# Patient Record
Sex: FEMALE | Race: WHITE | Employment: UNEMPLOYED | URBAN - NONMETROPOLITAN AREA
[De-identification: names, ages, dates, MRNs, and addresses within clinical notes are randomized per-mention and may not be internally consistent; named-entity substitution may affect disease eponyms.]

---

## 2017-01-14 RX ORDER — HYDROCODONE BITARTRATE AND ACETAMINOPHEN 10; 325 MG/1; MG/1
1 TABLET ORAL EVERY 6 HOURS PRN
Qty: 110 TABLET | Refills: 0 | Status: SHIPPED | OUTPATIENT
Start: 2017-01-14 | End: 2017-02-16 | Stop reason: SDUPTHER

## 2017-01-14 RX ORDER — ALPRAZOLAM 1 MG/1
1 TABLET ORAL 3 TIMES DAILY PRN
Qty: 90 TABLET | Refills: 0 | Status: ON HOLD | OUTPATIENT
Start: 2017-01-14 | End: 2017-02-08 | Stop reason: HOSPADM

## 2017-01-14 RX ORDER — ZOLPIDEM TARTRATE 12.5 MG/1
12.5 TABLET, FILM COATED, EXTENDED RELEASE ORAL NIGHTLY PRN
Qty: 30 TABLET | Refills: 0 | Status: SHIPPED | OUTPATIENT
Start: 2017-01-14 | End: 2017-02-16 | Stop reason: SDUPTHER

## 2017-01-30 ENCOUNTER — APPOINTMENT (OUTPATIENT)
Dept: GENERAL RADIOLOGY | Age: 47
DRG: 917 | End: 2017-01-30
Payer: COMMERCIAL

## 2017-01-30 ENCOUNTER — HOSPITAL ENCOUNTER (INPATIENT)
Age: 47
LOS: 9 days | Discharge: HOME | DRG: 917 | End: 2017-02-08
Attending: EMERGENCY MEDICINE | Admitting: HOSPITALIST
Payer: COMMERCIAL

## 2017-01-30 DIAGNOSIS — T24.202A: ICD-10-CM

## 2017-01-30 DIAGNOSIS — N30.00 ACUTE CYSTITIS WITHOUT HEMATURIA: ICD-10-CM

## 2017-01-30 DIAGNOSIS — T50.902A POLYSUBSTANCE OVERDOSE, INTENTIONAL SELF-HARM, INITIAL ENCOUNTER (HCC): ICD-10-CM

## 2017-01-30 DIAGNOSIS — J96.01 ACUTE HYPOXEMIC RESPIRATORY FAILURE (HCC): Primary | ICD-10-CM

## 2017-01-30 PROBLEM — N30.01 ACUTE CYSTITIS WITH HEMATURIA: Status: ACTIVE | Noted: 2017-01-30

## 2017-01-30 PROBLEM — J96.02 ACUTE RESPIRATORY FAILURE WITH HYPOXIA AND HYPERCAPNIA (HCC): Status: ACTIVE | Noted: 2017-01-30

## 2017-01-30 PROBLEM — F33.1 MODERATE EPISODE OF RECURRENT MAJOR DEPRESSIVE DISORDER (HCC): Status: ACTIVE | Noted: 2017-01-30

## 2017-01-30 LAB
ACETAMINOPHEN LEVEL: <15 UG/ML
ALBUMIN SERPL-MCNC: 4.4 G/DL (ref 3.5–5.2)
ALP BLD-CCNC: 122 U/L (ref 35–104)
ALT SERPL-CCNC: 25 U/L (ref 5–33)
AMPHETAMINE SCREEN, URINE: NEGATIVE
ANION GAP SERPL CALCULATED.3IONS-SCNC: 20 MMOL/L (ref 7–19)
AST SERPL-CCNC: 27 U/L (ref 5–32)
BACTERIA: ABNORMAL /HPF
BANDED NEUTROPHILS RELATIVE PERCENT: 3 % (ref 0–5)
BARBITURATE SCREEN URINE: NEGATIVE
BASE EXCESS ARTERIAL: -1.2 MMOL/L (ref -2–2)
BASE EXCESS ARTERIAL: -5 MMOL/L (ref -2–2)
BASOPHILS ABSOLUTE: 0.4 K/UL (ref 0–0.2)
BASOPHILS RELATIVE PERCENT: 2 % (ref 0–1)
BENZODIAZEPINE SCREEN, URINE: POSITIVE
BILIRUB SERPL-MCNC: <0.2 MG/DL (ref 0.2–1.2)
BILIRUBIN URINE: NEGATIVE
BLOOD, URINE: ABNORMAL
BUN BLDV-MCNC: 13 MG/DL (ref 6–20)
CALCIUM SERPL-MCNC: 8.8 MG/DL (ref 8.6–10)
CANNABINOID SCREEN URINE: NEGATIVE
CARBOXYHEMOGLOBIN ARTERIAL: 1.3 % (ref 0–5)
CARBOXYHEMOGLOBIN ARTERIAL: 1.7 % (ref 0–5)
CHLORIDE BLD-SCNC: 103 MMOL/L (ref 98–111)
CLARITY: ABNORMAL
CO2: 22 MMOL/L (ref 22–29)
COCAINE METABOLITE SCREEN URINE: NEGATIVE
COLOR: YELLOW
CREAT SERPL-MCNC: 0.8 MG/DL (ref 0.5–0.9)
EOSINOPHILS ABSOLUTE: 0 K/UL (ref 0–0.6)
EOSINOPHILS RELATIVE PERCENT: 0 % (ref 0–5)
EPITHELIAL CELLS, UA: 2 /HPF (ref 0–5)
ETHANOL: <10 MG/DL (ref 0–0.08)
GFR NON-AFRICAN AMERICAN: >60
GLOBULIN: 3.5 G/DL
GLUCOSE BLD-MCNC: 132 MG/DL (ref 74–109)
GLUCOSE URINE: NEGATIVE MG/DL
HCG(URINE) PREGNANCY TEST: NEGATIVE
HCO3 ARTERIAL: 22.4 MMOL/L (ref 22–26)
HCO3 ARTERIAL: 23.6 MMOL/L (ref 22–26)
HCT VFR BLD CALC: 44.7 % (ref 37–47)
HEMOGLOBIN, ART, EXTENDED: 12.4 G/DL (ref 12–16)
HEMOGLOBIN, ART, EXTENDED: 13.6 G/DL (ref 12–16)
HEMOGLOBIN: 13.9 G/DL (ref 12–16)
HYALINE CASTS: 12 /HPF (ref 0–8)
KETONES, URINE: NEGATIVE MG/DL
LACTIC ACID: 4.5 MG/DL (ref 0.5–1.9)
LACTIC ACID: 4.7 MG/DL (ref 0.5–1.9)
LACTIC ACID: 5 MG/DL (ref 0.5–1.9)
LACTIC ACID: 5.2 MG/DL (ref 0.5–1.9)
LEUKOCYTE ESTERASE, URINE: NEGATIVE
LYMPHOCYTES ABSOLUTE: 1.3 K/UL (ref 1.1–4.5)
LYMPHOCYTES RELATIVE PERCENT: 6 % (ref 20–40)
Lab: ABNORMAL
MAGNESIUM: 2.2 MG/DL (ref 1.6–2.6)
MCH RBC QN AUTO: 28.6 PG (ref 27–31)
MCHC RBC AUTO-ENTMCNC: 31.1 G/DL (ref 33–37)
MCV RBC AUTO: 92 FL (ref 81–99)
METHEMOGLOBIN ARTERIAL: 0.8 %
METHEMOGLOBIN ARTERIAL: 1.3 %
MONOCYTES ABSOLUTE: 0.6 K/UL (ref 0–0.9)
MONOCYTES RELATIVE PERCENT: 3 % (ref 0–10)
NEUTROPHILS ABSOLUTE: 18.9 K/UL (ref 1.5–7.5)
NEUTROPHILS RELATIVE PERCENT: 86 % (ref 50–65)
NITRITE, URINE: POSITIVE
O2 CONTENT ARTERIAL: 16.4 ML/DL
O2 CONTENT ARTERIAL: 19.7 ML/DL
O2 SAT, ARTERIAL: 93.7 %
O2 SAT, ARTERIAL: 97.6 %
O2 THERAPY: ABNORMAL
O2 THERAPY: ABNORMAL
OPIATE SCREEN URINE: NEGATIVE
PCO2 ARTERIAL: 39 MMHG (ref 35–45)
PCO2 ARTERIAL: 50 MMHG (ref 35–45)
PDW BLD-RTO: 14.2 % (ref 11.5–14.5)
PH ARTERIAL: 7.26 (ref 7.35–7.45)
PH ARTERIAL: 7.39 (ref 7.35–7.45)
PH UA: 5.5
PLATELET # BLD: 387 K/UL (ref 130–400)
PLATELET SLIDE REVIEW: ADEQUATE
PMV BLD AUTO: 9 FL (ref 7.4–10.4)
PO2 ARTERIAL: 407 MMHG (ref 80–100)
PO2 ARTERIAL: 70 MMHG (ref 80–100)
POLYCHROMASIA: ABNORMAL
POTASSIUM SERPL-SCNC: 4.6 MMOL/L (ref 3.5–5)
POTASSIUM, WHOLE BLOOD: 3.7
POTASSIUM, WHOLE BLOOD: 4.5
PROTEIN UA: 30 MG/DL
RBC # BLD: 4.86 M/UL (ref 4.2–5.4)
RBC UA: 2 /HPF (ref 0–4)
SALICYLATE, SERUM: <3 MG/DL (ref 3–10)
SODIUM BLD-SCNC: 145 MMOL/L (ref 136–145)
SPECIFIC GRAVITY UA: 1.02
STOMATOCYTES: ABNORMAL
TOTAL CK: 601 U/L (ref 26–192)
TOTAL PROTEIN: 7.9 G/DL (ref 6.6–8.7)
UROBILINOGEN, URINE: 0.2 E.U./DL
WBC # BLD: 21.2 K/UL (ref 4.8–10.8)
WBC UA: 10 /HPF (ref 0–5)

## 2017-01-30 PROCEDURE — G0480 DRUG TEST DEF 1-7 CLASSES: HCPCS

## 2017-01-30 PROCEDURE — 36600 WITHDRAWAL OF ARTERIAL BLOOD: CPT

## 2017-01-30 PROCEDURE — 99291 CRITICAL CARE FIRST HOUR: CPT | Performed by: HOSPITALIST

## 2017-01-30 PROCEDURE — 96365 THER/PROPH/DIAG IV INF INIT: CPT

## 2017-01-30 PROCEDURE — 43753 TX GASTRO INTUB W/ASP: CPT | Performed by: EMERGENCY MEDICINE

## 2017-01-30 PROCEDURE — 82550 ASSAY OF CK (CPK): CPT

## 2017-01-30 PROCEDURE — 31500 INSERT EMERGENCY AIRWAY: CPT | Performed by: EMERGENCY MEDICINE

## 2017-01-30 PROCEDURE — 96375 TX/PRO/DX INJ NEW DRUG ADDON: CPT

## 2017-01-30 PROCEDURE — 93005 ELECTROCARDIOGRAM TRACING: CPT

## 2017-01-30 PROCEDURE — 71010 XR CHEST PORTABLE: CPT

## 2017-01-30 PROCEDURE — 81001 URINALYSIS AUTO W/SCOPE: CPT

## 2017-01-30 PROCEDURE — 2000000000 HC ICU R&B

## 2017-01-30 PROCEDURE — 6360000002 HC RX W HCPCS: Performed by: EMERGENCY MEDICINE

## 2017-01-30 PROCEDURE — 6360000002 HC RX W HCPCS: Performed by: HOSPITALIST

## 2017-01-30 PROCEDURE — 84132 ASSAY OF SERUM POTASSIUM: CPT

## 2017-01-30 PROCEDURE — 99291 CRITICAL CARE FIRST HOUR: CPT

## 2017-01-30 PROCEDURE — 6370000000 HC RX 637 (ALT 250 FOR IP): Performed by: HOSPITALIST

## 2017-01-30 PROCEDURE — 82803 BLOOD GASES ANY COMBINATION: CPT

## 2017-01-30 PROCEDURE — 83605 ASSAY OF LACTIC ACID: CPT

## 2017-01-30 PROCEDURE — 87185 SC STD ENZYME DETCJ PER NZM: CPT

## 2017-01-30 PROCEDURE — 36415 COLL VENOUS BLD VENIPUNCTURE: CPT

## 2017-01-30 PROCEDURE — 94002 VENT MGMT INPAT INIT DAY: CPT

## 2017-01-30 PROCEDURE — C9113 INJ PANTOPRAZOLE SODIUM, VIA: HCPCS | Performed by: HOSPITALIST

## 2017-01-30 PROCEDURE — 83735 ASSAY OF MAGNESIUM: CPT

## 2017-01-30 PROCEDURE — 94640 AIRWAY INHALATION TREATMENT: CPT

## 2017-01-30 PROCEDURE — 87070 CULTURE OTHR SPECIMN AEROBIC: CPT

## 2017-01-30 PROCEDURE — 2580000003 HC RX 258: Performed by: HOSPITALIST

## 2017-01-30 PROCEDURE — 2580000003 HC RX 258: Performed by: EMERGENCY MEDICINE

## 2017-01-30 PROCEDURE — 80307 DRUG TEST PRSMV CHEM ANLYZR: CPT

## 2017-01-30 PROCEDURE — 2700000000 HC OXYGEN THERAPY PER DAY

## 2017-01-30 PROCEDURE — 0BH17EZ INSERTION OF ENDOTRACHEAL AIRWAY INTO TRACHEA, VIA NATURAL OR ARTIFICIAL OPENING: ICD-10-PCS | Performed by: EMERGENCY MEDICINE

## 2017-01-30 PROCEDURE — 80053 COMPREHEN METABOLIC PANEL: CPT

## 2017-01-30 PROCEDURE — 99291 CRITICAL CARE FIRST HOUR: CPT | Performed by: EMERGENCY MEDICINE

## 2017-01-30 PROCEDURE — 85025 COMPLETE CBC W/AUTO DIFF WBC: CPT

## 2017-01-30 PROCEDURE — 87205 SMEAR GRAM STAIN: CPT

## 2017-01-30 PROCEDURE — 87086 URINE CULTURE/COLONY COUNT: CPT

## 2017-01-30 PROCEDURE — 5A1955Z RESPIRATORY VENTILATION, GREATER THAN 96 CONSECUTIVE HOURS: ICD-10-PCS | Performed by: INTERNAL MEDICINE

## 2017-01-30 PROCEDURE — 2500000003 HC RX 250 WO HCPCS: Performed by: EMERGENCY MEDICINE

## 2017-01-30 PROCEDURE — 2580000003 HC RX 258: Performed by: INTERNAL MEDICINE

## 2017-01-30 PROCEDURE — 6360000002 HC RX W HCPCS

## 2017-01-30 PROCEDURE — 31500 INSERT EMERGENCY AIRWAY: CPT

## 2017-01-30 PROCEDURE — 81025 URINE PREGNANCY TEST: CPT

## 2017-01-30 RX ORDER — SODIUM CHLORIDE 9 MG/ML
INJECTION, SOLUTION INTRAVENOUS CONTINUOUS
Status: DISCONTINUED | OUTPATIENT
Start: 2017-01-30 | End: 2017-02-03

## 2017-01-30 RX ORDER — 0.9 % SODIUM CHLORIDE 0.9 %
500 INTRAVENOUS SOLUTION INTRAVENOUS ONCE
Status: COMPLETED | OUTPATIENT
Start: 2017-01-30 | End: 2017-01-30

## 2017-01-30 RX ORDER — PROPOFOL 10 MG/ML
10 INJECTION, EMULSION INTRAVENOUS CONTINUOUS
Status: DISCONTINUED | OUTPATIENT
Start: 2017-01-30 | End: 2017-02-05

## 2017-01-30 RX ORDER — SODIUM CHLORIDE 0.9 % (FLUSH) 0.9 %
10 SYRINGE (ML) INJECTION PRN
Status: DISCONTINUED | OUTPATIENT
Start: 2017-01-30 | End: 2017-01-31

## 2017-01-30 RX ORDER — PROPOFOL 10 MG/ML
INJECTION, EMULSION INTRAVENOUS
Status: COMPLETED
Start: 2017-01-30 | End: 2017-01-30

## 2017-01-30 RX ORDER — PROPOFOL 10 MG/ML
10 INJECTION, EMULSION INTRAVENOUS
Status: DISCONTINUED | OUTPATIENT
Start: 2017-01-30 | End: 2017-01-30

## 2017-01-30 RX ORDER — PANTOPRAZOLE SODIUM 40 MG/10ML
40 INJECTION, POWDER, LYOPHILIZED, FOR SOLUTION INTRAVENOUS DAILY
Status: DISCONTINUED | OUTPATIENT
Start: 2017-01-30 | End: 2017-02-08 | Stop reason: ALTCHOICE

## 2017-01-30 RX ORDER — CHLORHEXIDINE GLUCONATE 0.12 MG/ML
15 RINSE ORAL 2 TIMES DAILY
Status: DISCONTINUED | OUTPATIENT
Start: 2017-01-30 | End: 2017-02-05

## 2017-01-30 RX ORDER — IPRATROPIUM BROMIDE AND ALBUTEROL SULFATE 2.5; .5 MG/3ML; MG/3ML
1 SOLUTION RESPIRATORY (INHALATION) EVERY 4 HOURS
Status: DISCONTINUED | OUTPATIENT
Start: 2017-01-30 | End: 2017-02-06

## 2017-01-30 RX ORDER — LORAZEPAM 2 MG/ML
2 INJECTION INTRAMUSCULAR
Status: DISCONTINUED | OUTPATIENT
Start: 2017-01-30 | End: 2017-02-06

## 2017-01-30 RX ORDER — LORAZEPAM 1 MG/1
2 TABLET ORAL
Status: DISCONTINUED | OUTPATIENT
Start: 2017-01-30 | End: 2017-02-06

## 2017-01-30 RX ORDER — ACETAMINOPHEN 325 MG/1
650 TABLET ORAL EVERY 4 HOURS PRN
Status: DISCONTINUED | OUTPATIENT
Start: 2017-01-30 | End: 2017-02-08 | Stop reason: HOSPADM

## 2017-01-30 RX ORDER — LORAZEPAM 1 MG/1
4 TABLET ORAL
Status: DISCONTINUED | OUTPATIENT
Start: 2017-01-30 | End: 2017-02-06

## 2017-01-30 RX ORDER — SODIUM CHLORIDE 0.9 % (FLUSH) 0.9 %
10 SYRINGE (ML) INJECTION EVERY 12 HOURS SCHEDULED
Status: DISCONTINUED | OUTPATIENT
Start: 2017-01-30 | End: 2017-01-31

## 2017-01-30 RX ORDER — LORAZEPAM 1 MG/1
1 TABLET ORAL
Status: DISCONTINUED | OUTPATIENT
Start: 2017-01-30 | End: 2017-02-06

## 2017-01-30 RX ORDER — LORAZEPAM 1 MG/1
3 TABLET ORAL
Status: DISCONTINUED | OUTPATIENT
Start: 2017-01-30 | End: 2017-02-06

## 2017-01-30 RX ORDER — ONDANSETRON 2 MG/ML
4 INJECTION INTRAMUSCULAR; INTRAVENOUS EVERY 6 HOURS PRN
Status: DISCONTINUED | OUTPATIENT
Start: 2017-01-30 | End: 2017-02-08 | Stop reason: HOSPADM

## 2017-01-30 RX ORDER — LORAZEPAM 2 MG/ML
3 INJECTION INTRAMUSCULAR
Status: DISCONTINUED | OUTPATIENT
Start: 2017-01-30 | End: 2017-02-06

## 2017-01-30 RX ORDER — LORAZEPAM 2 MG/ML
1 INJECTION INTRAMUSCULAR
Status: DISCONTINUED | OUTPATIENT
Start: 2017-01-30 | End: 2017-02-06

## 2017-01-30 RX ORDER — LORAZEPAM 2 MG/ML
4 INJECTION INTRAMUSCULAR
Status: DISCONTINUED | OUTPATIENT
Start: 2017-01-30 | End: 2017-02-06

## 2017-01-30 RX ADMIN — SODIUM CHLORIDE 500 ML: 9 INJECTION, SOLUTION INTRAVENOUS at 20:02

## 2017-01-30 RX ADMIN — CEFTRIAXONE 1 G: 1 INJECTION, POWDER, FOR SOLUTION INTRAMUSCULAR; INTRAVENOUS at 10:28

## 2017-01-30 RX ADMIN — PROPOFOL 30 MCG/KG/MIN: 10 INJECTION, EMULSION INTRAVENOUS at 18:00

## 2017-01-30 RX ADMIN — SILVER SULFADIAZINE: 10 CREAM TOPICAL at 12:07

## 2017-01-30 RX ADMIN — SODIUM CHLORIDE: 9 INJECTION, SOLUTION INTRAVENOUS at 20:02

## 2017-01-30 RX ADMIN — ENOXAPARIN SODIUM 40 MG: 40 INJECTION SUBCUTANEOUS at 16:02

## 2017-01-30 RX ADMIN — IPRATROPIUM BROMIDE AND ALBUTEROL SULFATE 1 AMPULE: .5; 3 SOLUTION RESPIRATORY (INHALATION) at 23:12

## 2017-01-30 RX ADMIN — MEROPENEM 1 G: 1 INJECTION, POWDER, FOR SOLUTION INTRAVENOUS at 18:14

## 2017-01-30 RX ADMIN — IPRATROPIUM BROMIDE AND ALBUTEROL SULFATE 1 AMPULE: .5; 3 SOLUTION RESPIRATORY (INHALATION) at 16:09

## 2017-01-30 RX ADMIN — PROPOFOL 1000 MG: 10 INJECTION, EMULSION INTRAVENOUS at 10:12

## 2017-01-30 RX ADMIN — PROPOFOL 30 MCG/KG/MIN: 10 INJECTION, EMULSION INTRAVENOUS at 22:13

## 2017-01-30 RX ADMIN — PANTOPRAZOLE SODIUM 40 MG: 40 INJECTION, POWDER, FOR SOLUTION INTRAVENOUS at 16:02

## 2017-01-30 RX ADMIN — IPRATROPIUM BROMIDE AND ALBUTEROL SULFATE 1 AMPULE: .5; 3 SOLUTION RESPIRATORY (INHALATION) at 20:08

## 2017-01-30 RX ADMIN — CHLORHEXIDINE GLUCONATE 15 ML: 1.2 RINSE ORAL at 21:55

## 2017-01-30 ASSESSMENT — PULMONARY FUNCTION TESTS
PIF_VALUE: 16.5
PIF_VALUE: 31.5
PIF_VALUE: 23.1
PIF_VALUE: 18.9
PIF_VALUE: 18.9
PIF_VALUE: 12.3
PIF_VALUE: 57.7
PIF_VALUE: 18.6
PIF_VALUE: 20.6
PIF_VALUE: 21.8
PIF_VALUE: 19.8
PIF_VALUE: 20

## 2017-01-31 ENCOUNTER — APPOINTMENT (OUTPATIENT)
Dept: GENERAL RADIOLOGY | Age: 47
DRG: 917 | End: 2017-01-31
Payer: COMMERCIAL

## 2017-01-31 LAB
ANION GAP SERPL CALCULATED.3IONS-SCNC: 19 MMOL/L (ref 7–19)
BANDED NEUTROPHILS RELATIVE PERCENT: 2 % (ref 0–5)
BASE EXCESS ARTERIAL: -0.2 MMOL/L (ref -2–2)
BASOPHILS ABSOLUTE: 0 K/UL (ref 0–0.2)
BASOPHILS RELATIVE PERCENT: 0 % (ref 0–1)
BUN BLDV-MCNC: 12 MG/DL (ref 6–20)
CALCIUM SERPL-MCNC: 8 MG/DL (ref 8.6–10)
CARBOXYHEMOGLOBIN ARTERIAL: 1.4 % (ref 0–5)
CHLORIDE BLD-SCNC: 100 MMOL/L (ref 98–111)
CO2: 23 MMOL/L (ref 22–29)
CREAT SERPL-MCNC: 0.9 MG/DL (ref 0.5–0.9)
EKG P AXIS: 67 DEGREES
EKG P-R INTERVAL: 112 MS
EKG Q-T INTERVAL: 328 MS
EKG QRS DURATION: 94 MS
EKG QTC CALCULATION (BAZETT): 429 MS
EKG T AXIS: 60 DEGREES
EOSINOPHILS ABSOLUTE: 0 K/UL (ref 0–0.6)
EOSINOPHILS RELATIVE PERCENT: 0 % (ref 0–5)
GFR NON-AFRICAN AMERICAN: >60
GLUCOSE BLD-MCNC: 120 MG/DL (ref 74–109)
HCO3 ARTERIAL: 23.9 MMOL/L (ref 22–26)
HCT VFR BLD CALC: 38.7 % (ref 37–47)
HEMOGLOBIN, ART, EXTENDED: 11.2 G/DL (ref 12–16)
HEMOGLOBIN: 12 G/DL (ref 12–16)
LACTIC ACID: 4.3 MG/DL (ref 0.5–1.9)
LACTIC ACID: 4.6 MG/DL (ref 0.5–1.9)
LACTIC ACID: 5 MG/DL (ref 0.5–1.9)
LYMPHOCYTES ABSOLUTE: 1.4 K/UL (ref 1.1–4.5)
LYMPHOCYTES RELATIVE PERCENT: 10 % (ref 20–40)
MAGNESIUM: 2 MG/DL (ref 1.6–2.6)
MCH RBC QN AUTO: 29.5 PG (ref 27–31)
MCHC RBC AUTO-ENTMCNC: 31 G/DL (ref 33–37)
MCV RBC AUTO: 95.1 FL (ref 81–99)
METHEMOGLOBIN ARTERIAL: 1.3 %
MONOCYTES ABSOLUTE: 0.3 K/UL (ref 0–0.9)
MONOCYTES RELATIVE PERCENT: 2 % (ref 0–10)
NEUTROPHILS ABSOLUTE: 12.2 K/UL (ref 1.5–7.5)
NEUTROPHILS RELATIVE PERCENT: 86 % (ref 50–65)
O2 CONTENT ARTERIAL: 15 ML/DL
O2 SAT, ARTERIAL: 94.5 %
O2 THERAPY: ABNORMAL
PCO2 ARTERIAL: 36 MMHG (ref 35–45)
PDW BLD-RTO: 14.6 % (ref 11.5–14.5)
PH ARTERIAL: 7.43 (ref 7.35–7.45)
PHOSPHORUS: 2.5 MG/DL (ref 2.5–4.5)
PLATELET # BLD: 305 K/UL (ref 130–400)
PLATELET SLIDE REVIEW: ADEQUATE
PMV BLD AUTO: 9 FL (ref 7.4–10.4)
PO2 ARTERIAL: 79 MMHG (ref 80–100)
POTASSIUM SERPL-SCNC: 3.6 MMOL/L (ref 3.5–5)
POTASSIUM, WHOLE BLOOD: 3.4
RBC # BLD: 4.07 M/UL (ref 4.2–5.4)
RBC # BLD: NORMAL 10*6/UL
SODIUM BLD-SCNC: 142 MMOL/L (ref 136–145)
TOTAL CK: 2701 U/L (ref 26–192)
WBC # BLD: 13.9 K/UL (ref 4.8–10.8)

## 2017-01-31 PROCEDURE — 99291 CRITICAL CARE FIRST HOUR: CPT | Performed by: HOSPITALIST

## 2017-01-31 PROCEDURE — 94640 AIRWAY INHALATION TREATMENT: CPT

## 2017-01-31 PROCEDURE — 2700000000 HC OXYGEN THERAPY PER DAY

## 2017-01-31 PROCEDURE — 82803 BLOOD GASES ANY COMBINATION: CPT

## 2017-01-31 PROCEDURE — 85025 COMPLETE CBC W/AUTO DIFF WBC: CPT

## 2017-01-31 PROCEDURE — 6360000002 HC RX W HCPCS: Performed by: HOSPITALIST

## 2017-01-31 PROCEDURE — C1751 CATH, INF, PER/CENT/MIDLINE: HCPCS

## 2017-01-31 PROCEDURE — 2580000003 HC RX 258: Performed by: HOSPITALIST

## 2017-01-31 PROCEDURE — 2000000000 HC ICU R&B

## 2017-01-31 PROCEDURE — 82550 ASSAY OF CK (CPK): CPT

## 2017-01-31 PROCEDURE — 87040 BLOOD CULTURE FOR BACTERIA: CPT

## 2017-01-31 PROCEDURE — 36600 WITHDRAWAL OF ARTERIAL BLOOD: CPT

## 2017-01-31 PROCEDURE — 2500000003 HC RX 250 WO HCPCS: Performed by: HOSPITALIST

## 2017-01-31 PROCEDURE — 83605 ASSAY OF LACTIC ACID: CPT

## 2017-01-31 PROCEDURE — 83735 ASSAY OF MAGNESIUM: CPT

## 2017-01-31 PROCEDURE — 71010 XR CHEST PORTABLE: CPT

## 2017-01-31 PROCEDURE — 4A02X4A MEASUREMENT OF CARDIAC ELECTRICAL ACTIVITY, GUIDANCE, EXTERNAL APPROACH: ICD-10-PCS | Performed by: HOSPITALIST

## 2017-01-31 PROCEDURE — 84132 ASSAY OF SERUM POTASSIUM: CPT

## 2017-01-31 PROCEDURE — 36415 COLL VENOUS BLD VENIPUNCTURE: CPT

## 2017-01-31 PROCEDURE — 84100 ASSAY OF PHOSPHORUS: CPT

## 2017-01-31 PROCEDURE — 87070 CULTURE OTHR SPECIMN AEROBIC: CPT

## 2017-01-31 PROCEDURE — 02HV33Z INSERTION OF INFUSION DEVICE INTO SUPERIOR VENA CAVA, PERCUTANEOUS APPROACH: ICD-10-PCS | Performed by: HOSPITALIST

## 2017-01-31 PROCEDURE — 94003 VENT MGMT INPAT SUBQ DAY: CPT

## 2017-01-31 PROCEDURE — 76937 US GUIDE VASCULAR ACCESS: CPT

## 2017-01-31 PROCEDURE — 6370000000 HC RX 637 (ALT 250 FOR IP): Performed by: HOSPITALIST

## 2017-01-31 PROCEDURE — 36569 INSJ PICC 5 YR+ W/O IMAGING: CPT

## 2017-01-31 PROCEDURE — 80048 BASIC METABOLIC PNL TOTAL CA: CPT

## 2017-01-31 PROCEDURE — 2580000003 HC RX 258: Performed by: INTERNAL MEDICINE

## 2017-01-31 PROCEDURE — C9113 INJ PANTOPRAZOLE SODIUM, VIA: HCPCS | Performed by: HOSPITALIST

## 2017-01-31 RX ORDER — SODIUM CHLORIDE 0.9 % (FLUSH) 0.9 %
10 SYRINGE (ML) INJECTION EVERY 12 HOURS SCHEDULED
Status: DISCONTINUED | OUTPATIENT
Start: 2017-01-31 | End: 2017-02-08 | Stop reason: HOSPADM

## 2017-01-31 RX ORDER — 0.9 % SODIUM CHLORIDE 0.9 %
500 INTRAVENOUS SOLUTION INTRAVENOUS ONCE
Status: COMPLETED | OUTPATIENT
Start: 2017-01-31 | End: 2017-01-31

## 2017-01-31 RX ORDER — LORAZEPAM 2 MG/ML
1 INJECTION INTRAMUSCULAR EVERY 4 HOURS PRN
Status: DISCONTINUED | OUTPATIENT
Start: 2017-01-31 | End: 2017-02-07 | Stop reason: ALTCHOICE

## 2017-01-31 RX ORDER — SODIUM CHLORIDE 0.9 % (FLUSH) 0.9 %
10 SYRINGE (ML) INJECTION PRN
Status: DISCONTINUED | OUTPATIENT
Start: 2017-01-31 | End: 2017-02-08 | Stop reason: HOSPADM

## 2017-01-31 RX ORDER — LIDOCAINE HYDROCHLORIDE 10 MG/ML
5 INJECTION, SOLUTION EPIDURAL; INFILTRATION; INTRACAUDAL; PERINEURAL ONCE
Status: DISCONTINUED | OUTPATIENT
Start: 2017-01-31 | End: 2017-02-07 | Stop reason: ALTCHOICE

## 2017-01-31 RX ADMIN — Medication 10 ML: at 08:37

## 2017-01-31 RX ADMIN — MEROPENEM 1 G: 1 INJECTION, POWDER, FOR SOLUTION INTRAVENOUS at 01:39

## 2017-01-31 RX ADMIN — DEXMEDETOMIDINE HYDROCHLORIDE 1 MCG/KG/HR: 100 INJECTION, SOLUTION, CONCENTRATE INTRAVENOUS at 16:14

## 2017-01-31 RX ADMIN — SODIUM CHLORIDE: 9 INJECTION, SOLUTION INTRAVENOUS at 23:12

## 2017-01-31 RX ADMIN — LORAZEPAM 2 MG: 2 INJECTION INTRAMUSCULAR; INTRAVENOUS at 16:13

## 2017-01-31 RX ADMIN — MEROPENEM 1 G: 1 INJECTION, POWDER, FOR SOLUTION INTRAVENOUS at 08:51

## 2017-01-31 RX ADMIN — MEROPENEM 1 G: 1 INJECTION, POWDER, FOR SOLUTION INTRAVENOUS at 16:13

## 2017-01-31 RX ADMIN — IPRATROPIUM BROMIDE AND ALBUTEROL SULFATE 1 AMPULE: .5; 3 SOLUTION RESPIRATORY (INHALATION) at 23:18

## 2017-01-31 RX ADMIN — PANTOPRAZOLE SODIUM 40 MG: 40 INJECTION, POWDER, FOR SOLUTION INTRAVENOUS at 08:28

## 2017-01-31 RX ADMIN — LORAZEPAM 2 MG: 2 INJECTION INTRAMUSCULAR; INTRAVENOUS at 11:03

## 2017-01-31 RX ADMIN — IPRATROPIUM BROMIDE AND ALBUTEROL SULFATE 1 AMPULE: .5; 3 SOLUTION RESPIRATORY (INHALATION) at 03:15

## 2017-01-31 RX ADMIN — DEXMEDETOMIDINE HYDROCHLORIDE 1 MCG/KG/HR: 100 INJECTION, SOLUTION, CONCENTRATE INTRAVENOUS at 21:49

## 2017-01-31 RX ADMIN — Medication 10 ML: at 21:50

## 2017-01-31 RX ADMIN — DEXMEDETOMIDINE HYDROCHLORIDE 1 MCG/KG/HR: 100 INJECTION, SOLUTION, CONCENTRATE INTRAVENOUS at 19:36

## 2017-01-31 RX ADMIN — SODIUM CHLORIDE: 9 INJECTION, SOLUTION INTRAVENOUS at 08:51

## 2017-01-31 RX ADMIN — DEXMEDETOMIDINE HYDROCHLORIDE 1 MCG/KG/HR: 100 INJECTION, SOLUTION, CONCENTRATE INTRAVENOUS at 23:12

## 2017-01-31 RX ADMIN — IPRATROPIUM BROMIDE AND ALBUTEROL SULFATE 1 AMPULE: .5; 3 SOLUTION RESPIRATORY (INHALATION) at 18:45

## 2017-01-31 RX ADMIN — SODIUM CHLORIDE 500 ML: 9 INJECTION, SOLUTION INTRAVENOUS at 04:39

## 2017-01-31 RX ADMIN — CHLORHEXIDINE GLUCONATE 15 ML: 1.2 RINSE ORAL at 21:49

## 2017-01-31 RX ADMIN — Medication 10 ML: at 08:28

## 2017-01-31 RX ADMIN — IPRATROPIUM BROMIDE AND ALBUTEROL SULFATE 1 AMPULE: .5; 3 SOLUTION RESPIRATORY (INHALATION) at 10:28

## 2017-01-31 RX ADMIN — IPRATROPIUM BROMIDE AND ALBUTEROL SULFATE 1 AMPULE: .5; 3 SOLUTION RESPIRATORY (INHALATION) at 06:17

## 2017-01-31 RX ADMIN — CHLORHEXIDINE GLUCONATE 15 ML: 1.2 RINSE ORAL at 08:37

## 2017-01-31 RX ADMIN — DEXMEDETOMIDINE HYDROCHLORIDE 1.2 MCG/KG/HR: 100 INJECTION, SOLUTION, CONCENTRATE INTRAVENOUS at 13:19

## 2017-01-31 RX ADMIN — ENOXAPARIN SODIUM 40 MG: 40 INJECTION SUBCUTANEOUS at 15:33

## 2017-01-31 RX ADMIN — SILVER SULFADIAZINE: 10 CREAM TOPICAL at 19:15

## 2017-01-31 RX ADMIN — DEXMEDETOMIDINE HYDROCHLORIDE 1.2 MCG/KG/HR: 100 INJECTION, SOLUTION, CONCENTRATE INTRAVENOUS at 11:19

## 2017-01-31 RX ADMIN — IPRATROPIUM BROMIDE AND ALBUTEROL SULFATE 1 AMPULE: .5; 3 SOLUTION RESPIRATORY (INHALATION) at 14:43

## 2017-01-31 RX ADMIN — PROPOFOL 30 MCG/KG/MIN: 10 INJECTION, EMULSION INTRAVENOUS at 01:58

## 2017-01-31 RX ADMIN — LORAZEPAM 2 MG: 2 INJECTION INTRAMUSCULAR; INTRAVENOUS at 08:28

## 2017-01-31 ASSESSMENT — PULMONARY FUNCTION TESTS
PIF_VALUE: 21.4
PIF_VALUE: 17.9
PIF_VALUE: 20.3
PIF_VALUE: 19.9
PIF_VALUE: 21.5
PIF_VALUE: 13.4
PIF_VALUE: 16.4
PIF_VALUE: 11.5
PIF_VALUE: 21.8
PIF_VALUE: 22.2
PIF_VALUE: 20
PIF_VALUE: 20.8
PIF_VALUE: 9.8
PIF_VALUE: 19.1
PIF_VALUE: 19.4
PIF_VALUE: 18
PIF_VALUE: 18.8
PIF_VALUE: 21.7
PIF_VALUE: 21
PIF_VALUE: 17.5
PIF_VALUE: 19.5
PIF_VALUE: 18.6
PIF_VALUE: 22.5

## 2017-02-01 ENCOUNTER — APPOINTMENT (OUTPATIENT)
Dept: GENERAL RADIOLOGY | Age: 47
DRG: 917 | End: 2017-02-01
Payer: COMMERCIAL

## 2017-02-01 ENCOUNTER — APPOINTMENT (OUTPATIENT)
Dept: CT IMAGING | Age: 47
DRG: 917 | End: 2017-02-01
Payer: COMMERCIAL

## 2017-02-01 PROBLEM — N39.0 E. COLI UTI: Status: ACTIVE | Noted: 2017-02-01

## 2017-02-01 PROBLEM — I10 HYPERTENSION: Status: ACTIVE | Noted: 2017-02-01

## 2017-02-01 PROBLEM — B96.20 E. COLI UTI: Status: ACTIVE | Noted: 2017-02-01

## 2017-02-01 PROBLEM — F41.9 ANXIETY: Status: ACTIVE | Noted: 2017-02-01

## 2017-02-01 PROBLEM — E66.9 DIABETES MELLITUS TYPE 2 IN OBESE (HCC): Status: ACTIVE | Noted: 2017-02-01

## 2017-02-01 PROBLEM — E11.69 DIABETES MELLITUS TYPE 2 IN OBESE (HCC): Status: ACTIVE | Noted: 2017-02-01

## 2017-02-01 LAB
ALBUMIN SERPL-MCNC: 2.9 G/DL (ref 3.5–5.2)
ALP BLD-CCNC: 85 U/L (ref 35–104)
ALT SERPL-CCNC: 24 U/L (ref 5–33)
ANION GAP SERPL CALCULATED.3IONS-SCNC: 16 MMOL/L (ref 7–19)
AST SERPL-CCNC: 36 U/L (ref 5–32)
BASE EXCESS ARTERIAL: 0.8 MMOL/L (ref -2–2)
BASOPHILS ABSOLUTE: 0 K/UL (ref 0–0.2)
BASOPHILS RELATIVE PERCENT: 0.3 % (ref 0–1)
BILIRUB SERPL-MCNC: 0.3 MG/DL (ref 0.2–1.2)
BUN BLDV-MCNC: 11 MG/DL (ref 6–20)
CALCIUM SERPL-MCNC: 7.8 MG/DL (ref 8.6–10)
CARBOXYHEMOGLOBIN ARTERIAL: 1.7 % (ref 0–5)
CHLORIDE BLD-SCNC: 106 MMOL/L (ref 98–111)
CO2: 22 MMOL/L (ref 22–29)
CREAT SERPL-MCNC: 0.8 MG/DL (ref 0.5–0.9)
CULTURE, RESPIRATORY: NORMAL
EOSINOPHILS ABSOLUTE: 0.1 K/UL (ref 0–0.6)
EOSINOPHILS RELATIVE PERCENT: 0.7 % (ref 0–5)
GFR NON-AFRICAN AMERICAN: >60
GLOBULIN: 2.7 G/DL
GLUCOSE BLD-MCNC: 113 MG/DL (ref 70–99)
GLUCOSE BLD-MCNC: 134 MG/DL (ref 74–109)
GLUCOSE BLD-MCNC: 89 MG/DL (ref 70–99)
GLUCOSE BLD-MCNC: 94 MG/DL (ref 70–99)
GRAM STAIN RESULT: NORMAL
HCO3 ARTERIAL: 25.3 MMOL/L (ref 22–26)
HCT VFR BLD CALC: 34.9 % (ref 37–47)
HEMOGLOBIN, ART, EXTENDED: 10.7 G/DL (ref 12–16)
HEMOGLOBIN: 10.7 G/DL (ref 12–16)
LACTIC ACID: 2.7 MG/DL (ref 0.5–1.9)
LYMPHOCYTES ABSOLUTE: 1.8 K/UL (ref 1.1–4.5)
LYMPHOCYTES RELATIVE PERCENT: 20.7 % (ref 20–40)
MAGNESIUM: 2.1 MG/DL (ref 1.6–2.6)
MCH RBC QN AUTO: 28.2 PG (ref 27–31)
MCHC RBC AUTO-ENTMCNC: 30.7 G/DL (ref 33–37)
MCV RBC AUTO: 91.8 FL (ref 81–99)
METHEMOGLOBIN ARTERIAL: 0.9 %
MONOCYTES ABSOLUTE: 0.5 K/UL (ref 0–0.9)
MONOCYTES RELATIVE PERCENT: 6 % (ref 0–10)
MRSA CULTURE ONLY: NORMAL
NEUTROPHILS ABSOLUTE: 6.3 K/UL (ref 1.5–7.5)
NEUTROPHILS RELATIVE PERCENT: 71.8 % (ref 50–65)
O2 CONTENT ARTERIAL: 14.3 ML/DL
O2 SAT, ARTERIAL: 94.7 %
O2 THERAPY: ABNORMAL
PCO2 ARTERIAL: 39 MMHG (ref 35–45)
PDW BLD-RTO: 14.1 % (ref 11.5–14.5)
PERFORMED ON: ABNORMAL
PERFORMED ON: NORMAL
PERFORMED ON: NORMAL
PH ARTERIAL: 7.42 (ref 7.35–7.45)
PHOSPHORUS: 2.2 MG/DL (ref 2.5–4.5)
PLATELET # BLD: 266 K/UL (ref 130–400)
PMV BLD AUTO: 8.7 FL (ref 7.4–10.4)
PO2 ARTERIAL: 76 MMHG (ref 80–100)
POTASSIUM SERPL-SCNC: 3.2 MMOL/L (ref 3.5–5)
POTASSIUM, WHOLE BLOOD: 3.2
RBC # BLD: 3.8 M/UL (ref 4.2–5.4)
SODIUM BLD-SCNC: 144 MMOL/L (ref 136–145)
TOTAL CK: 1319 U/L (ref 26–192)
TOTAL PROTEIN: 5.6 G/DL (ref 6.6–8.7)
WBC # BLD: 8.7 K/UL (ref 4.8–10.8)

## 2017-02-01 PROCEDURE — 82803 BLOOD GASES ANY COMBINATION: CPT

## 2017-02-01 PROCEDURE — 36600 WITHDRAWAL OF ARTERIAL BLOOD: CPT

## 2017-02-01 PROCEDURE — 6370000000 HC RX 637 (ALT 250 FOR IP): Performed by: HOSPITALIST

## 2017-02-01 PROCEDURE — 99291 CRITICAL CARE FIRST HOUR: CPT | Performed by: HOSPITALIST

## 2017-02-01 PROCEDURE — 70450 CT HEAD/BRAIN W/O DYE: CPT

## 2017-02-01 PROCEDURE — 85025 COMPLETE CBC W/AUTO DIFF WBC: CPT

## 2017-02-01 PROCEDURE — 84100 ASSAY OF PHOSPHORUS: CPT

## 2017-02-01 PROCEDURE — 83735 ASSAY OF MAGNESIUM: CPT

## 2017-02-01 PROCEDURE — 71010 XR CHEST PORTABLE: CPT

## 2017-02-01 PROCEDURE — 2580000003 HC RX 258: Performed by: INTERNAL MEDICINE

## 2017-02-01 PROCEDURE — 2000000000 HC ICU R&B

## 2017-02-01 PROCEDURE — 95819 EEG AWAKE AND ASLEEP: CPT

## 2017-02-01 PROCEDURE — 83605 ASSAY OF LACTIC ACID: CPT

## 2017-02-01 PROCEDURE — C9113 INJ PANTOPRAZOLE SODIUM, VIA: HCPCS | Performed by: HOSPITALIST

## 2017-02-01 PROCEDURE — 84132 ASSAY OF SERUM POTASSIUM: CPT

## 2017-02-01 PROCEDURE — 80053 COMPREHEN METABOLIC PANEL: CPT

## 2017-02-01 PROCEDURE — 94003 VENT MGMT INPAT SUBQ DAY: CPT

## 2017-02-01 PROCEDURE — 6360000002 HC RX W HCPCS: Performed by: HOSPITALIST

## 2017-02-01 PROCEDURE — 2580000003 HC RX 258: Performed by: HOSPITALIST

## 2017-02-01 PROCEDURE — 2500000003 HC RX 250 WO HCPCS: Performed by: HOSPITALIST

## 2017-02-01 PROCEDURE — 95819 EEG AWAKE AND ASLEEP: CPT | Performed by: PSYCHIATRY & NEUROLOGY

## 2017-02-01 PROCEDURE — 94640 AIRWAY INHALATION TREATMENT: CPT

## 2017-02-01 PROCEDURE — 2700000000 HC OXYGEN THERAPY PER DAY

## 2017-02-01 PROCEDURE — 82948 REAGENT STRIP/BLOOD GLUCOSE: CPT

## 2017-02-01 PROCEDURE — 82550 ASSAY OF CK (CPK): CPT

## 2017-02-01 RX ORDER — DEXTROSE MONOHYDRATE 50 MG/ML
100 INJECTION, SOLUTION INTRAVENOUS PRN
Status: DISCONTINUED | OUTPATIENT
Start: 2017-02-01 | End: 2017-02-02

## 2017-02-01 RX ORDER — BUSPIRONE HYDROCHLORIDE 5 MG/1
10 TABLET ORAL 3 TIMES DAILY
Status: DISCONTINUED | OUTPATIENT
Start: 2017-02-01 | End: 2017-02-08 | Stop reason: HOSPADM

## 2017-02-01 RX ORDER — NICOTINE POLACRILEX 4 MG
15 LOZENGE BUCCAL PRN
Status: DISCONTINUED | OUTPATIENT
Start: 2017-02-01 | End: 2017-02-08 | Stop reason: HOSPADM

## 2017-02-01 RX ORDER — M-VIT,TX,IRON,MINS/CALC/FOLIC 27MG-0.4MG
1 TABLET ORAL DAILY
Status: DISCONTINUED | OUTPATIENT
Start: 2017-02-01 | End: 2017-02-08 | Stop reason: HOSPADM

## 2017-02-01 RX ORDER — ARIPIPRAZOLE 5 MG/1
5 TABLET ORAL DAILY
Status: DISCONTINUED | OUTPATIENT
Start: 2017-02-01 | End: 2017-02-08 | Stop reason: HOSPADM

## 2017-02-01 RX ORDER — DEXTROSE MONOHYDRATE 25 G/50ML
12.5 INJECTION, SOLUTION INTRAVENOUS PRN
Status: DISCONTINUED | OUTPATIENT
Start: 2017-02-01 | End: 2017-02-08 | Stop reason: HOSPADM

## 2017-02-01 RX ORDER — FLUOXETINE HYDROCHLORIDE 20 MG/1
40 CAPSULE ORAL DAILY
Status: DISCONTINUED | OUTPATIENT
Start: 2017-02-01 | End: 2017-02-08 | Stop reason: HOSPADM

## 2017-02-01 RX ORDER — POTASSIUM CHLORIDE 7.45 MG/ML
10 INJECTION INTRAVENOUS
Status: COMPLETED | OUTPATIENT
Start: 2017-02-01 | End: 2017-02-01

## 2017-02-01 RX ADMIN — POTASSIUM CHLORIDE 10 MEQ: 10 INJECTION, SOLUTION INTRAVENOUS at 10:09

## 2017-02-01 RX ADMIN — LORAZEPAM 2 MG: 2 INJECTION INTRAMUSCULAR; INTRAVENOUS at 01:09

## 2017-02-01 RX ADMIN — SILVER SULFADIAZINE: 10 CREAM TOPICAL at 16:21

## 2017-02-01 RX ADMIN — BUSPIRONE HYDROCHLORIDE 10 MG: 10 TABLET ORAL at 14:06

## 2017-02-01 RX ADMIN — MEROPENEM 1 G: 1 INJECTION, POWDER, FOR SOLUTION INTRAVENOUS at 18:00

## 2017-02-01 RX ADMIN — POTASSIUM CHLORIDE 10 MEQ: 10 INJECTION, SOLUTION INTRAVENOUS at 08:38

## 2017-02-01 RX ADMIN — IPRATROPIUM BROMIDE AND ALBUTEROL SULFATE 1 AMPULE: .5; 3 SOLUTION RESPIRATORY (INHALATION) at 03:31

## 2017-02-01 RX ADMIN — PROPOFOL 40 MCG/KG/MIN: 10 INJECTION, EMULSION INTRAVENOUS at 04:58

## 2017-02-01 RX ADMIN — IPRATROPIUM BROMIDE AND ALBUTEROL SULFATE 1 AMPULE: .5; 3 SOLUTION RESPIRATORY (INHALATION) at 14:43

## 2017-02-01 RX ADMIN — PROPOFOL 50 MCG/KG/MIN: 10 INJECTION, EMULSION INTRAVENOUS at 03:00

## 2017-02-01 RX ADMIN — MEROPENEM 1 G: 1 INJECTION, POWDER, FOR SOLUTION INTRAVENOUS at 02:34

## 2017-02-01 RX ADMIN — ENOXAPARIN SODIUM 40 MG: 40 INJECTION SUBCUTANEOUS at 16:21

## 2017-02-01 RX ADMIN — ARIPIPRAZOLE 5 MG: 5 TABLET ORAL at 11:08

## 2017-02-01 RX ADMIN — BUSPIRONE HYDROCHLORIDE 10 MG: 10 TABLET ORAL at 08:38

## 2017-02-01 RX ADMIN — PROPOFOL 50 MCG/KG/MIN: 10 INJECTION, EMULSION INTRAVENOUS at 11:32

## 2017-02-01 RX ADMIN — Medication 10 ML: at 08:39

## 2017-02-01 RX ADMIN — IPRATROPIUM BROMIDE AND ALBUTEROL SULFATE 1 AMPULE: .5; 3 SOLUTION RESPIRATORY (INHALATION) at 10:58

## 2017-02-01 RX ADMIN — LORAZEPAM 2 MG: 2 INJECTION INTRAMUSCULAR; INTRAVENOUS at 20:02

## 2017-02-01 RX ADMIN — POTASSIUM CHLORIDE 10 MEQ: 10 INJECTION, SOLUTION INTRAVENOUS at 11:08

## 2017-02-01 RX ADMIN — MEROPENEM 1 G: 1 INJECTION, POWDER, FOR SOLUTION INTRAVENOUS at 10:06

## 2017-02-01 RX ADMIN — PROPOFOL 40 MCG/KG/MIN: 10 INJECTION, EMULSION INTRAVENOUS at 19:07

## 2017-02-01 RX ADMIN — SODIUM CHLORIDE: 9 INJECTION, SOLUTION INTRAVENOUS at 22:34

## 2017-02-01 RX ADMIN — PANTOPRAZOLE SODIUM 40 MG: 40 INJECTION, POWDER, FOR SOLUTION INTRAVENOUS at 08:37

## 2017-02-01 RX ADMIN — Medication 1 TABLET: at 08:37

## 2017-02-01 RX ADMIN — IPRATROPIUM BROMIDE AND ALBUTEROL SULFATE 1 AMPULE: .5; 3 SOLUTION RESPIRATORY (INHALATION) at 07:13

## 2017-02-01 RX ADMIN — IPRATROPIUM BROMIDE AND ALBUTEROL SULFATE 1 AMPULE: .5; 3 SOLUTION RESPIRATORY (INHALATION) at 22:51

## 2017-02-01 RX ADMIN — SODIUM CHLORIDE 100 ML/HR: 9 INJECTION, SOLUTION INTRAVENOUS at 11:27

## 2017-02-01 RX ADMIN — POTASSIUM CHLORIDE 10 MEQ: 10 INJECTION, SOLUTION INTRAVENOUS at 12:36

## 2017-02-01 RX ADMIN — CHLORHEXIDINE GLUCONATE 15 ML: 1.2 RINSE ORAL at 08:37

## 2017-02-01 RX ADMIN — BUSPIRONE HYDROCHLORIDE 10 MG: 10 TABLET ORAL at 20:02

## 2017-02-01 RX ADMIN — FLUOXETINE HYDROCHLORIDE 40 MG: 20 CAPSULE ORAL at 08:37

## 2017-02-01 RX ADMIN — PROPOFOL 40 MCG/KG/MIN: 10 INJECTION, EMULSION INTRAVENOUS at 16:15

## 2017-02-01 RX ADMIN — IPRATROPIUM BROMIDE AND ALBUTEROL SULFATE 1 AMPULE: .5; 3 SOLUTION RESPIRATORY (INHALATION) at 18:21

## 2017-02-01 RX ADMIN — Medication 10 ML: at 20:02

## 2017-02-01 RX ADMIN — PROPOFOL 40 MCG/KG/MIN: 10 INJECTION, EMULSION INTRAVENOUS at 21:59

## 2017-02-01 RX ADMIN — PROPOFOL 40 MCG/KG/MIN: 10 INJECTION, EMULSION INTRAVENOUS at 08:18

## 2017-02-01 RX ADMIN — PROPOFOL 35 MCG/KG/MIN: 10 INJECTION, EMULSION INTRAVENOUS at 14:05

## 2017-02-01 RX ADMIN — CHLORHEXIDINE GLUCONATE 15 ML: 1.2 RINSE ORAL at 20:09

## 2017-02-01 ASSESSMENT — PULMONARY FUNCTION TESTS
PIF_VALUE: 13.9
PIF_VALUE: 17.6
PIF_VALUE: 20.5
PIF_VALUE: 19.9
PIF_VALUE: 22.1
PIF_VALUE: 18.1
PIF_VALUE: 17.7
PIF_VALUE: .5
PIF_VALUE: 16
PIF_VALUE: 20.5
PIF_VALUE: 19.6
PIF_VALUE: 18.5
PIF_VALUE: 18.3
PIF_VALUE: 19.9
PIF_VALUE: 21.7
PIF_VALUE: 22.7
PIF_VALUE: 16.8

## 2017-02-02 ENCOUNTER — APPOINTMENT (OUTPATIENT)
Dept: GENERAL RADIOLOGY | Age: 47
DRG: 917 | End: 2017-02-02
Payer: COMMERCIAL

## 2017-02-02 LAB
ALBUMIN SERPL-MCNC: 2.9 G/DL (ref 3.5–5.2)
ALP BLD-CCNC: 80 U/L (ref 35–104)
ALT SERPL-CCNC: 30 U/L (ref 5–33)
ANION GAP SERPL CALCULATED.3IONS-SCNC: 12 MMOL/L (ref 7–19)
AST SERPL-CCNC: 42 U/L (ref 5–32)
BASE EXCESS ARTERIAL: 2 MMOL/L (ref -2–2)
BASOPHILS ABSOLUTE: 0 K/UL (ref 0–0.2)
BASOPHILS RELATIVE PERCENT: 0.5 % (ref 0–1)
BILIRUB SERPL-MCNC: <0.2 MG/DL (ref 0.2–1.2)
BUN BLDV-MCNC: 9 MG/DL (ref 6–20)
CALCIUM SERPL-MCNC: 7.9 MG/DL (ref 8.6–10)
CARBOXYHEMOGLOBIN ARTERIAL: 2.1 % (ref 0–5)
CHLORIDE BLD-SCNC: 106 MMOL/L (ref 98–111)
CO2: 24 MMOL/L (ref 22–29)
CREAT SERPL-MCNC: 0.7 MG/DL (ref 0.5–0.9)
EOSINOPHILS ABSOLUTE: 0.3 K/UL (ref 0–0.6)
EOSINOPHILS RELATIVE PERCENT: 3.3 % (ref 0–5)
GFR NON-AFRICAN AMERICAN: >60
GLOBULIN: 2.4 G/DL
GLUCOSE BLD-MCNC: 102 MG/DL (ref 70–99)
GLUCOSE BLD-MCNC: 108 MG/DL (ref 70–99)
GLUCOSE BLD-MCNC: 121 MG/DL (ref 74–109)
GLUCOSE BLD-MCNC: 95 MG/DL (ref 70–99)
GLUCOSE BLD-MCNC: 99 MG/DL (ref 70–99)
HCO3 ARTERIAL: 26.5 MMOL/L (ref 22–26)
HCT VFR BLD CALC: 33.4 % (ref 37–47)
HEMOGLOBIN, ART, EXTENDED: 10.7 G/DL (ref 12–16)
HEMOGLOBIN: 10.2 G/DL (ref 12–16)
LYMPHOCYTES ABSOLUTE: 1.8 K/UL (ref 1.1–4.5)
LYMPHOCYTES RELATIVE PERCENT: 20.7 % (ref 20–40)
MAGNESIUM: 2.1 MG/DL (ref 1.6–2.6)
MCH RBC QN AUTO: 28 PG (ref 27–31)
MCHC RBC AUTO-ENTMCNC: 30.5 G/DL (ref 33–37)
MCV RBC AUTO: 91.8 FL (ref 81–99)
METHEMOGLOBIN ARTERIAL: 0.9 %
MONOCYTES ABSOLUTE: 0.4 K/UL (ref 0–0.9)
MONOCYTES RELATIVE PERCENT: 4.1 % (ref 0–10)
NEUTROPHILS ABSOLUTE: 6.2 K/UL (ref 1.5–7.5)
NEUTROPHILS RELATIVE PERCENT: 70.9 % (ref 50–65)
O2 CONTENT ARTERIAL: 13.9 ML/DL
O2 SAT, ARTERIAL: 92.3 %
O2 THERAPY: ABNORMAL
ORGANISM: ABNORMAL
PCO2 ARTERIAL: 40 MMHG (ref 35–45)
PDW BLD-RTO: 14.3 % (ref 11.5–14.5)
PERFORMED ON: ABNORMAL
PERFORMED ON: ABNORMAL
PERFORMED ON: NORMAL
PERFORMED ON: NORMAL
PH ARTERIAL: 7.43 (ref 7.35–7.45)
PHOSPHORUS: 3.1 MG/DL (ref 2.5–4.5)
PLATELET # BLD: 239 K/UL (ref 130–400)
PMV BLD AUTO: 8.5 FL (ref 7.4–10.4)
PO2 ARTERIAL: 62 MMHG (ref 80–100)
POTASSIUM SERPL-SCNC: 3.7 MMOL/L (ref 3.5–5)
POTASSIUM, WHOLE BLOOD: 3.7
RBC # BLD: 3.64 M/UL (ref 4.2–5.4)
SODIUM BLD-SCNC: 142 MMOL/L (ref 136–145)
TOTAL PROTEIN: 5.3 G/DL (ref 6.6–8.7)
URINE CULTURE, ROUTINE: ABNORMAL
URINE CULTURE, ROUTINE: ABNORMAL
WBC # BLD: 8.7 K/UL (ref 4.8–10.8)

## 2017-02-02 PROCEDURE — 2580000003 HC RX 258: Performed by: HOSPITALIST

## 2017-02-02 PROCEDURE — 82803 BLOOD GASES ANY COMBINATION: CPT

## 2017-02-02 PROCEDURE — 94003 VENT MGMT INPAT SUBQ DAY: CPT

## 2017-02-02 PROCEDURE — 94640 AIRWAY INHALATION TREATMENT: CPT

## 2017-02-02 PROCEDURE — 2500000003 HC RX 250 WO HCPCS: Performed by: HOSPITALIST

## 2017-02-02 PROCEDURE — 84132 ASSAY OF SERUM POTASSIUM: CPT

## 2017-02-02 PROCEDURE — 2000000000 HC ICU R&B

## 2017-02-02 PROCEDURE — 2700000000 HC OXYGEN THERAPY PER DAY

## 2017-02-02 PROCEDURE — 71010 XR CHEST PORTABLE: CPT

## 2017-02-02 PROCEDURE — 82948 REAGENT STRIP/BLOOD GLUCOSE: CPT

## 2017-02-02 PROCEDURE — 36600 WITHDRAWAL OF ARTERIAL BLOOD: CPT

## 2017-02-02 PROCEDURE — 99291 CRITICAL CARE FIRST HOUR: CPT | Performed by: HOSPITALIST

## 2017-02-02 PROCEDURE — 6370000000 HC RX 637 (ALT 250 FOR IP): Performed by: HOSPITALIST

## 2017-02-02 PROCEDURE — 83735 ASSAY OF MAGNESIUM: CPT

## 2017-02-02 PROCEDURE — 6360000002 HC RX W HCPCS: Performed by: HOSPITALIST

## 2017-02-02 PROCEDURE — 85025 COMPLETE CBC W/AUTO DIFF WBC: CPT

## 2017-02-02 PROCEDURE — C9113 INJ PANTOPRAZOLE SODIUM, VIA: HCPCS | Performed by: HOSPITALIST

## 2017-02-02 PROCEDURE — 84100 ASSAY OF PHOSPHORUS: CPT

## 2017-02-02 PROCEDURE — 2580000003 HC RX 258: Performed by: INTERNAL MEDICINE

## 2017-02-02 PROCEDURE — 80053 COMPREHEN METABOLIC PANEL: CPT

## 2017-02-02 PROCEDURE — 99222 1ST HOSP IP/OBS MODERATE 55: CPT | Performed by: PSYCHIATRY & NEUROLOGY

## 2017-02-02 RX ADMIN — IPRATROPIUM BROMIDE AND ALBUTEROL SULFATE 1 AMPULE: .5; 3 SOLUTION RESPIRATORY (INHALATION) at 18:39

## 2017-02-02 RX ADMIN — IPRATROPIUM BROMIDE AND ALBUTEROL SULFATE 1 AMPULE: .5; 3 SOLUTION RESPIRATORY (INHALATION) at 15:01

## 2017-02-02 RX ADMIN — ENOXAPARIN SODIUM 40 MG: 40 INJECTION SUBCUTANEOUS at 17:03

## 2017-02-02 RX ADMIN — FLUOXETINE HYDROCHLORIDE 40 MG: 20 CAPSULE ORAL at 08:44

## 2017-02-02 RX ADMIN — CHLORHEXIDINE GLUCONATE 15 ML: 1.2 RINSE ORAL at 08:45

## 2017-02-02 RX ADMIN — Medication 10 ML: at 08:44

## 2017-02-02 RX ADMIN — PROPOFOL 40 MCG/KG/MIN: 10 INJECTION, EMULSION INTRAVENOUS at 02:44

## 2017-02-02 RX ADMIN — IPRATROPIUM BROMIDE AND ALBUTEROL SULFATE 1 AMPULE: .5; 3 SOLUTION RESPIRATORY (INHALATION) at 06:47

## 2017-02-02 RX ADMIN — METOPROLOL TARTRATE 5 MG: 5 INJECTION INTRAVENOUS at 19:48

## 2017-02-02 RX ADMIN — CHLORHEXIDINE GLUCONATE 15 ML: 1.2 RINSE ORAL at 20:13

## 2017-02-02 RX ADMIN — SODIUM CHLORIDE: 9 INJECTION, SOLUTION INTRAVENOUS at 10:35

## 2017-02-02 RX ADMIN — BUSPIRONE HYDROCHLORIDE 10 MG: 10 TABLET ORAL at 08:44

## 2017-02-02 RX ADMIN — PROPOFOL 40 MCG/KG/MIN: 10 INJECTION, EMULSION INTRAVENOUS at 08:43

## 2017-02-02 RX ADMIN — PROPOFOL 40 MCG/KG/MIN: 10 INJECTION, EMULSION INTRAVENOUS at 05:34

## 2017-02-02 RX ADMIN — PROPOFOL 30 MCG/KG/MIN: 10 INJECTION, EMULSION INTRAVENOUS at 22:19

## 2017-02-02 RX ADMIN — IPRATROPIUM BROMIDE AND ALBUTEROL SULFATE 1 AMPULE: .5; 3 SOLUTION RESPIRATORY (INHALATION) at 02:46

## 2017-02-02 RX ADMIN — PROPOFOL 40 MCG/KG/MIN: 10 INJECTION, EMULSION INTRAVENOUS at 00:48

## 2017-02-02 RX ADMIN — IPRATROPIUM BROMIDE AND ALBUTEROL SULFATE 1 AMPULE: .5; 3 SOLUTION RESPIRATORY (INHALATION) at 10:37

## 2017-02-02 RX ADMIN — PANTOPRAZOLE SODIUM 40 MG: 40 INJECTION, POWDER, FOR SOLUTION INTRAVENOUS at 08:44

## 2017-02-02 RX ADMIN — MEROPENEM 1 G: 1 INJECTION, POWDER, FOR SOLUTION INTRAVENOUS at 09:08

## 2017-02-02 RX ADMIN — MEROPENEM 1 G: 1 INJECTION, POWDER, FOR SOLUTION INTRAVENOUS at 01:01

## 2017-02-02 RX ADMIN — SILVER SULFADIAZINE: 10 CREAM TOPICAL at 08:45

## 2017-02-02 RX ADMIN — CEFTRIAXONE SODIUM 1 G: 1 INJECTION, POWDER, FOR SOLUTION INTRAMUSCULAR; INTRAVENOUS at 11:45

## 2017-02-02 RX ADMIN — ARIPIPRAZOLE 5 MG: 5 TABLET ORAL at 08:44

## 2017-02-02 RX ADMIN — Medication 10 ML: at 20:13

## 2017-02-02 RX ADMIN — BUSPIRONE HYDROCHLORIDE 10 MG: 10 TABLET ORAL at 20:15

## 2017-02-02 RX ADMIN — METOPROLOL TARTRATE 5 MG: 5 INJECTION INTRAVENOUS at 11:03

## 2017-02-02 RX ADMIN — SODIUM CHLORIDE: 9 INJECTION, SOLUTION INTRAVENOUS at 21:23

## 2017-02-02 RX ADMIN — PROPOFOL 30 MCG/KG/MIN: 10 INJECTION, EMULSION INTRAVENOUS at 19:47

## 2017-02-02 RX ADMIN — BUSPIRONE HYDROCHLORIDE 10 MG: 10 TABLET ORAL at 13:41

## 2017-02-02 RX ADMIN — Medication 1 TABLET: at 08:44

## 2017-02-02 RX ADMIN — IPRATROPIUM BROMIDE AND ALBUTEROL SULFATE 1 AMPULE: .5; 3 SOLUTION RESPIRATORY (INHALATION) at 22:45

## 2017-02-02 ASSESSMENT — PULMONARY FUNCTION TESTS
PIF_VALUE: 19.4
PIF_VALUE: 20.8
PIF_VALUE: 20.8
PIF_VALUE: 21.5
PIF_VALUE: 22.9
PIF_VALUE: 28.6

## 2017-02-03 ENCOUNTER — APPOINTMENT (OUTPATIENT)
Dept: GENERAL RADIOLOGY | Age: 47
DRG: 917 | End: 2017-02-03
Payer: COMMERCIAL

## 2017-02-03 LAB
ALBUMIN SERPL-MCNC: 3.1 G/DL (ref 3.5–5.2)
ALP BLD-CCNC: 88 U/L (ref 35–104)
ALT SERPL-CCNC: 36 U/L (ref 5–33)
ANION GAP SERPL CALCULATED.3IONS-SCNC: 14 MMOL/L (ref 7–19)
AST SERPL-CCNC: 44 U/L (ref 5–32)
BASE EXCESS ARTERIAL: 3.2 MMOL/L (ref -2–2)
BASE EXCESS ARTERIAL: 4.2 MMOL/L (ref -2–2)
BASOPHILS ABSOLUTE: 0 K/UL (ref 0–0.2)
BASOPHILS RELATIVE PERCENT: 0.4 % (ref 0–1)
BILIRUB SERPL-MCNC: <0.2 MG/DL (ref 0.2–1.2)
BUN BLDV-MCNC: 14 MG/DL (ref 6–20)
CALCIUM SERPL-MCNC: 8.2 MG/DL (ref 8.6–10)
CARBOXYHEMOGLOBIN ARTERIAL: 2 % (ref 0–5)
CARBOXYHEMOGLOBIN ARTERIAL: 2.2 % (ref 0–5)
CHLORIDE BLD-SCNC: 106 MMOL/L (ref 98–111)
CO2: 26 MMOL/L (ref 22–29)
CREAT SERPL-MCNC: 0.6 MG/DL (ref 0.5–0.9)
EOSINOPHILS ABSOLUTE: 0.3 K/UL (ref 0–0.6)
EOSINOPHILS RELATIVE PERCENT: 2.4 % (ref 0–5)
GFR NON-AFRICAN AMERICAN: >60
GLOBULIN: 2.9 G/DL
GLUCOSE BLD-MCNC: 101 MG/DL (ref 70–99)
GLUCOSE BLD-MCNC: 130 MG/DL (ref 70–99)
GLUCOSE BLD-MCNC: 131 MG/DL (ref 74–109)
GLUCOSE BLD-MCNC: 89 MG/DL (ref 70–99)
HCO3 ARTERIAL: 28.4 MMOL/L (ref 22–26)
HCO3 ARTERIAL: 28.5 MMOL/L (ref 22–26)
HCT VFR BLD CALC: 34.5 % (ref 37–47)
HEMOGLOBIN, ART, EXTENDED: 10.6 G/DL (ref 12–16)
HEMOGLOBIN, ART, EXTENDED: 11.4 G/DL (ref 12–16)
HEMOGLOBIN: 10.5 G/DL (ref 12–16)
LYMPHOCYTES ABSOLUTE: 1.5 K/UL (ref 1.1–4.5)
LYMPHOCYTES RELATIVE PERCENT: 13.8 % (ref 20–40)
MAGNESIUM: 2.4 MG/DL (ref 1.6–2.6)
MCH RBC QN AUTO: 27.9 PG (ref 27–31)
MCHC RBC AUTO-ENTMCNC: 30.4 G/DL (ref 33–37)
MCV RBC AUTO: 91.8 FL (ref 81–99)
METHEMOGLOBIN ARTERIAL: 0.9 %
METHEMOGLOBIN ARTERIAL: 1 %
MONOCYTES ABSOLUTE: 0.8 K/UL (ref 0–0.9)
MONOCYTES RELATIVE PERCENT: 7.2 % (ref 0–10)
NEUTROPHILS ABSOLUTE: 8 K/UL (ref 1.5–7.5)
NEUTROPHILS RELATIVE PERCENT: 75.4 % (ref 50–65)
O2 CONTENT ARTERIAL: 13.7 ML/DL
O2 CONTENT ARTERIAL: 14.9 ML/DL
O2 SAT, ARTERIAL: 91.7 %
O2 SAT, ARTERIAL: 93 %
O2 THERAPY: ABNORMAL
O2 THERAPY: ABNORMAL
PCO2 ARTERIAL: 40 MMHG (ref 35–45)
PCO2 ARTERIAL: 46 MMHG (ref 35–45)
PDW BLD-RTO: 14.4 % (ref 11.5–14.5)
PERFORMED ON: ABNORMAL
PERFORMED ON: ABNORMAL
PERFORMED ON: NORMAL
PH ARTERIAL: 7.4 (ref 7.35–7.45)
PH ARTERIAL: 7.46 (ref 7.35–7.45)
PHOSPHORUS: 3.5 MG/DL (ref 2.5–4.5)
PLATELET # BLD: 238 K/UL (ref 130–400)
PMV BLD AUTO: 8.6 FL (ref 7.4–10.4)
PO2 ARTERIAL: 65 MMHG (ref 80–100)
PO2 ARTERIAL: 67 MMHG (ref 80–100)
POTASSIUM SERPL-SCNC: 3.9 MMOL/L (ref 3.5–5)
POTASSIUM, WHOLE BLOOD: 3.7
POTASSIUM, WHOLE BLOOD: 3.8
RBC # BLD: 3.76 M/UL (ref 4.2–5.4)
SODIUM BLD-SCNC: 146 MMOL/L (ref 136–145)
TOTAL PROTEIN: 6 G/DL (ref 6.6–8.7)
WBC # BLD: 10.6 K/UL (ref 4.8–10.8)

## 2017-02-03 PROCEDURE — 6370000000 HC RX 637 (ALT 250 FOR IP): Performed by: HOSPITALIST

## 2017-02-03 PROCEDURE — 94003 VENT MGMT INPAT SUBQ DAY: CPT

## 2017-02-03 PROCEDURE — 36600 WITHDRAWAL OF ARTERIAL BLOOD: CPT

## 2017-02-03 PROCEDURE — 99233 SBSQ HOSP IP/OBS HIGH 50: CPT | Performed by: HOSPITALIST

## 2017-02-03 PROCEDURE — 80053 COMPREHEN METABOLIC PANEL: CPT

## 2017-02-03 PROCEDURE — 83735 ASSAY OF MAGNESIUM: CPT

## 2017-02-03 PROCEDURE — 2580000003 HC RX 258: Performed by: HOSPITALIST

## 2017-02-03 PROCEDURE — 2700000000 HC OXYGEN THERAPY PER DAY

## 2017-02-03 PROCEDURE — 82803 BLOOD GASES ANY COMBINATION: CPT

## 2017-02-03 PROCEDURE — 84100 ASSAY OF PHOSPHORUS: CPT

## 2017-02-03 PROCEDURE — 6360000002 HC RX W HCPCS: Performed by: HOSPITALIST

## 2017-02-03 PROCEDURE — 71010 XR CHEST PORTABLE: CPT

## 2017-02-03 PROCEDURE — 99232 SBSQ HOSP IP/OBS MODERATE 35: CPT | Performed by: PSYCHIATRY & NEUROLOGY

## 2017-02-03 PROCEDURE — 85025 COMPLETE CBC W/AUTO DIFF WBC: CPT

## 2017-02-03 PROCEDURE — 82948 REAGENT STRIP/BLOOD GLUCOSE: CPT

## 2017-02-03 PROCEDURE — 2000000000 HC ICU R&B

## 2017-02-03 PROCEDURE — C9113 INJ PANTOPRAZOLE SODIUM, VIA: HCPCS | Performed by: HOSPITALIST

## 2017-02-03 PROCEDURE — 94640 AIRWAY INHALATION TREATMENT: CPT

## 2017-02-03 PROCEDURE — 84132 ASSAY OF SERUM POTASSIUM: CPT

## 2017-02-03 PROCEDURE — 2500000003 HC RX 250 WO HCPCS: Performed by: HOSPITALIST

## 2017-02-03 RX ADMIN — IPRATROPIUM BROMIDE AND ALBUTEROL SULFATE 1 AMPULE: .5; 3 SOLUTION RESPIRATORY (INHALATION) at 23:21

## 2017-02-03 RX ADMIN — PROPOFOL 30 MCG/KG/MIN: 10 INJECTION, EMULSION INTRAVENOUS at 01:37

## 2017-02-03 RX ADMIN — CEFTRIAXONE SODIUM 1 G: 1 INJECTION, POWDER, FOR SOLUTION INTRAMUSCULAR; INTRAVENOUS at 13:23

## 2017-02-03 RX ADMIN — CHLORHEXIDINE GLUCONATE 15 ML: 1.2 RINSE ORAL at 09:53

## 2017-02-03 RX ADMIN — Medication 1 TABLET: at 08:35

## 2017-02-03 RX ADMIN — SILVER SULFADIAZINE: 10 CREAM TOPICAL at 09:53

## 2017-02-03 RX ADMIN — PROPOFOL 40 MCG/KG/MIN: 10 INJECTION, EMULSION INTRAVENOUS at 19:25

## 2017-02-03 RX ADMIN — LORAZEPAM 2 MG: 2 INJECTION INTRAMUSCULAR; INTRAVENOUS at 09:03

## 2017-02-03 RX ADMIN — ARIPIPRAZOLE 5 MG: 5 TABLET ORAL at 08:35

## 2017-02-03 RX ADMIN — LORAZEPAM 2 MG: 2 INJECTION INTRAMUSCULAR; INTRAVENOUS at 11:32

## 2017-02-03 RX ADMIN — CHLORHEXIDINE GLUCONATE 15 ML: 1.2 RINSE ORAL at 22:09

## 2017-02-03 RX ADMIN — LORAZEPAM 2 MG: 2 INJECTION INTRAMUSCULAR; INTRAVENOUS at 03:48

## 2017-02-03 RX ADMIN — ENOXAPARIN SODIUM 40 MG: 40 INJECTION SUBCUTANEOUS at 15:10

## 2017-02-03 RX ADMIN — Medication 10 ML: at 08:35

## 2017-02-03 RX ADMIN — PROPOFOL 20 MCG/KG/MIN: 10 INJECTION, EMULSION INTRAVENOUS at 05:55

## 2017-02-03 RX ADMIN — BUSPIRONE HYDROCHLORIDE 10 MG: 10 TABLET ORAL at 08:35

## 2017-02-03 RX ADMIN — IPRATROPIUM BROMIDE AND ALBUTEROL SULFATE 1 AMPULE: .5; 3 SOLUTION RESPIRATORY (INHALATION) at 20:13

## 2017-02-03 RX ADMIN — IPRATROPIUM BROMIDE AND ALBUTEROL SULFATE 1 AMPULE: .5; 3 SOLUTION RESPIRATORY (INHALATION) at 10:24

## 2017-02-03 RX ADMIN — PROPOFOL 30 MCG/KG/MIN: 10 INJECTION, EMULSION INTRAVENOUS at 15:09

## 2017-02-03 RX ADMIN — BUSPIRONE HYDROCHLORIDE 10 MG: 10 TABLET ORAL at 21:12

## 2017-02-03 RX ADMIN — IPRATROPIUM BROMIDE AND ALBUTEROL SULFATE 1 AMPULE: .5; 3 SOLUTION RESPIRATORY (INHALATION) at 14:36

## 2017-02-03 RX ADMIN — PROPOFOL 30 MCG/KG/MIN: 10 INJECTION, EMULSION INTRAVENOUS at 03:26

## 2017-02-03 RX ADMIN — FLUOXETINE HYDROCHLORIDE 40 MG: 20 CAPSULE ORAL at 08:35

## 2017-02-03 RX ADMIN — PROPOFOL 40 MCG/KG/MIN: 10 INJECTION, EMULSION INTRAVENOUS at 22:09

## 2017-02-03 RX ADMIN — METOPROLOL TARTRATE 5 MG: 5 INJECTION INTRAVENOUS at 03:46

## 2017-02-03 RX ADMIN — BUSPIRONE HYDROCHLORIDE 10 MG: 10 TABLET ORAL at 13:23

## 2017-02-03 RX ADMIN — IPRATROPIUM BROMIDE AND ALBUTEROL SULFATE 1 AMPULE: .5; 3 SOLUTION RESPIRATORY (INHALATION) at 06:56

## 2017-02-03 RX ADMIN — PROPOFOL 30 MCG/KG/MIN: 10 INJECTION, EMULSION INTRAVENOUS at 13:00

## 2017-02-03 RX ADMIN — PANTOPRAZOLE SODIUM 40 MG: 40 INJECTION, POWDER, FOR SOLUTION INTRAVENOUS at 08:35

## 2017-02-03 RX ADMIN — IPRATROPIUM BROMIDE AND ALBUTEROL SULFATE: .5; 3 SOLUTION RESPIRATORY (INHALATION) at 02:53

## 2017-02-03 ASSESSMENT — PULMONARY FUNCTION TESTS
PIF_VALUE: 25.1
PIF_VALUE: 21.1
PIF_VALUE: 19
PIF_VALUE: 22.8
PIF_VALUE: 11.4
PIF_VALUE: 20.7
PIF_VALUE: 22.4
PIF_VALUE: 30.5
PIF_VALUE: 18
PIF_VALUE: 11.5
PIF_VALUE: 11.3
PIF_VALUE: 11.4
PIF_VALUE: 28
PIF_VALUE: 31.9
PIF_VALUE: 25.6
PIF_VALUE: 23.9
PIF_VALUE: 25.6
PIF_VALUE: 26.6
PIF_VALUE: 21
PIF_VALUE: 20.2
PIF_VALUE: 25.3
PIF_VALUE: 24.5
PIF_VALUE: 21.3
PIF_VALUE: 32.1

## 2017-02-04 ENCOUNTER — APPOINTMENT (OUTPATIENT)
Dept: GENERAL RADIOLOGY | Age: 47
DRG: 917 | End: 2017-02-04
Payer: COMMERCIAL

## 2017-02-04 LAB
ALBUMIN SERPL-MCNC: 3.2 G/DL (ref 3.5–5.2)
ALP BLD-CCNC: 81 U/L (ref 35–104)
ALT SERPL-CCNC: 37 U/L (ref 5–33)
ANION GAP SERPL CALCULATED.3IONS-SCNC: 12 MMOL/L (ref 7–19)
AST SERPL-CCNC: 44 U/L (ref 5–32)
BASE EXCESS ARTERIAL: 3.9 MMOL/L (ref -2–2)
BASOPHILS ABSOLUTE: 0.1 K/UL (ref 0–0.2)
BASOPHILS RELATIVE PERCENT: 0.4 % (ref 0–1)
BILIRUB SERPL-MCNC: 0.3 MG/DL (ref 0.2–1.2)
BUN BLDV-MCNC: 18 MG/DL (ref 6–20)
CALCIUM SERPL-MCNC: 8.4 MG/DL (ref 8.6–10)
CARBOXYHEMOGLOBIN ARTERIAL: 2.1 % (ref 0–5)
CHLORIDE BLD-SCNC: 105 MMOL/L (ref 98–111)
CO2: 26 MMOL/L (ref 22–29)
CREAT SERPL-MCNC: 0.6 MG/DL (ref 0.5–0.9)
EOSINOPHILS ABSOLUTE: 0.4 K/UL (ref 0–0.6)
EOSINOPHILS RELATIVE PERCENT: 3.8 % (ref 0–5)
GFR NON-AFRICAN AMERICAN: >60
GLOBULIN: 2.7 G/DL
GLUCOSE BLD-MCNC: 104 MG/DL (ref 70–99)
GLUCOSE BLD-MCNC: 108 MG/DL (ref 70–99)
GLUCOSE BLD-MCNC: 113 MG/DL (ref 70–99)
GLUCOSE BLD-MCNC: 113 MG/DL (ref 74–109)
HCO3 ARTERIAL: 28.5 MMOL/L (ref 22–26)
HCT VFR BLD CALC: 34.3 % (ref 37–47)
HEMOGLOBIN, ART, EXTENDED: 10.8 G/DL (ref 12–16)
HEMOGLOBIN: 10.4 G/DL (ref 12–16)
LYMPHOCYTES ABSOLUTE: 2.2 K/UL (ref 1.1–4.5)
LYMPHOCYTES RELATIVE PERCENT: 20 % (ref 20–40)
MAGNESIUM: 2.4 MG/DL (ref 1.6–2.6)
MCH RBC QN AUTO: 28.1 PG (ref 27–31)
MCHC RBC AUTO-ENTMCNC: 30.3 G/DL (ref 33–37)
MCV RBC AUTO: 92.7 FL (ref 81–99)
METHEMOGLOBIN ARTERIAL: 1.2 %
MONOCYTES ABSOLUTE: 0.6 K/UL (ref 0–0.9)
MONOCYTES RELATIVE PERCENT: 5.4 % (ref 0–10)
NEUTROPHILS ABSOLUTE: 7.7 K/UL (ref 1.5–7.5)
NEUTROPHILS RELATIVE PERCENT: 69.2 % (ref 50–65)
O2 CONTENT ARTERIAL: 13.9 ML/DL
O2 SAT, ARTERIAL: 91.3 %
O2 THERAPY: ABNORMAL
PCO2 ARTERIAL: 42 MMHG (ref 35–45)
PDW BLD-RTO: 14.6 % (ref 11.5–14.5)
PERFORMED ON: ABNORMAL
PH ARTERIAL: 7.44 (ref 7.35–7.45)
PHOSPHORUS: 4.2 MG/DL (ref 2.5–4.5)
PLATELET # BLD: 238 K/UL (ref 130–400)
PMV BLD AUTO: 8.8 FL (ref 7.4–10.4)
PO2 ARTERIAL: 67 MMHG (ref 80–100)
POTASSIUM SERPL-SCNC: 3.7 MMOL/L (ref 3.5–5)
POTASSIUM, WHOLE BLOOD: 3.7
RBC # BLD: 3.7 M/UL (ref 4.2–5.4)
SODIUM BLD-SCNC: 143 MMOL/L (ref 136–145)
TOTAL PROTEIN: 5.9 G/DL (ref 6.6–8.7)
WBC # BLD: 11.2 K/UL (ref 4.8–10.8)

## 2017-02-04 PROCEDURE — 84100 ASSAY OF PHOSPHORUS: CPT

## 2017-02-04 PROCEDURE — C9113 INJ PANTOPRAZOLE SODIUM, VIA: HCPCS | Performed by: HOSPITALIST

## 2017-02-04 PROCEDURE — 84132 ASSAY OF SERUM POTASSIUM: CPT

## 2017-02-04 PROCEDURE — 85025 COMPLETE CBC W/AUTO DIFF WBC: CPT

## 2017-02-04 PROCEDURE — 2700000000 HC OXYGEN THERAPY PER DAY

## 2017-02-04 PROCEDURE — 2000000000 HC ICU R&B

## 2017-02-04 PROCEDURE — 94003 VENT MGMT INPAT SUBQ DAY: CPT

## 2017-02-04 PROCEDURE — 99233 SBSQ HOSP IP/OBS HIGH 50: CPT | Performed by: HOSPITALIST

## 2017-02-04 PROCEDURE — 80053 COMPREHEN METABOLIC PANEL: CPT

## 2017-02-04 PROCEDURE — 83735 ASSAY OF MAGNESIUM: CPT

## 2017-02-04 PROCEDURE — 6360000002 HC RX W HCPCS: Performed by: HOSPITALIST

## 2017-02-04 PROCEDURE — 2580000003 HC RX 258: Performed by: HOSPITALIST

## 2017-02-04 PROCEDURE — 71010 XR CHEST PORTABLE: CPT

## 2017-02-04 PROCEDURE — 6370000000 HC RX 637 (ALT 250 FOR IP): Performed by: HOSPITALIST

## 2017-02-04 PROCEDURE — 94640 AIRWAY INHALATION TREATMENT: CPT

## 2017-02-04 PROCEDURE — 82948 REAGENT STRIP/BLOOD GLUCOSE: CPT

## 2017-02-04 PROCEDURE — 36600 WITHDRAWAL OF ARTERIAL BLOOD: CPT

## 2017-02-04 PROCEDURE — 82803 BLOOD GASES ANY COMBINATION: CPT

## 2017-02-04 RX ORDER — OLANZAPINE 10 MG/1
5 INJECTION, POWDER, LYOPHILIZED, FOR SOLUTION INTRAMUSCULAR ONCE
Status: COMPLETED | OUTPATIENT
Start: 2017-02-05 | End: 2017-02-05

## 2017-02-04 RX ADMIN — SILVER SULFADIAZINE: 10 CREAM TOPICAL at 09:36

## 2017-02-04 RX ADMIN — BUSPIRONE HYDROCHLORIDE 10 MG: 10 TABLET ORAL at 08:55

## 2017-02-04 RX ADMIN — CEFTRIAXONE SODIUM 1 G: 1 INJECTION, POWDER, FOR SOLUTION INTRAMUSCULAR; INTRAVENOUS at 12:30

## 2017-02-04 RX ADMIN — ARIPIPRAZOLE 5 MG: 5 TABLET ORAL at 08:56

## 2017-02-04 RX ADMIN — PROPOFOL 44.6 MCG/KG/MIN: 10 INJECTION, EMULSION INTRAVENOUS at 23:14

## 2017-02-04 RX ADMIN — BUSPIRONE HYDROCHLORIDE 10 MG: 10 TABLET ORAL at 20:27

## 2017-02-04 RX ADMIN — IPRATROPIUM BROMIDE AND ALBUTEROL SULFATE 1 AMPULE: .5; 3 SOLUTION RESPIRATORY (INHALATION) at 19:22

## 2017-02-04 RX ADMIN — PROPOFOL 20 MCG/KG/MIN: 10 INJECTION, EMULSION INTRAVENOUS at 08:55

## 2017-02-04 RX ADMIN — LORAZEPAM 2 MG: 2 INJECTION INTRAMUSCULAR; INTRAVENOUS at 12:26

## 2017-02-04 RX ADMIN — PROPOFOL 40 MCG/KG/MIN: 10 INJECTION, EMULSION INTRAVENOUS at 03:14

## 2017-02-04 RX ADMIN — IPRATROPIUM BROMIDE AND ALBUTEROL SULFATE 1 AMPULE: .5; 3 SOLUTION RESPIRATORY (INHALATION) at 02:50

## 2017-02-04 RX ADMIN — Medication 1 TABLET: at 08:56

## 2017-02-04 RX ADMIN — PROPOFOL 45 MCG/KG/MIN: 10 INJECTION, EMULSION INTRAVENOUS at 20:27

## 2017-02-04 RX ADMIN — LORAZEPAM 2 MG: 2 INJECTION INTRAMUSCULAR; INTRAVENOUS at 16:43

## 2017-02-04 RX ADMIN — BUSPIRONE HYDROCHLORIDE 10 MG: 10 TABLET ORAL at 14:33

## 2017-02-04 RX ADMIN — PROPOFOL 45 MCG/KG/MIN: 10 INJECTION, EMULSION INTRAVENOUS at 01:25

## 2017-02-04 RX ADMIN — FLUOXETINE HYDROCHLORIDE 40 MG: 20 CAPSULE ORAL at 08:55

## 2017-02-04 RX ADMIN — LORAZEPAM 2 MG: 2 INJECTION INTRAMUSCULAR; INTRAVENOUS at 10:38

## 2017-02-04 RX ADMIN — PROPOFOL 20 MCG/KG/MIN: 10 INJECTION, EMULSION INTRAVENOUS at 05:37

## 2017-02-04 RX ADMIN — IPRATROPIUM BROMIDE AND ALBUTEROL SULFATE 1 AMPULE: .5; 3 SOLUTION RESPIRATORY (INHALATION) at 10:21

## 2017-02-04 RX ADMIN — PROPOFOL 20 MCG/KG/MIN: 10 INJECTION, EMULSION INTRAVENOUS at 16:43

## 2017-02-04 RX ADMIN — PANTOPRAZOLE SODIUM 40 MG: 40 INJECTION, POWDER, FOR SOLUTION INTRAVENOUS at 08:55

## 2017-02-04 RX ADMIN — CHLORHEXIDINE GLUCONATE 15 ML: 1.2 RINSE ORAL at 09:36

## 2017-02-04 RX ADMIN — Medication 10 ML: at 09:00

## 2017-02-04 RX ADMIN — IPRATROPIUM BROMIDE AND ALBUTEROL SULFATE 1 AMPULE: .5; 3 SOLUTION RESPIRATORY (INHALATION) at 22:48

## 2017-02-04 RX ADMIN — IPRATROPIUM BROMIDE AND ALBUTEROL SULFATE 1 AMPULE: .5; 3 SOLUTION RESPIRATORY (INHALATION) at 14:12

## 2017-02-04 RX ADMIN — ENOXAPARIN SODIUM 40 MG: 40 INJECTION SUBCUTANEOUS at 16:44

## 2017-02-04 RX ADMIN — IPRATROPIUM BROMIDE AND ALBUTEROL SULFATE 1 AMPULE: .5; 3 SOLUTION RESPIRATORY (INHALATION) at 06:07

## 2017-02-04 ASSESSMENT — PULMONARY FUNCTION TESTS
PIF_VALUE: 15.4
PIF_VALUE: 14.7
PIF_VALUE: 20.7
PIF_VALUE: 17
PIF_VALUE: 19.9
PIF_VALUE: 22.4
PIF_VALUE: 14
PIF_VALUE: 19.3
PIF_VALUE: 19.8
PIF_VALUE: 23.8
PIF_VALUE: 22.8
PIF_VALUE: 22.5
PIF_VALUE: 20.4
PIF_VALUE: 27
PIF_VALUE: 18.5
PIF_VALUE: 20.8
PIF_VALUE: 18
PIF_VALUE: 19.2
PIF_VALUE: 20
PIF_VALUE: 18.3
PIF_VALUE: 21.5
PIF_VALUE: 17.8
PIF_VALUE: 16.8
PIF_VALUE: 18.3

## 2017-02-05 ENCOUNTER — APPOINTMENT (OUTPATIENT)
Dept: GENERAL RADIOLOGY | Age: 47
DRG: 917 | End: 2017-02-05
Payer: COMMERCIAL

## 2017-02-05 LAB
ALBUMIN SERPL-MCNC: 3.1 G/DL (ref 3.5–5.2)
ALP BLD-CCNC: 80 U/L (ref 35–104)
ALT SERPL-CCNC: 40 U/L (ref 5–33)
ANION GAP SERPL CALCULATED.3IONS-SCNC: 16 MMOL/L (ref 7–19)
AST SERPL-CCNC: 38 U/L (ref 5–32)
BASE EXCESS ARTERIAL: 3.4 MMOL/L (ref -2–2)
BASOPHILS ABSOLUTE: 0 K/UL (ref 0–0.2)
BASOPHILS RELATIVE PERCENT: 0.4 % (ref 0–1)
BILIRUB SERPL-MCNC: 0.3 MG/DL (ref 0.2–1.2)
BLOOD CULTURE, ROUTINE: NORMAL
BUN BLDV-MCNC: 18 MG/DL (ref 6–20)
CALCIUM SERPL-MCNC: 8.6 MG/DL (ref 8.6–10)
CARBOXYHEMOGLOBIN ARTERIAL: 1.9 % (ref 0–5)
CHLORIDE BLD-SCNC: 104 MMOL/L (ref 98–111)
CO2: 25 MMOL/L (ref 22–29)
CREAT SERPL-MCNC: 0.6 MG/DL (ref 0.5–0.9)
CULTURE, BLOOD 2: NORMAL
EOSINOPHILS ABSOLUTE: 0.4 K/UL (ref 0–0.6)
EOSINOPHILS RELATIVE PERCENT: 4 % (ref 0–5)
GFR NON-AFRICAN AMERICAN: >60
GLOBULIN: 2.9 G/DL
GLUCOSE BLD-MCNC: 133 MG/DL (ref 74–109)
GLUCOSE BLD-MCNC: 85 MG/DL (ref 70–99)
GLUCOSE BLD-MCNC: 93 MG/DL (ref 70–99)
GLUCOSE BLD-MCNC: 94 MG/DL (ref 70–99)
HCO3 ARTERIAL: 28.5 MMOL/L (ref 22–26)
HCT VFR BLD CALC: 33.6 % (ref 37–47)
HEMOGLOBIN, ART, EXTENDED: 10.3 G/DL (ref 12–16)
HEMOGLOBIN: 10.2 G/DL (ref 12–16)
LYMPHOCYTES ABSOLUTE: 1.6 K/UL (ref 1.1–4.5)
LYMPHOCYTES RELATIVE PERCENT: 16.6 % (ref 20–40)
MAGNESIUM: 2.3 MG/DL (ref 1.6–2.6)
MCH RBC QN AUTO: 28.1 PG (ref 27–31)
MCHC RBC AUTO-ENTMCNC: 30.4 G/DL (ref 33–37)
MCV RBC AUTO: 92.6 FL (ref 81–99)
METHEMOGLOBIN ARTERIAL: 1 %
MONOCYTES ABSOLUTE: 0.5 K/UL (ref 0–0.9)
MONOCYTES RELATIVE PERCENT: 5.1 % (ref 0–10)
NEUTROPHILS ABSOLUTE: 7.2 K/UL (ref 1.5–7.5)
NEUTROPHILS RELATIVE PERCENT: 72.7 % (ref 50–65)
O2 CONTENT ARTERIAL: 13.5 ML/DL
O2 SAT, ARTERIAL: 92.7 %
O2 THERAPY: ABNORMAL
PCO2 ARTERIAL: 45 MMHG (ref 35–45)
PDW BLD-RTO: 14.4 % (ref 11.5–14.5)
PERFORMED ON: NORMAL
PH ARTERIAL: 7.41 (ref 7.35–7.45)
PHOSPHORUS: 4.2 MG/DL (ref 2.5–4.5)
PLATELET # BLD: 244 K/UL (ref 130–400)
PMV BLD AUTO: 9.1 FL (ref 7.4–10.4)
PO2 ARTERIAL: 69 MMHG (ref 80–100)
POTASSIUM SERPL-SCNC: 3.4 MMOL/L (ref 3.5–5)
POTASSIUM, WHOLE BLOOD: 3.5
RBC # BLD: 3.63 M/UL (ref 4.2–5.4)
SODIUM BLD-SCNC: 145 MMOL/L (ref 136–145)
TOTAL PROTEIN: 6 G/DL (ref 6.6–8.7)
WBC # BLD: 9.9 K/UL (ref 4.8–10.8)

## 2017-02-05 PROCEDURE — 71010 XR CHEST PORTABLE: CPT

## 2017-02-05 PROCEDURE — 82948 REAGENT STRIP/BLOOD GLUCOSE: CPT

## 2017-02-05 PROCEDURE — 94003 VENT MGMT INPAT SUBQ DAY: CPT

## 2017-02-05 PROCEDURE — 85025 COMPLETE CBC W/AUTO DIFF WBC: CPT

## 2017-02-05 PROCEDURE — 84132 ASSAY OF SERUM POTASSIUM: CPT

## 2017-02-05 PROCEDURE — 6370000000 HC RX 637 (ALT 250 FOR IP): Performed by: HOSPITALIST

## 2017-02-05 PROCEDURE — 36600 WITHDRAWAL OF ARTERIAL BLOOD: CPT

## 2017-02-05 PROCEDURE — 83735 ASSAY OF MAGNESIUM: CPT

## 2017-02-05 PROCEDURE — 2000000000 HC ICU R&B

## 2017-02-05 PROCEDURE — 99233 SBSQ HOSP IP/OBS HIGH 50: CPT | Performed by: HOSPITALIST

## 2017-02-05 PROCEDURE — 2580000003 HC RX 258: Performed by: HOSPITALIST

## 2017-02-05 PROCEDURE — 2700000000 HC OXYGEN THERAPY PER DAY

## 2017-02-05 PROCEDURE — 6360000002 HC RX W HCPCS: Performed by: HOSPITALIST

## 2017-02-05 PROCEDURE — 94640 AIRWAY INHALATION TREATMENT: CPT

## 2017-02-05 PROCEDURE — 82803 BLOOD GASES ANY COMBINATION: CPT

## 2017-02-05 PROCEDURE — 2500000003 HC RX 250 WO HCPCS: Performed by: HOSPITALIST

## 2017-02-05 PROCEDURE — 80053 COMPREHEN METABOLIC PANEL: CPT

## 2017-02-05 PROCEDURE — 84100 ASSAY OF PHOSPHORUS: CPT

## 2017-02-05 PROCEDURE — C9113 INJ PANTOPRAZOLE SODIUM, VIA: HCPCS | Performed by: HOSPITALIST

## 2017-02-05 RX ORDER — ZIPRASIDONE MESYLATE 20 MG/ML
10 INJECTION, POWDER, LYOPHILIZED, FOR SOLUTION INTRAMUSCULAR ONCE
Status: COMPLETED | OUTPATIENT
Start: 2017-02-05 | End: 2017-02-05

## 2017-02-05 RX ORDER — LISINOPRIL 20 MG/1
20 TABLET ORAL DAILY
Status: DISCONTINUED | OUTPATIENT
Start: 2017-02-05 | End: 2017-02-08 | Stop reason: HOSPADM

## 2017-02-05 RX ORDER — OLANZAPINE 5 MG/1
5 TABLET ORAL EVERY 12 HOURS PRN
Status: DISCONTINUED | OUTPATIENT
Start: 2017-02-05 | End: 2017-02-06

## 2017-02-05 RX ADMIN — PROPOFOL 40 MCG/KG/MIN: 10 INJECTION, EMULSION INTRAVENOUS at 06:47

## 2017-02-05 RX ADMIN — LISINOPRIL 20 MG: 20 TABLET ORAL at 13:30

## 2017-02-05 RX ADMIN — ENOXAPARIN SODIUM 40 MG: 40 INJECTION SUBCUTANEOUS at 16:00

## 2017-02-05 RX ADMIN — IPRATROPIUM BROMIDE AND ALBUTEROL SULFATE 1 AMPULE: .5; 3 SOLUTION RESPIRATORY (INHALATION) at 23:20

## 2017-02-05 RX ADMIN — LORAZEPAM 4 MG: 2 INJECTION INTRAMUSCULAR; INTRAVENOUS at 14:15

## 2017-02-05 RX ADMIN — Medication 10 ML: at 09:59

## 2017-02-05 RX ADMIN — LORAZEPAM 4 MG: 2 INJECTION INTRAMUSCULAR; INTRAVENOUS at 11:02

## 2017-02-05 RX ADMIN — IPRATROPIUM BROMIDE AND ALBUTEROL SULFATE 1 AMPULE: .5; 3 SOLUTION RESPIRATORY (INHALATION) at 06:22

## 2017-02-05 RX ADMIN — BUSPIRONE HYDROCHLORIDE 10 MG: 10 TABLET ORAL at 09:58

## 2017-02-05 RX ADMIN — PROPOFOL 30 MCG/KG/MIN: 10 INJECTION, EMULSION INTRAVENOUS at 07:12

## 2017-02-05 RX ADMIN — IPRATROPIUM BROMIDE AND ALBUTEROL SULFATE 1 AMPULE: .5; 3 SOLUTION RESPIRATORY (INHALATION) at 10:39

## 2017-02-05 RX ADMIN — IPRATROPIUM BROMIDE AND ALBUTEROL SULFATE 1 AMPULE: .5; 3 SOLUTION RESPIRATORY (INHALATION) at 19:48

## 2017-02-05 RX ADMIN — LORAZEPAM 4 MG: 2 INJECTION INTRAMUSCULAR; INTRAVENOUS at 09:57

## 2017-02-05 RX ADMIN — Medication 10 ML: at 21:47

## 2017-02-05 RX ADMIN — OLANZAPINE: 10 INJECTION, POWDER, FOR SOLUTION INTRAMUSCULAR at 07:07

## 2017-02-05 RX ADMIN — LORAZEPAM 4 MG: 2 INJECTION INTRAMUSCULAR; INTRAVENOUS at 19:40

## 2017-02-05 RX ADMIN — IPRATROPIUM BROMIDE AND ALBUTEROL SULFATE 1 AMPULE: .5; 3 SOLUTION RESPIRATORY (INHALATION) at 14:33

## 2017-02-05 RX ADMIN — METOPROLOL TARTRATE 5 MG: 5 INJECTION INTRAVENOUS at 19:40

## 2017-02-05 RX ADMIN — BUSPIRONE HYDROCHLORIDE 10 MG: 10 TABLET ORAL at 13:52

## 2017-02-05 RX ADMIN — LORAZEPAM 4 MG: 2 INJECTION INTRAMUSCULAR; INTRAVENOUS at 12:55

## 2017-02-05 RX ADMIN — BUSPIRONE HYDROCHLORIDE 10 MG: 10 TABLET ORAL at 21:47

## 2017-02-05 RX ADMIN — LORAZEPAM 4 MG: 2 INJECTION INTRAMUSCULAR; INTRAVENOUS at 15:13

## 2017-02-05 RX ADMIN — ARIPIPRAZOLE 5 MG: 5 TABLET ORAL at 09:57

## 2017-02-05 RX ADMIN — ZIPRASIDONE MESYLATE 10 MG: 20 INJECTION, POWDER, LYOPHILIZED, FOR SOLUTION INTRAMUSCULAR at 15:14

## 2017-02-05 RX ADMIN — LORAZEPAM 1 MG: 2 INJECTION INTRAMUSCULAR; INTRAVENOUS at 08:00

## 2017-02-05 RX ADMIN — IPRATROPIUM BROMIDE AND ALBUTEROL SULFATE 1 AMPULE: .5; 3 SOLUTION RESPIRATORY (INHALATION) at 02:54

## 2017-02-05 RX ADMIN — Medication 1 TABLET: at 09:58

## 2017-02-05 RX ADMIN — FLUOXETINE HYDROCHLORIDE 40 MG: 20 CAPSULE ORAL at 09:58

## 2017-02-05 RX ADMIN — CEFTRIAXONE SODIUM 1 G: 1 INJECTION, POWDER, FOR SOLUTION INTRAMUSCULAR; INTRAVENOUS at 12:00

## 2017-02-05 RX ADMIN — SILVER SULFADIAZINE: 10 CREAM TOPICAL at 12:00

## 2017-02-05 RX ADMIN — PANTOPRAZOLE SODIUM 40 MG: 40 INJECTION, POWDER, FOR SOLUTION INTRAVENOUS at 09:57

## 2017-02-05 ASSESSMENT — PULMONARY FUNCTION TESTS
PIF_VALUE: 23.3
PIF_VALUE: 11.1
PIF_VALUE: 20.3
PIF_VALUE: 19.4
PIF_VALUE: 49.7
PIF_VALUE: 19.5
PIF_VALUE: 9
PIF_VALUE: 20.3

## 2017-02-06 LAB
ALBUMIN SERPL-MCNC: 3.2 G/DL (ref 3.5–5.2)
ALP BLD-CCNC: 83 U/L (ref 35–104)
ALT SERPL-CCNC: 44 U/L (ref 5–33)
ANION GAP SERPL CALCULATED.3IONS-SCNC: 13 MMOL/L (ref 7–19)
AST SERPL-CCNC: 43 U/L (ref 5–32)
BASOPHILS ABSOLUTE: 0 K/UL (ref 0–0.2)
BASOPHILS RELATIVE PERCENT: 0.4 % (ref 0–1)
BILIRUB SERPL-MCNC: <0.2 MG/DL (ref 0.2–1.2)
BUN BLDV-MCNC: 18 MG/DL (ref 6–20)
C DIFFICILE TOXIN, EIA: NORMAL
CALCIUM SERPL-MCNC: 8.3 MG/DL (ref 8.6–10)
CHLORIDE BLD-SCNC: 106 MMOL/L (ref 98–111)
CO2: 28 MMOL/L (ref 22–29)
CREAT SERPL-MCNC: 0.6 MG/DL (ref 0.5–0.9)
EOSINOPHILS ABSOLUTE: 0.4 K/UL (ref 0–0.6)
EOSINOPHILS RELATIVE PERCENT: 4.4 % (ref 0–5)
GFR NON-AFRICAN AMERICAN: >60
GLOBULIN: 2.9 G/DL
GLUCOSE BLD-MCNC: 113 MG/DL (ref 70–99)
GLUCOSE BLD-MCNC: 132 MG/DL (ref 74–109)
GLUCOSE BLD-MCNC: 90 MG/DL (ref 70–99)
GLUCOSE BLD-MCNC: 93 MG/DL (ref 70–99)
HCT VFR BLD CALC: 34.2 % (ref 37–47)
HEMOGLOBIN: 10.4 G/DL (ref 12–16)
LYMPHOCYTES ABSOLUTE: 1.7 K/UL (ref 1.1–4.5)
LYMPHOCYTES RELATIVE PERCENT: 17.1 % (ref 20–40)
MAGNESIUM: 2.3 MG/DL (ref 1.6–2.6)
MCH RBC QN AUTO: 28.2 PG (ref 27–31)
MCHC RBC AUTO-ENTMCNC: 30.4 G/DL (ref 33–37)
MCV RBC AUTO: 92.7 FL (ref 81–99)
MONOCYTES ABSOLUTE: 0.6 K/UL (ref 0–0.9)
MONOCYTES RELATIVE PERCENT: 5.6 % (ref 0–10)
NEUTROPHILS ABSOLUTE: 7.1 K/UL (ref 1.5–7.5)
NEUTROPHILS RELATIVE PERCENT: 71.3 % (ref 50–65)
PDW BLD-RTO: 14.2 % (ref 11.5–14.5)
PERFORMED ON: ABNORMAL
PERFORMED ON: NORMAL
PERFORMED ON: NORMAL
PHOSPHORUS: 4.1 MG/DL (ref 2.5–4.5)
PLATELET # BLD: 265 K/UL (ref 130–400)
PMV BLD AUTO: 9 FL (ref 7.4–10.4)
POTASSIUM SERPL-SCNC: 3.5 MMOL/L (ref 3.5–5)
RBC # BLD: 3.69 M/UL (ref 4.2–5.4)
SODIUM BLD-SCNC: 147 MMOL/L (ref 136–145)
TOTAL PROTEIN: 6.1 G/DL (ref 6.6–8.7)
WBC # BLD: 9.9 K/UL (ref 4.8–10.8)

## 2017-02-06 PROCEDURE — 94640 AIRWAY INHALATION TREATMENT: CPT

## 2017-02-06 PROCEDURE — 90791 PSYCH DIAGNOSTIC EVALUATION: CPT | Performed by: PSYCHIATRY & NEUROLOGY

## 2017-02-06 PROCEDURE — 92610 EVALUATE SWALLOWING FUNCTION: CPT

## 2017-02-06 PROCEDURE — 99233 SBSQ HOSP IP/OBS HIGH 50: CPT | Performed by: HOSPITALIST

## 2017-02-06 PROCEDURE — G8997 SWALLOW GOAL STATUS: HCPCS

## 2017-02-06 PROCEDURE — 2700000000 HC OXYGEN THERAPY PER DAY

## 2017-02-06 PROCEDURE — 6370000000 HC RX 637 (ALT 250 FOR IP): Performed by: HOSPITALIST

## 2017-02-06 PROCEDURE — 99231 SBSQ HOSP IP/OBS SF/LOW 25: CPT | Performed by: PSYCHIATRY & NEUROLOGY

## 2017-02-06 PROCEDURE — 82948 REAGENT STRIP/BLOOD GLUCOSE: CPT

## 2017-02-06 PROCEDURE — 85025 COMPLETE CBC W/AUTO DIFF WBC: CPT

## 2017-02-06 PROCEDURE — G8996 SWALLOW CURRENT STATUS: HCPCS

## 2017-02-06 PROCEDURE — 83735 ASSAY OF MAGNESIUM: CPT

## 2017-02-06 PROCEDURE — 80053 COMPREHEN METABOLIC PANEL: CPT

## 2017-02-06 PROCEDURE — 6370000000 HC RX 637 (ALT 250 FOR IP): Performed by: NURSE PRACTITIONER

## 2017-02-06 PROCEDURE — 6360000002 HC RX W HCPCS: Performed by: HOSPITALIST

## 2017-02-06 PROCEDURE — 2580000003 HC RX 258: Performed by: HOSPITALIST

## 2017-02-06 PROCEDURE — C9113 INJ PANTOPRAZOLE SODIUM, VIA: HCPCS | Performed by: HOSPITALIST

## 2017-02-06 PROCEDURE — 84100 ASSAY OF PHOSPHORUS: CPT

## 2017-02-06 PROCEDURE — 1210000000 HC MED SURG R&B

## 2017-02-06 PROCEDURE — 87324 CLOSTRIDIUM AG IA: CPT

## 2017-02-06 RX ORDER — IPRATROPIUM BROMIDE AND ALBUTEROL SULFATE 2.5; .5 MG/3ML; MG/3ML
1 SOLUTION RESPIRATORY (INHALATION) 3 TIMES DAILY
Status: DISCONTINUED | OUTPATIENT
Start: 2017-02-06 | End: 2017-02-08 | Stop reason: HOSPADM

## 2017-02-06 RX ORDER — DEXTROSE MONOHYDRATE 50 MG/ML
INJECTION, SOLUTION INTRAVENOUS CONTINUOUS
Status: DISCONTINUED | OUTPATIENT
Start: 2017-02-06 | End: 2017-02-07

## 2017-02-06 RX ORDER — ZIPRASIDONE MESYLATE 20 MG/ML
20 INJECTION, POWDER, LYOPHILIZED, FOR SOLUTION INTRAMUSCULAR EVERY 12 HOURS PRN
Status: DISCONTINUED | OUTPATIENT
Start: 2017-02-06 | End: 2017-02-07 | Stop reason: ALTCHOICE

## 2017-02-06 RX ADMIN — PANTOPRAZOLE SODIUM 40 MG: 40 INJECTION, POWDER, FOR SOLUTION INTRAVENOUS at 09:15

## 2017-02-06 RX ADMIN — BUSPIRONE HYDROCHLORIDE 10 MG: 10 TABLET ORAL at 21:14

## 2017-02-06 RX ADMIN — ENOXAPARIN SODIUM 40 MG: 40 INJECTION SUBCUTANEOUS at 16:30

## 2017-02-06 RX ADMIN — IPRATROPIUM BROMIDE AND ALBUTEROL SULFATE 1 AMPULE: .5; 3 SOLUTION RESPIRATORY (INHALATION) at 14:25

## 2017-02-06 RX ADMIN — FLUOXETINE HYDROCHLORIDE 40 MG: 20 CAPSULE ORAL at 09:15

## 2017-02-06 RX ADMIN — CEFTRIAXONE SODIUM 1 G: 1 INJECTION, POWDER, FOR SOLUTION INTRAMUSCULAR; INTRAVENOUS at 12:30

## 2017-02-06 RX ADMIN — SILVER SULFADIAZINE: 10 CREAM TOPICAL at 09:22

## 2017-02-06 RX ADMIN — ARIPIPRAZOLE 5 MG: 5 TABLET ORAL at 09:14

## 2017-02-06 RX ADMIN — DEXTROSE MONOHYDRATE: 50 INJECTION, SOLUTION INTRAVENOUS at 09:21

## 2017-02-06 RX ADMIN — IPRATROPIUM BROMIDE AND ALBUTEROL SULFATE 1 AMPULE: .5; 3 SOLUTION RESPIRATORY (INHALATION) at 03:04

## 2017-02-06 RX ADMIN — Medication 10 ML: at 09:19

## 2017-02-06 RX ADMIN — IPRATROPIUM BROMIDE AND ALBUTEROL SULFATE 1 AMPULE: .5; 3 SOLUTION RESPIRATORY (INHALATION) at 18:07

## 2017-02-06 RX ADMIN — Medication 10 ML: at 21:15

## 2017-02-06 RX ADMIN — IPRATROPIUM BROMIDE AND ALBUTEROL SULFATE 1 AMPULE: .5; 3 SOLUTION RESPIRATORY (INHALATION) at 06:31

## 2017-02-06 RX ADMIN — LISINOPRIL 20 MG: 20 TABLET ORAL at 09:21

## 2017-02-06 RX ADMIN — LORAZEPAM 4 MG: 2 INJECTION INTRAMUSCULAR; INTRAVENOUS at 02:13

## 2017-02-06 RX ADMIN — BUSPIRONE HYDROCHLORIDE 10 MG: 10 TABLET ORAL at 09:15

## 2017-02-06 RX ADMIN — Medication 1 TABLET: at 09:14

## 2017-02-06 ASSESSMENT — PAIN SCALES - GENERAL: PAINLEVEL_OUTOF10: 0

## 2017-02-07 PROBLEM — N30.00 ACUTE CYSTITIS WITHOUT HEMATURIA: Status: ACTIVE | Noted: 2017-01-30

## 2017-02-07 LAB
ALBUMIN SERPL-MCNC: 3.3 G/DL (ref 3.5–5.2)
ALP BLD-CCNC: 78 U/L (ref 35–104)
ALT SERPL-CCNC: 40 U/L (ref 5–33)
ANION GAP SERPL CALCULATED.3IONS-SCNC: 18 MMOL/L (ref 7–19)
AST SERPL-CCNC: 33 U/L (ref 5–32)
BASOPHILS ABSOLUTE: 0.1 K/UL (ref 0–0.2)
BASOPHILS RELATIVE PERCENT: 0.5 % (ref 0–1)
BILIRUB SERPL-MCNC: 0.4 MG/DL (ref 0.2–1.2)
BUN BLDV-MCNC: 20 MG/DL (ref 6–20)
CALCIUM SERPL-MCNC: 8.6 MG/DL (ref 8.6–10)
CHLORIDE BLD-SCNC: 102 MMOL/L (ref 98–111)
CO2: 24 MMOL/L (ref 22–29)
CREAT SERPL-MCNC: 0.7 MG/DL (ref 0.5–0.9)
EOSINOPHILS ABSOLUTE: 0.3 K/UL (ref 0–0.6)
EOSINOPHILS RELATIVE PERCENT: 3.1 % (ref 0–5)
GFR NON-AFRICAN AMERICAN: >60
GLOBULIN: 3.1 G/DL
GLUCOSE BLD-MCNC: 102 MG/DL (ref 70–99)
GLUCOSE BLD-MCNC: 103 MG/DL (ref 70–99)
GLUCOSE BLD-MCNC: 132 MG/DL (ref 70–99)
GLUCOSE BLD-MCNC: 73 MG/DL (ref 74–109)
GLUCOSE BLD-MCNC: 90 MG/DL (ref 70–99)
HCT VFR BLD CALC: 34.9 % (ref 37–47)
HEMOGLOBIN: 10.4 G/DL (ref 12–16)
LYMPHOCYTES ABSOLUTE: 2.4 K/UL (ref 1.1–4.5)
LYMPHOCYTES RELATIVE PERCENT: 21.4 % (ref 20–40)
MCH RBC QN AUTO: 28 PG (ref 27–31)
MCHC RBC AUTO-ENTMCNC: 29.8 G/DL (ref 33–37)
MCV RBC AUTO: 94.1 FL (ref 81–99)
MONOCYTES ABSOLUTE: 0.6 K/UL (ref 0–0.9)
MONOCYTES RELATIVE PERCENT: 5.4 % (ref 0–10)
NEUTROPHILS ABSOLUTE: 7.6 K/UL (ref 1.5–7.5)
NEUTROPHILS RELATIVE PERCENT: 68.4 % (ref 50–65)
PDW BLD-RTO: 14.4 % (ref 11.5–14.5)
PERFORMED ON: ABNORMAL
PERFORMED ON: NORMAL
PLATELET # BLD: 308 K/UL (ref 130–400)
PMV BLD AUTO: 9.4 FL (ref 7.4–10.4)
POTASSIUM SERPL-SCNC: 3.4 MMOL/L (ref 3.5–5)
RBC # BLD: 3.71 M/UL (ref 4.2–5.4)
SODIUM BLD-SCNC: 144 MMOL/L (ref 136–145)
TOTAL PROTEIN: 6.4 G/DL (ref 6.6–8.7)
WBC # BLD: 11 K/UL (ref 4.8–10.8)

## 2017-02-07 PROCEDURE — 2580000003 HC RX 258: Performed by: HOSPITALIST

## 2017-02-07 PROCEDURE — 85025 COMPLETE CBC W/AUTO DIFF WBC: CPT

## 2017-02-07 PROCEDURE — 94640 AIRWAY INHALATION TREATMENT: CPT

## 2017-02-07 PROCEDURE — 99231 SBSQ HOSP IP/OBS SF/LOW 25: CPT | Performed by: PSYCHIATRY & NEUROLOGY

## 2017-02-07 PROCEDURE — C9113 INJ PANTOPRAZOLE SODIUM, VIA: HCPCS | Performed by: HOSPITALIST

## 2017-02-07 PROCEDURE — 82948 REAGENT STRIP/BLOOD GLUCOSE: CPT

## 2017-02-07 PROCEDURE — 6360000002 HC RX W HCPCS: Performed by: HOSPITALIST

## 2017-02-07 PROCEDURE — 93005 ELECTROCARDIOGRAM TRACING: CPT

## 2017-02-07 PROCEDURE — 80053 COMPREHEN METABOLIC PANEL: CPT

## 2017-02-07 PROCEDURE — 2700000000 HC OXYGEN THERAPY PER DAY

## 2017-02-07 PROCEDURE — 1210000000 HC MED SURG R&B

## 2017-02-07 PROCEDURE — 99232 SBSQ HOSP IP/OBS MODERATE 35: CPT | Performed by: HOSPITALIST

## 2017-02-07 PROCEDURE — 6370000000 HC RX 637 (ALT 250 FOR IP): Performed by: HOSPITALIST

## 2017-02-07 PROCEDURE — 6370000000 HC RX 637 (ALT 250 FOR IP): Performed by: NURSE PRACTITIONER

## 2017-02-07 PROCEDURE — 92526 ORAL FUNCTION THERAPY: CPT

## 2017-02-07 RX ORDER — NITROFURANTOIN 25; 75 MG/1; MG/1
100 CAPSULE ORAL EVERY 12 HOURS SCHEDULED
Status: DISCONTINUED | OUTPATIENT
Start: 2017-02-07 | End: 2017-02-08

## 2017-02-07 RX ORDER — PHENAZOPYRIDINE HYDROCHLORIDE 200 MG/1
200 TABLET, FILM COATED ORAL 3 TIMES DAILY PRN
Status: DISCONTINUED | OUTPATIENT
Start: 2017-02-07 | End: 2017-02-08 | Stop reason: HOSPADM

## 2017-02-07 RX ORDER — POTASSIUM CHLORIDE 20MEQ/15ML
20 LIQUID (ML) ORAL DAILY
Status: DISCONTINUED | OUTPATIENT
Start: 2017-02-07 | End: 2017-02-07

## 2017-02-07 RX ADMIN — BUSPIRONE HYDROCHLORIDE 10 MG: 10 TABLET ORAL at 14:39

## 2017-02-07 RX ADMIN — Medication 10 ML: at 11:08

## 2017-02-07 RX ADMIN — IPRATROPIUM BROMIDE AND ALBUTEROL SULFATE 1 AMPULE: .5; 3 SOLUTION RESPIRATORY (INHALATION) at 18:59

## 2017-02-07 RX ADMIN — FLUOXETINE HYDROCHLORIDE 40 MG: 20 CAPSULE ORAL at 11:06

## 2017-02-07 RX ADMIN — NITROFURANTOIN (MONOHYDRATE/MACROCRYSTALS) 100 MG: 75; 25 CAPSULE ORAL at 22:13

## 2017-02-07 RX ADMIN — BUSPIRONE HYDROCHLORIDE 10 MG: 10 TABLET ORAL at 11:05

## 2017-02-07 RX ADMIN — ENOXAPARIN SODIUM 40 MG: 40 INJECTION SUBCUTANEOUS at 18:38

## 2017-02-07 RX ADMIN — IPRATROPIUM BROMIDE AND ALBUTEROL SULFATE 1 AMPULE: .5; 3 SOLUTION RESPIRATORY (INHALATION) at 14:07

## 2017-02-07 RX ADMIN — IPRATROPIUM BROMIDE AND ALBUTEROL SULFATE 1 AMPULE: .5; 3 SOLUTION RESPIRATORY (INHALATION) at 06:28

## 2017-02-07 RX ADMIN — LISINOPRIL 20 MG: 20 TABLET ORAL at 11:06

## 2017-02-07 RX ADMIN — PANTOPRAZOLE SODIUM 40 MG: 40 INJECTION, POWDER, FOR SOLUTION INTRAVENOUS at 11:07

## 2017-02-07 RX ADMIN — BUSPIRONE HYDROCHLORIDE 10 MG: 10 TABLET ORAL at 22:13

## 2017-02-07 RX ADMIN — Medication 10 ML: at 22:15

## 2017-02-07 RX ADMIN — POTASSIUM CHLORIDE 20 MEQ: 20 SOLUTION ORAL at 11:05

## 2017-02-07 RX ADMIN — Medication 1 TABLET: at 11:06

## 2017-02-07 RX ADMIN — ARIPIPRAZOLE 5 MG: 5 TABLET ORAL at 11:06

## 2017-02-08 VITALS
OXYGEN SATURATION: 94 % | HEART RATE: 107 BPM | SYSTOLIC BLOOD PRESSURE: 147 MMHG | HEIGHT: 70 IN | TEMPERATURE: 98.2 F | RESPIRATION RATE: 16 BRPM | WEIGHT: 291 LBS | DIASTOLIC BLOOD PRESSURE: 94 MMHG | BODY MASS INDEX: 41.66 KG/M2

## 2017-02-08 LAB
ALBUMIN SERPL-MCNC: 3.3 G/DL (ref 3.5–5.2)
ALP BLD-CCNC: 82 U/L (ref 35–104)
ALT SERPL-CCNC: 45 U/L (ref 5–33)
ANION GAP SERPL CALCULATED.3IONS-SCNC: 14 MMOL/L (ref 7–19)
AST SERPL-CCNC: 34 U/L (ref 5–32)
BASOPHILS ABSOLUTE: 0.1 K/UL (ref 0–0.2)
BASOPHILS RELATIVE PERCENT: 0.4 % (ref 0–1)
BILIRUB SERPL-MCNC: 0.4 MG/DL (ref 0.2–1.2)
BUN BLDV-MCNC: 16 MG/DL (ref 6–20)
CALCIUM SERPL-MCNC: 8.2 MG/DL (ref 8.6–10)
CHLORIDE BLD-SCNC: 102 MMOL/L (ref 98–111)
CO2: 25 MMOL/L (ref 22–29)
CREAT SERPL-MCNC: 0.7 MG/DL (ref 0.5–0.9)
EOSINOPHILS ABSOLUTE: 0.3 K/UL (ref 0–0.6)
EOSINOPHILS RELATIVE PERCENT: 2.8 % (ref 0–5)
GFR NON-AFRICAN AMERICAN: >60
GLOBULIN: 2.7 G/DL
GLUCOSE BLD-MCNC: 108 MG/DL (ref 74–109)
GLUCOSE BLD-MCNC: 115 MG/DL (ref 70–99)
GLUCOSE BLD-MCNC: 120 MG/DL (ref 70–99)
HCT VFR BLD CALC: 33.5 % (ref 37–47)
HEMOGLOBIN: 10.2 G/DL (ref 12–16)
LYMPHOCYTES ABSOLUTE: 2 K/UL (ref 1.1–4.5)
LYMPHOCYTES RELATIVE PERCENT: 17.1 % (ref 20–40)
MCH RBC QN AUTO: 28 PG (ref 27–31)
MCHC RBC AUTO-ENTMCNC: 30.4 G/DL (ref 33–37)
MCV RBC AUTO: 92 FL (ref 81–99)
MONOCYTES ABSOLUTE: 0.6 K/UL (ref 0–0.9)
MONOCYTES RELATIVE PERCENT: 4.8 % (ref 0–10)
NEUTROPHILS ABSOLUTE: 8.8 K/UL (ref 1.5–7.5)
NEUTROPHILS RELATIVE PERCENT: 74.1 % (ref 50–65)
PDW BLD-RTO: 14.3 % (ref 11.5–14.5)
PERFORMED ON: ABNORMAL
PERFORMED ON: ABNORMAL
PLATELET # BLD: 312 K/UL (ref 130–400)
PMV BLD AUTO: 9.1 FL (ref 7.4–10.4)
POTASSIUM SERPL-SCNC: 3.3 MMOL/L (ref 3.5–5)
RBC # BLD: 3.64 M/UL (ref 4.2–5.4)
SODIUM BLD-SCNC: 141 MMOL/L (ref 136–145)
TOTAL PROTEIN: 6 G/DL (ref 6.6–8.7)
WBC # BLD: 11.9 K/UL (ref 4.8–10.8)

## 2017-02-08 PROCEDURE — 80053 COMPREHEN METABOLIC PANEL: CPT

## 2017-02-08 PROCEDURE — 6370000000 HC RX 637 (ALT 250 FOR IP): Performed by: HOSPITALIST

## 2017-02-08 PROCEDURE — 6370000000 HC RX 637 (ALT 250 FOR IP): Performed by: NURSE PRACTITIONER

## 2017-02-08 PROCEDURE — 99238 HOSP IP/OBS DSCHRG MGMT 30/<: CPT | Performed by: NURSE PRACTITIONER

## 2017-02-08 PROCEDURE — 85025 COMPLETE CBC W/AUTO DIFF WBC: CPT

## 2017-02-08 PROCEDURE — 94640 AIRWAY INHALATION TREATMENT: CPT

## 2017-02-08 PROCEDURE — 93005 ELECTROCARDIOGRAM TRACING: CPT

## 2017-02-08 PROCEDURE — 99999 PR OFFICE/OUTPT VISIT,PROCEDURE ONLY: CPT | Performed by: PSYCHIATRY & NEUROLOGY

## 2017-02-08 PROCEDURE — 82948 REAGENT STRIP/BLOOD GLUCOSE: CPT

## 2017-02-08 RX ORDER — CIPROFLOXACIN 500 MG/1
500 TABLET, FILM COATED ORAL EVERY 12 HOURS SCHEDULED
Qty: 20 TABLET | Refills: 0 | Status: SHIPPED | OUTPATIENT
Start: 2017-02-08 | End: 2017-02-16 | Stop reason: ALTCHOICE

## 2017-02-08 RX ORDER — POTASSIUM CHLORIDE 20 MEQ/1
40 TABLET, EXTENDED RELEASE ORAL 2 TIMES DAILY
Status: DISCONTINUED | OUTPATIENT
Start: 2017-02-08 | End: 2017-02-08

## 2017-02-08 RX ORDER — CIPROFLOXACIN 500 MG/1
500 TABLET, FILM COATED ORAL EVERY 12 HOURS SCHEDULED
Status: DISCONTINUED | OUTPATIENT
Start: 2017-02-08 | End: 2017-02-08 | Stop reason: HOSPADM

## 2017-02-08 RX ORDER — PANTOPRAZOLE SODIUM 40 MG/1
40 TABLET, DELAYED RELEASE ORAL
Status: DISCONTINUED | OUTPATIENT
Start: 2017-02-08 | End: 2017-02-08 | Stop reason: HOSPADM

## 2017-02-08 RX ADMIN — BUSPIRONE HYDROCHLORIDE 10 MG: 10 TABLET ORAL at 09:43

## 2017-02-08 RX ADMIN — IPRATROPIUM BROMIDE AND ALBUTEROL SULFATE 1 AMPULE: .5; 3 SOLUTION RESPIRATORY (INHALATION) at 06:10

## 2017-02-08 RX ADMIN — FLUOXETINE HYDROCHLORIDE 40 MG: 20 CAPSULE ORAL at 09:43

## 2017-02-08 RX ADMIN — Medication 1 TABLET: at 09:43

## 2017-02-08 RX ADMIN — CIPROFLOXACIN HYDROCHLORIDE 500 MG: 500 TABLET, FILM COATED ORAL at 09:43

## 2017-02-08 RX ADMIN — POTASSIUM CHLORIDE 40 MEQ: 20 TABLET, EXTENDED RELEASE ORAL at 09:43

## 2017-02-08 RX ADMIN — ARIPIPRAZOLE 5 MG: 5 TABLET ORAL at 09:44

## 2017-02-08 RX ADMIN — PANTOPRAZOLE SODIUM 40 MG: 40 TABLET, DELAYED RELEASE ORAL at 06:33

## 2017-02-08 RX ADMIN — LISINOPRIL 20 MG: 20 TABLET ORAL at 09:43

## 2017-02-09 LAB
EKG P AXIS: 56 DEGREES
EKG P AXIS: 60 DEGREES
EKG P-R INTERVAL: 126 MS
EKG P-R INTERVAL: 128 MS
EKG Q-T INTERVAL: 368 MS
EKG Q-T INTERVAL: 368 MS
EKG QRS DURATION: 90 MS
EKG QRS DURATION: 94 MS
EKG QTC CALCULATION (BAZETT): 431 MS
EKG QTC CALCULATION (BAZETT): 436 MS
EKG T AXIS: 18 DEGREES
EKG T AXIS: 36 DEGREES

## 2017-02-10 ENCOUNTER — TELEPHONE (OUTPATIENT)
Dept: PRIMARY CARE CLINIC | Age: 47
End: 2017-02-10

## 2017-02-16 ENCOUNTER — OFFICE VISIT (OUTPATIENT)
Dept: PRIMARY CARE CLINIC | Age: 47
End: 2017-02-16
Payer: COMMERCIAL

## 2017-02-16 VITALS
DIASTOLIC BLOOD PRESSURE: 78 MMHG | RESPIRATION RATE: 20 BRPM | HEIGHT: 68 IN | OXYGEN SATURATION: 100 % | WEIGHT: 280 LBS | BODY MASS INDEX: 42.44 KG/M2 | SYSTOLIC BLOOD PRESSURE: 132 MMHG | HEART RATE: 98 BPM

## 2017-02-16 DIAGNOSIS — I10 ESSENTIAL HYPERTENSION: ICD-10-CM

## 2017-02-16 DIAGNOSIS — T50.901D: ICD-10-CM

## 2017-02-16 DIAGNOSIS — R73.02 IMPAIRED GLUCOSE TOLERANCE: Primary | ICD-10-CM

## 2017-02-16 DIAGNOSIS — E11.9 TYPE 2 DIABETES MELLITUS WITHOUT COMPLICATION, WITHOUT LONG-TERM CURRENT USE OF INSULIN (HCC): ICD-10-CM

## 2017-02-16 PROCEDURE — 99214 OFFICE O/P EST MOD 30 MIN: CPT | Performed by: INTERNAL MEDICINE

## 2017-02-16 RX ORDER — HYDROCODONE BITARTRATE AND ACETAMINOPHEN 10; 325 MG/1; MG/1
1 TABLET ORAL EVERY 8 HOURS PRN
Qty: 90 TABLET | Refills: 0 | Status: SHIPPED | OUTPATIENT
Start: 2017-02-16 | End: 2017-03-14 | Stop reason: SDUPTHER

## 2017-02-16 RX ORDER — ZOLPIDEM TARTRATE 12.5 MG/1
12.5 TABLET, FILM COATED, EXTENDED RELEASE ORAL NIGHTLY PRN
Qty: 30 TABLET | Refills: 0 | Status: SHIPPED | OUTPATIENT
Start: 2017-02-16 | End: 2017-04-13 | Stop reason: SDUPTHER

## 2017-02-16 RX ORDER — ALPRAZOLAM 0.25 MG/1
0.25 TABLET ORAL 3 TIMES DAILY PRN
Qty: 90 TABLET | Refills: 0 | Status: SHIPPED | OUTPATIENT
Start: 2017-02-16 | End: 2017-03-14 | Stop reason: SDUPTHER

## 2017-02-28 ENCOUNTER — TELEPHONE (OUTPATIENT)
Dept: PRIMARY CARE CLINIC | Age: 47
End: 2017-02-28

## 2017-03-14 RX ORDER — ZOLPIDEM TARTRATE 12.5 MG/1
12.5 TABLET, FILM COATED, EXTENDED RELEASE ORAL NIGHTLY PRN
Qty: 30 TABLET | Refills: 0 | Status: SHIPPED | OUTPATIENT
Start: 2017-03-18 | End: 2017-04-01

## 2017-03-14 RX ORDER — ALPRAZOLAM 0.25 MG/1
0.25 TABLET ORAL 3 TIMES DAILY PRN
Qty: 90 TABLET | Refills: 0 | Status: SHIPPED | OUTPATIENT
Start: 2017-03-18 | End: 2017-04-13 | Stop reason: SDUPTHER

## 2017-03-14 RX ORDER — HYDROCODONE BITARTRATE AND ACETAMINOPHEN 10; 325 MG/1; MG/1
1 TABLET ORAL EVERY 8 HOURS PRN
Qty: 90 TABLET | Refills: 0 | Status: SHIPPED | OUTPATIENT
Start: 2017-03-18 | End: 2017-04-13 | Stop reason: SDUPTHER

## 2017-04-13 RX ORDER — ZOLPIDEM TARTRATE 12.5 MG/1
12.5 TABLET, FILM COATED, EXTENDED RELEASE ORAL NIGHTLY PRN
Qty: 30 TABLET | Refills: 0 | Status: SHIPPED | OUTPATIENT
Start: 2017-04-17 | End: 2017-05-12 | Stop reason: SDUPTHER

## 2017-04-13 RX ORDER — HYDROCODONE BITARTRATE AND ACETAMINOPHEN 10; 325 MG/1; MG/1
1 TABLET ORAL EVERY 8 HOURS PRN
Qty: 90 TABLET | Refills: 0 | Status: SHIPPED | OUTPATIENT
Start: 2017-04-17 | End: 2017-05-12 | Stop reason: SDUPTHER

## 2017-04-13 RX ORDER — ALPRAZOLAM 0.25 MG/1
0.25 TABLET ORAL 3 TIMES DAILY PRN
Qty: 90 TABLET | Refills: 0 | Status: SHIPPED | OUTPATIENT
Start: 2017-04-17 | End: 2017-05-12 | Stop reason: SDUPTHER

## 2017-05-12 RX ORDER — ALPRAZOLAM 0.25 MG/1
0.25 TABLET ORAL 3 TIMES DAILY PRN
Qty: 90 TABLET | Refills: 0 | OUTPATIENT
Start: 2017-05-12 | End: 2017-06-13 | Stop reason: SDUPTHER

## 2017-05-12 RX ORDER — ZOLPIDEM TARTRATE 12.5 MG/1
12.5 TABLET, FILM COATED, EXTENDED RELEASE ORAL NIGHTLY PRN
Qty: 30 TABLET | Refills: 0 | OUTPATIENT
Start: 2017-05-12 | End: 2017-06-13 | Stop reason: SDUPTHER

## 2017-05-15 RX ORDER — HYDROCODONE BITARTRATE AND ACETAMINOPHEN 10; 325 MG/1; MG/1
1 TABLET ORAL EVERY 8 HOURS PRN
Qty: 90 TABLET | Refills: 0 | Status: SHIPPED | OUTPATIENT
Start: 2017-05-15 | End: 2017-06-13 | Stop reason: SDUPTHER

## 2017-05-27 DIAGNOSIS — Z76.0 MEDICATION REFILL: ICD-10-CM

## 2017-05-28 RX ORDER — HYDROCHLOROTHIAZIDE 25 MG/1
TABLET ORAL
Qty: 30 TABLET | Refills: 0 | Status: SHIPPED | OUTPATIENT
Start: 2017-05-28 | End: 2017-08-28 | Stop reason: SDUPTHER

## 2017-06-14 RX ORDER — ZOLPIDEM TARTRATE 12.5 MG/1
12.5 TABLET, FILM COATED, EXTENDED RELEASE ORAL NIGHTLY PRN
Qty: 30 TABLET | Refills: 0 | Status: SHIPPED | OUTPATIENT
Start: 2017-06-14 | End: 2017-07-17 | Stop reason: SDUPTHER

## 2017-06-14 RX ORDER — ALPRAZOLAM 0.25 MG/1
0.25 TABLET ORAL 3 TIMES DAILY PRN
Qty: 90 TABLET | Refills: 0 | Status: SHIPPED | OUTPATIENT
Start: 2017-06-14 | End: 2017-07-14

## 2017-06-14 RX ORDER — HYDROCODONE BITARTRATE AND ACETAMINOPHEN 10; 325 MG/1; MG/1
1 TABLET ORAL EVERY 8 HOURS PRN
Qty: 90 TABLET | Refills: 0 | Status: SHIPPED | OUTPATIENT
Start: 2017-06-14 | End: 2017-07-17 | Stop reason: SDUPTHER

## 2017-06-28 DIAGNOSIS — Z76.0 MEDICATION REFILL: ICD-10-CM

## 2017-06-28 RX ORDER — HYDROCHLOROTHIAZIDE 25 MG/1
TABLET ORAL
Qty: 30 TABLET | Refills: 0 | OUTPATIENT
Start: 2017-06-28

## 2017-07-17 ENCOUNTER — OFFICE VISIT (OUTPATIENT)
Dept: PRIMARY CARE CLINIC | Age: 47
End: 2017-07-17

## 2017-07-17 VITALS
WEIGHT: 293 LBS | TEMPERATURE: 98 F | OXYGEN SATURATION: 96 % | SYSTOLIC BLOOD PRESSURE: 122 MMHG | HEIGHT: 68 IN | BODY MASS INDEX: 44.41 KG/M2 | DIASTOLIC BLOOD PRESSURE: 80 MMHG | HEART RATE: 113 BPM | RESPIRATION RATE: 22 BRPM

## 2017-07-17 DIAGNOSIS — I10 ESSENTIAL HYPERTENSION: Primary | ICD-10-CM

## 2017-07-17 DIAGNOSIS — M51.36 DDD (DEGENERATIVE DISC DISEASE), LUMBAR: ICD-10-CM

## 2017-07-17 DIAGNOSIS — E11.9 TYPE 2 DIABETES MELLITUS WITHOUT COMPLICATION, WITHOUT LONG-TERM CURRENT USE OF INSULIN (HCC): ICD-10-CM

## 2017-07-17 PROCEDURE — 99214 OFFICE O/P EST MOD 30 MIN: CPT | Performed by: INTERNAL MEDICINE

## 2017-07-17 RX ORDER — ALPRAZOLAM 0.5 MG/1
0.5 TABLET ORAL 3 TIMES DAILY PRN
Qty: 90 TABLET | Refills: 2 | Status: SHIPPED | OUTPATIENT
Start: 2017-07-17 | End: 2017-10-10 | Stop reason: SDUPTHER

## 2017-07-17 RX ORDER — ZOLPIDEM TARTRATE 12.5 MG/1
12.5 TABLET, FILM COATED, EXTENDED RELEASE ORAL NIGHTLY PRN
Qty: 30 TABLET | Refills: 0 | Status: SHIPPED | OUTPATIENT
Start: 2017-07-17 | End: 2017-08-14 | Stop reason: SDUPTHER

## 2017-07-17 RX ORDER — HYDROCODONE BITARTRATE AND ACETAMINOPHEN 10; 325 MG/1; MG/1
1 TABLET ORAL EVERY 8 HOURS PRN
Qty: 90 TABLET | Refills: 0 | Status: SHIPPED | OUTPATIENT
Start: 2017-07-17 | End: 2017-08-14 | Stop reason: SDUPTHER

## 2017-07-17 ASSESSMENT — ENCOUNTER SYMPTOMS
BACK PAIN: 1
COUGH: 0
CONSTIPATION: 0
VOMITING: 0
NAUSEA: 0
SHORTNESS OF BREATH: 0
DIARRHEA: 1

## 2017-08-14 RX ORDER — HYDROCODONE BITARTRATE AND ACETAMINOPHEN 10; 325 MG/1; MG/1
1 TABLET ORAL EVERY 8 HOURS PRN
Qty: 90 TABLET | Refills: 0 | Status: SHIPPED | OUTPATIENT
Start: 2017-08-16 | End: 2017-09-11 | Stop reason: SDUPTHER

## 2017-08-14 RX ORDER — ZOLPIDEM TARTRATE 12.5 MG/1
12.5 TABLET, FILM COATED, EXTENDED RELEASE ORAL NIGHTLY PRN
Qty: 30 TABLET | Refills: 3 | Status: SHIPPED | OUTPATIENT
Start: 2017-08-16 | End: 2017-09-11 | Stop reason: SDUPTHER

## 2017-08-28 DIAGNOSIS — Z76.0 MEDICATION REFILL: ICD-10-CM

## 2017-08-28 RX ORDER — HYDROCHLOROTHIAZIDE 25 MG/1
TABLET ORAL
Qty: 30 TABLET | Refills: 11 | Status: SHIPPED | OUTPATIENT
Start: 2017-08-28 | End: 2018-09-21 | Stop reason: SDUPTHER

## 2017-09-13 RX ORDER — HYDROCODONE BITARTRATE AND ACETAMINOPHEN 10; 325 MG/1; MG/1
1 TABLET ORAL EVERY 8 HOURS PRN
Qty: 90 TABLET | Refills: 0 | Status: SHIPPED | OUTPATIENT
Start: 2017-10-11 | End: 2017-11-10

## 2017-09-13 RX ORDER — ZOLPIDEM TARTRATE 12.5 MG/1
12.5 TABLET, FILM COATED, EXTENDED RELEASE ORAL NIGHTLY PRN
Qty: 30 TABLET | Refills: 3 | Status: SHIPPED | OUTPATIENT
Start: 2017-09-13 | End: 2018-01-02 | Stop reason: SDUPTHER

## 2017-09-13 RX ORDER — HYDROCODONE BITARTRATE AND ACETAMINOPHEN 10; 325 MG/1; MG/1
1 TABLET ORAL EVERY 8 HOURS PRN
Qty: 90 TABLET | Refills: 0 | Status: SHIPPED | OUTPATIENT
Start: 2017-09-13 | End: 2017-11-07 | Stop reason: SDUPTHER

## 2017-10-12 RX ORDER — ALPRAZOLAM 0.5 MG/1
0.5 TABLET ORAL 3 TIMES DAILY PRN
Qty: 90 TABLET | Refills: 2 | OUTPATIENT
Start: 2017-10-13 | End: 2018-01-02 | Stop reason: SDUPTHER

## 2017-10-12 RX ORDER — ALPRAZOLAM 0.5 MG/1
0.5 TABLET ORAL 3 TIMES DAILY PRN
Qty: 90 TABLET | Refills: 2 | Status: CANCELLED | OUTPATIENT
Start: 2017-10-13 | End: 2017-11-12

## 2017-10-12 NOTE — TELEPHONE ENCOUNTER
JUANITA was reviewed today per office protocol. Report shows No discrepancies. Fill pattern is consistent from single provider(s) at single pharmacy(s). Prescription escribed. Patient is aware to check within the pharmacy        Please call in.

## 2017-11-07 RX ORDER — HYDROCODONE BITARTRATE AND ACETAMINOPHEN 10; 325 MG/1; MG/1
1 TABLET ORAL EVERY 8 HOURS PRN
Qty: 90 TABLET | Refills: 0 | Status: SHIPPED | OUTPATIENT
Start: 2017-11-07 | End: 2017-12-07 | Stop reason: SDUPTHER

## 2017-11-07 NOTE — TELEPHONE ENCOUNTER
Pt requesting a refill on [place] . Dose verified. Last fill date 10/11/2017. Last OV 7/17/2017.  Next OV 11/10/2017

## 2017-11-07 NOTE — TELEPHONE ENCOUNTER
JUANITA was reviewed today per office protocol. Report shows No discrepancies. Fill pattern is consistent from single provider(s) at single pharmacy(s).     Presciption Escribed

## 2017-12-07 ENCOUNTER — OFFICE VISIT (OUTPATIENT)
Dept: PRIMARY CARE CLINIC | Age: 47
End: 2017-12-07

## 2017-12-07 VITALS
BODY MASS INDEX: 44.41 KG/M2 | SYSTOLIC BLOOD PRESSURE: 119 MMHG | WEIGHT: 293 LBS | OXYGEN SATURATION: 96 % | HEIGHT: 68 IN | DIASTOLIC BLOOD PRESSURE: 76 MMHG | HEART RATE: 91 BPM | TEMPERATURE: 97.6 F | RESPIRATION RATE: 19 BRPM

## 2017-12-07 DIAGNOSIS — F32.A ANXIETY AND DEPRESSION: Primary | ICD-10-CM

## 2017-12-07 DIAGNOSIS — R73.02 GLUCOSE INTOLERANCE (IMPAIRED GLUCOSE TOLERANCE): ICD-10-CM

## 2017-12-07 DIAGNOSIS — F41.9 ANXIETY AND DEPRESSION: Primary | ICD-10-CM

## 2017-12-07 DIAGNOSIS — R23.2 HOT FLASHES: ICD-10-CM

## 2017-12-07 DIAGNOSIS — E66.01 MORBID OBESITY WITH BMI OF 40.0-44.9, ADULT (HCC): ICD-10-CM

## 2017-12-07 DIAGNOSIS — F41.9 ANXIETY AND DEPRESSION: ICD-10-CM

## 2017-12-07 DIAGNOSIS — E11.9 TYPE 2 DIABETES MELLITUS WITHOUT COMPLICATION, WITHOUT LONG-TERM CURRENT USE OF INSULIN (HCC): ICD-10-CM

## 2017-12-07 DIAGNOSIS — R53.83 OTHER FATIGUE: ICD-10-CM

## 2017-12-07 DIAGNOSIS — G89.29 OTHER CHRONIC PAIN: ICD-10-CM

## 2017-12-07 DIAGNOSIS — F32.A ANXIETY AND DEPRESSION: ICD-10-CM

## 2017-12-07 DIAGNOSIS — I10 ESSENTIAL HYPERTENSION: ICD-10-CM

## 2017-12-07 DIAGNOSIS — I10 WELL-CONTROLLED HYPERTENSION: ICD-10-CM

## 2017-12-07 DIAGNOSIS — M51.36 DDD (DEGENERATIVE DISC DISEASE), LUMBAR: ICD-10-CM

## 2017-12-07 LAB
ALBUMIN SERPL-MCNC: 4.3 G/DL (ref 3.5–5.2)
ALP BLD-CCNC: 96 U/L (ref 35–104)
ALT SERPL-CCNC: 33 U/L (ref 5–33)
ANION GAP SERPL CALCULATED.3IONS-SCNC: 16 MMOL/L (ref 7–19)
AST SERPL-CCNC: 25 U/L (ref 5–32)
BACTERIA: ABNORMAL /HPF
BILIRUB SERPL-MCNC: 0.4 MG/DL (ref 0.2–1.2)
BILIRUBIN URINE: NEGATIVE
BLOOD, URINE: NEGATIVE
BUN BLDV-MCNC: 13 MG/DL (ref 6–20)
CALCIUM SERPL-MCNC: 9.1 MG/DL (ref 8.6–10)
CHLORIDE BLD-SCNC: 99 MMOL/L (ref 98–111)
CHOLESTEROL, TOTAL: 239 MG/DL (ref 160–199)
CLARITY: ABNORMAL
CO2: 25 MMOL/L (ref 22–29)
COLOR: ABNORMAL
CREAT SERPL-MCNC: 0.8 MG/DL (ref 0.5–0.9)
CREATININE URINE: 332.8 MG/DL (ref 4.2–622)
EPITHELIAL CELLS, UA: ABNORMAL /HPF
FOLLICLE STIMULATING HORMONE: 5.5 MIU/ML
GFR NON-AFRICAN AMERICAN: >60
GLUCOSE BLD-MCNC: 72 MG/DL (ref 74–109)
GLUCOSE URINE: NEGATIVE MG/DL
HBA1C MFR BLD: 5.3 %
HCT VFR BLD CALC: 40.4 % (ref 37–47)
HDLC SERPL-MCNC: 61 MG/DL (ref 65–121)
HEMOGLOBIN: 12.9 G/DL (ref 12–16)
KETONES, URINE: NEGATIVE MG/DL
LDL CHOLESTEROL CALCULATED: 121 MG/DL
LEUKOCYTE ESTERASE, URINE: ABNORMAL
LUTEINIZING HORMONE: 10.1 MIU/ML
MCH RBC QN AUTO: 28.7 PG (ref 27–31)
MCHC RBC AUTO-ENTMCNC: 31.9 G/DL (ref 33–37)
MCV RBC AUTO: 90 FL (ref 81–99)
MICROALBUMIN UR-MCNC: <1.2 MG/DL (ref 0–19)
MICROALBUMIN/CREAT UR-RTO: NORMAL MG/G
MUCUS: ABNORMAL /LPF
NITRITE, URINE: POSITIVE
PDW BLD-RTO: 14 % (ref 11.5–14.5)
PH UA: 6
PLATELET # BLD: 353 K/UL (ref 130–400)
PMV BLD AUTO: 8.8 FL (ref 9.4–12.3)
POTASSIUM SERPL-SCNC: 4.1 MMOL/L (ref 3.5–5)
PROTEIN PROTEIN: 41 MG/DL (ref 15–45)
PROTEIN UA: ABNORMAL MG/DL
RBC # BLD: 4.49 M/UL (ref 4.2–5.4)
SODIUM BLD-SCNC: 140 MMOL/L (ref 136–145)
SPECIFIC GRAVITY UA: 1.03
TOTAL PROTEIN: 7.3 G/DL (ref 6.6–8.7)
TRIGL SERPL-MCNC: 287 MG/DL (ref 0–149)
TSH SERPL DL<=0.05 MIU/L-ACNC: 1.97 UIU/ML (ref 0.27–4.2)
UROBILINOGEN, URINE: 0.2 E.U./DL
WBC # BLD: 10.1 K/UL (ref 4.8–10.8)
WBC UA: ABNORMAL /HPF (ref 0–5)

## 2017-12-07 PROCEDURE — 99214 OFFICE O/P EST MOD 30 MIN: CPT | Performed by: NURSE PRACTITIONER

## 2017-12-07 RX ORDER — HYDROCODONE BITARTRATE AND ACETAMINOPHEN 10; 325 MG/1; MG/1
1 TABLET ORAL EVERY 8 HOURS PRN
Qty: 90 TABLET | Refills: 0 | Status: SHIPPED | OUTPATIENT
Start: 2017-12-07 | End: 2018-01-02 | Stop reason: SDUPTHER

## 2017-12-07 RX ORDER — FLUOXETINE HYDROCHLORIDE 40 MG/1
40 CAPSULE ORAL DAILY
Qty: 30 CAPSULE | Refills: 11 | Status: SHIPPED | OUTPATIENT
Start: 2017-12-07 | End: 2018-12-09 | Stop reason: SDUPTHER

## 2017-12-07 RX ORDER — HYDROCODONE BITARTRATE AND ACETAMINOPHEN 10; 325 MG/1; MG/1
1 TABLET ORAL EVERY 8 HOURS PRN
Qty: 90 TABLET | Refills: 0 | Status: SHIPPED | OUTPATIENT
Start: 2017-12-07 | End: 2017-12-07 | Stop reason: SDUPTHER

## 2017-12-07 RX ORDER — FLUOXETINE 10 MG/1
10 CAPSULE ORAL DAILY
Qty: 30 CAPSULE | Refills: 3 | Status: SHIPPED | OUTPATIENT
Start: 2017-12-07 | End: 2018-05-24 | Stop reason: SDUPTHER

## 2017-12-07 RX ORDER — M-VIT,TX,IRON,MINS/CALC/FOLIC 27MG-0.4MG
1 TABLET ORAL DAILY
Qty: 30 TABLET | Refills: 5 | Status: SHIPPED | OUTPATIENT
Start: 2017-12-07

## 2017-12-07 NOTE — PROGRESS NOTES
worsening she denies any hair loss, does have a history of constipation. She is also struggle with weight gain. Patient would like to have her hormone levels evaluated as she is beginning to have symptoms consistent with perimenopause. Including less frequent periods and shorter indurations however she does have some dysmenorrhea as well. He is complaining of hot flashes. Past Medical History:   Diagnosis Date    Anxiety     Chronic pain     Depression     Diabetes mellitus type 2 in obese ([address]     Hypertension       Past Surgical History:   Procedure Laterality Date     SECTION      TUBAL LIGATION         Family History   Problem Relation Age of Onset    Family history unknown: Yes       Social History   Substance Use Topics    Smoking status: Never Smoker    Smokeless tobacco: Never Used    Alcohol use Yes      Comment: rare      Current Outpatient Prescriptions   Medication Sig Dispense Refill    Multiple Vitamins-Minerals (THERAPEUTIC MULTIVITAMIN-MINERALS) tablet Take 1 tablet by mouth daily 30 tablet 5    FLUoxetine (PROZAC) 10 MG capsule Take 1 capsule by mouth daily 30 capsule 3    FLUoxetine (PROZAC) 40 MG capsule Take 1 capsule by mouth daily 30 capsule 11    HYDROcodone-acetaminophen (NORCO)  MG per tablet Take 1 tablet by mouth every 8 hours as needed for Pain .  90 tablet 0    zolpidem (AMBIEN CR) 12.5 MG extended release tablet Take 1 tablet by mouth nightly as needed for Sleep 30 tablet 3    hydrochlorothiazide (HYDRODIURIL) 25 MG tablet TAKE 1 TABLET BY MOUTH DAILY 30 tablet 11    ARIPiprazole (ABILIFY) 5 MG tablet Take 1 tablet by mouth daily 30 tablet 9    lisinopril (PRINIVIL;ZESTRIL) 20 MG tablet TAKE 1 TABLET BY MOUTH EVERY DAY 30 tablet 11    busPIRone (BUSPAR) 10 MG tablet Take 1 tablet by mouth 3 times daily 90 tablet 3    Insulin Pen Needle (B-D ULTRAFINE III SHORT PEN) 31G X 8 MM MISC 1 each by Does not apply route daily 100 each 3     No current facility-administered medications for this visit. No Known Allergies    Health Maintenance   Topic Date Due    Diabetic foot exam  10/01/1979    Diabetic retinal exam  10/01/1979    DTaP/Tdap/Td vaccine (1 - Tdap) 10/01/1988    Pneumococcal med risk (1 of 1 - PPSV23) 10/01/1988    Cervical cancer screen  10/01/1990    Flu vaccine (1) 09/01/2017    HIV screen  12/21/2017 (Originally 10/1/1984)    Diabetic hemoglobin A1C test  12/07/2018    Diabetic microalbuminuria test  12/07/2018    Lipid screen  12/07/2018       Subjective:      Review of Systems   Constitutional: Positive for fatigue and unexpected weight change. Negative for activity change, appetite change and chills. Eyes: Negative for visual disturbance. Respiratory: Negative for apnea, shortness of breath and wheezing. Cardiovascular: Negative for chest pain, palpitations and leg swelling. Gastrointestinal: Positive for constipation. Endocrine: Positive for heat intolerance and polyuria. Negative for cold intolerance, polydipsia and polyphagia. Genitourinary: Positive for dysuria and menstrual problem. History of recurrent UTIs   Musculoskeletal: Positive for arthralgias and back pain. Skin: Negative. Allergic/Immunologic: Negative. Neurological: Negative. Psychiatric/Behavioral: Positive for decreased concentration and dysphoric mood. Negative for agitation, behavioral problems, confusion, hallucinations, self-injury, sleep disturbance and suicidal ideas. The patient is nervous/anxious. The patient is not hyperactive. Patient is feeling more depressed than usual.       Objective:     Physical Exam   Constitutional: She is oriented to person, place, and time. She appears well-developed and well-nourished. HENT:   Head: Normocephalic and atraumatic. Eyes: Pupils are equal, round, and reactive to light. Neck: Normal range of motion. Neck supple. No thyromegaly present.    Cardiovascular: Normal rate, continue current medications, diet and exercise. Patient agreed with treatment plan. Follow up as directed. There are no Patient Instructions on file for this visit.       Electronically signed by NATALI Teresa on 12/8/2017 at 8:27 AM

## 2017-12-08 ASSESSMENT — ENCOUNTER SYMPTOMS
BACK PAIN: 1
APNEA: 0
CONSTIPATION: 1
SHORTNESS OF BREATH: 0
WHEEZING: 0
ALLERGIC/IMMUNOLOGIC NEGATIVE: 1

## 2017-12-09 LAB
ORGANISM: ABNORMAL
URINE CULTURE, ROUTINE: ABNORMAL
URINE CULTURE, ROUTINE: ABNORMAL

## 2017-12-10 LAB
DHEA: 1.26 NG/ML (ref 0.63–4.7)
SEX HORMONE BINDING GLOBULIN: 43 NMOL/L (ref 30–135)
TESTOSTERONE FREE: 3.4 PG/ML (ref 1.1–5.8)
TESTOSTERONE, FEMALES/CHILDREN: 24 NG/DL (ref 9–55)

## 2017-12-12 ENCOUNTER — TELEPHONE (OUTPATIENT)
Dept: PRIMARY CARE CLINIC | Age: 47
End: 2017-12-12

## 2017-12-12 RX ORDER — LEVOFLOXACIN 500 MG/1
500 TABLET, FILM COATED ORAL DAILY
Qty: 3 TABLET | Refills: 0 | Status: SHIPPED | OUTPATIENT
Start: 2017-12-12 | End: 2017-12-22

## 2017-12-12 RX ORDER — PHENAZOPYRIDINE HYDROCHLORIDE 100 MG/1
100 TABLET, FILM COATED ORAL 3 TIMES DAILY PRN
Qty: 9 TABLET | Refills: 0 | Status: SHIPPED | OUTPATIENT
Start: 2017-12-12 | End: 2018-07-05 | Stop reason: ALTCHOICE

## 2018-01-02 RX ORDER — HYDROCODONE BITARTRATE AND ACETAMINOPHEN 10; 325 MG/1; MG/1
1 TABLET ORAL EVERY 8 HOURS PRN
Qty: 90 TABLET | Refills: 0 | Status: SHIPPED | OUTPATIENT
Start: 2018-01-02 | End: 2018-01-30 | Stop reason: SDUPTHER

## 2018-01-02 RX ORDER — ZOLPIDEM TARTRATE 12.5 MG/1
12.5 TABLET, FILM COATED, EXTENDED RELEASE ORAL NIGHTLY PRN
Qty: 30 TABLET | Refills: 3 | Status: SHIPPED | OUTPATIENT
Start: 2018-01-02 | End: 2018-03-29 | Stop reason: SDUPTHER

## 2018-01-02 RX ORDER — LISINOPRIL 20 MG/1
TABLET ORAL
Qty: 30 TABLET | Refills: 11 | Status: SHIPPED | OUTPATIENT
Start: 2018-01-02 | End: 2019-01-13 | Stop reason: SDUPTHER

## 2018-01-02 RX ORDER — ALPRAZOLAM 0.5 MG/1
0.5 TABLET ORAL 3 TIMES DAILY PRN
Qty: 90 TABLET | Refills: 2 | Status: SHIPPED | OUTPATIENT
Start: 2018-01-02 | End: 2018-03-29 | Stop reason: SDUPTHER

## 2018-01-02 NOTE — TELEPHONE ENCOUNTER
Patient called today with request for refill on Xanax, Ambien and Norco . Last office visit 12/07/17 with next scheduled appointment 03/07/18  Dose verified. Last UDS Schedule for next appointment.

## 2018-01-30 NOTE — TELEPHONE ENCOUNTER
Patient will be in today or tomorrow for a UDS. This must be done prior to refill of script. Patient called today with request for refill on norco. Last office visit 12/07/17 with next scheduled appointment 03/07/18  Dose verified. Last UDS  Patient is scheduled to come by office to give UDS today or tomorrow. Script will pend until this is done.

## 2018-01-31 ENCOUNTER — OFFICE VISIT (OUTPATIENT)
Dept: PRIMARY CARE CLINIC | Age: 48
End: 2018-01-31

## 2018-01-31 DIAGNOSIS — G89.29 OTHER CHRONIC PAIN: Primary | ICD-10-CM

## 2018-01-31 LAB
AMPHETAMINE SCREEN, URINE: NORMAL
BARBITURATE SCREEN, URINE: NORMAL
BENZODIAZEPINE SCREEN, URINE: NORMAL
COCAINE METABOLITE SCREEN URINE: NORMAL
MDMA URINE: NORMAL
METHADONE SCREEN, URINE: NORMAL
METHAMPHETAMINE, URINE: NORMAL
OPIATE SCREEN URINE: NORMAL
OXYCODONE SCREEN URINE: NORMAL
PHENCYCLIDINE SCREEN URINE: NORMAL
PROPOXYPHENE SCREEN, URINE: NORMAL
THC: NORMAL
TRICYCLIC ANTIDEPRESSANTS, UR: NORMAL

## 2018-01-31 PROCEDURE — 80305 DRUG TEST PRSMV DIR OPT OBS: CPT | Performed by: INTERNAL MEDICINE

## 2018-01-31 RX ORDER — HYDROCODONE BITARTRATE AND ACETAMINOPHEN 10; 325 MG/1; MG/1
1 TABLET ORAL EVERY 8 HOURS PRN
Qty: 90 TABLET | Refills: 0 | Status: SHIPPED | OUTPATIENT
Start: 2018-01-31 | End: 2018-02-26 | Stop reason: SDUPTHER

## 2018-02-26 RX ORDER — HYDROCODONE BITARTRATE AND ACETAMINOPHEN 10; 325 MG/1; MG/1
1 TABLET ORAL EVERY 8 HOURS PRN
Qty: 90 TABLET | Refills: 0 | Status: SHIPPED | OUTPATIENT
Start: 2018-02-26 | End: 2018-03-29 | Stop reason: SDUPTHER

## 2018-03-29 ENCOUNTER — OFFICE VISIT (OUTPATIENT)
Dept: PRIMARY CARE CLINIC | Age: 48
End: 2018-03-29

## 2018-03-29 VITALS
HEART RATE: 128 BPM | SYSTOLIC BLOOD PRESSURE: 136 MMHG | DIASTOLIC BLOOD PRESSURE: 86 MMHG | BODY MASS INDEX: 49.26 KG/M2 | OXYGEN SATURATION: 95 % | WEIGHT: 293 LBS | TEMPERATURE: 97.5 F

## 2018-03-29 DIAGNOSIS — I10 ESSENTIAL HYPERTENSION: Primary | ICD-10-CM

## 2018-03-29 DIAGNOSIS — F41.9 ANXIETY AND DEPRESSION: ICD-10-CM

## 2018-03-29 DIAGNOSIS — E11.9 TYPE 2 DIABETES MELLITUS WITHOUT COMPLICATION, WITHOUT LONG-TERM CURRENT USE OF INSULIN (HCC): ICD-10-CM

## 2018-03-29 DIAGNOSIS — M51.36 DDD (DEGENERATIVE DISC DISEASE), LUMBAR: ICD-10-CM

## 2018-03-29 DIAGNOSIS — F32.A ANXIETY AND DEPRESSION: ICD-10-CM

## 2018-03-29 PROCEDURE — 99212 OFFICE O/P EST SF 10 MIN: CPT | Performed by: INTERNAL MEDICINE

## 2018-03-29 RX ORDER — BUSPIRONE HYDROCHLORIDE 10 MG/1
10 TABLET ORAL 3 TIMES DAILY
Qty: 90 TABLET | Refills: 3 | Status: SHIPPED | OUTPATIENT
Start: 2018-03-29 | End: 2018-09-04 | Stop reason: SDUPTHER

## 2018-03-29 RX ORDER — HYDROCODONE BITARTRATE AND ACETAMINOPHEN 10; 325 MG/1; MG/1
1 TABLET ORAL EVERY 8 HOURS PRN
Qty: 90 TABLET | Refills: 0 | Status: SHIPPED | OUTPATIENT
Start: 2018-03-29 | End: 2018-04-26 | Stop reason: SDUPTHER

## 2018-03-29 RX ORDER — GABAPENTIN 100 MG/1
100 CAPSULE ORAL 3 TIMES DAILY
Qty: 90 CAPSULE | Refills: 3 | Status: SHIPPED | OUTPATIENT
Start: 2018-03-29 | End: 2018-10-31 | Stop reason: ALTCHOICE

## 2018-03-29 RX ORDER — ALPRAZOLAM 0.5 MG/1
0.5 TABLET ORAL 3 TIMES DAILY PRN
Qty: 90 TABLET | Refills: 2 | Status: SHIPPED | OUTPATIENT
Start: 2018-03-29 | End: 2018-06-26 | Stop reason: SDUPTHER

## 2018-03-29 RX ORDER — ZOLPIDEM TARTRATE 12.5 MG/1
12.5 TABLET, FILM COATED, EXTENDED RELEASE ORAL NIGHTLY PRN
Qty: 30 TABLET | Refills: 3 | Status: SHIPPED | OUTPATIENT
Start: 2018-03-29 | End: 2018-04-26 | Stop reason: SDUPTHER

## 2018-03-29 ASSESSMENT — ENCOUNTER SYMPTOMS
DIARRHEA: 0
CONSTIPATION: 0
SHORTNESS OF BREATH: 0
COUGH: 0
VOMITING: 0
BACK PAIN: 1
NAUSEA: 0

## 2018-03-29 ASSESSMENT — PATIENT HEALTH QUESTIONNAIRE - PHQ9
1. LITTLE INTEREST OR PLEASURE IN DOING THINGS: 0
SUM OF ALL RESPONSES TO PHQ QUESTIONS 1-9: 0
2. FEELING DOWN, DEPRESSED OR HOPELESS: 0
SUM OF ALL RESPONSES TO PHQ9 QUESTIONS 1 & 2: 0

## 2018-04-26 RX ORDER — HYDROCODONE BITARTRATE AND ACETAMINOPHEN 10; 325 MG/1; MG/1
1 TABLET ORAL EVERY 8 HOURS PRN
Qty: 90 TABLET | Refills: 0 | Status: SHIPPED | OUTPATIENT
Start: 2018-04-28 | End: 2018-05-24 | Stop reason: SDUPTHER

## 2018-04-26 RX ORDER — ZOLPIDEM TARTRATE 12.5 MG/1
12.5 TABLET, FILM COATED, EXTENDED RELEASE ORAL NIGHTLY PRN
Qty: 30 TABLET | Refills: 3 | Status: SHIPPED | OUTPATIENT
Start: 2018-04-28 | End: 2018-05-28

## 2018-05-25 RX ORDER — HYDROCODONE BITARTRATE AND ACETAMINOPHEN 10; 325 MG/1; MG/1
1 TABLET ORAL EVERY 8 HOURS PRN
Qty: 90 TABLET | Refills: 0 | Status: SHIPPED | OUTPATIENT
Start: 2018-05-28 | End: 2018-06-26 | Stop reason: SDUPTHER

## 2018-06-26 DIAGNOSIS — M51.36 DDD (DEGENERATIVE DISC DISEASE), LUMBAR: Primary | ICD-10-CM

## 2018-06-26 DIAGNOSIS — F41.9 ANXIETY AND DEPRESSION: ICD-10-CM

## 2018-06-26 DIAGNOSIS — F32.A ANXIETY AND DEPRESSION: ICD-10-CM

## 2018-06-27 RX ORDER — ALPRAZOLAM 0.5 MG/1
0.5 TABLET ORAL 3 TIMES DAILY PRN
Qty: 90 TABLET | Refills: 2 | Status: SHIPPED | OUTPATIENT
Start: 2018-06-27 | End: 2018-08-23 | Stop reason: SDUPTHER

## 2018-06-27 RX ORDER — HYDROCODONE BITARTRATE AND ACETAMINOPHEN 10; 325 MG/1; MG/1
1 TABLET ORAL EVERY 8 HOURS PRN
Qty: 90 TABLET | Refills: 0 | Status: SHIPPED | OUTPATIENT
Start: 2018-06-27 | End: 2018-07-24 | Stop reason: SDUPTHER

## 2018-07-05 ENCOUNTER — OFFICE VISIT (OUTPATIENT)
Dept: PRIMARY CARE CLINIC | Age: 48
End: 2018-07-05

## 2018-07-05 VITALS
HEIGHT: 68 IN | SYSTOLIC BLOOD PRESSURE: 130 MMHG | BODY MASS INDEX: 44.41 KG/M2 | HEART RATE: 120 BPM | TEMPERATURE: 96.8 F | WEIGHT: 293 LBS | DIASTOLIC BLOOD PRESSURE: 70 MMHG | OXYGEN SATURATION: 98 %

## 2018-07-05 DIAGNOSIS — Z13.220 SCREENING CHOLESTEROL LEVEL: ICD-10-CM

## 2018-07-05 DIAGNOSIS — F41.9 ANXIETY DISORDER, UNSPECIFIED TYPE: Primary | ICD-10-CM

## 2018-07-05 DIAGNOSIS — E83.42 HYPOMAGNESEMIA: ICD-10-CM

## 2018-07-05 DIAGNOSIS — E66.01 MORBID OBESITY WITH BMI OF 40.0-44.9, ADULT (HCC): ICD-10-CM

## 2018-07-05 DIAGNOSIS — G89.4 CHRONIC PAIN DISORDER: ICD-10-CM

## 2018-07-05 DIAGNOSIS — E78.1 HYPERTRIGLYCERIDEMIA: ICD-10-CM

## 2018-07-05 DIAGNOSIS — R73.03 BORDERLINE DIABETES: ICD-10-CM

## 2018-07-05 DIAGNOSIS — I10 WELL-CONTROLLED HYPERTENSION: ICD-10-CM

## 2018-07-05 LAB
ALBUMIN SERPL-MCNC: 4 G/DL (ref 3.5–5.2)
ALP BLD-CCNC: 86 U/L (ref 35–104)
ALT SERPL-CCNC: 26 U/L (ref 5–33)
AMPHETAMINE SCREEN, URINE: ABNORMAL
ANION GAP SERPL CALCULATED.3IONS-SCNC: 21 MMOL/L (ref 7–19)
AST SERPL-CCNC: 25 U/L (ref 5–32)
BARBITURATE SCREEN, URINE: ABNORMAL
BENZODIAZEPINE SCREEN, URINE: ABNORMAL
BILIRUB SERPL-MCNC: 0.3 MG/DL (ref 0.2–1.2)
BUN BLDV-MCNC: 13 MG/DL (ref 6–20)
CALCIUM SERPL-MCNC: 9.3 MG/DL (ref 8.6–10)
CHLORIDE BLD-SCNC: 98 MMOL/L (ref 98–111)
CHOLESTEROL, TOTAL: 207 MG/DL (ref 160–199)
CO2: 22 MMOL/L (ref 22–29)
COCAINE METABOLITE SCREEN URINE: ABNORMAL
CREAT SERPL-MCNC: 0.9 MG/DL (ref 0.5–0.9)
GFR NON-AFRICAN AMERICAN: >60
GLUCOSE BLD-MCNC: 102 MG/DL (ref 74–109)
HCT VFR BLD CALC: 40 % (ref 37–47)
HDLC SERPL-MCNC: 51 MG/DL (ref 65–121)
HEMOGLOBIN: 12.6 G/DL (ref 12–16)
LDL CHOLESTEROL CALCULATED: ABNORMAL MG/DL
LDL CHOLESTEROL DIRECT: 118 MG/DL
MAGNESIUM: 1.7 MG/DL (ref 1.6–2.6)
MCH RBC QN AUTO: 29.3 PG (ref 27–31)
MCHC RBC AUTO-ENTMCNC: 31.5 G/DL (ref 33–37)
MCV RBC AUTO: 93 FL (ref 81–99)
MDMA URINE: ABNORMAL
METHADONE SCREEN, URINE: ABNORMAL
METHAMPHETAMINE, URINE: ABNORMAL
OPIATE SCREEN URINE: ABNORMAL
OXYCODONE SCREEN URINE: ABNORMAL
PDW BLD-RTO: 14.6 % (ref 11.5–14.5)
PHENCYCLIDINE SCREEN URINE: ABNORMAL
PLATELET # BLD: 289 K/UL (ref 130–400)
PMV BLD AUTO: 8.8 FL (ref 9.4–12.3)
POTASSIUM SERPL-SCNC: 4.1 MMOL/L (ref 3.5–5)
PROPOXYPHENE SCREEN, URINE: ABNORMAL
RBC # BLD: 4.3 M/UL (ref 4.2–5.4)
SODIUM BLD-SCNC: 141 MMOL/L (ref 136–145)
THC: ABNORMAL
TOTAL PROTEIN: 7.3 G/DL (ref 6.6–8.7)
TRICYCLIC ANTIDEPRESSANTS, UR: ABNORMAL
TRIGL SERPL-MCNC: 437 MG/DL (ref 0–149)
WBC # BLD: 9.7 K/UL (ref 4.8–10.8)

## 2018-07-05 PROCEDURE — 80305 DRUG TEST PRSMV DIR OPT OBS: CPT | Performed by: NURSE PRACTITIONER

## 2018-07-05 PROCEDURE — 99214 OFFICE O/P EST MOD 30 MIN: CPT | Performed by: NURSE PRACTITIONER

## 2018-07-05 RX ORDER — ATORVASTATIN CALCIUM 20 MG/1
20 TABLET, FILM COATED ORAL DAILY
Qty: 30 TABLET | Refills: 3 | Status: SHIPPED | OUTPATIENT
Start: 2018-07-05 | End: 2018-11-03 | Stop reason: SDUPTHER

## 2018-07-05 RX ORDER — ASPIRIN 325 MG
325 TABLET ORAL NIGHTLY
COMMUNITY

## 2018-07-05 RX ORDER — GABAPENTIN 300 MG/1
300 CAPSULE ORAL 3 TIMES DAILY
Qty: 90 CAPSULE | Refills: 0 | Status: SHIPPED | OUTPATIENT
Start: 2018-07-05 | End: 2018-08-02 | Stop reason: SDUPTHER

## 2018-07-05 ASSESSMENT — ENCOUNTER SYMPTOMS
DIARRHEA: 0
VOMITING: 0
ABDOMINAL DISTENTION: 0
NAUSEA: 0
SHORTNESS OF BREATH: 0
ABDOMINAL PAIN: 0
CONSTIPATION: 0
CHEST TIGHTNESS: 0
APNEA: 0
COUGH: 0
COLOR CHANGE: 0
WHEEZING: 0

## 2018-07-05 NOTE — PROGRESS NOTES
[facility]  [address]  [address] [place] [address]  Dept: [phone]  Dept Fax: [phone]  Loc: [phone]    Shaye Mendoza is a 49 y.o. female who presents today for her medical conditions/complaints as noted below. Shaye Mendoza is c/o of   Chief Complaint   Patient presents with    Anxiety     Doing well    Depression    Other     Chronic pain       HPI  This is a patient that was followed by Dr. Evelin Galvaiz prior to his departure from our practice. Patient presents to the office today to follow-up on anxiety with depression, chronic pain disorder. Patient also has hyperlipidemia, diabetes and hypertension. Anxiety and depression  Patient reports she's had a several year of anxiety with depression. She tells me that her symptoms have been overall stable she does take Xanax 0.5 mg 3 times a day as needed for severe anxiety, I did question the patient on whether she takes it consistently 3 times a day and she reports that most days she does. She is on a very generous dose of Prozac 50 mg daily. She also takes Abilify 5 mg daily. BuSpar 3 times a day for breakthrough anxiety. In addition to these medication she is also on gabapentin 100 mg 3 times a day. She tells me Dr. Evelin Galaviz recently started her on this for a combination of pain and anxiety. She currently denies suicidal or homicidal ideation but tells me she thinks she could benefit from increasing the Xanax. I did explain to her that she is on several controlled substances and felt that a referral to psychiatry might be beneficial and the patient declines at present she will continue taking her current medications as overall she is stable. She also has chronic pain disorder and as noted above she takes Neurontin 100 mg 3 times a day and Norco  milligrams every 8 hours. Patient did have an MRI at [facility] in 2016.  I did discuss the option of pain management with the [facility] lumbar spine wo contrast1/29/2016:    Romaine Lynn U. 18.- 1/29/2016 8-12 AM CST    HISTORY- Degenerative disc disease, low back pain, bilateral leg pain. FINDINGS- This exam of the lumbar spine consists of sagittal and axial  T1 and T2-weighted images with a sagittal STIR sequence. The conus  appears to end normally at the L1 level. The L1-2 level is unremarkable. At L2-3, there appears to be mild disc dehydration and disc space  narrowing. At L3-4, there appears to be disc dehydration and disc space narrowing  with mild facet hypertrophy. There appears to be mild narrowing of the  canal in the AP diameter at the L3-4 level with the AP diameter of the  canal measuring approximately 8 mm in the midline consistent with mild  spinal stenosis. There appears to be mild bilateral foraminal narrowing. At L4-5, there is mild disc dehydration, disc space narrowing, and  concentric disc bulge. The AP diameter of the canal at this level is  approximately 8 mm in the midline. There appears to be mild bilateral  foraminal narrowing. The L5-S1 level is remarkable for mild bilateral facet hypertrophy. No  significant impingement upon the thecal sac or nerve roots is noted. IMPRESSION-   Mild multilevel changes as described above. Findings are similar to an  old exam from 10/28/2014.           Reading Radiologist- Jesica jiménez and she adamantly declines, citing that she does not want injections and that her current pain medication regimen has been stable under the plan initiated by Dr. Lisa Tony. Did explain to her that since he moved to [facility] revealed that she had the option to continue with him or she would have to consider tapering down on her dosage. She currently states that they are he decreased her from 4 times a day to 3 times a day she doesn't think she can go any lower.  I did explain that Dr. Joseluis Castaneda is currently our medical director and I would need to discuss the case more in detail with him as I have to rely on him or Dr. Irma Barker to prescribe schedule II controlled substances. Patient has tried physical therapy but might possibly benefit from a surgical intervention as well, but she does not want a referral at this time. Patient's diabetes is well controlled, not currently on any medication and her fasting blood sugar today was 102 on her lab work, her CMP was otherwise stable. Patient's cholesterol level, was significantly elevated with her triglycerides 437. Her last A1c was 5.3. I do recommend statin therapy and a healthy heart diet and we will recheck her blood sugar on follow-up may need to consider metformin. Patient's blood pressure is well controlled she denies chest pain, shortness of breath ,or fatigue she is adherent to a low sodium diet and is trying to exercise. She does have some ongoing pain which does seem to be a barrier for her exercise.   Hemoglobin A1C (%)   Date Value   2017 5.3   2016 5.2   2016 5.1             ( goal A1C is < 7)   Microalbumin, Random Urine (mg/dL)   Date Value   2017 <1.20     LDL Calculated (mg/dL)   Date Value   2018 see below   2017 121   2016 65       (goal LDL is <100)   AST (U/L)   Date Value   2018 25     ALT (U/L)   Date Value   2018 26     BUN (mg/dL)   Date Value   2018 13     BP Readings from Last 3 Encounters:   18 130/70   18 136/86   17 119/76          (goal 120/80)    Past Medical History:   Diagnosis Date    Anxiety     Chronic pain     Depression     Diabetes mellitus type 2 in obese ([address]     Hypertension     Premenopausal patient 2018      Past Surgical History:   Procedure Laterality Date     SECTION      TUBAL LIGATION         Family History   Problem Relation Age of Onset    Family history unknown: Yes       Social History   Substance Use Topics    Smoking Prediabetic)  12/07/2018    Diabetic microalbuminuria test  12/07/2018    Lipid screen  07/05/2019    Potassium monitoring  07/05/2019    Creatinine monitoring  07/05/2019       Subjective:      Review of Systems   Constitutional: Negative for activity change, appetite change, fatigue, fever and unexpected weight change. Respiratory: Negative for apnea, cough, chest tightness, shortness of breath and wheezing. Cardiovascular: Negative for chest pain, palpitations and leg swelling. Gastrointestinal: Negative for abdominal distention, abdominal pain, constipation, diarrhea, nausea and vomiting. Endocrine: Negative for cold intolerance, heat intolerance, polydipsia, polyphagia and polyuria. Genitourinary: Negative for difficulty urinating, dysuria, enuresis, hematuria, menstrual problem, pelvic pain, urgency, vaginal bleeding, vaginal discharge and vaginal pain. Musculoskeletal: Positive for arthralgias and back pain. Negative for gait problem, joint swelling, neck pain and neck stiffness. Patient complains of arthritis in multiple joints and chronic lumbosacral pain. Skin: Negative for color change, pallor, rash and wound. Allergic/Immunologic: Negative for environmental allergies. Neurological: Negative for dizziness, tremors, seizures, syncope, facial asymmetry, speech difficulty, weakness, light-headedness, numbness and headaches. Psychiatric/Behavioral: Negative for agitation, behavioral problems, confusion, decreased concentration, dysphoric mood and hallucinations. The patient is not nervous/anxious and is not hyperactive. Objective:     Physical Exam   Constitutional: She is oriented to person, place, and time. She appears well-developed and well-nourished. HENT:   Head: Normocephalic and atraumatic. Eyes: Pupils are equal, round, and reactive to light. Neck: Normal range of motion. Neck supple.    Cardiovascular: Normal rate, regular rhythm, normal heart sounds and

## 2018-07-06 ENCOUNTER — TELEPHONE (OUTPATIENT)
Dept: PRIMARY CARE CLINIC | Age: 48
End: 2018-07-06

## 2018-07-06 ASSESSMENT — ENCOUNTER SYMPTOMS: BACK PAIN: 1

## 2018-07-06 NOTE — TELEPHONE ENCOUNTER
Patient called and wanted to know what the new medication was for at the pharmacy. Reviewed,discussed lab-triglycerides elevated.  Patient will start lipitor as directed

## 2018-07-12 ENCOUNTER — TELEPHONE (OUTPATIENT)
Dept: PRIMARY CARE CLINIC | Age: 48
End: 2018-07-12

## 2018-07-12 NOTE — TELEPHONE ENCOUNTER
----- Message from NATALI Yoon sent at 7/5/2018  1:16 PM CDT -----  Please notify patient of abnormal results. I recommend Lipitor 20 mg daily, she can also try over-the-counter fish oil. Triglycerides are dangerously high!

## 2018-07-12 NOTE — TELEPHONE ENCOUNTER
Patient notified of labs, need for Lipitor and Fish Oil 1200 MG daily. Please notify patient of abnormal results. I recommend Lipitor 20 mg daily, she can also try over-the-counter fish oil. Triglycerides are dangerously high! Associated Results     LDL Cholesterol, Direct   Order: [id number]   Status:  Final result   Visible to patient:  No (Not Released)   Notes recorded by NATALI Presley on 7/5/2018 at 1:16 PM CDT  Please notify patient of abnormal results. I recommend Lipitor 20 mg daily, she can also try over-the-counter fish oil. Triglycerides are dangerously high! Ref Range & Units 7d ago   LDL Direct <100 mg/dL 118    Comment: <100 MG/DL=OPITIMAL     100-129 MG/DL=DESIRABLE     130-159 MG/DL BORDERLINE=INCREASED RISK OF ATHEROSCLEROTIC   CARDIOVASCULAR DISEASE     > OR = 160 MG/DL=ASSOCIATED WITH AN INCREASE RISK OF   ATHEROSCLEROTIC CARDIOVASCULAR DISEASE    Resulting Agency  [address] [facility]      Specimen Collected: 07/05/18 10:37 Last Resulted: 07/05/18 12:24                       Lipid Panel   Order: [id number]   Status:  Final result   Visible to patient:  No (Not Released) Dx:  Screening cholesterol level   Notes recorded by NATALI Presley on 7/5/2018 at 1:16 PM CDT  Please notify patient of abnormal results. I recommend Lipitor 20 mg daily, she can also try over-the-counter fish oil. Triglycerides are dangerously high!      Ref Range & Units 7d ago 7mo ago 1yr ago    Cholesterol, Total 160 - 199 mg/dL 207   239CM   179CM    Comment: <160 MG/DL=OPTIMAL     160-199 MG/DL= DESIRABLE     200-239 MG/DL=BORDERLINE-INCREASED RISK OF ATHEROSCLEROTIC   CARDIOVASCULAR DISEASE     > OR = 240 MG/DL-ASSOCIATED WITH AN INCREASED RISK OF   ATHROSCLEROTIC CARDIOVASCULAR DISEASE     Triglycerides 0 - 149 mg/dL 437   287   335R      HDL 65 - 121 mg/dL 51   61CM   47CM     Comment: VALUES>60 MG/DL ARE ASSOCIATED WITH A DECREASED RISK OF   ATHEROSCLEROTIC CARDIOVASCULAR DISEASE     LDL Glucose 74 - 109 mg/dL 102  72   108     BUN 6 - 20 mg/dL 13  13  16     CREATININE 0.5 - 0.9 mg/dL 0.9  0.8  0.7     GFR Non- >60 >60  >60CM  >60CM    Comment: This calculation may be inaccurate for patients under the age of 25 years. For ages 25 and older, a GFR >60 mL/min/1.73m2 (not corrected for weight) is   valid for stable renal function. Calcium 8.6 - 10.0 mg/dL 9.3  9.1  8.2      Total Protein 6.6 - 8.7 g/dL 7.3  7.3  6.0      Alb 3.5 - 5.2 g/dL 4.0  4.3  3.3      Total Bilirubin 0.2 - 1.2 mg/dL 0.3  0.4  0.4     Alkaline Phosphatase 35 - 104 U/L 86  96  82     ALT 5 - 33 U/L 26  33  45      AST 5 - 32 U/L 25  25  34      Globulin    2.7    Resulting Agency  [address] [place] [address] Lab      Specimen Collected: 07/05/18 10:37 Last Resulted: 07/05/18 12:10              CM=Additional comments              CBC   Order: [id number]   Status:  Final result   Visible to patient:  No (Not Released) Dx:  Morbid obesity with BMI of 40.0-44.9,... Notes recorded by NATALI Mendenhall on 7/5/2018 at 1:16 PM CDT  Please notify patient of abnormal results. I recommend Lipitor 20 mg daily, she can also try over-the-counter fish oil. Triglycerides are dangerously high!      Ref Range & Units 7d ago 7mo ago 1yr ago    WBC 4.8 - 10.8 K/uL 9.7  10.1  11.9      RBC 4.20 - 5.40 M/uL 4.30  4.49  3.64      Hemoglobin 12.0 - 16.0 g/dL 12.6  12.9  10.2      Hematocrit 37.0 - 47.0 % 40.0  40.4  33.5      MCV 81.0 - 99.0 fL 93.0  90.0  92.0     MCH 27.0 - 31.0 pg 29.3  28.7  28.0     MCHC 33.0 - 37.0 g/dL 31.5   31.9   30.4      RDW 11.5 - 14.5 % 14.6   14.0  14.3     Platelets 710 - 500 K/uL 289  353  312     MPV 9.4 - 12.3 fL 8.8   8.8   9.1R     Neutrophils %    74.1      Basophils #    0.10     Lymphocytes %    17.1      Monocytes %    4.8     Eosinophils %    2.8     Basophils %    0.4     Neutrophils #    8.8      Lymphocytes #    2.0     Monocytes #

## 2018-07-24 DIAGNOSIS — M51.36 DDD (DEGENERATIVE DISC DISEASE), LUMBAR: ICD-10-CM

## 2018-07-24 RX ORDER — HYDROCODONE BITARTRATE AND ACETAMINOPHEN 10; 325 MG/1; MG/1
1 TABLET ORAL EVERY 8 HOURS PRN
Qty: 90 TABLET | Refills: 0 | Status: SHIPPED | OUTPATIENT
Start: 2018-07-26 | End: 2018-08-23 | Stop reason: SDUPTHER

## 2018-08-02 DIAGNOSIS — G89.4 CHRONIC PAIN DISORDER: ICD-10-CM

## 2018-08-02 RX ORDER — GABAPENTIN 300 MG/1
300 CAPSULE ORAL 3 TIMES DAILY
Qty: 90 CAPSULE | Refills: 0 | OUTPATIENT
Start: 2018-08-05 | End: 2018-09-01 | Stop reason: SDUPTHER

## 2018-08-05 DIAGNOSIS — G89.4 CHRONIC PAIN DISORDER: ICD-10-CM

## 2018-08-06 RX ORDER — GABAPENTIN 300 MG/1
CAPSULE ORAL
Qty: 90 CAPSULE | Refills: 0 | OUTPATIENT
Start: 2018-08-06

## 2018-08-23 DIAGNOSIS — F41.9 ANXIETY AND DEPRESSION: ICD-10-CM

## 2018-08-23 DIAGNOSIS — M51.36 DDD (DEGENERATIVE DISC DISEASE), LUMBAR: ICD-10-CM

## 2018-08-23 DIAGNOSIS — F32.A ANXIETY AND DEPRESSION: ICD-10-CM

## 2018-08-24 RX ORDER — ALPRAZOLAM 0.5 MG/1
0.5 TABLET ORAL 3 TIMES DAILY PRN
Qty: 90 TABLET | Refills: 2 | Status: SHIPPED | OUTPATIENT
Start: 2018-08-24 | End: 2018-12-18 | Stop reason: SDUPTHER

## 2018-08-24 RX ORDER — HYDROCODONE BITARTRATE AND ACETAMINOPHEN 10; 325 MG/1; MG/1
1 TABLET ORAL EVERY 8 HOURS PRN
Qty: 90 TABLET | Refills: 0 | Status: SHIPPED | OUTPATIENT
Start: 2018-08-24 | End: 2018-09-19 | Stop reason: SDUPTHER

## 2018-09-04 RX ORDER — BUSPIRONE HYDROCHLORIDE 10 MG/1
TABLET ORAL
Qty: 90 TABLET | Refills: 5 | Status: SHIPPED | OUTPATIENT
Start: 2018-09-04 | End: 2019-09-30 | Stop reason: SDUPTHER

## 2018-09-19 DIAGNOSIS — M51.36 DDD (DEGENERATIVE DISC DISEASE), LUMBAR: ICD-10-CM

## 2018-09-19 NOTE — TELEPHONE ENCOUNTER
Oseas Saha called 09/19/18 with request for refill on Norco. Last office visit 07/05/18 with next scheduled appointment 10/10/18  Dose verified.    Last UDS  07/05/18  Abnormal  Last fill per 08/24/18  Eden James Report 06/26/18 7/5/2018 12:44 PM - Vaishali Bennett MA     Component Results     Component Collected Lab   Amphetamine Screen, Urine 07/05/2018 10:56 AM Unknown   NEG    Barbiturate Screen, Urine 07/05/2018 10:56 AM Unknown   NEG    Benzodiazepine Screen, Urine 07/05/2018 10:56 AM Unknown   POS    Cocaine Metabolite Screen, Urine 07/05/2018 10:56 AM Unknown   NEG    THC 07/05/2018 10:56 AM Unknown   NEG    MDMA, Urine 07/05/2018 10:56 AM Unknown   NEG    Methadone Screen, Urine 07/05/2018 10:56 AM Unknown   NEG    Opiate Scrn, Ur 07/05/2018 10:56 AM Unknown   POS    Oxycodone Screen, Ur 07/05/2018 10:56 AM Unknown   NEG    PCP Screen, Urine 07/05/2018 10:56 AM Unknown   NEG    Propoxyphene Screen, Urine 07/05/2018 10:56 AM Unknown   NEG    Tricyclic Antidepressants, Urine 07/05/2018 10:56 AM Unknown   POS    Methamphetamine, Urine 07/05/2018 10:56 AM Unknown   NEG    Lab and Collection     POCT Rapid Drug Screen on 7/5/2018   Result History     POCT Rapid Drug Screen on 7/5/2018          Result Information     Flag: Abnormal

## 2018-09-21 DIAGNOSIS — Z76.0 MEDICATION REFILL: ICD-10-CM

## 2018-09-21 DIAGNOSIS — G47.00 INSOMNIA, UNSPECIFIED TYPE: Primary | ICD-10-CM

## 2018-09-21 RX ORDER — HYDROCODONE BITARTRATE AND ACETAMINOPHEN 10; 325 MG/1; MG/1
1 TABLET ORAL EVERY 8 HOURS PRN
Qty: 90 TABLET | Refills: 0 | Status: SHIPPED | OUTPATIENT
Start: 2018-09-24 | End: 2018-10-17 | Stop reason: SDUPTHER

## 2018-09-21 RX ORDER — ZOLPIDEM TARTRATE 12.5 MG/1
TABLET, FILM COATED, EXTENDED RELEASE ORAL
Qty: 30 TABLET | Refills: 0 | Status: SHIPPED | OUTPATIENT
Start: 2018-09-21 | End: 2018-10-17 | Stop reason: SDUPTHER

## 2018-09-21 RX ORDER — HYDROCHLOROTHIAZIDE 25 MG/1
TABLET ORAL
Qty: 30 TABLET | Refills: 0 | Status: SHIPPED | OUTPATIENT
Start: 2018-09-21 | End: 2018-10-27 | Stop reason: SDUPTHER

## 2018-10-17 DIAGNOSIS — G47.00 INSOMNIA, UNSPECIFIED TYPE: Primary | ICD-10-CM

## 2018-10-17 DIAGNOSIS — G47.00 INSOMNIA, UNSPECIFIED TYPE: ICD-10-CM

## 2018-10-17 DIAGNOSIS — M51.36 DDD (DEGENERATIVE DISC DISEASE), LUMBAR: ICD-10-CM

## 2018-10-18 RX ORDER — ZOLPIDEM TARTRATE 12.5 MG/1
TABLET, FILM COATED, EXTENDED RELEASE ORAL
Qty: 30 TABLET | Refills: 0 | OUTPATIENT
Start: 2018-10-21 | End: 2018-10-31 | Stop reason: SDUPTHER

## 2018-10-18 RX ORDER — ZOLPIDEM TARTRATE 12.5 MG/1
TABLET, FILM COATED, EXTENDED RELEASE ORAL
Qty: 30 TABLET | Refills: 0 | Status: SHIPPED | OUTPATIENT
Start: 2018-10-20 | End: 2018-11-16 | Stop reason: SDUPTHER

## 2018-10-18 RX ORDER — HYDROCODONE BITARTRATE AND ACETAMINOPHEN 10; 325 MG/1; MG/1
1 TABLET ORAL EVERY 8 HOURS PRN
Qty: 90 TABLET | Refills: 0 | Status: SHIPPED | OUTPATIENT
Start: 2018-10-20 | End: 2018-11-16 | Stop reason: SDUPTHER

## 2018-10-18 NOTE — TELEPHONE ENCOUNTER
JUANITA was reviewed today per office protocol. Report shows No discrepancies. Fill pattern is consistent from single provider(s) at single pharmacy(s). Prescription escribed.  Patient is aware to check within the pharmacy

## 2018-10-27 DIAGNOSIS — Z76.0 MEDICATION REFILL: ICD-10-CM

## 2018-10-28 RX ORDER — HYDROCHLOROTHIAZIDE 25 MG/1
TABLET ORAL
Qty: 30 TABLET | Refills: 0 | Status: SHIPPED | OUTPATIENT
Start: 2018-10-28 | End: 2018-12-09 | Stop reason: SDUPTHER

## 2018-10-31 ENCOUNTER — OFFICE VISIT (OUTPATIENT)
Dept: PRIMARY CARE CLINIC | Age: 48
End: 2018-10-31

## 2018-10-31 VITALS
HEIGHT: 68 IN | HEART RATE: 90 BPM | SYSTOLIC BLOOD PRESSURE: 120 MMHG | TEMPERATURE: 97.1 F | BODY MASS INDEX: 44.41 KG/M2 | WEIGHT: 293 LBS | DIASTOLIC BLOOD PRESSURE: 64 MMHG | OXYGEN SATURATION: 98 %

## 2018-10-31 DIAGNOSIS — E66.01 MORBID OBESITY WITH BMI OF 50.0-59.9, ADULT (HCC): ICD-10-CM

## 2018-10-31 DIAGNOSIS — R63.5 WEIGHT GAIN FINDING: ICD-10-CM

## 2018-10-31 DIAGNOSIS — R53.83 OTHER FATIGUE: ICD-10-CM

## 2018-10-31 DIAGNOSIS — E11.69 DM COMA, TYPE 2, NOT AT GOAL (HCC): Primary | ICD-10-CM

## 2018-10-31 DIAGNOSIS — E55.9 VITAMIN D DEFICIENCY: ICD-10-CM

## 2018-10-31 PROCEDURE — 99214 OFFICE O/P EST MOD 30 MIN: CPT | Performed by: NURSE PRACTITIONER

## 2018-10-31 RX ORDER — LANCETS 28 GAUGE
1 EACH MISCELLANEOUS 2 TIMES DAILY
Qty: 100 EACH | Refills: 3 | Status: SHIPPED | OUTPATIENT
Start: 2018-10-31 | End: 2019-03-15 | Stop reason: SDUPTHER

## 2018-10-31 RX ORDER — BLOOD-GLUCOSE METER
1 KIT MISCELLANEOUS DAILY
Qty: 1 KIT | Refills: 0 | Status: SHIPPED | OUTPATIENT
Start: 2018-10-31

## 2018-10-31 RX ORDER — ALPRAZOLAM 0.5 MG/1
0.5 TABLET ORAL 3 TIMES DAILY PRN
COMMUNITY
End: 2019-03-20 | Stop reason: SDUPTHER

## 2018-10-31 ASSESSMENT — ENCOUNTER SYMPTOMS
CONSTIPATION: 0
DIARRHEA: 0
WHEEZING: 0
BACK PAIN: 1
ABDOMINAL PAIN: 0
VOMITING: 0
COUGH: 0
NAUSEA: 0
BLOOD IN STOOL: 0

## 2018-10-31 NOTE — PROGRESS NOTES
(1.727 m)     Estimated body mass index is 51.85 kg/m² as calculated from the following:    Height as of this encounter: 5' 8\" (1.727 m). Weight as of this encounter: 341 lb (154.7 kg). Physical Exam   Constitutional: She is oriented to person, place, and time. She appears well-developed and well-nourished. BMI 51.85   HENT:   Head: Normocephalic and atraumatic. Eyes: Pupils are equal, round, and reactive to light. Neck: Normal range of motion. Neck supple. Cardiovascular: Normal rate, regular rhythm, normal heart sounds and intact distal pulses. Pulmonary/Chest: Effort normal and breath sounds normal.   Abdominal: Soft. Bowel sounds are normal.   Musculoskeletal: Normal range of motion. Neurological: She is alert and oriented to person, place, and time. Skin: Skin is warm and dry. Psychiatric: She has a normal mood and affect. Her behavior is normal.   Nursing note and vitals reviewed. ASSESSMENT/PLAN:    Irvin Mijares was seen today for anxiety, depression, hypertension, obesity and diabetes. Diagnoses and all orders for this visit:    DM coma, type 2, not at goal [facility])  -     Exenatide ER 2 MG/0.85ML AUIJ; Inject 2 mg into the skin once a week  -     metFORMIN (GLUCOPHAGE) 500 MG tablet; Take 2 tablets by mouth 2 times daily (with meals)  -     glucose monitoring kit (FREESTYLE) monitoring kit; 1 kit by Does not apply route daily Please fill script with meter covered under patients insurance. Dx: E11.9  -     blood glucose test strips (ACCU-CHEK BLAIR) strip; 1 each by In Vitro route 2 times daily As needed. Please fill script with meter and strips covered under patients insurance. Dx: E11.9  -     A[place]o Ultra Thin Lancets MISC; 1 strip by Does not apply route 2 times daily Please fill script with meter cover, strips and lancets under patients insurance. Dx: E11.9    Morbid obesity with BMI of 50.0-59.9, adult (HCC)    Weight gain finding  -     TSH without Reflex;  Future  -     T4;

## 2018-11-12 ENCOUNTER — TELEPHONE (OUTPATIENT)
Dept: PRIMARY CARE CLINIC | Age: 48
End: 2018-11-12

## 2018-11-12 DIAGNOSIS — Z79.4 TYPE 2 DIABETES MELLITUS WITHOUT COMPLICATION, WITH LONG-TERM CURRENT USE OF INSULIN (HCC): Primary | ICD-10-CM

## 2018-11-12 DIAGNOSIS — E11.9 TYPE 2 DIABETES MELLITUS WITHOUT COMPLICATION, WITH LONG-TERM CURRENT USE OF INSULIN (HCC): Primary | ICD-10-CM

## 2018-11-12 NOTE — TELEPHONE ENCOUNTER
Patient called and wants to know why gabapentin was taken off her med list. Additionally would like Jollyon switched to [facility] so the insurance will pay for it.

## 2018-11-12 NOTE — TELEPHONE ENCOUNTER
Patient's insurance will not pay for Bydureon. Per patient it will pay for Trulicity. Per NATALI Trujillo script for Trulicity 1.5 mg once weekly, e-script to [facility]. Patient notified.

## 2018-11-16 DIAGNOSIS — G47.00 INSOMNIA, UNSPECIFIED TYPE: ICD-10-CM

## 2018-11-16 DIAGNOSIS — M51.36 DDD (DEGENERATIVE DISC DISEASE), LUMBAR: ICD-10-CM

## 2018-11-18 RX ORDER — HYDROCODONE BITARTRATE AND ACETAMINOPHEN 10; 325 MG/1; MG/1
1 TABLET ORAL EVERY 8 HOURS PRN
Qty: 90 TABLET | Refills: 0 | Status: SHIPPED | OUTPATIENT
Start: 2018-11-19 | End: 2018-12-18 | Stop reason: SDUPTHER

## 2018-11-18 RX ORDER — ZOLPIDEM TARTRATE 12.5 MG/1
TABLET, FILM COATED, EXTENDED RELEASE ORAL
Qty: 30 TABLET | Refills: 0 | Status: SHIPPED | OUTPATIENT
Start: 2018-11-19 | End: 2018-12-18 | Stop reason: SDUPTHER

## 2018-12-09 DIAGNOSIS — Z76.0 MEDICATION REFILL: ICD-10-CM

## 2018-12-09 RX ORDER — ARIPIPRAZOLE 5 MG/1
5 TABLET ORAL DAILY
Qty: 30 TABLET | Refills: 0 | Status: SHIPPED | OUTPATIENT
Start: 2018-12-09 | End: 2019-03-28 | Stop reason: SDUPTHER

## 2018-12-10 RX ORDER — HYDROCHLOROTHIAZIDE 25 MG/1
TABLET ORAL
Qty: 30 TABLET | Refills: 0 | Status: SHIPPED | OUTPATIENT
Start: 2018-12-10 | End: 2019-01-13 | Stop reason: SDUPTHER

## 2018-12-10 RX ORDER — FLUOXETINE HYDROCHLORIDE 40 MG/1
40 CAPSULE ORAL DAILY
Qty: 30 CAPSULE | Refills: 0 | Status: SHIPPED | OUTPATIENT
Start: 2018-12-10 | End: 2019-01-13 | Stop reason: SDUPTHER

## 2018-12-18 DIAGNOSIS — M51.36 DDD (DEGENERATIVE DISC DISEASE), LUMBAR: ICD-10-CM

## 2018-12-18 DIAGNOSIS — F41.9 ANXIETY AND DEPRESSION: ICD-10-CM

## 2018-12-18 DIAGNOSIS — F32.A ANXIETY AND DEPRESSION: ICD-10-CM

## 2018-12-18 DIAGNOSIS — Z76.0 MEDICATION REFILL: ICD-10-CM

## 2018-12-18 DIAGNOSIS — G47.00 INSOMNIA, UNSPECIFIED TYPE: ICD-10-CM

## 2018-12-18 RX ORDER — ZOLPIDEM TARTRATE 12.5 MG/1
TABLET, FILM COATED, EXTENDED RELEASE ORAL
Qty: 30 TABLET | Refills: 2 | Status: SHIPPED | OUTPATIENT
Start: 2018-12-18 | End: 2019-01-15

## 2018-12-18 RX ORDER — ALPRAZOLAM 0.5 MG/1
0.5 TABLET ORAL 3 TIMES DAILY PRN
Qty: 90 TABLET | Refills: 2 | Status: SHIPPED | OUTPATIENT
Start: 2018-12-18 | End: 2019-01-17

## 2018-12-18 RX ORDER — HYDROCODONE BITARTRATE AND ACETAMINOPHEN 10; 325 MG/1; MG/1
1 TABLET ORAL EVERY 8 HOURS PRN
Qty: 90 TABLET | Refills: 0 | Status: SHIPPED | OUTPATIENT
Start: 2018-12-18 | End: 2019-01-17 | Stop reason: SDUPTHER

## 2018-12-21 DIAGNOSIS — R63.5 WEIGHT GAIN FINDING: ICD-10-CM

## 2018-12-21 DIAGNOSIS — R53.83 OTHER FATIGUE: ICD-10-CM

## 2018-12-21 DIAGNOSIS — E55.9 VITAMIN D DEFICIENCY: ICD-10-CM

## 2018-12-21 LAB
ALBUMIN SERPL-MCNC: 4.4 G/DL (ref 3.5–5.2)
ALP BLD-CCNC: 104 U/L (ref 35–104)
ALT SERPL-CCNC: 36 U/L (ref 5–33)
ANION GAP SERPL CALCULATED.3IONS-SCNC: 18 MMOL/L (ref 7–19)
AST SERPL-CCNC: 33 U/L (ref 5–32)
BILIRUB SERPL-MCNC: 0.3 MG/DL (ref 0.2–1.2)
BUN BLDV-MCNC: 14 MG/DL (ref 6–20)
CALCIUM SERPL-MCNC: 9.3 MG/DL (ref 8.6–10)
CHLORIDE BLD-SCNC: 97 MMOL/L (ref 98–111)
CHOLESTEROL, TOTAL: 145 MG/DL (ref 160–199)
CO2: 26 MMOL/L (ref 22–29)
CREAT SERPL-MCNC: 1 MG/DL (ref 0.5–0.9)
GFR NON-AFRICAN AMERICAN: 59
GLUCOSE FASTING: 100 MG/DL (ref 74–109)
HCT VFR BLD CALC: 41.5 % (ref 37–47)
HDLC SERPL-MCNC: 52 MG/DL (ref 65–121)
HEMOGLOBIN: 12.9 G/DL (ref 12–16)
LDL CHOLESTEROL CALCULATED: 52 MG/DL
MCH RBC QN AUTO: 28.7 PG (ref 27–31)
MCHC RBC AUTO-ENTMCNC: 31.1 G/DL (ref 33–37)
MCV RBC AUTO: 92.4 FL (ref 81–99)
PDW BLD-RTO: 14.1 % (ref 11.5–14.5)
PLATELET # BLD: 362 K/UL (ref 130–400)
PMV BLD AUTO: 8.4 FL (ref 9.4–12.3)
POTASSIUM SERPL-SCNC: 4.4 MMOL/L (ref 3.5–5)
RBC # BLD: 4.49 M/UL (ref 4.2–5.4)
SODIUM BLD-SCNC: 141 MMOL/L (ref 136–145)
T4 TOTAL: 10.2 UG/DL (ref 4.5–11.7)
TOTAL PROTEIN: 7.8 G/DL (ref 6.6–8.7)
TRIGL SERPL-MCNC: 203 MG/DL (ref 0–149)
TSH SERPL DL<=0.05 MIU/L-ACNC: 1.75 UIU/ML (ref 0.27–4.2)
VITAMIN B-12: 589 PG/ML (ref 211–946)
VITAMIN D 25-HYDROXY: >100 NG/ML
WBC # BLD: 11.1 K/UL (ref 4.8–10.8)

## 2019-01-07 ENCOUNTER — TELEPHONE (OUTPATIENT)
Dept: PRIMARY CARE CLINIC | Age: 49
End: 2019-01-07

## 2019-01-13 DIAGNOSIS — Z76.0 MEDICATION REFILL: ICD-10-CM

## 2019-01-14 RX ORDER — FLUOXETINE HYDROCHLORIDE 40 MG/1
40 CAPSULE ORAL DAILY
Qty: 30 CAPSULE | Refills: 0 | Status: SHIPPED | OUTPATIENT
Start: 2019-01-14 | End: 2019-03-27 | Stop reason: SDUPTHER

## 2019-01-14 RX ORDER — HYDROCHLOROTHIAZIDE 25 MG/1
TABLET ORAL
Qty: 30 TABLET | Refills: 0 | Status: SHIPPED | OUTPATIENT
Start: 2019-01-14 | End: 2019-02-01 | Stop reason: SDUPTHER

## 2019-01-17 DIAGNOSIS — M51.36 DDD (DEGENERATIVE DISC DISEASE), LUMBAR: ICD-10-CM

## 2019-01-21 RX ORDER — HYDROCODONE BITARTRATE AND ACETAMINOPHEN 10; 325 MG/1; MG/1
1 TABLET ORAL EVERY 8 HOURS PRN
Qty: 90 TABLET | Refills: 0 | Status: SHIPPED | OUTPATIENT
Start: 2019-02-17 | End: 2019-03-20 | Stop reason: SDUPTHER

## 2019-01-21 RX ORDER — HYDROCODONE BITARTRATE AND ACETAMINOPHEN 10; 325 MG/1; MG/1
1 TABLET ORAL EVERY 8 HOURS PRN
Qty: 90 TABLET | Refills: 0 | Status: SHIPPED | OUTPATIENT
Start: 2019-01-21 | End: 2019-02-20

## 2019-02-01 ENCOUNTER — OFFICE VISIT (OUTPATIENT)
Dept: PRIMARY CARE CLINIC | Age: 49
End: 2019-02-01
Payer: MEDICAID

## 2019-02-01 VITALS
HEART RATE: 112 BPM | TEMPERATURE: 97.7 F | RESPIRATION RATE: 16 BRPM | WEIGHT: 293 LBS | DIASTOLIC BLOOD PRESSURE: 86 MMHG | HEIGHT: 68 IN | OXYGEN SATURATION: 98 % | SYSTOLIC BLOOD PRESSURE: 136 MMHG | BODY MASS INDEX: 44.41 KG/M2

## 2019-02-01 DIAGNOSIS — E66.01 MORBID OBESITY WITH BMI OF 40.0-44.9, ADULT (HCC): Chronic | ICD-10-CM

## 2019-02-01 DIAGNOSIS — Z76.0 MEDICATION REFILL: ICD-10-CM

## 2019-02-01 DIAGNOSIS — E66.9 DIABETES MELLITUS TYPE 2 IN OBESE (HCC): Chronic | ICD-10-CM

## 2019-02-01 DIAGNOSIS — Z79.899 HIGH RISK MEDICATION USE: ICD-10-CM

## 2019-02-01 DIAGNOSIS — E11.69 DIABETES MELLITUS TYPE 2 IN OBESE (HCC): Chronic | ICD-10-CM

## 2019-02-01 DIAGNOSIS — I10 WELL-CONTROLLED HYPERTENSION: Chronic | ICD-10-CM

## 2019-02-01 LAB
AMPHETAMINE SCREEN, URINE: NEGATIVE
BARBITURATE SCREEN, URINE: NEGATIVE
BENZODIAZEPINE SCREEN, URINE: POSITIVE
BUPRENORPHINE URINE: NEGATIVE
COCAINE METABOLITE SCREEN URINE: NEGATIVE
GABAPENTIN SCREEN, URINE: NEGATIVE
HBA1C MFR BLD: 5.1 %
MDMA URINE: NEGATIVE
METHADONE SCREEN, URINE: NEGATIVE
METHAMPHETAMINE, URINE: NEGATIVE
OPIATE SCREEN URINE: POSITIVE
OXYCODONE SCREEN URINE: NEGATIVE
PHENCYCLIDINE SCREEN URINE: NEGATIVE
PROPOXYPHENE SCREEN, URINE: NEGATIVE
THC SCREEN, URINE: NEGATIVE
TRICYCLIC ANTIDEPRESSANTS, UR: NORMAL

## 2019-02-01 PROCEDURE — 80305 DRUG TEST PRSMV DIR OPT OBS: CPT | Performed by: NURSE PRACTITIONER

## 2019-02-01 PROCEDURE — 99214 OFFICE O/P EST MOD 30 MIN: CPT | Performed by: NURSE PRACTITIONER

## 2019-02-01 PROCEDURE — 83036 HEMOGLOBIN GLYCOSYLATED A1C: CPT | Performed by: NURSE PRACTITIONER

## 2019-02-01 RX ORDER — HYDROCHLOROTHIAZIDE 25 MG/1
25 TABLET ORAL DAILY
Qty: 30 TABLET | Refills: 0 | Status: SHIPPED | OUTPATIENT
Start: 2019-02-01 | End: 2019-04-26 | Stop reason: SDUPTHER

## 2019-02-01 ASSESSMENT — ENCOUNTER SYMPTOMS
ABDOMINAL PAIN: 0
VOMITING: 0
COUGH: 0
BACK PAIN: 1
ABDOMINAL DISTENTION: 0
SHORTNESS OF BREATH: 0
COLOR CHANGE: 0
CONSTIPATION: 0
NAUSEA: 0
DIARRHEA: 0
CHEST TIGHTNESS: 0
APNEA: 0
WHEEZING: 0

## 2019-02-13 RX ORDER — DULAGLUTIDE 1.5 MG/.5ML
INJECTION, SOLUTION SUBCUTANEOUS
Refills: 1 | COMMUNITY
Start: 2019-01-14 | End: 2019-02-13

## 2019-03-11 ENCOUNTER — TELEPHONE (OUTPATIENT)
Dept: PRIMARY CARE CLINIC | Age: 49
End: 2019-03-11

## 2019-03-15 DIAGNOSIS — G89.29 OTHER CHRONIC PAIN: ICD-10-CM

## 2019-03-15 DIAGNOSIS — E11.69 DM COMA, TYPE 2, NOT AT GOAL (HCC): ICD-10-CM

## 2019-03-15 DIAGNOSIS — Z79.899 HIGH RISK MEDICATION USE: ICD-10-CM

## 2019-03-15 DIAGNOSIS — M51.36 DDD (DEGENERATIVE DISC DISEASE), LUMBAR: ICD-10-CM

## 2019-03-15 DIAGNOSIS — F41.9 ANXIETY: Primary | ICD-10-CM

## 2019-03-15 RX ORDER — LANCETS 33 GAUGE
EACH MISCELLANEOUS
Qty: 100 EACH | Refills: 1 | Status: SHIPPED | OUTPATIENT
Start: 2019-03-15 | End: 2020-10-02 | Stop reason: SDUPTHER

## 2019-03-20 DIAGNOSIS — F41.9 ANXIETY AND DEPRESSION: ICD-10-CM

## 2019-03-20 DIAGNOSIS — M51.36 DDD (DEGENERATIVE DISC DISEASE), LUMBAR: ICD-10-CM

## 2019-03-20 DIAGNOSIS — F32.A ANXIETY AND DEPRESSION: ICD-10-CM

## 2019-03-20 RX ORDER — ALPRAZOLAM 0.5 MG/1
0.5 TABLET ORAL 3 TIMES DAILY PRN
Qty: 90 TABLET | Refills: 2 | Status: CANCELLED | OUTPATIENT
Start: 2019-03-20 | End: 2019-04-19

## 2019-03-22 ENCOUNTER — TELEPHONE (OUTPATIENT)
Dept: PRIMARY CARE CLINIC | Age: 49
End: 2019-03-22

## 2019-03-27 RX ORDER — LISINOPRIL 20 MG/1
TABLET ORAL
Qty: 30 TABLET | Refills: 0 | Status: SHIPPED | OUTPATIENT
Start: 2019-03-27 | End: 2019-04-26 | Stop reason: SDUPTHER

## 2019-03-27 RX ORDER — FLUOXETINE HYDROCHLORIDE 40 MG/1
40 CAPSULE ORAL DAILY
Qty: 30 CAPSULE | Refills: 0 | Status: SHIPPED | OUTPATIENT
Start: 2019-03-27 | End: 2019-04-26 | Stop reason: SDUPTHER

## 2019-03-28 RX ORDER — HYDROCODONE BITARTRATE AND ACETAMINOPHEN 10; 325 MG/1; MG/1
1 TABLET ORAL EVERY 8 HOURS PRN
Qty: 90 TABLET | Refills: 0 | Status: SHIPPED | OUTPATIENT
Start: 2019-04-20 | End: 2019-05-20

## 2019-03-28 RX ORDER — HYDROCODONE BITARTRATE AND ACETAMINOPHEN 10; 325 MG/1; MG/1
1 TABLET ORAL EVERY 8 HOURS PRN
Qty: 90 TABLET | Refills: 0 | Status: SHIPPED | OUTPATIENT
Start: 2019-03-28 | End: 2019-04-27

## 2019-03-28 RX ORDER — ALPRAZOLAM 0.5 MG/1
0.5 TABLET ORAL 3 TIMES DAILY PRN
Qty: 90 TABLET | Refills: 2 | Status: SHIPPED | OUTPATIENT
Start: 2019-03-28 | End: 2019-06-20 | Stop reason: SDUPTHER

## 2019-03-28 RX ORDER — ARIPIPRAZOLE 5 MG/1
5 TABLET ORAL DAILY
Qty: 30 TABLET | Refills: 0 | Status: SHIPPED | OUTPATIENT
Start: 2019-03-28 | End: 2019-05-04 | Stop reason: SDUPTHER

## 2019-04-16 DIAGNOSIS — G47.00 INSOMNIA, UNSPECIFIED TYPE: Primary | ICD-10-CM

## 2019-04-16 RX ORDER — ZOLPIDEM TARTRATE 12.5 MG/1
12.5 TABLET, FILM COATED, EXTENDED RELEASE ORAL NIGHTLY PRN
Qty: 30 TABLET | Refills: 1 | OUTPATIENT
Start: 2019-04-16 | End: 2019-06-20 | Stop reason: SDUPTHER

## 2019-04-26 DIAGNOSIS — Z76.0 MEDICATION REFILL: ICD-10-CM

## 2019-04-26 RX ORDER — HYDROCHLOROTHIAZIDE 25 MG/1
25 TABLET ORAL DAILY
Qty: 30 TABLET | Refills: 0 | Status: SHIPPED | OUTPATIENT
Start: 2019-04-26 | End: 2019-06-01 | Stop reason: SDUPTHER

## 2019-04-26 RX ORDER — LISINOPRIL 20 MG/1
TABLET ORAL
Qty: 30 TABLET | Refills: 0 | Status: SHIPPED | OUTPATIENT
Start: 2019-04-26 | End: 2019-06-01 | Stop reason: SDUPTHER

## 2019-04-26 RX ORDER — FLUOXETINE HYDROCHLORIDE 40 MG/1
40 CAPSULE ORAL DAILY
Qty: 30 CAPSULE | Refills: 0 | Status: SHIPPED | OUTPATIENT
Start: 2019-04-26 | End: 2019-06-01 | Stop reason: SDUPTHER

## 2019-05-15 ENCOUNTER — OFFICE VISIT (OUTPATIENT)
Dept: PRIMARY CARE CLINIC | Age: 49
End: 2019-05-15
Payer: MEDICAID

## 2019-05-15 VITALS
WEIGHT: 293 LBS | DIASTOLIC BLOOD PRESSURE: 62 MMHG | HEIGHT: 68 IN | HEART RATE: 112 BPM | SYSTOLIC BLOOD PRESSURE: 114 MMHG | TEMPERATURE: 96.3 F | OXYGEN SATURATION: 99 % | BODY MASS INDEX: 44.41 KG/M2

## 2019-05-15 DIAGNOSIS — F41.9 ANXIETY: ICD-10-CM

## 2019-05-15 DIAGNOSIS — I10 WELL-CONTROLLED HYPERTENSION: Primary | Chronic | ICD-10-CM

## 2019-05-15 DIAGNOSIS — E66.01 MORBID OBESITY (HCC): ICD-10-CM

## 2019-05-15 DIAGNOSIS — R10.9 FLANK PAIN: ICD-10-CM

## 2019-05-15 DIAGNOSIS — E78.5 HYPERLIPIDEMIA, UNSPECIFIED HYPERLIPIDEMIA TYPE: ICD-10-CM

## 2019-05-15 DIAGNOSIS — E11.9 WELL CONTROLLED TYPE 2 DIABETES MELLITUS (HCC): Chronic | ICD-10-CM

## 2019-05-15 LAB
BILIRUBIN, POC: NORMAL
BLOOD URINE, POC: NORMAL
CLARITY, POC: CLEAR
COLOR, POC: YELLOW
CREATININE URINE: 205.6 MG/DL (ref 4.2–622)
GLUCOSE URINE, POC: NORMAL
HBA1C MFR BLD: 5.4 %
KETONES, POC: NORMAL
LEUKOCYTE EST, POC: NORMAL
MICROALBUMIN UR-MCNC: <1.2 MG/DL (ref 0–19)
MICROALBUMIN/CREAT UR-RTO: NORMAL MG/G
NITRITE, POC: NORMAL
PH, POC: 6
PROTEIN, POC: NORMAL
SPECIFIC GRAVITY, POC: 1.03
UROBILINOGEN, POC: 0.2

## 2019-05-15 PROCEDURE — 99214 OFFICE O/P EST MOD 30 MIN: CPT | Performed by: NURSE PRACTITIONER

## 2019-05-15 PROCEDURE — 83036 HEMOGLOBIN GLYCOSYLATED A1C: CPT | Performed by: NURSE PRACTITIONER

## 2019-05-15 PROCEDURE — 81002 URINALYSIS NONAUTO W/O SCOPE: CPT | Performed by: NURSE PRACTITIONER

## 2019-05-15 NOTE — PROGRESS NOTES
Yes    Hypertension:  Home blood pressure monitoring: Yes - systolic ranges from 888 to 288 diastolic 52-24. She is adherent to a low sodium diet. Patient denies chest pain, lightheadedness, palpitations, dry cough and fatigue. Antihypertensive medication side effects: no medication side effects noted. Use of agents associated with hypertension: none. Hyperlipidemia:  No new myalgias or GI upset on atorvastatin (Lipitor). Lab Results   Component Value Date    LABA1C 5.4 05/15/2019    LABA1C 5.1 02/01/2019    LABA1C 5.3 12/07/2017     Lab Results   Component Value Date    LABMICR <1.20 12/07/2017    CREATININE 1.0 (H) 12/21/2018     Lab Results   Component Value Date    ALT 36 (H) 12/21/2018    AST 33 (H) 12/21/2018     Lab Results   Component Value Date    CHOL 145 (L) 12/21/2018    TRIG 203 (H) 12/21/2018    HDL 52 (L) 12/21/2018    LDLCALC 52 12/21/2018    LDLDIRECT 118 07/05/2018          Mood Disorder:  Patient presents for follow-up of anxiety disorder. Current complaints include: none. She denies any other symptoms. Symptoms/signs of kirsten: none. External stressors: none. Current treatment includes: Abilify and BuSpar. Medication side effects: none. Body mass index is 46.53 kg/m². Wt Readings from Last 3 Encounters:   05/15/19 (!) 306 lb (138.8 kg)   02/01/19 (!) 331 lb (150.1 kg)   10/31/18 (!) 341 lb (154.7 kg)     BP Readings from Last 3 Encounters:   05/15/19 114/62   02/01/19 136/86   10/31/18 120/64      Review of Systems    Prior to Visit Medications    Medication Sig Taking?  Authorizing Provider   metFORMIN (GLUCOPHAGE) 500 MG tablet Take 2 tablets by mouth 2 times daily (with meals) Yes NATALI Jara   ARIPiprazole (ABILIFY) 5 MG tablet TAKE ONE TABLET BY MOUTH DAILY Yes Percy Wooten MD   lisinopril (PRINIVIL;ZESTRIL) 20 MG tablet TAKE 1 TABLET BY MOUTH EVERY DAY Yes NATALI Jara   FLUoxetine (PROZAC) 40 MG capsule TAKE 1 CAPSULE BY MOUTH DAILY Yes Derrek Carolina NATALI   hydrochlorothiazide (HYDRODIURIL) 25 MG tablet TAKE 1 TABLET BY MOUTH DAILY Yes NATALI Jara   HYDROcodone-acetaminophen (NORCO)  MG per tablet Take 1 tablet by mouth every 8 hours as needed for Pain for up to 30 days. Yes Ashley Walden MD   [facility] LANCETS 37X MISC USE TO CHECK BLOOD SUGAR TWICE DAILY Yes NATALI Jara   Semaglutide (OZEMPIC) 1 MG/DOSE SOPN Inject 0.25mg into skin once weekly for the first 4 weeks and then go up to 0.50 mg weekly there after Yes NATALI Jara   atorvastatin (LIPITOR) 20 MG tablet TAKE 1 TABLET BY MOUTH DAILY Yes NATALI Jara   glucose monitoring kit (FREESTYLE) monitoring kit 1 kit by Does not apply route daily Please fill script with meter covered under patients insurance. Dx: E11.9 Yes NATALI Jara   blood glucose test strips (ACCU-CHEK BLAIR) strip 1 each by In Vitro route 2 times daily As needed. Please fill script with meter and strips covered under patients insurance. Dx: E11.9 Yes NATALI Jara   busPIRone (BUSPAR) 10 MG tablet TAKE 1 TABLET BY MOUTH THREE TIMES DAILY Yes NATALI Jara   aspirin 325 MG tablet Take 325 mg by mouth daily Yes Historical Provider, MD   Multiple Vitamins-Minerals (THERAPEUTIC MULTIVITAMIN-MINERALS) tablet Take 1 tablet by mouth daily Yes NATALI Jara        Social History     Tobacco Use    Smoking status: Never Smoker    Smokeless tobacco: Never Used   Substance Use Topics    Alcohol use: Yes     Comment: rare        Vitals:    05/15/19 1023   BP: 114/62   Pulse: 112   Temp: 96.3 °F (35.7 °C)   SpO2: 99%   Weight: (!) 306 lb (138.8 kg)   Height: 5' 8\" (1.727 m)     Estimated body mass index is 46.53 kg/m² as calculated from the following:    Height as of this encounter: 5' 8\" (1.727 m). Weight as of this encounter: 306 lb (138.8 kg). Physical Exam   Constitutional: She is oriented to person, place, and time. She appears well-developed and well-nourished.    BMI restriction. ChooseMyPlate.gov - Official Site  EXEND[employer]r.co.nz    Body Weight Planner  [facility]  [email or web address]     Exercise regularly 5 x a week at least 30min at a time. May need to start out with less minutes but try to exercise 10 minutes immediately after each meal. Certainly may exercise 30 minutes a day with low impact activities every day if possible. Research recommends exercising and an environment that is safe, and do not put herself at any risk. EMR Dragon/transcription disclaimer: Much of this encounter note is an electronic transcription/translation of spoken language to printed text. The electronic translation of spoken language may permit erroneous, or at times, nonsensical words or phrases to be inadvertently transcribed. Although I have reviewed the note for such errors, some may still exist        An electronic signature was used to authenticate this note.     --NATALI Bourgeois on 5/15/2019 at 12:54 PM

## 2019-05-31 ENCOUNTER — TELEPHONE (OUTPATIENT)
Dept: PRIMARY CARE CLINIC | Age: 49
End: 2019-05-31

## 2019-05-31 NOTE — TELEPHONE ENCOUNTER
My chart message  Please notify patient of normal results. Associated Results     Result Notes for Microalbumin / Creatinine Urine Ratio     Notes recorded by NATALI Bentley on 5/15/2019 at 8:15 PM CDT  Please notify patient of normal results. Microalbumin / Creatinine Urine Ratio   Order: [id number]   Status:  Final result   Visible to patient:  Yes (MyChart)    Ref Range & Units 2wk ago 1yr ago 2yr ago   Microalbumin, Random Urine 0.00 - 19.00 mg/dL <1.20  <1.20     Creatinine, Ur 4.2 - 622.0 mg/dL 205.6  332.8  120.9    Microalbumin Creatinine Ratio mg/g see below  see below CM    Comment: Ratio cannot be calculated since microalbumin level is below   the lower detection limit. Resulting Agency  [address] [facility]         Specimen Collected: 05/15/19 14:00 Last Resulted: 05/15/19 16:51         Lab Flowsheet       Order Details       View Encounter       Lab and Collection Details       Routing       Result History         CM=Additional comments           Result Notes for POCT glycosylated hemoglobin (Hb A1C)     Notes recorded by NATALI Bentley on 5/15/2019 at 8:15 PM CDT  Please notify patient of normal results. POCT glycosylated hemoglobin (Hb A1C)   Order: [id number]   Status:  Final result   Visible to patient:  Yes ([facility]) Dx: Well controlled type 2 diabetes melli. ..     Ref Range & Units 2wk ago 3mo ago 1yr ago   Hemoglobin A1C % 5.4  5.1  5.3 R, CM   Resulting Agency    [facility]         Specimen Collected: 05/15/19 11:19 Last Resulted: 05/15/19 11:19         Lab Flowsheet       Order Details       View Encounter       Lab and Collection Details       Routing       Result History         CM=Additional comments  R=Reference range differs from displayed range           Result Notes for POCT Urinalysis no Micro     Notes recorded by NATALI Bentley on 5/15/2019 at 8:15 PM CDT  Please notify patient of normal results.    POCT Urinalysis no Micro   Order: [id number]   Status:  Final result   Visible to patient:  Yes (MyChart) Dx:  Flank pain   Component 2wk ago   Color, UA Yellow    Clarity, UA Clear    Glucose, UA POC Neg    Bilirubin, UA Neg    Ketones, UA Neg    Spec Grav, UA 1.030    Blood, UA POC Neg    pH, UA 6.0    Protein, UA POC Neg    Urobilinogen, UA 0.2    Leukocytes, UA Neg    Nitrite, UA Neg          Specimen Collected: 05/15/19 11:17 Last Resulted: 05/15/19 11:18         Lab Flowsheet       Order Details       View Encounter       Lab and Collection Details       Routing       Result History               Status of Other Orders     Expected    Microalbumin / creatinine urine ratio 06/14/19      Completed      DIABETES FOOT EXAM  05/15/19

## 2019-05-31 NOTE — TELEPHONE ENCOUNTER
----- Message from NATALI Castro sent at 5/15/2019  8:15 PM CDT -----  Please notify patient of normal results.

## 2019-06-14 RX ORDER — FLUOXETINE 10 MG/1
10 CAPSULE ORAL DAILY
Qty: 30 CAPSULE | Refills: 11 | Status: SHIPPED | OUTPATIENT
Start: 2019-06-14 | End: 2020-01-22 | Stop reason: ALTCHOICE

## 2019-06-14 NOTE — TELEPHONE ENCOUNTER
Patient called stating that she takes 50mg of Prozac daily and that her 10mg was not sent in with the 40mg. Rx has been reordered.

## 2019-06-16 DIAGNOSIS — E11.69 DM COMA, TYPE 2, NOT AT GOAL (HCC): ICD-10-CM

## 2019-06-20 DIAGNOSIS — F41.9 ANXIETY AND DEPRESSION: ICD-10-CM

## 2019-06-20 DIAGNOSIS — F32.A ANXIETY AND DEPRESSION: ICD-10-CM

## 2019-06-20 DIAGNOSIS — M51.36 DDD (DEGENERATIVE DISC DISEASE), LUMBAR: ICD-10-CM

## 2019-06-20 DIAGNOSIS — G47.00 INSOMNIA, UNSPECIFIED TYPE: ICD-10-CM

## 2019-07-01 RX ORDER — ALPRAZOLAM 0.5 MG/1
0.5 TABLET ORAL 3 TIMES DAILY PRN
Qty: 90 TABLET | Refills: 2 | OUTPATIENT
Start: 2019-07-01 | End: 2019-07-01 | Stop reason: SDUPTHER

## 2019-07-01 RX ORDER — ALPRAZOLAM 0.5 MG/1
0.5 TABLET ORAL 3 TIMES DAILY PRN
Qty: 90 TABLET | Refills: 0 | Status: SHIPPED | OUTPATIENT
Start: 2019-07-01 | End: 2019-08-06 | Stop reason: SDUPTHER

## 2019-07-01 RX ORDER — ZOLPIDEM TARTRATE 12.5 MG/1
12.5 TABLET, FILM COATED, EXTENDED RELEASE ORAL NIGHTLY PRN
Qty: 30 TABLET | Refills: 1 | OUTPATIENT
Start: 2019-07-01 | End: 2019-07-01 | Stop reason: SDUPTHER

## 2019-07-01 RX ORDER — ZOLPIDEM TARTRATE 12.5 MG/1
12.5 TABLET, FILM COATED, EXTENDED RELEASE ORAL NIGHTLY PRN
Qty: 30 TABLET | Refills: 0 | Status: SHIPPED | OUTPATIENT
Start: 2019-07-01 | End: 2019-08-06 | Stop reason: SDUPTHER

## 2019-07-01 NOTE — TELEPHONE ENCOUNTER
Requested Prescriptions     Signed Prescriptions Disp Refills    zolpidem (AMBIEN CR) 12.5 MG extended release tablet 30 tablet 0     Sig: Take 1 tablet by mouth nightly as needed for Sleep for up to 14 days. Authorizing Provider: RYLEE NARVAEZ     Ordering User: Serenity Bourgeois ALPRAZolam (XANAX) 0.5 MG tablet 90 tablet 0     Sig: Take 1 tablet by mouth 3 times daily as needed for Sleep for up to 30 days.      Authorizing Provider: Nicolas MACIAS     Ordering User: Ceci Crespo

## 2019-08-06 DIAGNOSIS — F41.9 ANXIETY AND DEPRESSION: ICD-10-CM

## 2019-08-06 DIAGNOSIS — M51.36 DDD (DEGENERATIVE DISC DISEASE), LUMBAR: ICD-10-CM

## 2019-08-06 DIAGNOSIS — G47.00 INSOMNIA, UNSPECIFIED TYPE: ICD-10-CM

## 2019-08-06 DIAGNOSIS — F32.A ANXIETY AND DEPRESSION: ICD-10-CM

## 2019-08-06 RX ORDER — ALPRAZOLAM 0.5 MG/1
0.5 TABLET ORAL 3 TIMES DAILY PRN
Qty: 90 TABLET | Refills: 1 | OUTPATIENT
Start: 2019-08-06 | End: 2019-09-05

## 2019-08-06 RX ORDER — ZOLPIDEM TARTRATE 12.5 MG/1
12.5 TABLET, FILM COATED, EXTENDED RELEASE ORAL NIGHTLY PRN
Qty: 30 TABLET | Refills: 1 | OUTPATIENT
Start: 2019-08-06 | End: 2019-08-20

## 2019-08-17 DIAGNOSIS — E11.69 DM COMA, TYPE 2, NOT AT GOAL (HCC): ICD-10-CM

## 2019-10-09 ENCOUNTER — OFFICE VISIT (OUTPATIENT)
Dept: PRIMARY CARE CLINIC | Age: 49
End: 2019-10-09
Payer: MEDICAID

## 2019-10-09 VITALS
WEIGHT: 293 LBS | TEMPERATURE: 97.8 F | BODY MASS INDEX: 44.41 KG/M2 | DIASTOLIC BLOOD PRESSURE: 72 MMHG | HEIGHT: 68 IN | SYSTOLIC BLOOD PRESSURE: 122 MMHG | HEART RATE: 100 BPM | OXYGEN SATURATION: 98 %

## 2019-10-09 DIAGNOSIS — Z23 NEED FOR PROPHYLACTIC VACCINATION AND INOCULATION AGAINST VARICELLA: ICD-10-CM

## 2019-10-09 DIAGNOSIS — F32.A ANXIETY AND DEPRESSION: ICD-10-CM

## 2019-10-09 DIAGNOSIS — F41.9 ANXIETY AND DEPRESSION: ICD-10-CM

## 2019-10-09 DIAGNOSIS — G89.29 CHRONIC BILATERAL LOW BACK PAIN WITH SCIATICA, SCIATICA LATERALITY UNSPECIFIED: Chronic | ICD-10-CM

## 2019-10-09 DIAGNOSIS — I10 ESSENTIAL HYPERTENSION: ICD-10-CM

## 2019-10-09 DIAGNOSIS — Z12.31 ENCOUNTER FOR SCREENING MAMMOGRAM FOR BREAST CANCER: ICD-10-CM

## 2019-10-09 DIAGNOSIS — M54.40 CHRONIC BILATERAL LOW BACK PAIN WITH SCIATICA, SCIATICA LATERALITY UNSPECIFIED: Chronic | ICD-10-CM

## 2019-10-09 DIAGNOSIS — E11.00 TYPE 2 DIABETES MELLITUS WITH HYPEROSMOLARITY WITHOUT COMA, UNSPECIFIED WHETHER LONG TERM INSULIN USE (HCC): Primary | Chronic | ICD-10-CM

## 2019-10-09 DIAGNOSIS — G47.00 INSOMNIA, UNSPECIFIED TYPE: ICD-10-CM

## 2019-10-09 LAB — HBA1C MFR BLD: 5.3 %

## 2019-10-09 PROCEDURE — 83036 HEMOGLOBIN GLYCOSYLATED A1C: CPT | Performed by: NURSE PRACTITIONER

## 2019-10-09 PROCEDURE — 99214 OFFICE O/P EST MOD 30 MIN: CPT | Performed by: NURSE PRACTITIONER

## 2019-10-09 RX ORDER — GABAPENTIN 800 MG/1
800 TABLET ORAL EVERY 8 HOURS PRN
Refills: 1 | COMMUNITY
Start: 2019-09-30

## 2019-10-09 RX ORDER — ZOLPIDEM TARTRATE 12.5 MG/1
TABLET, FILM COATED, EXTENDED RELEASE ORAL
Qty: 30 TABLET | Refills: 0 | Status: SHIPPED | OUTPATIENT
Start: 2019-10-09 | End: 2019-11-09

## 2019-10-09 RX ORDER — TIZANIDINE 4 MG/1
4 TABLET ORAL 3 TIMES DAILY PRN
Refills: 1 | Status: ON HOLD | COMMUNITY
Start: 2019-09-26 | End: 2021-01-18 | Stop reason: HOSPADM

## 2019-10-09 RX ORDER — ALPRAZOLAM 0.5 MG/1
TABLET ORAL
Qty: 90 TABLET | Refills: 0 | Status: SHIPPED | OUTPATIENT
Start: 2019-10-09 | End: 2019-11-07 | Stop reason: SDUPTHER

## 2019-10-09 RX ORDER — ALPRAZOLAM 0.5 MG/1
TABLET ORAL
Refills: 1 | COMMUNITY
Start: 2019-09-05 | End: 2019-10-09 | Stop reason: SDUPTHER

## 2019-10-09 RX ORDER — ZOLPIDEM TARTRATE 12.5 MG/1
TABLET, FILM COATED, EXTENDED RELEASE ORAL
Refills: 1 | COMMUNITY
Start: 2019-09-05 | End: 2019-10-09 | Stop reason: SDUPTHER

## 2019-10-09 ASSESSMENT — ENCOUNTER SYMPTOMS
APNEA: 0
DIARRHEA: 0
VOMITING: 0
CHEST TIGHTNESS: 0
SHORTNESS OF BREATH: 0
ABDOMINAL PAIN: 0
COUGH: 0
BACK PAIN: 0
CONSTIPATION: 0
ABDOMINAL DISTENTION: 0
NAUSEA: 0
COLOR CHANGE: 0
WHEEZING: 0

## 2019-11-15 ENCOUNTER — OFFICE VISIT (OUTPATIENT)
Dept: PRIMARY CARE CLINIC | Age: 49
End: 2019-11-15
Payer: MEDICAID

## 2019-11-15 VITALS
SYSTOLIC BLOOD PRESSURE: 126 MMHG | DIASTOLIC BLOOD PRESSURE: 68 MMHG | HEART RATE: 75 BPM | HEIGHT: 68 IN | OXYGEN SATURATION: 96 % | RESPIRATION RATE: 20 BRPM | WEIGHT: 293 LBS | TEMPERATURE: 96.8 F | BODY MASS INDEX: 44.41 KG/M2

## 2019-11-15 DIAGNOSIS — G89.29 OTHER CHRONIC PAIN: ICD-10-CM

## 2019-11-15 DIAGNOSIS — N95.1 VASOMOTOR SYMPTOMS DUE TO MENOPAUSE: ICD-10-CM

## 2019-11-15 DIAGNOSIS — M51.36 DDD (DEGENERATIVE DISC DISEASE), LUMBAR: ICD-10-CM

## 2019-11-15 DIAGNOSIS — G47.00 INSOMNIA, UNSPECIFIED TYPE: ICD-10-CM

## 2019-11-15 DIAGNOSIS — F32.A DEPRESSION, UNSPECIFIED DEPRESSION TYPE: Primary | ICD-10-CM

## 2019-11-15 DIAGNOSIS — Z79.899 POLYPHARMACY: ICD-10-CM

## 2019-11-15 PROCEDURE — 99214 OFFICE O/P EST MOD 30 MIN: CPT | Performed by: NURSE PRACTITIONER

## 2019-11-15 RX ORDER — DESVENLAFAXINE 25 MG/1
25 TABLET, EXTENDED RELEASE ORAL DAILY
Qty: 60 TABLET | Refills: 1 | Status: SHIPPED | OUTPATIENT
Start: 2019-11-15 | End: 2020-01-22 | Stop reason: ALTCHOICE

## 2019-11-15 RX ORDER — TRAZODONE HYDROCHLORIDE 100 MG/1
100 TABLET ORAL NIGHTLY
Qty: 30 TABLET | Refills: 1 | Status: SHIPPED | OUTPATIENT
Start: 2019-11-15 | End: 2019-12-17 | Stop reason: ALTCHOICE

## 2019-11-19 ASSESSMENT — ENCOUNTER SYMPTOMS
CHEST TIGHTNESS: 0
COLOR CHANGE: 0
VOMITING: 0
CONSTIPATION: 0
SHORTNESS OF BREATH: 0
NAUSEA: 0
WHEEZING: 0
APNEA: 0
ABDOMINAL DISTENTION: 0
COUGH: 0
BACK PAIN: 1
ABDOMINAL PAIN: 0
DIARRHEA: 0

## 2019-12-17 ENCOUNTER — OFFICE VISIT (OUTPATIENT)
Dept: PRIMARY CARE CLINIC | Age: 49
End: 2019-12-17
Payer: MEDICAID

## 2019-12-17 VITALS
TEMPERATURE: 97.8 F | HEIGHT: 68 IN | WEIGHT: 293 LBS | HEART RATE: 85 BPM | DIASTOLIC BLOOD PRESSURE: 82 MMHG | OXYGEN SATURATION: 98 % | SYSTOLIC BLOOD PRESSURE: 122 MMHG | BODY MASS INDEX: 44.41 KG/M2

## 2019-12-17 DIAGNOSIS — G89.29 OTHER CHRONIC PAIN: ICD-10-CM

## 2019-12-17 DIAGNOSIS — F41.9 ANXIETY AND DEPRESSION: ICD-10-CM

## 2019-12-17 DIAGNOSIS — Z23 NEED FOR PROPHYLACTIC VACCINATION AND INOCULATION AGAINST VARICELLA: ICD-10-CM

## 2019-12-17 DIAGNOSIS — Z79.899 POLYPHARMACY: ICD-10-CM

## 2019-12-17 DIAGNOSIS — E11.00 TYPE 2 DIABETES MELLITUS WITH HYPEROSMOLARITY WITHOUT COMA, UNSPECIFIED WHETHER LONG TERM INSULIN USE (HCC): Primary | ICD-10-CM

## 2019-12-17 DIAGNOSIS — Z12.11 COLON CANCER SCREENING: ICD-10-CM

## 2019-12-17 DIAGNOSIS — Z12.31 ENCOUNTER FOR SCREENING MAMMOGRAM FOR BREAST CANCER: ICD-10-CM

## 2019-12-17 DIAGNOSIS — G47.00 INSOMNIA, UNSPECIFIED TYPE: ICD-10-CM

## 2019-12-17 DIAGNOSIS — F32.A ANXIETY AND DEPRESSION: ICD-10-CM

## 2019-12-17 PROCEDURE — 99214 OFFICE O/P EST MOD 30 MIN: CPT | Performed by: NURSE PRACTITIONER

## 2019-12-17 RX ORDER — ALPRAZOLAM 0.5 MG/1
TABLET ORAL
Qty: 90 TABLET | Refills: 0 | Status: SHIPPED | OUTPATIENT
Start: 2019-12-17 | End: 2020-01-09

## 2019-12-17 RX ORDER — BUSPIRONE HYDROCHLORIDE 10 MG/1
10 TABLET ORAL 3 TIMES DAILY PRN
Qty: 90 TABLET | Refills: 2 | Status: SHIPPED | OUTPATIENT
Start: 2019-12-17 | End: 2020-01-16

## 2019-12-17 RX ORDER — TRAZODONE HYDROCHLORIDE 300 MG/1
300 TABLET ORAL NIGHTLY
Qty: 60 TABLET | Refills: 1 | Status: SHIPPED | OUTPATIENT
Start: 2019-12-17 | End: 2019-12-27 | Stop reason: SDUPTHER

## 2019-12-17 RX ORDER — FLUOXETINE HYDROCHLORIDE 40 MG/1
40 CAPSULE ORAL EVERY MORNING
Refills: 5 | COMMUNITY
Start: 2019-11-27 | End: 2020-01-02 | Stop reason: SDUPTHER

## 2019-12-17 RX ORDER — HYDROCODONE BITARTRATE AND ACETAMINOPHEN 10; 325 MG/1; MG/1
1 TABLET ORAL EVERY 8 HOURS PRN
Refills: 0 | COMMUNITY
Start: 2019-11-30

## 2019-12-17 ASSESSMENT — ENCOUNTER SYMPTOMS
NAUSEA: 0
SHORTNESS OF BREATH: 0
BACK PAIN: 1
COUGH: 0
VOMITING: 0
CHEST TIGHTNESS: 0
WHEEZING: 0
ABDOMINAL DISTENTION: 0
APNEA: 0
CONSTIPATION: 0
COLOR CHANGE: 0
DIARRHEA: 0
ABDOMINAL PAIN: 0

## 2019-12-18 ENCOUNTER — TRANSCRIBE ORDERS (OUTPATIENT)
Dept: ADMINISTRATIVE | Facility: HOSPITAL | Age: 49
End: 2019-12-18

## 2019-12-18 DIAGNOSIS — Z12.31 ENCOUNTER FOR SCREENING MAMMOGRAM FOR MALIGNANT NEOPLASM OF BREAST: Primary | ICD-10-CM

## 2019-12-26 ENCOUNTER — HOSPITAL ENCOUNTER (OUTPATIENT)
Dept: MAMMOGRAPHY | Facility: HOSPITAL | Age: 49
Discharge: HOME | End: 2019-12-26
Admitting: NURSE PRACTITIONER

## 2019-12-26 DIAGNOSIS — E11.00 TYPE 2 DIABETES MELLITUS WITH HYPEROSMOLARITY WITHOUT COMA, UNSPECIFIED WHETHER LONG TERM INSULIN USE (HCC): ICD-10-CM

## 2019-12-26 DIAGNOSIS — Z12.31 ENCOUNTER FOR SCREENING MAMMOGRAM FOR MALIGNANT NEOPLASM OF BREAST: ICD-10-CM

## 2019-12-26 PROBLEM — Z23 NEED FOR PROPHYLACTIC VACCINATION AND INOCULATION AGAINST VARICELLA: Status: ACTIVE | Noted: 2019-12-26

## 2019-12-26 PROBLEM — Z79.899 POLYPHARMACY: Status: ACTIVE | Noted: 2019-12-26

## 2019-12-26 PROBLEM — Z12.11 COLON CANCER SCREENING: Status: ACTIVE | Noted: 2019-12-26

## 2019-12-26 PROBLEM — F32.A ANXIETY AND DEPRESSION: Status: ACTIVE | Noted: 2019-12-26

## 2019-12-26 PROBLEM — F41.9 ANXIETY AND DEPRESSION: Status: ACTIVE | Noted: 2019-12-26

## 2019-12-26 PROBLEM — G89.29 OTHER CHRONIC PAIN: Status: ACTIVE | Noted: 2019-12-26

## 2019-12-26 PROBLEM — G47.00 INSOMNIA: Status: ACTIVE | Noted: 2019-12-26

## 2019-12-26 LAB
ALBUMIN SERPL-MCNC: 3.8 G/DL (ref 3.5–5.2)
ALP BLD-CCNC: 86 U/L (ref 35–104)
ALT SERPL-CCNC: 21 U/L (ref 5–33)
ANION GAP SERPL CALCULATED.3IONS-SCNC: 14 MMOL/L (ref 7–19)
AST SERPL-CCNC: 15 U/L (ref 5–32)
BACTERIA: ABNORMAL /HPF
BASOPHILS ABSOLUTE: 0.1 K/UL (ref 0–0.2)
BASOPHILS RELATIVE PERCENT: 0.5 % (ref 0–1)
BILIRUB SERPL-MCNC: <0.2 MG/DL (ref 0.2–1.2)
BILIRUBIN URINE: ABNORMAL
BLOOD, URINE: NEGATIVE
BUN BLDV-MCNC: 14 MG/DL (ref 6–20)
CALCIUM SERPL-MCNC: 8.7 MG/DL (ref 8.6–10)
CHLORIDE BLD-SCNC: 99 MMOL/L (ref 98–111)
CHOLESTEROL, TOTAL: 181 MG/DL (ref 160–199)
CLARITY: ABNORMAL
CO2: 26 MMOL/L (ref 22–29)
COLOR: YELLOW
CREAT SERPL-MCNC: 0.9 MG/DL (ref 0.5–0.9)
CREATININE URINE: 423.3 MG/DL (ref 4.2–622)
EOSINOPHILS ABSOLUTE: 0.2 K/UL (ref 0–0.6)
EOSINOPHILS RELATIVE PERCENT: 1.7 % (ref 0–5)
EPITHELIAL CELLS, UA: ABNORMAL /HPF
GFR NON-AFRICAN AMERICAN: >60
GLUCOSE BLD-MCNC: 100 MG/DL (ref 74–109)
GLUCOSE URINE: NEGATIVE MG/DL
HBA1C MFR BLD: 5.2 % (ref 4–6)
HCT VFR BLD CALC: 34.7 % (ref 37–47)
HDLC SERPL-MCNC: 58 MG/DL (ref 65–121)
HEMOGLOBIN: 11.3 G/DL (ref 12–16)
IMMATURE GRANULOCYTES #: 0.1 K/UL
KETONES, URINE: NEGATIVE MG/DL
LDL CHOLESTEROL CALCULATED: 85 MG/DL
LEUKOCYTE ESTERASE, URINE: NEGATIVE
LYMPHOCYTES ABSOLUTE: 2.4 K/UL (ref 1.1–4.5)
LYMPHOCYTES RELATIVE PERCENT: 24 % (ref 20–40)
MCH RBC QN AUTO: 30.5 PG (ref 27–31)
MCHC RBC AUTO-ENTMCNC: 32.6 G/DL (ref 33–37)
MCV RBC AUTO: 93.5 FL (ref 81–99)
MICROALBUMIN UR-MCNC: 1.4 MG/DL (ref 0–19)
MICROALBUMIN/CREAT UR-RTO: 3.3 MG/G
MONOCYTES ABSOLUTE: 0.5 K/UL (ref 0–0.9)
MONOCYTES RELATIVE PERCENT: 5.2 % (ref 0–10)
MUCUS: ABNORMAL /LPF
NEUTROPHILS ABSOLUTE: 6.7 K/UL (ref 1.5–7.5)
NEUTROPHILS RELATIVE PERCENT: 67.9 % (ref 50–65)
NITRITE, URINE: POSITIVE
PDW BLD-RTO: 13.1 % (ref 11.5–14.5)
PH UA: 5.5 (ref 5–8)
PLATELET # BLD: 344 K/UL (ref 130–400)
PMV BLD AUTO: 8.9 FL (ref 9.4–12.3)
POTASSIUM SERPL-SCNC: 4.3 MMOL/L (ref 3.5–5)
PROTEIN UA: ABNORMAL MG/DL
RBC # BLD: 3.71 M/UL (ref 4.2–5.4)
RBC UA: ABNORMAL /HPF (ref 0–2)
SODIUM BLD-SCNC: 139 MMOL/L (ref 136–145)
SPECIFIC GRAVITY UA: >=1.03 (ref 1–1.03)
T4 FREE: 1.08 NG/DL (ref 0.93–1.7)
TOTAL PROTEIN: 6.5 G/DL (ref 6.6–8.7)
TRIGL SERPL-MCNC: 192 MG/DL (ref 0–149)
TSH SERPL DL<=0.05 MIU/L-ACNC: 3.07 UIU/ML (ref 0.27–4.2)
UROBILINOGEN, URINE: 0.2 E.U./DL
WBC # BLD: 9.9 K/UL (ref 4.8–10.8)
WBC UA: ABNORMAL /HPF (ref 0–5)

## 2019-12-26 PROCEDURE — 77067 SCR MAMMO BI INCL CAD: CPT

## 2019-12-26 PROCEDURE — 77063 BREAST TOMOSYNTHESIS BI: CPT

## 2019-12-27 RX ORDER — TRAZODONE HYDROCHLORIDE 150 MG/1
300 TABLET ORAL NIGHTLY
Qty: 60 TABLET | Refills: 1 | Status: SHIPPED | OUTPATIENT
Start: 2019-12-27 | End: 2020-04-08

## 2020-01-02 RX ORDER — FLUOXETINE HYDROCHLORIDE 40 MG/1
40 CAPSULE ORAL EVERY MORNING
Qty: 30 CAPSULE | Refills: 0 | Status: SHIPPED | OUTPATIENT
Start: 2020-01-02 | End: 2020-01-22 | Stop reason: ALTCHOICE

## 2020-01-03 ENCOUNTER — HOSPITAL ENCOUNTER (OUTPATIENT)
Dept: MAMMOGRAPHY | Facility: HOSPITAL | Age: 50
Discharge: HOME | End: 2020-01-03

## 2020-01-03 ENCOUNTER — HOSPITAL ENCOUNTER (OUTPATIENT)
Dept: ULTRASOUND IMAGING | Facility: HOSPITAL | Age: 50
Discharge: HOME | End: 2020-01-03
Admitting: NURSE PRACTITIONER

## 2020-01-03 DIAGNOSIS — R92.8 ABNORMAL MAMMOGRAM OF RIGHT BREAST: ICD-10-CM

## 2020-01-03 PROCEDURE — G0279 TOMOSYNTHESIS, MAMMO: HCPCS

## 2020-01-03 PROCEDURE — 77065 DX MAMMO INCL CAD UNI: CPT

## 2020-01-03 PROCEDURE — 76642 ULTRASOUND BREAST LIMITED: CPT

## 2020-01-07 ENCOUNTER — HOSPITAL ENCOUNTER (OUTPATIENT)
Dept: ULTRASOUND IMAGING | Facility: HOSPITAL | Age: 50
Discharge: HOME | End: 2020-01-07
Admitting: NURSE PRACTITIONER

## 2020-01-07 DIAGNOSIS — R92.8 ABNORMAL MAMMOGRAM OF RIGHT BREAST: ICD-10-CM

## 2020-01-07 PROCEDURE — 76642 ULTRASOUND BREAST LIMITED: CPT

## 2020-01-09 ENCOUNTER — TELEPHONE (OUTPATIENT)
Dept: PRIMARY CARE CLINIC | Age: 50
End: 2020-01-09

## 2020-01-14 RX ORDER — HYDROCHLOROTHIAZIDE 25 MG/1
25 TABLET ORAL EVERY MORNING
Qty: 30 TABLET | Refills: 5 | Status: SHIPPED | OUTPATIENT
Start: 2020-01-14 | End: 2020-07-15

## 2020-01-17 NOTE — TELEPHONE ENCOUNTER
Naye Burlington called to request a refill on her medication. Last office visit : 12/17/2019   Next office visit : 1/22/2020     Last UDS: appropriate  Amphetamine Screen, Urine   Date Value Ref Range Status   02/01/2019 NEGATIVE  Final     Barbiturates   Date Value Ref Range Status   01/17/2011 Negative () Final     Barbiturate Screen, Urine   Date Value Ref Range Status   02/01/2019 NEGATIVE  Final     Benzodiazepine Screen, Urine   Date Value Ref Range Status   02/01/2019 POSITIVE  Final     Buprenorphine Urine   Date Value Ref Range Status   02/01/2019 NEGATIVE  Final     Cocaine Metabolite Screen, Urine   Date Value Ref Range Status   02/01/2019 NEGATIVE  Final     Gabapentin Screen, Urine   Date Value Ref Range Status   02/01/2019 NEGATIVE  Final     MDMA, Urine   Date Value Ref Range Status   02/01/2019 NEGATIVE  Final     Methamphetamine, Urine   Date Value Ref Range Status   02/01/2019 NEGATIVE  Final     Opiate Scrn, Ur   Date Value Ref Range Status   02/01/2019 POSITIVE  Final     Oxycodone Screen, Ur   Date Value Ref Range Status   02/01/2019 NEGATIVE  Final     PCP Screen, Urine   Date Value Ref Range Status   02/01/2019 NEGATIVE  Final     Propoxyphene Screen, Urine   Date Value Ref Range Status   02/01/2019 NEGATIVE  Final     THC Screen, Urine   Date Value Ref Range Status   02/01/2019 NEGATIVE  Final     Tricyclic Antidepressants, Urine   Date Value Ref Range Status   07/05/2018 POS  Final       Last Michelle Jen: 11/14/19            Last Fill: 12/17/19    Requested Prescriptions     Pending Prescriptions Disp Refills    ALPRAZolam (XANAX) 0.5 MG tablet [Pharmacy Med Name: ALPRAZOLAM 0.5MG TABLETS] 90 tablet 0     Sig: TAKE 1 TABLET BY MOUTH THREE TIMES DAILY AS NEEDED         Please approve or refuse this medication.    Claudia Hu LPN

## 2020-01-21 RX ORDER — ALPRAZOLAM 0.5 MG/1
TABLET ORAL
Qty: 90 TABLET | Refills: 0 | Status: SHIPPED | OUTPATIENT
Start: 2020-01-21 | End: 2020-02-18

## 2020-01-22 ENCOUNTER — OFFICE VISIT (OUTPATIENT)
Dept: PRIMARY CARE CLINIC | Age: 50
End: 2020-01-22
Payer: MEDICAID

## 2020-01-22 VITALS
OXYGEN SATURATION: 97 % | SYSTOLIC BLOOD PRESSURE: 134 MMHG | HEART RATE: 74 BPM | DIASTOLIC BLOOD PRESSURE: 84 MMHG | BODY MASS INDEX: 44.41 KG/M2 | HEIGHT: 68 IN | WEIGHT: 293 LBS | TEMPERATURE: 98.4 F

## 2020-01-22 PROCEDURE — 99214 OFFICE O/P EST MOD 30 MIN: CPT | Performed by: NURSE PRACTITIONER

## 2020-01-22 RX ORDER — DULOXETIN HYDROCHLORIDE 30 MG/1
30 CAPSULE, DELAYED RELEASE ORAL DAILY
Qty: 90 CAPSULE | Refills: 1 | Status: SHIPPED | OUTPATIENT
Start: 2020-01-22 | End: 2020-04-14 | Stop reason: SDUPTHER

## 2020-01-22 ASSESSMENT — ENCOUNTER SYMPTOMS
CHEST TIGHTNESS: 0
ABDOMINAL DISTENTION: 0
NAUSEA: 0
SHORTNESS OF BREATH: 0
BACK PAIN: 1
CONSTIPATION: 0
COLOR CHANGE: 0
WHEEZING: 0
ABDOMINAL PAIN: 0
DIARRHEA: 0
COUGH: 0
VOMITING: 0
APNEA: 0

## 2020-01-22 NOTE — PATIENT INSTRUCTIONS
Begin cymbalta mg daily. Stop prozac when you get cymbalta. We will decrease xanax by 5 per month. Continue abilify.      I will refer you to Dr. Magui Townsend for abnormal u/s of right breast.

## 2020-01-22 NOTE — PROGRESS NOTES
[facility]  [address] [place] [address]  [id number]  Verena Walters [id number]  Dept: [phone]  Dept Fax: [phone]  Loc: [phone]        Zahraa John is a 47 y.o. female who presents today for her medical conditions/ complaints as noted below. Zahraa John is c/o Follow-up (dm, anxiety, depression, insomnia, chronic pain, polypharmacy); Medication Check (If pristiq not covered try cymbalta instead-then stop prozac; lipitor and asa at bed); and Discuss Labs        Chief Complaint   Patient presents with    Follow-up     dm, anxiety, depression, insomnia, chronic pain, polypharmacy    Medication Check     If pristiq not covered try cymbalta instead-then stop prozac; lipitor and asa at bed    Discuss Labs       HPI:     HPI    Breast lesion: Pt recently had screening mammogram at [facility]. She states that mammogram revealed a 1.1 cm and 0.7cm nodules in the upper outer aspect of the right breast.  Ultrasound noted that these lesion were at the 10/11 o'clock region of the right breast, 4 cm from the nipple, there is a 1.4 x 1 x 0.4 cm lymph node measure up to 4mm cortical thickness. An adjacent 0.8. 0.4 x 0.7cm lymph node measure up to 3 medial cortical thickness also present 3 cm from the nipple. The Impression indicated that this could be infectious/ inflammatory process or neoplastic process, tissue diagnosis was recommended. Bi-Rads Category 4 suspicious. Pt states that she didn't feel as though the interventional radiologist that was discussing the procedure wanted to perform the procedure. Thus it was documented that pt would follow up with ultrasound in 3 months. She wishes to be referred to Dr. Malissa Amaya for second opinion. DM2:   1/22/2020 304lbs today down from 312lbs at previous appointment. Ozempic 1mg for the last month. Glucophage 1000 mg bid. Pt doing well on this regimen and wishes to continue same. 12/17/19 - 134 in the morning. Weight down to 312.  B/p and pulse stable. HA1C is 5.4  Home glucose monitoring:    Medication compliance: currently on ozempic 0.5mg, Glucophage 1000 mg twice daily. Pt is compliant with ASA 325mg at bedtime, lipitor 20mg at bedtime, and lisinopril 20mg daily. Diet: not adherent to dm diet. Exercise: not adherent due to chronic pain. Depression/ anxiety/ panic attacks:   2020: Pt states that she still has not started pristiq 50mg daily. Pt states that she is still on prozac. Pt denies sI or HI. She wishes to try medication that will help with chronic pain, depression, and anxiety. Discussed with pt and she wishes to try cymbalta. 19: Pt has not started the pristiq 50mg. She states that the medication needed prior authorization. Abilify 5mg daily. Pt notes compliance with abilify 5mg daily, prozac 10mg daily, and is also on xanax 0.5mg three times daily. Pt denies SI or HI. Chronic back pain:   19Pt states that she is under the care of Dr. Solange Rob who prescribes norco 10mg tid, an d Neurontin 800 mg three times daily, zanaflex 4 mg. Patient reports that they have been compliant with taking medications as directed.      Past Medical History:   Diagnosis Date    Anxiety     Chronic pain     Depression     Diabetes mellitus type 2 in obese ([address]     Hypertension     Premenopausal patient 2018       Past Surgical History:   Procedure Laterality Date     SECTION      TUBAL LIGATION         Social History     Socioeconomic History    Marital status:      Spouse name: None    Number of children: None    Years of education: None    Highest education level: None   Occupational History    None   Social Needs    Financial resource strain: None    Food insecurity:     Worry: None     Inability: None    Transportation needs:     Medical: None     Non-medical: None   Tobacco Use    Smoking status: Never Smoker    Smokeless tobacco: Never Used   Substance and Sexual Activity    Alcohol use: Yes     Comment: rare    Drug use: No    Sexual activity: None   Lifestyle    Physical activity:     Days per week: None     Minutes per session: None    Stress: None   Relationships    Social connections:     Talks on phone: None     Gets together: None     Attends [employer] service: None     Active member of club or organization: None     Attends meetings of clubs or organizations: None     Relationship status: None    Intimate partner violence:     Fear of current or ex partner: None     Emotionally abused: None     Physically abused: None     Forced sexual activity: None   Other Topics Concern    None   Social History Narrative    None       Family History   Problem Relation Age of Onset    Other Father         bladder cancer    Diabetes Father     Breast Cancer Sister     Diabetes Maternal Grandfather     Diabetes Paternal Grandmother        Current Outpatient Medications   Medication Sig Dispense Refill    DULoxetine (CYMBALTA) 30 MG extended release capsule Take 1 capsule by mouth daily 90 capsule 1    ALPRAZolam (XANAX) 0.5 MG tablet TAKE 1 TABLET BY MOUTH THREE TIMES DAILY AS NEEDED (Patient taking differently: Take 0.5 mg by mouth 3 times daily as needed for Anxiety. ) 90 tablet 0    ARIPiprazole (ABILIFY) 5 MG tablet TAKE ONE TABLET BY MOUTH DAILY (Patient taking differently: Take 5 mg by mouth nightly ) 30 tablet 5    hydrochlorothiazide (HYDRODIURIL) 25 MG tablet Take 1 tablet by mouth every morning 30 tablet 5    traZODone (DESYREL) 150 MG tablet Take 2 tablets by mouth nightly 60 tablet 1    HYDROcodone-acetaminophen (NORCO)  MG per tablet Take 1 tablet by mouth every 8 hours as needed for Pain (Dr. Mindy Israel). 0    Semaglutide, 1 MG/DOSE, (OZEMPIC, 1 MG/DOSE,) 2 MG/1.5ML SOPN Inject 0.25mg into skin once weekly for the first 4 weeks and then go up to 0.50 mg weekly then increase to 1mg weekly.  (Patient taking differently: Inject 1 mg into the skin once a week Wednesdays) urgency, vaginal bleeding, vaginal discharge and vaginal pain. Musculoskeletal: Positive for back pain and joint swelling. Negative for gait problem, neck pain and neck stiffness. Skin: Negative for color change, pallor, rash and wound. Breast lesion right. Allergic/Immunologic: Negative for environmental allergies. Neurological: Negative for dizziness, tremors, seizures, syncope, facial asymmetry, speech difficulty, weakness, light-headedness, numbness and headaches. Psychiatric/Behavioral: Negative for agitation, behavioral problems, confusion, decreased concentration, dysphoric mood and hallucinations. The patient is not nervous/anxious and is not hyperactive. Objective:     Physical Exam  Vitals signs and nursing note reviewed. Constitutional:       Appearance: She is well-developed. HENT:      Head: Normocephalic and atraumatic. Eyes:      Pupils: Pupils are equal, round, and reactive to light. Neck:      Musculoskeletal: Normal range of motion and neck supple. Cardiovascular:      Rate and Rhythm: Normal rate and regular rhythm. Heart sounds: Normal heart sounds. No murmur. Pulmonary:      Effort: Pulmonary effort is normal.      Breath sounds: Normal breath sounds. No wheezing. Chest:       Abdominal:      General: Bowel sounds are normal.      Palpations: Abdomen is soft. Musculoskeletal:      Thoracic back: She exhibits decreased range of motion, tenderness, pain and spasm. Lumbar back: She exhibits decreased range of motion, tenderness, pain and spasm. Skin:     General: Skin is warm and dry. Neurological:      Mental Status: She is alert and oriented to person, place, and time. Psychiatric:         Behavior: Behavior normal.         Thought Content:  Thought content normal.         Judgment: Judgment normal.       /84   Pulse 74   Temp 98.4 °F (36.9 °C)   Ht 5' 8\" (1.727 m)   Wt (!) 307 lb (139.3 kg)   SpO2 97%   BMI 46.68 kg/m² capsule by mouth daily     Dispense:  90 capsule     Refill:  1       Patient Instructions   Begin cymbalta mg daily. Stop prozac when you get cymbalta. We will decrease xanax by 5 per month. Continue abilify. I will refer you to Dr. Olivia Fair for abnormal u/s of right breast.       Patient/family given educational materials - see patient instructions. Discussed use, benefit, and side effects of prescribed medications. All patient/family questions answered and voiced understanding. Instructed to continue current medications, diet and exercise. Pt/family agreed with treatment plan. Follow up as directed and sooner if needed. Patient/ family instructed that is symptoms worsen or persist they are to contact office or report to nearest ER. They voice understanding and agreement with this plan.      Electronically signed by NATALI Talbert on 2/2/2020 at 12:44 PM

## 2020-01-25 PROBLEM — Z12.11 COLON CANCER SCREENING: Status: RESOLVED | Noted: 2019-12-26 | Resolved: 2020-01-25

## 2020-02-03 ENCOUNTER — OFFICE VISIT (OUTPATIENT)
Dept: SURGERY | Age: 50
End: 2020-02-03
Payer: MEDICAID

## 2020-02-03 VITALS — SYSTOLIC BLOOD PRESSURE: 120 MMHG | HEART RATE: 80 BPM | DIASTOLIC BLOOD PRESSURE: 80 MMHG

## 2020-02-03 PROCEDURE — 99204 OFFICE O/P NEW MOD 45 MIN: CPT | Performed by: SURGERY

## 2020-02-04 PROBLEM — R92.8 ABNORMAL MAMMOGRAM: Status: ACTIVE | Noted: 2020-02-04

## 2020-02-04 PROBLEM — N63.0 LUMP OR MASS IN BREAST: Status: ACTIVE | Noted: 2020-02-04

## 2020-02-04 PROBLEM — Z80.3 FAMILY HX-BREAST MALIGNANCY: Status: ACTIVE | Noted: 2020-02-04

## 2020-02-18 RX ORDER — ALPRAZOLAM 0.5 MG/1
0.5 TABLET ORAL 3 TIMES DAILY PRN
Qty: 80 TABLET | Refills: 0 | Status: SHIPPED | OUTPATIENT
Start: 2020-02-21 | End: 2020-03-17

## 2020-02-18 NOTE — TELEPHONE ENCOUNTER
Kat Ureña called to request a refill on her medication. Last office visit : 1/22/2020   Next office visit : 4/14/2020 uds & med contract needed noted    Pended tapered dec by 5 amount per provider     Last Julia Blanchard: 2/18/20 addressed prev with provider  Last UDS: 2/1/19 aprropriate  Last Rx: 1/21    Amphetamine Screen, Urine   Date Value Ref Range Status   02/01/2019 NEGATIVE  Final     Barbiturates   Date Value Ref Range Status   01/17/2011 Negative () Final     Barbiturate Screen, Urine   Date Value Ref Range Status   02/01/2019 NEGATIVE  Final     Benzodiazepine Screen, Urine   Date Value Ref Range Status   02/01/2019 POSITIVE  Final     Buprenorphine Urine   Date Value Ref Range Status   02/01/2019 NEGATIVE  Final     Cocaine Metabolite Screen, Urine   Date Value Ref Range Status   02/01/2019 NEGATIVE  Final     Gabapentin Screen, Urine   Date Value Ref Range Status   02/01/2019 NEGATIVE  Final     MDMA, Urine   Date Value Ref Range Status   02/01/2019 NEGATIVE  Final     Methamphetamine, Urine   Date Value Ref Range Status   02/01/2019 NEGATIVE  Final     Opiate Scrn, Ur   Date Value Ref Range Status   02/01/2019 POSITIVE  Final     Oxycodone Screen, Ur   Date Value Ref Range Status   02/01/2019 NEGATIVE  Final     PCP Screen, Urine   Date Value Ref Range Status   02/01/2019 NEGATIVE  Final     Propoxyphene Screen, Urine   Date Value Ref Range Status   02/01/2019 NEGATIVE  Final     THC Screen, Urine   Date Value Ref Range Status   02/01/2019 NEGATIVE  Final     Tricyclic Antidepressants, Urine   Date Value Ref Range Status   07/05/2018 POS  Final           Requested Prescriptions     Pending Prescriptions Disp Refills    ALPRAZolam (XANAX) 0.5 MG tablet [Pharmacy Med Name: ALPRAZOLAM 0.5MG TABLETS] 90 tablet      Sig: TAKE 1 TABLET BY MOUTH THREE TIMES DAILY AS NEEDED         Please approve or refuse this medication.    Paula Carpio LPN

## 2020-02-26 ENCOUNTER — TELEPHONE (OUTPATIENT)
Dept: FAMILY MEDICINE CLINIC | Age: 50
End: 2020-02-26

## 2020-02-26 RX ORDER — BUSPIRONE HYDROCHLORIDE 10 MG/1
10 TABLET ORAL 3 TIMES DAILY
Qty: 90 TABLET | Refills: 3 | Status: SHIPPED | OUTPATIENT
Start: 2020-02-26 | End: 2021-03-18

## 2020-03-12 ENCOUNTER — OFFICE VISIT (OUTPATIENT)
Dept: SURGERY | Age: 50
End: 2020-03-12
Payer: MEDICAID

## 2020-03-12 VITALS — TEMPERATURE: 98.6 F | WEIGHT: 293 LBS | BODY MASS INDEX: 44.41 KG/M2 | HEIGHT: 68 IN

## 2020-03-12 PROCEDURE — 99213 OFFICE O/P EST LOW 20 MIN: CPT | Performed by: PHYSICIAN ASSISTANT

## 2020-03-17 NOTE — TELEPHONE ENCOUNTER
Jacob Ady called to request a refill on her medication. Last office visit : 1/22/2020   Next office visit : 4/14/2020     Last UDS:   Amphetamine Screen, Urine   Date Value Ref Range Status   02/01/2019 NEGATIVE  Final     Barbiturates   Date Value Ref Range Status   01/17/2011 Negative () Final     Barbiturate Screen, Urine   Date Value Ref Range Status   02/01/2019 NEGATIVE  Final     Benzodiazepine Screen, Urine   Date Value Ref Range Status   02/01/2019 POSITIVE  Final     Buprenorphine Urine   Date Value Ref Range Status   02/01/2019 NEGATIVE  Final     Cocaine Metabolite Screen, Urine   Date Value Ref Range Status   02/01/2019 NEGATIVE  Final     Gabapentin Screen, Urine   Date Value Ref Range Status   02/01/2019 NEGATIVE  Final     MDMA, Urine   Date Value Ref Range Status   02/01/2019 NEGATIVE  Final     Methamphetamine, Urine   Date Value Ref Range Status   02/01/2019 NEGATIVE  Final     Opiate Scrn, Ur   Date Value Ref Range Status   02/01/2019 POSITIVE  Final     Oxycodone Screen, Ur   Date Value Ref Range Status   02/01/2019 NEGATIVE  Final     PCP Screen, Urine   Date Value Ref Range Status   02/01/2019 NEGATIVE  Final     Propoxyphene Screen, Urine   Date Value Ref Range Status   02/01/2019 NEGATIVE  Final     THC Screen, Urine   Date Value Ref Range Status   02/01/2019 NEGATIVE  Final     Tricyclic Antidepressants, Urine   Date Value Ref Range Status   07/05/2018 POS  Final       Last Geroldine Greenhouse: 2-18  Medication Contract: 2-2017   Last Fill: 2-21    Requested Prescriptions     Pending Prescriptions Disp Refills    ALPRAZolam (XANAX) 0.5 MG tablet [Pharmacy Med Name: ALPRAZOLAM 0.5MG TABLETS] 80 tablet      Sig: TAKE 1 TABLET BY MOUTH THREE TIMES DAILY AS NEEDED FOR ANXIETY                   Please approve or refuse this medication.    Stefanie Bergeron LPN

## 2020-03-18 RX ORDER — ALPRAZOLAM 0.5 MG/1
TABLET ORAL
Qty: 75 TABLET | Refills: 0 | OUTPATIENT
Start: 2020-03-18 | End: 2020-03-31 | Stop reason: SDUPTHER

## 2020-03-18 NOTE — TELEPHONE ENCOUNTER
Pended dec #5 per tapering by 5 monthly per PCP    Guido Chester called to request a refill on her medication. Last office visit : 1/22/2020   Next office visit : 4/14/2020     Last [place]: 2/18/20 addressed prev w provider   Last UDS: 2/1/19 uds needed at next ov noted  Last Rx: 2/21/20    Amphetamine Screen, Urine   Date Value Ref Range Status   02/01/2019 NEGATIVE  Final     Barbiturates   Date Value Ref Range Status   01/17/2011 Negative () Final     Barbiturate Screen, Urine   Date Value Ref Range Status   02/01/2019 NEGATIVE  Final     Benzodiazepine Screen, Urine   Date Value Ref Range Status   02/01/2019 POSITIVE  Final     Buprenorphine Urine   Date Value Ref Range Status   02/01/2019 NEGATIVE  Final     Cocaine Metabolite Screen, Urine   Date Value Ref Range Status   02/01/2019 NEGATIVE  Final     Gabapentin Screen, Urine   Date Value Ref Range Status   02/01/2019 NEGATIVE  Final     MDMA, Urine   Date Value Ref Range Status   02/01/2019 NEGATIVE  Final     Methamphetamine, Urine   Date Value Ref Range Status   02/01/2019 NEGATIVE  Final     Opiate Scrn, Ur   Date Value Ref Range Status   02/01/2019 POSITIVE  Final     Oxycodone Screen, Ur   Date Value Ref Range Status   02/01/2019 NEGATIVE  Final     PCP Screen, Urine   Date Value Ref Range Status   02/01/2019 NEGATIVE  Final     Propoxyphene Screen, Urine   Date Value Ref Range Status   02/01/2019 NEGATIVE  Final     THC Screen, Urine   Date Value Ref Range Status   02/01/2019 NEGATIVE  Final     Tricyclic Antidepressants, Urine   Date Value Ref Range Status   07/05/2018 POS  Final           Requested Prescriptions     Pending Prescriptions Disp Refills    ALPRAZolam (XANAX) 0.5 MG tablet [Pharmacy Med Name: ALPRAZOLAM 0.5MG TABLETS] 75 tablet 0     Sig: TAKE 1 TABLET BY MOUTH THREE TIMES DAILY AS NEEDED FOR ANXIETY         Please approve or refuse this medication.    Soumya Pacheco LPN

## 2020-03-31 RX ORDER — ALPRAZOLAM 0.5 MG/1
TABLET ORAL
Qty: 75 TABLET | Refills: 0 | OUTPATIENT
Start: 2020-03-31 | End: 2020-05-01 | Stop reason: SDUPTHER

## 2020-03-31 NOTE — TELEPHONE ENCOUNTER
Jose Patel called to request a refill on her medication. Last office visit : 1/22/2020   Next office visit : 4/14/2020     Last UDS:   Amphetamine Screen, Urine   Date Value Ref Range Status   02/01/2019 NEGATIVE  Final     Barbiturates   Date Value Ref Range Status   01/17/2011 Negative () Final     Barbiturate Screen, Urine   Date Value Ref Range Status   02/01/2019 NEGATIVE  Final     Benzodiazepine Screen, Urine   Date Value Ref Range Status   02/01/2019 POSITIVE  Final     Buprenorphine Urine   Date Value Ref Range Status   02/01/2019 NEGATIVE  Final     Cocaine Metabolite Screen, Urine   Date Value Ref Range Status   02/01/2019 NEGATIVE  Final     Gabapentin Screen, Urine   Date Value Ref Range Status   02/01/2019 NEGATIVE  Final     MDMA, Urine   Date Value Ref Range Status   02/01/2019 NEGATIVE  Final     Methamphetamine, Urine   Date Value Ref Range Status   02/01/2019 NEGATIVE  Final     Opiate Scrn, Ur   Date Value Ref Range Status   02/01/2019 POSITIVE  Final     Oxycodone Screen, Ur   Date Value Ref Range Status   02/01/2019 NEGATIVE  Final     PCP Screen, Urine   Date Value Ref Range Status   02/01/2019 NEGATIVE  Final     Propoxyphene Screen, Urine   Date Value Ref Range Status   02/01/2019 NEGATIVE  Final     THC Screen, Urine   Date Value Ref Range Status   02/01/2019 NEGATIVE  Final     Tricyclic Antidepressants, Urine   Date Value Ref Range Status   07/05/2018 POS  Final       Last Alessio Taverasrows: 03/31/20  Medication Contract: none   Last Fill: 02/19/20                Requested Prescriptions     Signed Prescriptions Disp Refills    ALPRAZolam (XANAX) 0.5 MG tablet 75 tablet 0     Sig: TAKE 1 TABLET BY MOUTH THREE TIMES DAILY AS NEEDED FOR ANXIETY     Authorizing Provider: Franca Camargo     Ordering User: Boubacar Cao         Please approve or refuse this medication.    Sabra Rojas MA

## 2020-04-14 ENCOUNTER — VIRTUAL VISIT (OUTPATIENT)
Dept: PRIMARY CARE CLINIC | Age: 50
End: 2020-04-14
Payer: MEDICAID

## 2020-04-14 PROCEDURE — 99214 OFFICE O/P EST MOD 30 MIN: CPT | Performed by: NURSE PRACTITIONER

## 2020-04-14 RX ORDER — ATORVASTATIN CALCIUM 20 MG/1
20 TABLET, FILM COATED ORAL DAILY
Qty: 30 TABLET | Refills: 11 | Status: SHIPPED | OUTPATIENT
Start: 2020-04-14 | End: 2020-09-14

## 2020-04-14 RX ORDER — DULOXETINE 40 MG/1
40 CAPSULE, DELAYED RELEASE ORAL DAILY
Qty: 30 CAPSULE | Refills: 5 | Status: SHIPPED | OUTPATIENT
Start: 2020-04-14 | End: 2020-11-24

## 2020-04-14 ASSESSMENT — ENCOUNTER SYMPTOMS
VOICE CHANGE: 0
VOMITING: 0
BACK PAIN: 0
PHOTOPHOBIA: 0
RHINORRHEA: 0
NAUSEA: 0
SHORTNESS OF BREATH: 0
COLOR CHANGE: 0
COUGH: 0

## 2020-04-14 NOTE — PROGRESS NOTES
fill script with meter covered under patients insurance.  Dx: E11.9  NATALI Jara   aspirin 325 MG tablet Take 325 mg by mouth nightly   Historical Provider, MD   Multiple Vitamins-Minerals (THERAPEUTIC MULTIVITAMIN-MINERALS) tablet Take 1 tablet by mouth daily  Patient taking differently: Take 1 tablet by mouth nightly   NATALI Jara       Social History     Tobacco Use    Smoking status: Never Smoker    Smokeless tobacco: Never Used   Substance Use Topics    Alcohol use: Yes     Comment: rare    Drug use: No        No Known Allergies,   Past Medical History:   Diagnosis Date    Anxiety     Chronic pain     Depression     Diabetes mellitus type 2 in obese ([address]     Hypertension     Premenopausal patient 2018   ,   Past Surgical History:   Procedure Laterality Date     SECTION      TUBAL LIGATION     ,   Social History     Tobacco Use    Smoking status: Never Smoker    Smokeless tobacco: Never Used   Substance Use Topics    Alcohol use: Yes     Comment: rare    Drug use: No   ,   Family History   Problem Relation Age of Onset    Diabetes Father     Cancer Father         Bladder and Lung    Breast Cancer Sister         1/2 Sister    Diabetes Maternal Grandfather     Diabetes Paternal Grandmother    ,   There is no immunization history on file for this patient.,   Health Maintenance   Topic Date Due    Cervical cancer screen  10/01/1990    Shingles Vaccine (1 of 2) 10/01/2019    Hepatitis B vaccine (1 of 3 - Risk 3-dose series) 05/15/2020 (Originally 10/1/1988)    DTaP/Tdap/Td vaccine (1 - Tdap) 05/15/2020 (Originally 10/1/1988)    Pneumococcal 0-64 years Vaccine (1 of 1 - PPSV23) 05/15/2020 (Originally 10/1/1975)    HIV screen  05/15/2020 (Originally 10/1/1984)    Flu vaccine (Season Ended) 2021 (Originally 2020)    Diabetic foot exam  05/15/2020    A1C test (Diabetic or Prediabetic)  2020    Diabetic microalbuminuria test  2020    Lipid screen  12/26/2020    Potassium monitoring  12/26/2020    Creatinine monitoring  12/26/2020    Breast cancer screen  01/07/2021    Diabetic retinal exam  01/09/2021    Colon cancer screen fecal DNA test (Cologuard)  01/09/2023    Hepatitis A vaccine  Aged Out    Hib vaccine  Aged Out    Meningococcal (ACWY) vaccine  Aged Out       PHYSICAL EXAMINATION:  [ INSTRUCTIONS:  \"[x]\" Indicates a positive item  \"[]\" Indicates a negative item  -- DELETE ALL ITEMS NOT EXAMINED]  [] Alert  [] Oriented to person/place/time    [] No apparent distress  [] Toxic appearing    [] Face flushed appearing [] Sclera clear  [] Lips are cyanotic      [] Breathing appears normal  [] Appears tachypneic      [] Rash on visible skin    [] Cranial Nerves II-XII grossly intact    [] Motor grossly intact in visible upper extremities    [] Motor grossly intact in visible lower extremities    [] Normal Mood  [] Anxious appearing    [] Depressed appearing  [] Confused appearing      [] Poor short term memory  [] Poor long term memory    [] OTHER:      Due to this being a TeleHealth encounter, evaluation of the following organ systems is limited: Vitals/Constitutional/EENT/Resp/CV/GI//MS/Neuro/Skin/Heme-Lymph-Imm. ASSESSMENT/PLAN:  1. Anxiety and depression  - Increase Cymbalta to 40 mg daily. 2. Other chronic pain    3. Essential hypertension    4. Type 2 diabetes mellitus with hyperosmolarity without coma, without long-term current use of insulin ([facility] [address]      Return in about 3 months (around 7/14/2020) for follow-up. An  electronic signature was used to authenticate this note.     --NATALI Pascual on 4/14/2020 at 1:06 PM        Pursuant to the emergency declaration under the [facility] waiver authority and the [employer] Act, this Virtual  Visit was conducted, with patient's consent, to reduce the patient's risk of exposure to

## 2020-04-15 ENCOUNTER — TELEPHONE (OUTPATIENT)
Dept: SURGERY | Age: 50
End: 2020-04-15

## 2020-04-15 ENCOUNTER — HOSPITAL ENCOUNTER (OUTPATIENT)
Dept: WOMENS IMAGING | Age: 50
Discharge: HOME | End: 2020-04-15
Payer: MEDICAID

## 2020-04-15 PROCEDURE — 76642 ULTRASOUND BREAST LIMITED: CPT

## 2020-05-01 RX ORDER — ALPRAZOLAM 0.5 MG/1
TABLET ORAL
Qty: 70 TABLET | Refills: 0 | Status: SHIPPED | OUTPATIENT
Start: 2020-05-01 | End: 2020-05-29

## 2020-05-01 NOTE — TELEPHONE ENCOUNTER
Angie Alcantar called to request a refill on her medication. Last office visit : 04/14/2020   Next office visit : 7/14/2020       Last Janeen Quick: 03/31/20 appropriate   Medication Contract: 02/17/2019  Last Fill: 03/31/2020 appropriate     Last UDS:   Amphetamine Screen, Urine   Date Value Ref Range Status   02/01/2019 NEGATIVE  Final     Barbiturates   Date Value Ref Range Status   01/17/2011 Negative () Final     Barbiturate Screen, Urine   Date Value Ref Range Status   02/01/2019 NEGATIVE  Final     Benzodiazepine Screen, Urine   Date Value Ref Range Status   02/01/2019 POSITIVE  Final     Buprenorphine Urine   Date Value Ref Range Status   02/01/2019 NEGATIVE  Final     Cocaine Metabolite Screen, Urine   Date Value Ref Range Status   02/01/2019 NEGATIVE  Final     Gabapentin Screen, Urine   Date Value Ref Range Status   02/01/2019 NEGATIVE  Final     MDMA, Urine   Date Value Ref Range Status   02/01/2019 NEGATIVE  Final     Methamphetamine, Urine   Date Value Ref Range Status   02/01/2019 NEGATIVE  Final     Opiate Scrn, Ur   Date Value Ref Range Status   02/01/2019 POSITIVE  Final     Oxycodone Screen, Ur   Date Value Ref Range Status   02/01/2019 NEGATIVE  Final     PCP Screen, Urine   Date Value Ref Range Status   02/01/2019 NEGATIVE  Final     Propoxyphene Screen, Urine   Date Value Ref Range Status   02/01/2019 NEGATIVE  Final     THC Screen, Urine   Date Value Ref Range Status   02/01/2019 NEGATIVE  Final     Tricyclic Antidepressants, Urine   Date Value Ref Range Status   07/05/2018 POS  Final                   Requested Prescriptions     Pending Prescriptions Disp Refills    ALPRAZolam (XANAX) 0.5 MG tablet 70 tablet 0     Sig: TAKE 1 TABLET BY MOUTH THREE TIMES DAILY AS NEEDED FOR ANXIETY         Please approve or refuse this medication.    Ligia Lozada MA

## 2020-05-12 RX ORDER — TRAZODONE HYDROCHLORIDE 150 MG/1
TABLET ORAL
Qty: 60 TABLET | Refills: 0 | Status: SHIPPED | OUTPATIENT
Start: 2020-05-12 | End: 2020-06-15

## 2020-06-02 RX ORDER — ALPRAZOLAM 0.5 MG/1
TABLET ORAL
Qty: 65 TABLET | Refills: 0 | Status: SHIPPED | OUTPATIENT
Start: 2020-06-02 | End: 2020-06-26

## 2020-06-15 RX ORDER — TRAZODONE HYDROCHLORIDE 150 MG/1
TABLET ORAL
Qty: 60 TABLET | Refills: 0 | Status: SHIPPED | OUTPATIENT
Start: 2020-06-15 | End: 2020-08-14

## 2020-06-25 RX ORDER — SEMAGLUTIDE 1.34 MG/ML
INJECTION, SOLUTION SUBCUTANEOUS
Qty: 3 PEN | Refills: 3 | Status: SHIPPED | OUTPATIENT
Start: 2020-06-25 | End: 2020-10-18

## 2020-06-25 NOTE — TELEPHONE ENCOUNTER
Michela Rodriges called to request a refill on her medication.       Last office visit : 04/14/2020   Next office visit : 7/14/2020     Requested Prescriptions     Signed Prescriptions Disp Refills    Semaglutide, 1 MG/DOSE, (OZEMPIC, 1 MG/DOSE,) 2 MG/1.5ML SOPN 3 pen 3     Sig: Inject 1mg into the skin once a week     Authorizing Provider: [place]     Ordering User: Ena Haines MA

## 2020-06-30 RX ORDER — ALPRAZOLAM 0.5 MG/1
TABLET ORAL
Qty: 60 TABLET | Refills: 0 | Status: SHIPPED | OUTPATIENT
Start: 2020-07-02 | End: 2020-07-31 | Stop reason: SDUPTHER

## 2020-07-15 RX ORDER — HYDROCHLOROTHIAZIDE 25 MG/1
25 TABLET ORAL EVERY MORNING
Qty: 30 TABLET | Refills: 5 | Status: SHIPPED | OUTPATIENT
Start: 2020-07-15 | End: 2021-01-10

## 2020-07-29 PROBLEM — K04.7 ABSCESSED TOOTH: Status: ACTIVE | Noted: 2020-07-29

## 2020-07-29 PROBLEM — R50.9 FEVER: Status: ACTIVE | Noted: 2020-07-29

## 2020-07-29 PROBLEM — K02.9 PAIN DUE TO DENTAL CARIES: Status: ACTIVE | Noted: 2020-07-29

## 2020-07-31 ENCOUNTER — VIRTUAL VISIT (OUTPATIENT)
Dept: PRIMARY CARE CLINIC | Age: 50
End: 2020-07-31
Payer: MEDICAID

## 2020-07-31 PROCEDURE — 99214 OFFICE O/P EST MOD 30 MIN: CPT | Performed by: NURSE PRACTITIONER

## 2020-07-31 ASSESSMENT — ENCOUNTER SYMPTOMS
COLOR CHANGE: 0
RHINORRHEA: 0
BACK PAIN: 0
VOICE CHANGE: 0
PHOTOPHOBIA: 0
VOMITING: 0
COUGH: 0
SHORTNESS OF BREATH: 0
NAUSEA: 0

## 2020-07-31 NOTE — PROGRESS NOTES
[address] [address]             Phone:  [phone]  Fax:  [phone]       2020    TELEHEALTH EVALUATION -- Audio/Visual (During QRQOQ-03 public health emergency)    HPI:  Chief Complaint   Patient presents with    Anxiety    Depression    Diabetes         Loren Orourke (:  1970) has requested an audio/video evaluation for the following concern(s):    Patient presents today for follow-up diabetes,anxiety and depression. Patient reports blood sugar has been in normal range. She denies episodes of hypoglycemia other than one time when it was 65 but she ate something and this improved she has had no further episodes. Patient is currently on antibiotic for dental abscess. She states that when she completes antibiotic she is going to see the dentist.  She denies fever. She is tolerating p.o. intake well. Patient is due for a refill on her Xanax. Each month her PCP has decreased this by 5 tablets. She will be due for 55 tablets this month. Patient states that she is doing well with the decrease in his needing the medication lasts. Patient denies suicidal ideation. Review of Systems   Constitutional: Negative for chills and fever. HENT: Negative for ear pain, hearing loss, rhinorrhea and voice change. Eyes: Negative for photophobia and visual disturbance. Respiratory: Negative for cough and shortness of breath. Cardiovascular: Negative for chest pain and palpitations. Gastrointestinal: Negative for nausea and vomiting. Endocrine: Negative. Negative for cold intolerance and heat intolerance. Genitourinary: Negative for difficulty urinating and flank pain. Musculoskeletal: Negative for back pain and neck pain. Skin: Negative for color change and rash. Allergic/Immunologic: Negative for environmental allergies and food allergies. Neurological: Negative for dizziness, speech difficulty and headaches.    Hematological: Does not bruise/bleed easily. Psychiatric/Behavioral: Negative for sleep disturbance and suicidal ideas. Prior to Visit Medications    Medication Sig Taking? Authorizing Provider   amoxicillin-clavulanate (AUGMENTIN) 875-125 MG per tablet Take 1 tablet by mouth 2 times daily for 10 days  NATALI Hernandez   hydroCHLOROthiazide (HYDRODIURIL) 25 MG tablet TAKE 1 TABLET BY MOUTH EVERY MORNING  NATALI Hernandez   ALPRAZolam (XANAX) 0.5 MG tablet TAKE 1 TABLET BY MOUTH THREE TIMES DAILY AS NEEDED FOR ANXIETY  NATALI Hernandez   Semaglutide, 1 MG/DOSE, (OZEMPIC, 1 MG/DOSE,) 2 MG/1.5ML SOPN Inject 1mg into the skin once a week  NATALI Hernandez   traZODone (DESYREL) 150 MG tablet TAKE 2 TABLETS BY MOUTH EVERY NIGHT  NATALI Hernandez   lisinopril (PRINIVIL;ZESTRIL) 20 MG tablet TAKE 1 TABLET BY MOUTH EVERY DAY  NATALI Hernandez   atorvastatin (LIPITOR) 20 MG tablet Take 1 tablet by mouth daily  NATALI Palma   DULoxetine 40 MG CPEP Take 40 mg by mouth daily  NATALI Palma   metFORMIN (GLUCOPHAGE) 500 MG tablet Take 2 tablets by mouth 2 times daily (with meals)  NATALI Hernandez   busPIRone (BUSPAR) 10 MG tablet Take 1 tablet by mouth 3 times daily  NATALI Hernandez   ARIPiprazole (ABILIFY) 5 MG tablet TAKE ONE TABLET BY MOUTH DAILY  Patient taking differently: Take 5 mg by mouth nightly   NATALI Hernandez   HYDROcodone-acetaminophen (NORCO)  MG per tablet Take 1 tablet by mouth every 8 hours as needed for Pain (Dr. Eren Dodson). Historical Provider, MD   tiZANidine (ZANAFLEX) 4 MG tablet Take 4 mg by mouth 3 times daily as needed (muscle spasms)   Historical Provider, MD   gabapentin (NEURONTIN) 800 MG tablet Take 800 mg by mouth every 8 hours as needed (Dr. Eren Dodson). Historical Provider, MD   ONE TOUCH ULTRA TEST strip USE TO TEST BLOOD SUAGR TWICE DAILY AS NEEDED.   Darrell Mort [place], APRN   [facility] LANCETS 87P MISC USE TO CHECK BLOOD SUGAR TWICE DAILY  NATALI Jara   glucose monitoring kit (FREESTYLE) monitoring kit 1 kit by Does not apply route daily Please fill script with meter covered under patients insurance.  Dx: E11.9  NATALI Jara   aspirin 325 MG tablet Take 325 mg by mouth nightly   Historical Provider, MD   Multiple Vitamins-Minerals (THERAPEUTIC MULTIVITAMIN-MINERALS) tablet Take 1 tablet by mouth daily  Patient taking differently: Take 1 tablet by mouth nightly   NATALI Jara       Social History     Tobacco Use    Smoking status: Never Smoker    Smokeless tobacco: Never Used   Substance Use Topics    Alcohol use: Yes     Comment: rare    Drug use: No        No Known Allergies,   Past Medical History:   Diagnosis Date    Anxiety     Chronic pain     Depression     Diabetes mellitus type 2 in obese ([address]     Hypertension     Premenopausal patient 2018   ,   Past Surgical History:   Procedure Laterality Date     SECTION      TUBAL LIGATION     ,   Social History     Tobacco Use    Smoking status: Never Smoker    Smokeless tobacco: Never Used   Substance Use Topics    Alcohol use: Yes     Comment: rare    Drug use: No   ,   Family History   Problem Relation Age of Onset    Diabetes Father     Cancer Father         Bladder and Lung    Breast Cancer Sister         1/2 Sister    Diabetes Maternal Grandfather     Diabetes Paternal Grandmother    ,   There is no immunization history on file for this patient.,   Health Maintenance   Topic Date Due    Pneumococcal 0-64 years Vaccine (1 of 1 - PPSV23) 10/01/1975    HIV screen  10/01/1984    Hepatitis B vaccine (1 of 3 - Risk 3-dose series) 10/01/1988    DTaP/Tdap/Td vaccine (1 - Tdap) 10/01/1988    Cervical cancer screen  10/01/1990    Shingles Vaccine (1 of 2) 10/01/2019    Diabetic foot exam  05/15/2020    Flu vaccine (1) 2020    A1C test (Diabetic or Prediabetic)  2020    Diabetic microalbuminuria test  12/26/2020    Lipid screen  12/26/2020    Potassium monitoring  12/26/2020    Creatinine monitoring  12/26/2020    Breast cancer screen  01/07/2021    Diabetic retinal exam  01/09/2021    Colon cancer screen fecal DNA test (Cologuard)  01/09/2023    Hepatitis A vaccine  Aged Out    Hib vaccine  Aged Out    Meningococcal (ACWY) vaccine  Aged Out       PHYSICAL EXAMINATION:  [ INSTRUCTIONS:  \"[x]\" Indicates a positive item  \"[]\" Indicates a negative item  -- DELETE ALL ITEMS NOT EXAMINED]  [x] Alert  [x] Oriented to person/place/time    [x] No apparent distress  [] Toxic appearing    [] Face flushed appearing [x] Sclera clear  [] Lips are cyanotic      [x] Breathing appears normal  [] Appears tachypneic      [] Rash on visible skin    [x] Cranial Nerves II-XII grossly intact    [x] Motor grossly intact in visible upper extremities    [x] Motor grossly intact in visible lower extremities    [x] Normal Mood  [] Anxious appearing    [] Depressed appearing  [] Confused appearing      [] Poor short term memory  [] Poor long term memory    [] OTHER:      Due to this being a TeleHealth encounter, evaluation of the following organ systems is limited: Vitals/Constitutional/EENT/Resp/CV/GI//MS/Neuro/Skin/Heme-Lymph-Imm. ASSESSMENT/PLAN:  1. Type 2 diabetes mellitus with hyperosmolarity without coma, without long-term current use of insulin ([facility] [address]  -Patient due for labs. She will return to have these done prior to next visit. - CBC Auto Differential; Future  - Comprehensive Metabolic Panel; Future  - Lipid Panel; Future  - Hemoglobin A1C; Future  - TSH without Reflex; Future  - Urinalysis Reflex to Culture; Future    2. Anxiety  -We will refill Xanax 0.5 mg #55  Take as needed. Return in about 3 months (around 10/31/2020) for in office, follow-up with labs. An  electronic signature was used to authenticate this note.     --NATALI Mitchell on 7/31/2020 at 11:17 AM        Pursuant to the emergency declaration under the [facility] waiver authority and the [employer] and [employer] Act, this Virtual  Visit was conducted, with patient's consent, to reduce the patient's risk of exposure to COVID-19 and provide continuity of care for an established patient. Services were provided through a video synchronous discussion virtually to substitute for in-person clinic visit.

## 2020-08-03 RX ORDER — ALPRAZOLAM 0.5 MG/1
TABLET ORAL
Qty: 55 TABLET | Refills: 0 | Status: SHIPPED | OUTPATIENT
Start: 2020-08-03 | End: 2020-08-31

## 2020-08-03 NOTE — TELEPHONE ENCOUNTER
JUANITA was reviewed today per office protocol. Report shows No discrepancies. Fill pattern is consistent from single provider(s) at single pharmacy(s).

## 2020-08-18 ENCOUNTER — TELEPHONE (OUTPATIENT)
Dept: PRIMARY CARE CLINIC | Age: 50
End: 2020-08-18

## 2020-08-18 DIAGNOSIS — E11.00 TYPE 2 DIABETES MELLITUS WITH HYPEROSMOLARITY WITHOUT COMA, WITHOUT LONG-TERM CURRENT USE OF INSULIN (HCC): ICD-10-CM

## 2020-08-18 LAB
ALBUMIN SERPL-MCNC: 4.1 G/DL (ref 3.5–5.2)
ALP BLD-CCNC: 83 U/L (ref 35–104)
ALT SERPL-CCNC: 19 U/L (ref 5–33)
ANION GAP SERPL CALCULATED.3IONS-SCNC: 14 MMOL/L (ref 7–19)
AST SERPL-CCNC: 20 U/L (ref 5–32)
BACTERIA: ABNORMAL /HPF
BASOPHILS ABSOLUTE: 0.1 K/UL (ref 0–0.2)
BASOPHILS RELATIVE PERCENT: 0.8 % (ref 0–1)
BILIRUB SERPL-MCNC: <0.2 MG/DL (ref 0.2–1.2)
BILIRUBIN URINE: NEGATIVE
BLOOD, URINE: NEGATIVE
BUN BLDV-MCNC: 14 MG/DL (ref 6–20)
CALCIUM SERPL-MCNC: 9 MG/DL (ref 8.6–10)
CHLORIDE BLD-SCNC: 99 MMOL/L (ref 98–111)
CHOLESTEROL, TOTAL: 196 MG/DL (ref 160–199)
CLARITY: ABNORMAL
CO2: 25 MMOL/L (ref 22–29)
COLOR: ABNORMAL
CREAT SERPL-MCNC: 0.8 MG/DL (ref 0.5–0.9)
EOSINOPHILS ABSOLUTE: 0.2 K/UL (ref 0–0.6)
EOSINOPHILS RELATIVE PERCENT: 1.8 % (ref 0–5)
EPITHELIAL CELLS, UA: ABNORMAL /HPF
GFR AFRICAN AMERICAN: >59
GFR NON-AFRICAN AMERICAN: >60
GLUCOSE BLD-MCNC: 87 MG/DL (ref 74–109)
GLUCOSE URINE: NEGATIVE MG/DL
HBA1C MFR BLD: 5.3 % (ref 4–6)
HCT VFR BLD CALC: 38.8 % (ref 37–47)
HDLC SERPL-MCNC: 66 MG/DL (ref 65–121)
HEMOGLOBIN: 12.3 G/DL (ref 12–16)
IMMATURE GRANULOCYTES #: 0 K/UL
KETONES, URINE: NEGATIVE MG/DL
LDL CHOLESTEROL CALCULATED: 88 MG/DL
LEUKOCYTE ESTERASE, URINE: ABNORMAL
LYMPHOCYTES ABSOLUTE: 2.2 K/UL (ref 1.1–4.5)
LYMPHOCYTES RELATIVE PERCENT: 25 % (ref 20–40)
MCH RBC QN AUTO: 29.4 PG (ref 27–31)
MCHC RBC AUTO-ENTMCNC: 31.7 G/DL (ref 33–37)
MCV RBC AUTO: 92.6 FL (ref 81–99)
MONOCYTES ABSOLUTE: 0.5 K/UL (ref 0–0.9)
MONOCYTES RELATIVE PERCENT: 5.8 % (ref 0–10)
NEUTROPHILS ABSOLUTE: 5.9 K/UL (ref 1.5–7.5)
NEUTROPHILS RELATIVE PERCENT: 66.3 % (ref 50–65)
NITRITE, URINE: NEGATIVE
PDW BLD-RTO: 13.8 % (ref 11.5–14.5)
PH UA: 6 (ref 5–8)
PLATELET # BLD: 353 K/UL (ref 130–400)
PMV BLD AUTO: 8.7 FL (ref 9.4–12.3)
POTASSIUM SERPL-SCNC: 4.3 MMOL/L (ref 3.5–5)
PROTEIN UA: NEGATIVE MG/DL
RBC # BLD: 4.19 M/UL (ref 4.2–5.4)
RBC UA: ABNORMAL /HPF (ref 0–2)
SODIUM BLD-SCNC: 138 MMOL/L (ref 136–145)
SPECIFIC GRAVITY UA: 1.03 (ref 1–1.03)
TOTAL PROTEIN: 6.7 G/DL (ref 6.6–8.7)
TRIGL SERPL-MCNC: 210 MG/DL (ref 0–149)
TSH SERPL DL<=0.05 MIU/L-ACNC: 1.32 UIU/ML (ref 0.27–4.2)
UROBILINOGEN, URINE: 0.2 E.U./DL
WBC # BLD: 8.9 K/UL (ref 4.8–10.8)
WBC UA: ABNORMAL /HPF (ref 0–5)
YEAST: PRESENT /HPF

## 2020-08-18 RX ORDER — FLUCONAZOLE 150 MG/1
TABLET ORAL
Qty: 1 TABLET | Refills: 0 | Status: SHIPPED | OUTPATIENT
Start: 2020-08-18 | End: 2020-08-19

## 2020-08-18 NOTE — TELEPHONE ENCOUNTER
Call patient,informed labs are stable. She does have yeast in urine; okay to send diflucan 150 mg x 1 dose.  VU

## 2020-08-20 LAB
ORGANISM: ABNORMAL
URINE CULTURE, ROUTINE: ABNORMAL
URINE CULTURE, ROUTINE: ABNORMAL

## 2020-08-31 NOTE — TELEPHONE ENCOUNTER
[place] called to request a refill on her medication. Last office visit : 7/31/2020   Next office visit : 11/3/2020     Last UDS:   Amphetamine Screen, Urine   Date Value Ref Range Status   02/01/2019 NEGATIVE  Final     Barbiturates   Date Value Ref Range Status   01/17/2011 Negative () Final     Barbiturate Screen, Urine   Date Value Ref Range Status   02/01/2019 NEGATIVE  Final     Benzodiazepine Screen, Urine   Date Value Ref Range Status   02/01/2019 POSITIVE  Final     Buprenorphine Urine   Date Value Ref Range Status   02/01/2019 NEGATIVE  Final     Cocaine Metabolite Screen, Urine   Date Value Ref Range Status   02/01/2019 NEGATIVE  Final     Gabapentin Screen, Urine   Date Value Ref Range Status   02/01/2019 NEGATIVE  Final     MDMA, Urine   Date Value Ref Range Status   02/01/2019 NEGATIVE  Final     Methamphetamine, Urine   Date Value Ref Range Status   02/01/2019 NEGATIVE  Final     Opiate Scrn, Ur   Date Value Ref Range Status   02/01/2019 POSITIVE  Final     Oxycodone Screen, Ur   Date Value Ref Range Status   02/01/2019 NEGATIVE  Final     PCP Screen, Urine   Date Value Ref Range Status   02/01/2019 NEGATIVE  Final     Propoxyphene Screen, Urine   Date Value Ref Range Status   02/01/2019 NEGATIVE  Final     THC Screen, Urine   Date Value Ref Range Status   02/01/2019 NEGATIVE  Final     Tricyclic Antidepressants, Urine   Date Value Ref Range Status   07/05/2018 POS  Final       Last Hoover Hamman: 6-26  Medication Contract: [id number]   Last Fill: 8-3    Requested Prescriptions     Pending Prescriptions Disp Refills    ALPRAZolam (XANAX) 0.5 MG tablet [Pharmacy Med Name: ALPRAZOLAM 0.5MG TABLETS] 55 tablet      Sig: TAKE 1 TABLET BY MOUTH UP TO THREE TIMES DAILY AS NEEDED FOR ANXIETY                   Please approve or refuse this medication.    Josué Calhoun LPN

## 2020-09-03 RX ORDER — ALPRAZOLAM 0.5 MG/1
TABLET ORAL
Qty: 50 TABLET | Refills: 0 | Status: SHIPPED | OUTPATIENT
Start: 2020-09-03 | End: 2020-09-28 | Stop reason: SDUPTHER

## 2020-09-14 ENCOUNTER — VIRTUAL VISIT (OUTPATIENT)
Dept: PRIMARY CARE CLINIC | Age: 50
End: 2020-09-14
Payer: MEDICAID

## 2020-09-14 PROCEDURE — 99214 OFFICE O/P EST MOD 30 MIN: CPT | Performed by: NURSE PRACTITIONER

## 2020-09-14 RX ORDER — EZETIMIBE AND SIMVASTATIN 10; 40 MG/1; MG/1
1 TABLET ORAL NIGHTLY
Qty: 30 TABLET | Refills: 3 | Status: SHIPPED | OUTPATIENT
Start: 2020-09-14 | End: 2021-03-18

## 2020-09-14 ASSESSMENT — ENCOUNTER SYMPTOMS
COLOR CHANGE: 0
NAUSEA: 0
VOMITING: 0
PHOTOPHOBIA: 0
RHINORRHEA: 0
VOICE CHANGE: 0
BACK PAIN: 0
SHORTNESS OF BREATH: 0
COUGH: 0

## 2020-09-14 NOTE — PROGRESS NOTES
[facility] [address]             Phone:  [phone]  Fax:  [phone]       2020    TELEHEALTH EVALUATION -- Audio/Visual (During AHNUV-84 public health emergency)    HPI:  Chief Complaint   Patient presents with    Anxiety    Hyperlipidemia         Shae Otto (:  1970) has requested an audio/video evaluation for the following concern(s):    Patient presents today for evaluation of diaphoresis. She states she woke up sweating today, however, this improved now. She is has no further episodes like this. She is diabetic and is taking metformin and ozempic. She is currently taking atorvastatin; she is nervous that this is not the best medication. Her father had a MI and she is worried that she will as well. Her mother is taking vytorin and would like to switch to this. She complains of lower extremity edema; she is in [place] right now and drove there. She has been there over a week and the swelling has not improved. She has not been diligent about elevating feet. She is currently taking 25 mg HCTZ daily. Review of Systems   Constitutional: Negative for chills and fever. HENT: Negative for ear pain, hearing loss, rhinorrhea and voice change. Eyes: Negative for photophobia and visual disturbance. Respiratory: Negative for cough and shortness of breath. Cardiovascular: Negative for chest pain and palpitations. Gastrointestinal: Negative for nausea and vomiting. Endocrine: Negative. Negative for cold intolerance and heat intolerance. Genitourinary: Negative for difficulty urinating and flank pain. Musculoskeletal: Negative for back pain and neck pain. Skin: Negative for color change and rash. Allergic/Immunologic: Negative for environmental allergies and food allergies. Neurological: Negative for dizziness, speech difficulty and headaches. Hematological: Does not bruise/bleed easily.    Psychiatric/Behavioral: fill script with meter covered under patients insurance.  Dx: E11.9  NATALI Jara   aspirin 325 MG tablet Take 325 mg by mouth nightly   Historical Provider, MD   Multiple Vitamins-Minerals (THERAPEUTIC MULTIVITAMIN-MINERALS) tablet Take 1 tablet by mouth daily  Patient taking differently: Take 1 tablet by mouth nightly   NATALI Jara       Social History     Tobacco Use    Smoking status: Never Smoker    Smokeless tobacco: Never Used   Substance Use Topics    Alcohol use: Yes     Comment: rare    Drug use: No        No Known Allergies,   Past Medical History:   Diagnosis Date    Anxiety     Chronic pain     Depression     Diabetes mellitus type 2 in obese ([address]     Hypertension     Premenopausal patient 2018   ,   Past Surgical History:   Procedure Laterality Date     SECTION      TUBAL LIGATION     ,   Social History     Tobacco Use    Smoking status: Never Smoker    Smokeless tobacco: Never Used   Substance Use Topics    Alcohol use: Yes     Comment: rare    Drug use: No   ,   Family History   Problem Relation Age of Onset    Diabetes Father     Cancer Father         Bladder and Lung    Breast Cancer Sister         1/2 Sister    Diabetes Maternal Grandfather     Diabetes Paternal Grandmother    ,   There is no immunization history on file for this patient.,   Health Maintenance   Topic Date Due    Pneumococcal 0-64 years Vaccine (1 of 1 - PPSV23) 10/01/1975    HIV screen  10/01/1984    Hepatitis B vaccine (1 of 3 - Risk 3-dose series) 10/01/1988    DTaP/Tdap/Td vaccine (1 - Tdap) 10/01/1988    Cervical cancer screen  10/01/1990    Shingles Vaccine (1 of 2) 10/01/2019    Diabetic foot exam  05/15/2020    Flu vaccine (1) 2020    Diabetic microalbuminuria test  2020    Breast cancer screen  2021    Diabetic retinal exam  2021    A1C test (Diabetic or Prediabetic)  2021    Lipid screen  2021    Potassium monitoring 08/18/2021    Creatinine monitoring  08/18/2021    Colon cancer screen fecal DNA test (Cologuard)  01/09/2023    Hepatitis A vaccine  Aged Out    Hib vaccine  Aged Out    Meningococcal (ACWY) vaccine  Aged Out       PHYSICAL EXAMINATION:  [ INSTRUCTIONS:  \"[x]\" Indicates a positive item  \"[]\" Indicates a negative item  -- DELETE ALL ITEMS NOT EXAMINED]  [x] Alert  [x] Oriented to person/place/time    [x] No apparent distress  [] Toxic appearing    [] Face flushed appearing [x] Sclera clear  [] Lips are cyanotic      [x] Breathing appears normal  [] Appears tachypneic      [] Rash on visible skin    [x] Cranial Nerves II-XII grossly intact    [x] Motor grossly intact in visible upper extremities    [x] Motor grossly intact in visible lower extremities    [x] Normal Mood  [] Anxious appearing    [] Depressed appearing  [] Confused appearing      [] Poor short term memory  [] Poor long term memory    [] OTHER:      Due to this being a TeleHealth encounter, evaluation of the following organ systems is limited: Vitals/Constitutional/EENT/Resp/CV/GI//MS/Neuro/Skin/Heme-Lymph-Imm. ASSESSMENT/PLAN:  1. Morbid obesity with BMI of 40.0-44.9, adult ([facility]      2. Mixed hyperlipidemia  - Discontinue atorvastatin and begin vytorin. - ezetimibe-simvastatin (VYTORIN) 10-40 MG per tablet; Take 1 tablet by mouth nightly  Dispense: 30 tablet; Refill: 3    3. Lower extremity edema  - Okay to take an extra half tab of HCTZ for 2-3 days. Elevate lower extremities; low sodium diet. Return if symptoms worsen or fail to improve. An  electronic signature was used to authenticate this note.     --NATALI Montgomery on 9/14/2020 at 2:34 PM        Pursuant to the emergency declaration under the [facility], [facility] waiver authority and the [employer] and [employer] Act, this Virtual  Visit was conducted, with patient's consent, to reduce the patient's risk of exposure to COVID-19 and provide continuity of care for an established patient. Services were provided through a video synchronous discussion virtually to substitute for in-person clinic visit.

## 2020-09-28 NOTE — TELEPHONE ENCOUNTER
Delmi Crouch called to request a refill on her medication. Last office visit : 9/14/2020   Next office visit : 11/3/2020    Last Torri Brennan: 09/28/2020  Medication Contract: not on file  Last Fill: 09/03/2020    Last UDS:   Amphetamine Screen, Urine   Date Value Ref Range Status   02/01/2019 NEGATIVE  Final     Barbiturates   Date Value Ref Range Status   01/17/2011 Negative () Final     Barbiturate Screen, Urine   Date Value Ref Range Status   02/01/2019 NEGATIVE  Final     Benzodiazepine Screen, Urine   Date Value Ref Range Status   02/01/2019 POSITIVE  Final     Buprenorphine Urine   Date Value Ref Range Status   02/01/2019 NEGATIVE  Final     Cocaine Metabolite Screen, Urine   Date Value Ref Range Status   02/01/2019 NEGATIVE  Final     Gabapentin Screen, Urine   Date Value Ref Range Status   02/01/2019 NEGATIVE  Final     MDMA, Urine   Date Value Ref Range Status   02/01/2019 NEGATIVE  Final     Methamphetamine, Urine   Date Value Ref Range Status   02/01/2019 NEGATIVE  Final     Opiate Scrn, Ur   Date Value Ref Range Status   02/01/2019 POSITIVE  Final     Oxycodone Screen, Ur   Date Value Ref Range Status   02/01/2019 NEGATIVE  Final     PCP Screen, Urine   Date Value Ref Range Status   02/01/2019 NEGATIVE  Final     Propoxyphene Screen, Urine   Date Value Ref Range Status   02/01/2019 NEGATIVE  Final     THC Screen, Urine   Date Value Ref Range Status   02/01/2019 NEGATIVE  Final     Tricyclic Antidepressants, Urine   Date Value Ref Range Status   07/05/2018 POS  Final                   Requested Prescriptions     Pending Prescriptions Disp Refills    ALPRAZolam (XANAX) 0.5 MG tablet 50 tablet 0     Sig: TAKE 1 TABLET BY MOUTH UP TO THREE TIMES DAILY AS NEEDED FOR ANXIETY         Please approve or refuse this medication.    Madelyn Baldwin MA

## 2020-09-29 RX ORDER — ALPRAZOLAM 0.5 MG/1
TABLET ORAL
Qty: 45 TABLET | Refills: 0 | Status: SHIPPED | OUTPATIENT
Start: 2020-10-02 | End: 2020-10-26

## 2020-10-02 RX ORDER — LANCETS 33 GAUGE
EACH MISCELLANEOUS
Qty: 100 EACH | Refills: 1 | Status: SHIPPED | OUTPATIENT
Start: 2020-10-02

## 2020-10-02 NOTE — TELEPHONE ENCOUNTER
Pablo Minaya called requesting a refill of the below medication which has been pended for you:     Requested Prescriptions     Pending Prescriptions Disp Refills    OneTouch Delica Lancets 40C MISC 100 each 1     Sig: USE TO CHECK BLOOD SUGAR TWICE DAILY    blood glucose test strips (ASCENSIA AUTODISC VI;ONE TOUCH ULTRA TEST VI) strip 100 each 3     Si each by In Vitro route daily       Last Appointment Date: 2020  Next Appointment Date: 11/3/2020    No Known Allergies

## 2020-10-18 RX ORDER — SEMAGLUTIDE 1.34 MG/ML
INJECTION, SOLUTION SUBCUTANEOUS
Qty: 3 ML | Refills: 1 | Status: SHIPPED | OUTPATIENT
Start: 2020-10-18 | End: 2021-05-10

## 2020-10-26 NOTE — TELEPHONE ENCOUNTER
Justin Leblanc called to request a refill on her medication. Last office visit : 9/14/2020   Next office visit : 11/3/2020     Last UDS:   Amphetamine Screen, Urine   Date Value Ref Range Status   02/01/2019 NEGATIVE  Final     Barbiturates   Date Value Ref Range Status   01/17/2011 Negative () Final     Barbiturate Screen, Urine   Date Value Ref Range Status   02/01/2019 NEGATIVE  Final     Benzodiazepine Screen, Urine   Date Value Ref Range Status   02/01/2019 POSITIVE  Final     Buprenorphine Urine   Date Value Ref Range Status   02/01/2019 NEGATIVE  Final     Cocaine Metabolite Screen, Urine   Date Value Ref Range Status   02/01/2019 NEGATIVE  Final     Gabapentin Screen, Urine   Date Value Ref Range Status   02/01/2019 NEGATIVE  Final     MDMA, Urine   Date Value Ref Range Status   02/01/2019 NEGATIVE  Final     Methamphetamine, Urine   Date Value Ref Range Status   02/01/2019 NEGATIVE  Final     Opiate Scrn, Ur   Date Value Ref Range Status   02/01/2019 POSITIVE  Final     Oxycodone Screen, Ur   Date Value Ref Range Status   02/01/2019 NEGATIVE  Final     PCP Screen, Urine   Date Value Ref Range Status   02/01/2019 NEGATIVE  Final     Propoxyphene Screen, Urine   Date Value Ref Range Status   02/01/2019 NEGATIVE  Final     THC Screen, Urine   Date Value Ref Range Status   02/01/2019 NEGATIVE  Final     Tricyclic Antidepressants, Urine   Date Value Ref Range Status   07/05/2018 POS  Final       Last Shira Kidd: 9-28  Medication Contract: 2017   Last Fill: 10-2    Requested Prescriptions     Pending Prescriptions Disp Refills    ALPRAZolam (XANAX) 0.5 MG tablet [Pharmacy Med Name: ALPRAZOLAM 0.5MG TABLETS] 45 tablet      Sig: TAKE 1 TABLET BY MOUTH UP TO THREE TIMES DAILY AS NEEDED FOR ANXIETY                   Please approve or refuse this medication.    Rossi Zambrano LPN

## 2020-10-27 RX ORDER — ALPRAZOLAM 0.5 MG/1
TABLET ORAL
Qty: 40 TABLET | Refills: 0 | Status: SHIPPED | OUTPATIENT
Start: 2020-10-27 | End: 2020-11-25 | Stop reason: SDUPTHER

## 2020-11-03 ENCOUNTER — HOSPITAL ENCOUNTER (OUTPATIENT)
Dept: GENERAL RADIOLOGY | Facility: HOSPITAL | Age: 50
Discharge: HOME | End: 2020-11-03
Admitting: NURSE PRACTITIONER

## 2020-11-03 ENCOUNTER — TRANSCRIBE ORDERS (OUTPATIENT)
Dept: ADMINISTRATIVE | Facility: HOSPITAL | Age: 50
End: 2020-11-03

## 2020-11-03 DIAGNOSIS — M79.672 BILATERAL FOOT PAIN: ICD-10-CM

## 2020-11-03 DIAGNOSIS — M79.672 BILATERAL FOOT PAIN: Primary | ICD-10-CM

## 2020-11-03 DIAGNOSIS — M79.671 BILATERAL FOOT PAIN: Primary | ICD-10-CM

## 2020-11-03 DIAGNOSIS — M79.671 BILATERAL FOOT PAIN: ICD-10-CM

## 2020-11-03 PROCEDURE — 73630 X-RAY EXAM OF FOOT: CPT

## 2020-11-24 RX ORDER — DULOXETINE 40 MG/1
CAPSULE, DELAYED RELEASE ORAL
Qty: 30 CAPSULE | Refills: 5 | Status: SHIPPED | OUTPATIENT
Start: 2020-11-24 | End: 2021-06-27

## 2020-11-25 ENCOUNTER — OFFICE VISIT (OUTPATIENT)
Dept: PRIMARY CARE CLINIC | Age: 50
End: 2020-11-25
Payer: MEDICAID

## 2020-11-25 VITALS
WEIGHT: 293 LBS | TEMPERATURE: 96.9 F | RESPIRATION RATE: 20 BRPM | HEIGHT: 68 IN | BODY MASS INDEX: 44.41 KG/M2 | OXYGEN SATURATION: 97 % | DIASTOLIC BLOOD PRESSURE: 78 MMHG | HEART RATE: 96 BPM | SYSTOLIC BLOOD PRESSURE: 128 MMHG

## 2020-11-25 PROCEDURE — 99214 OFFICE O/P EST MOD 30 MIN: CPT | Performed by: NURSE PRACTITIONER

## 2020-11-25 RX ORDER — ALPRAZOLAM 0.5 MG/1
0.5 TABLET ORAL NIGHTLY PRN
Qty: 35 TABLET | Refills: 0 | Status: SHIPPED | OUTPATIENT
Start: 2020-11-25 | End: 2020-12-30 | Stop reason: SDUPTHER

## 2020-11-25 RX ORDER — DICLOFENAC SODIUM 75 MG/1
75 TABLET, DELAYED RELEASE ORAL 2 TIMES DAILY
Qty: 60 TABLET | Refills: 3 | Status: ON HOLD | OUTPATIENT
Start: 2020-11-25 | End: 2021-01-18 | Stop reason: HOSPADM

## 2020-11-25 RX ORDER — MELOXICAM 15 MG/1
15 TABLET ORAL DAILY
COMMUNITY
End: 2020-11-25 | Stop reason: ALTCHOICE

## 2020-11-25 ASSESSMENT — PATIENT HEALTH QUESTIONNAIRE - PHQ9
SUM OF ALL RESPONSES TO PHQ QUESTIONS 1-9: 0
2. FEELING DOWN, DEPRESSED OR HOPELESS: 0
SUM OF ALL RESPONSES TO PHQ QUESTIONS 1-9: 0
1. LITTLE INTEREST OR PLEASURE IN DOING THINGS: 0
SUM OF ALL RESPONSES TO PHQ QUESTIONS 1-9: 0
SUM OF ALL RESPONSES TO PHQ9 QUESTIONS 1 & 2: 0

## 2020-11-25 ASSESSMENT — ENCOUNTER SYMPTOMS
BACK PAIN: 0
NAUSEA: 0
SHORTNESS OF BREATH: 0
VOICE CHANGE: 0
COLOR CHANGE: 0
PHOTOPHOBIA: 0
COUGH: 0
VOMITING: 0
RHINORRHEA: 0

## 2020-11-25 NOTE — PROGRESS NOTES
[address] [facility]  [address] [address]  [address] [address]  Dept: [phone]  Dept Fax: [phone]  Loc: [phone]    Dania Barraza is a 45 y.o. female who presents today for her medical conditions/complaints as noted below. Dania Barraza is c/o of Panic Attack (she thinks she does not need to decrease the xanax since she has been having more panic attacks lately) and Ankle Pain (left ankle; she has been having increased pain after rolling her left ankle a few weeks ago)      HPI:     HPI   Chief Complaint   Patient presents with    Panic Attack     she thinks she does not need to decrease the xanax since she has been having more panic attacks lately    Ankle Pain     left ankle; she has been having increased pain after rolling her left ankle a few weeks ago     Patient presents today for follow-up anxiety. She has been weaning off xanax with tapering 5 tablets each month. Her most recent refill was 40 tablets. Today she states she does not want to go any less than 40 because she is having more anxiety. However, she states she takes 1 tablet everyday regardless if having symptoms of anxiety. We discussed cutting to half tablet daily and then taking additional half tablet if needed each day. She is agreeable to this. She complains of left ankle pain. She states she \"rolled it a few weeks ago\". She states it is not swollen as much as it was. 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 She was referred to a podiatrist and has appt in 2 weeks. Patient requests handicap parking permit; she has been placed on disability due to her back. She is having increased difficulty with walking long distances.      Past Medical History:   Diagnosis Date    Anxiety     Chronic pain     Depression     Diabetes mellitus type 2 in obese ([address]     Hypertension     Premenopausal patient 07/05/2018      Past Surgical History:   Procedure Laterality Date     SECTION      TUBAL LIGATION         Vitals 2020 3/12/2020 2/3/2020 2020 2019    SYSTOLIC 226 - [phone] 698   DIASTOLIC 78 - 80 84 82 68   Site - - Right Upper Arm - - Left Upper Arm   Position - - Sitting - - Sitting   Cuff Size - - Large Adult - - Large Adult   Pulse 96 - 80 74 85 75   Temp 96.9 98.6 - 98.4 97.8 96.8   Resp 20 - - - - 20   SpO2 97 - - 97 98 96   Weight 322 lb 12.8 oz 316 lb - 307 lb 312 lb 312 lb   Height 5' 8\" 5' 8\" - 5' 8\" 5' 8\" 5' 8\"   Body mass index 49.08 kg/m2 48.04 kg/m2 - 46.67 kg/m2 47.43 kg/m2 47.43 kg/m2   Some recent data might be hidden       Family History   Problem Relation Age of Onset    Diabetes Father     Cancer Father         Bladder and Lung    Breast Cancer Sister         1/2 Sister    Diabetes Maternal Grandfather     Diabetes Paternal Grandmother        Social History     Tobacco Use    Smoking status: Never Smoker    Smokeless tobacco: Never Used   Substance Use Topics    Alcohol use: Yes     Comment: rare      Current Outpatient Medications   Medication Sig Dispense Refill    ALPRAZolam (XANAX) 0.5 MG tablet Take 1 tablet by mouth nightly as needed for Sleep for up to 30 days.  35 tablet 0    diclofenac (VOLTAREN) 75 MG EC tablet Take 1 tablet by mouth 2 times daily 60 tablet 3    DULoxetine HCl 40 MG CPEP TAKE 1 CAPSULE BY MOUTH DAILY 30 capsule 5    OZEMPIC, 1 MG/DOSE, 2 MG/1.5ML SOPN INJECT 1MG INTO THE SKIN ONCE A WEEK 3 mL 1    OneTouch Delica Lancets 42C MISC USE TO CHECK BLOOD SUGAR TWICE DAILY 100 each 1    blood glucose test strips (ASCENSIA AUTODISC VI;ONE TOUCH ULTRA TEST VI) strip 1 each by In Vitro route daily 100 each 3    ezetimibe-simvastatin (VYTORIN) 10-40 MG per tablet Take 1 tablet by mouth nightly 30 tablet 3    traZODone (DESYREL) 150 MG tablet TAKE 2 TABLET BY MOUTH EVERY EVENING 60 tablet 3    lisinopril (PRINIVIL;ZESTRIL) 20 MG tablet TAKE 1 TABLET BY MOUTH EVERY DAY 90 tablet 1    hydroCHLOROthiazide (HYDRODIURIL) 25 MG tablet TAKE 1 TABLET BY MOUTH EVERY MORNING 30 tablet 5    metFORMIN (GLUCOPHAGE) 500 MG tablet Take 2 tablets by mouth 2 times daily (with meals) 120 tablet 3    busPIRone (BUSPAR) 10 MG tablet Take 1 tablet by mouth 3 times daily 90 tablet 3    HYDROcodone-acetaminophen (NORCO)  MG per tablet Take 1 tablet by mouth every 8 hours as needed for Pain (Dr. Jeanne Goldman). 0    tiZANidine (ZANAFLEX) 4 MG tablet Take 4 mg by mouth 3 times daily as needed (muscle spasms)   1    gabapentin (NEURONTIN) 800 MG tablet Take 800 mg by mouth every 8 hours as needed (Dr. Jeanne Goldman). 1    ONE TOUCH ULTRA TEST strip USE TO TEST BLOOD SUAGR TWICE DAILY AS NEEDED. 100 strip 0    glucose monitoring kit (FREESTYLE) monitoring kit 1 kit by Does not apply route daily Please fill script with meter covered under patients insurance. Dx: E11.9 1 kit 0    aspirin 325 MG tablet Take 325 mg by mouth nightly       Multiple Vitamins-Minerals (THERAPEUTIC MULTIVITAMIN-MINERALS) tablet Take 1 tablet by mouth daily (Patient taking differently: Take 1 tablet by mouth nightly ) 30 tablet 5    ARIPiprazole (ABILIFY) 5 MG tablet TAKE ONE TABLET BY MOUTH DAILY 30 tablet 5     No current facility-administered medications for this visit.       No Known Allergies    Health Maintenance   Topic Date Due    HIV screen  10/01/1984    Hepatitis B vaccine (1 of 3 - Risk 3-dose series) 10/01/1988    DTaP/Tdap/Td vaccine (1 - Tdap) 10/01/1988    Cervical cancer screen  10/01/1990    Shingles Vaccine (1 of 2) 10/01/2019    Breast cancer screen  01/07/2021    Flu vaccine (1) 11/25/2021 (Originally 9/1/2020)    Diabetic microalbuminuria test  12/26/2020    Diabetic retinal exam  01/09/2021    A1C test (Diabetic or Prediabetic)  08/18/2021    Lipid screen  08/18/2021    Potassium monitoring  08/18/2021    Creatinine monitoring  08/18/2021    Diabetic foot exam 11/25/2021    Colon cancer screen fecal DNA test (Cologuard)  01/09/2023    Hepatitis A vaccine  Aged Out    Hib vaccine  Aged Out    Meningococcal (ACWY) vaccine  Aged Out    Pneumococcal 0-64 years Vaccine  Aged Out       Subjective:      Review of Systems   Constitutional: Negative for chills and fever. HENT: Negative for ear pain, hearing loss, rhinorrhea and voice change. Eyes: Negative for photophobia and visual disturbance. Respiratory: Negative for cough and shortness of breath. Cardiovascular: Negative for chest pain and palpitations. Gastrointestinal: Negative for nausea and vomiting. Endocrine: Negative. Negative for cold intolerance and heat intolerance. Genitourinary: Negative for difficulty urinating and flank pain. Musculoskeletal: Negative for back pain and neck pain. Skin: Negative for color change and rash. Allergic/Immunologic: Negative for environmental allergies and food allergies. Neurological: Negative for dizziness, speech difficulty and headaches. Hematological: Does not bruise/bleed easily. Psychiatric/Behavioral: Negative for sleep disturbance and suicidal ideas. Objective:     Physical Exam  Vitals signs and nursing note reviewed. Constitutional:       Appearance: She is well-developed. HENT:      Head: Atraumatic. Right Ear: External ear normal.      Left Ear: External ear normal.      Nose: Nose normal.   Eyes:      Conjunctiva/sclera: Conjunctivae normal.      Pupils: Pupils are equal, round, and reactive to light. Neck:      Musculoskeletal: Normal range of motion and neck supple. Cardiovascular:      Rate and Rhythm: Normal rate and regular rhythm. Heart sounds: Normal heart sounds, S1 normal and S2 normal.   Pulmonary:      Effort: Pulmonary effort is normal.      Breath sounds: Normal breath sounds. Abdominal:      General: Bowel sounds are normal.      Palpations: Abdomen is soft.    Musculoskeletal: Normal range of motion. Skin:     General: Skin is warm and dry. Neurological:      Mental Status: She is alert and oriented to person, place, and time. Psychiatric:         Behavior: Behavior normal.       /78   Pulse 96   Temp 96.9 °F (36.1 °C) (Temporal)   Resp 20   Ht 5' 8\" (1.727 m)   Wt (!) 322 lb 12.8 oz (146.4 kg)   SpO2 97%   BMI 49.08 kg/m²     Assessment:       Diagnosis Orders   1. Type 2 diabetes mellitus with hyperosmolarity without coma, without long-term current use of insulin (HCC)  Diabetic Foot Exam    Comprehensive Metabolic Panel    Hemoglobin A1C    Lipid Panel    TSH without Reflex    CBC Auto Differential   2. Anxiety and depression  ALPRAZolam (XANAX) 0.5 MG tablet    Comprehensive Metabolic Panel    Hemoglobin A1C    Lipid Panel    TSH without Reflex    CBC Auto Differential   3. Left Achilles tendinitis           Plan:     More than 50% of the time was spent counseling and coordinating care for a total time of 30 min face to face. Patient experiencing debilitating anxiety; she needs refill of xanax and does seem to need to take this and is using it appropriately. Will continue to try to reduce but will likely need something for panic control indefinitely. She will follow-up with labs. Keep appt with podiatry. Patient given educational materials -see patient instructions. Discussed use, benefit, and side effects of prescribed medications. All patient questions answered. Pt voiced understanding. Reviewed health maintenance. Instructed to continue currentmedications, diet and exercise. Patient agreed with treatment plan. Follow up as directed. MEDICATIONS:  Orders Placed This Encounter   Medications    ALPRAZolam (XANAX) 0.5 MG tablet     Sig: Take 1 tablet by mouth nightly as needed for Sleep for up to 30 days.      Dispense:  35 tablet     Refill:  0    diclofenac (VOLTAREN) 75 MG EC tablet     Sig: Take 1 tablet by mouth 2 times daily     Dispense:  60 tablet Refill:  3         ORDERS:  Orders Placed This Encounter   Procedures    Comprehensive Metabolic Panel    Hemoglobin A1C    Lipid Panel    TSH without Reflex    CBC Auto Differential    Diabetic Foot Exam       Follow-up:  Return in about 3 months (around 2/25/2021) for follow-up with labs, in office. PATIENT INSTRUCTIONS:  Patient Instructions   We are committed to providing you with the best care possible. In order to help us achieve these goals please remember to bring all medications, herbal products, and over the counter supplements with you to each visit. If your provider has ordered testing for you, please be sure to follow up with our office if you have not received results within 7 days after the testing took place. *If you receive a survey after visiting one of our offices, please take time to share your experience concerning your physician office visit. These surveys are confidential and no health information about you is shared. We are eager to improve for you and we are counting on your feedback to help make that happen. Electronically signed by NATALI Grimaldo on 12/10/2020 at 4:48 PM    EMR Dragon/transcription disclaimer:  Much of thisencounter note is electronic transcription/translation of spoken language to printed texts. The electronic translation of spoken language may be erroneous, or at times, nonsensical words or phrases may be inadvertentlytranscribed.   Although I have reviewed the note for such errors, some may still exist.

## 2020-12-03 ENCOUNTER — VIRTUAL VISIT (OUTPATIENT)
Dept: PRIMARY CARE CLINIC | Age: 50
End: 2020-12-03
Payer: MEDICAID

## 2020-12-03 PROCEDURE — 99214 OFFICE O/P EST MOD 30 MIN: CPT | Performed by: NURSE PRACTITIONER

## 2020-12-03 RX ORDER — ARIPIPRAZOLE 5 MG/1
TABLET ORAL
Qty: 30 TABLET | Refills: 5 | Status: SHIPPED | OUTPATIENT
Start: 2020-12-03 | End: 2021-06-11

## 2020-12-03 ASSESSMENT — ENCOUNTER SYMPTOMS
PHOTOPHOBIA: 0
RHINORRHEA: 0
COLOR CHANGE: 0
COUGH: 0
NAUSEA: 0
VOICE CHANGE: 0
VOMITING: 0
SHORTNESS OF BREATH: 0
BACK PAIN: 0

## 2020-12-03 NOTE — PROGRESS NOTES
[address] [address]            Phone:  [phone]  Fax:  [phone]    Dania Barraza is a 45 y.o. female evaluated via telephone on 12/3/2020. Consent:    She and/or health care decision maker is aware that that she may receive a bill for this telephone service, depending on her insurance coverage, and has provided verbal consent to proceed: Yes      HPI  Chief Complaint   Patient presents with    Anxiety       Patient presents today for evaluation of increased anxiety. She typically gets her meds at [address] has to be picked up at [address] due to cost but she states she never goes to [address] and so she has not taken this in 4 months. However, she states her mood was much better controlled on this. Patient questions trying a different medication she could get at [facility] instead. Review of Systems   Constitutional: Negative for chills and fever. HENT: Negative for ear pain, hearing loss, rhinorrhea and voice change. Eyes: Negative for photophobia and visual disturbance. Respiratory: Negative for cough and shortness of breath. Cardiovascular: Negative for chest pain and palpitations. Gastrointestinal: Negative for nausea and vomiting. Endocrine: Negative. Negative for cold intolerance and heat intolerance. Genitourinary: Negative for difficulty urinating and flank pain. Musculoskeletal: Negative for back pain and neck pain. Skin: Negative for color change and rash. Allergic/Immunologic: Negative for environmental allergies and food allergies. Neurological: Negative for dizziness, speech difficulty and headaches. Hematological: Does not bruise/bleed easily. Psychiatric/Behavioral: Negative for sleep disturbance and suicidal ideas. The patient is nervous/anxious. Patient location: home    PLAN    Details of this discussion including any medical advice provided:     1.  Anxiety and depression  - Patient agreeable to resume abilify as this significantly helped her mood be more stable. She will get this at [place] because she can afford it there. She will follow-up with PCP in 1 month. I affirm this is a Patient Initiated Episode with an Established Patient who has not had a related appointment within my department in the past 7 days or scheduled within the next 24 hours. Total Time: minutes: 21-30 minutes      Note: not billable if this call serves to triage the patient into an appointment for the relevant concern      [address] to the emergency declaration under the [facility], [facility] waiver authority and the [employer] and [employer] Act, this Virtual  Visit was conducted, with patient's consent, to reduce the patient's risk of exposure to COVID-19 and provide continuity of care for an established patient.

## 2020-12-30 RX ORDER — ALPRAZOLAM 0.5 MG/1
0.5 TABLET ORAL NIGHTLY PRN
Qty: 30 TABLET | Refills: 0 | Status: SHIPPED | OUTPATIENT
Start: 2020-12-30 | End: 2021-02-04 | Stop reason: SDUPTHER

## 2020-12-30 NOTE — TELEPHONE ENCOUNTER
Informed patient to come in and give a UDS     Zulema Deutsch called to request a refill on her medication. Last office visit : 12/3/2020   Next office visit : 1/5/2021     Last UDS:   Amphetamine Screen, Urine   Date Value Ref Range Status   02/01/2019 NEGATIVE  Final     Barbiturates   Date Value Ref Range Status   01/17/2011 Negative () Final     Barbiturate Screen, Urine   Date Value Ref Range Status   02/01/2019 NEGATIVE  Final     Benzodiazepine Screen, Urine   Date Value Ref Range Status   02/01/2019 POSITIVE  Final     Buprenorphine Urine   Date Value Ref Range Status   02/01/2019 NEGATIVE  Final     Cocaine Metabolite Screen, Urine   Date Value Ref Range Status   02/01/2019 NEGATIVE  Final     Gabapentin Screen, Urine   Date Value Ref Range Status   02/01/2019 NEGATIVE  Final     MDMA, Urine   Date Value Ref Range Status   02/01/2019 NEGATIVE  Final     Methamphetamine, Urine   Date Value Ref Range Status   02/01/2019 NEGATIVE  Final     Opiate Scrn, Ur   Date Value Ref Range Status   02/01/2019 POSITIVE  Final     Oxycodone Screen, Ur   Date Value Ref Range Status   02/01/2019 NEGATIVE  Final     PCP Screen, Urine   Date Value Ref Range Status   02/01/2019 NEGATIVE  Final     Propoxyphene Screen, Urine   Date Value Ref Range Status   02/01/2019 NEGATIVE  Final     THC Screen, Urine   Date Value Ref Range Status   02/01/2019 NEGATIVE  Final     Tricyclic Antidepressants, Urine   Date Value Ref Range Status   07/05/2018 POS  Final       Last Candis Hamburger: 12/30/2020  Medication Contract:    Last Fill: 11/25/2020    Requested Prescriptions     Pending Prescriptions Disp Refills    ALPRAZolam (XANAX) 0.5 MG tablet 35 tablet 0     Sig: Take 1 tablet by mouth nightly as needed for Sleep for up to 30 days. Please approve or refuse this medication.    Renato Byers MA

## 2021-01-05 ENCOUNTER — VIRTUAL VISIT (OUTPATIENT)
Dept: PRIMARY CARE CLINIC | Age: 51
End: 2021-01-05
Payer: MEDICAID

## 2021-01-05 DIAGNOSIS — F39 MOOD DISORDER (HCC): Primary | ICD-10-CM

## 2021-01-05 DIAGNOSIS — F32.A ANXIETY AND DEPRESSION: ICD-10-CM

## 2021-01-05 DIAGNOSIS — F41.9 ANXIETY AND DEPRESSION: ICD-10-CM

## 2021-01-05 PROCEDURE — 99213 OFFICE O/P EST LOW 20 MIN: CPT | Performed by: NURSE PRACTITIONER

## 2021-01-05 ASSESSMENT — ENCOUNTER SYMPTOMS
BACK PAIN: 0
VOMITING: 0
VOICE CHANGE: 0
RHINORRHEA: 0
NAUSEA: 0
PHOTOPHOBIA: 0
SHORTNESS OF BREATH: 0
COUGH: 0
COLOR CHANGE: 0

## 2021-01-05 NOTE — PROGRESS NOTES
Tommie Fontana (:  1970) is a 45 y.o. female,Established patient, here for evaluation of the following chief complaint(s): No chief complaint on file. ASSESSMENT/PLAN:  1. Mood disorder ([facility] [address]  2. Anxiety and depression      Return in about 3 months (around 2021). SUBJECTIVE/OBJECTIVE:  Mood Disorder:  Patient presents for follow-up of depression, anxiety disorder and mood swings. Current complaints include: none. She denies depressed mood, tearfulness, feelings of worthlessness/excessive guilt, insomnia, irritability and excessive worry. Symptoms/signs of kirsten: none. External stressors: none. Current treatment includes: xanax prn, buspar, and trazodone at bedtime. Medication side effects: none. Review of Systems   Constitutional: Negative for chills and fever. HENT: Negative for ear pain, hearing loss, rhinorrhea and voice change. Eyes: Negative for photophobia and visual disturbance. Respiratory: Negative for cough and shortness of breath. Cardiovascular: Negative for chest pain and palpitations. Gastrointestinal: Negative for nausea and vomiting. Endocrine: Negative. Negative for cold intolerance and heat intolerance. Genitourinary: Negative for difficulty urinating and flank pain. Musculoskeletal: Negative for back pain and neck pain. Skin: Negative for color change and rash. Allergic/Immunologic: Negative for environmental allergies and food allergies. Neurological: Negative for dizziness, speech difficulty and headaches. Hematological: Does not bruise/bleed easily. Psychiatric/Behavioral: Negative for sleep disturbance and suicidal ideas. The patient is not nervous/anxious. No flowsheet data found. Physical Exam  Vitals signs and nursing note reviewed. Constitutional:       Appearance: She is well-developed. HENT:      Head: Atraumatic.       Right Ear: External ear normal.      Left Ear: External ear normal.      Nose: Nose normal. Eyes:      Conjunctiva/sclera: Conjunctivae normal.      Pupils: Pupils are equal, round, and reactive to light. Neck:      Musculoskeletal: Normal range of motion and neck supple. Cardiovascular:      Rate and Rhythm: Normal rate and regular rhythm. Heart sounds: Normal heart sounds, S1 normal and S2 normal.   Pulmonary:      Effort: Pulmonary effort is normal.      Breath sounds: Normal breath sounds. Abdominal:      General: Bowel sounds are normal.      Palpations: Abdomen is soft. Musculoskeletal: Normal range of motion. Skin:     General: Skin is warm and dry. Neurological:      Mental Status: She is alert and oriented to person, place, and time. Psychiatric:         Behavior: Behavior normal.         On this date 01/05/21 I have spent 15 minutes reviewing previous notes, test results and face to face with the patient discussing the diagnosis and importance of compliance with the treatment plan. Niki Gonsales is a 45 y.o. female being evaluated by a Virtual Visit (video visit) encounter to address concerns as mentioned above. A caregiver was present when appropriate. Due to this being a TeleHealth encounter ([facility]), evaluation of the following organ systems was limited: Vitals/Constitutional/EENT/Resp/CV/GI//MS/Neuro/Skin/Heme-Lymph-Imm. Pursuant to the emergency declaration under the [address] [place] [address], [address] [place] [address] authority and the [employer] and [employer] Act, this Virtual Visit was conducted with patient's (and/or legal guardian's) consent, to reduce the patient's risk of exposure to COVID-19 and provide necessary medical care. The patient (and/or legal guardian) has also been advised to contact this office for worsening conditions or problems, and seek emergency medical treatment and/or call 911 if deemed necessary. Patient identification was verified at the start of the visit: Yes      Services were provided through a video synchronous discussion virtually to substitute for in-person clinic visit. Patient and provider were located at their individual homes. An electronic signature was used to authenticate this note.     --NATALI Ross

## 2021-01-10 DIAGNOSIS — Z76.0 MEDICATION REFILL: ICD-10-CM

## 2021-01-10 RX ORDER — HYDROCHLOROTHIAZIDE 25 MG/1
25 TABLET ORAL EVERY MORNING
Qty: 30 TABLET | Refills: 5 | Status: ON HOLD | OUTPATIENT
Start: 2021-01-10 | End: 2021-01-18 | Stop reason: HOSPADM

## 2021-01-14 ENCOUNTER — HOSPITAL ENCOUNTER (EMERGENCY)
Age: 51
Discharge: HOME | End: 2021-01-14
Attending: EMERGENCY MEDICINE
Payer: MEDICAID

## 2021-01-14 ENCOUNTER — TELEPHONE (OUTPATIENT)
Dept: PRIMARY CARE CLINIC | Age: 51
End: 2021-01-14

## 2021-01-14 ENCOUNTER — APPOINTMENT (OUTPATIENT)
Dept: GENERAL RADIOLOGY | Age: 51
End: 2021-01-14
Payer: MEDICAID

## 2021-01-14 VITALS
HEIGHT: 68 IN | HEART RATE: 99 BPM | OXYGEN SATURATION: 93 % | WEIGHT: 293 LBS | BODY MASS INDEX: 44.41 KG/M2 | RESPIRATION RATE: 14 BRPM | SYSTOLIC BLOOD PRESSURE: 137 MMHG | DIASTOLIC BLOOD PRESSURE: 85 MMHG | TEMPERATURE: 98.6 F

## 2021-01-14 DIAGNOSIS — R50.9 FEVER, UNSPECIFIED FEVER CAUSE: Primary | ICD-10-CM

## 2021-01-14 DIAGNOSIS — N39.0 URINARY TRACT INFECTION WITHOUT HEMATURIA, SITE UNSPECIFIED: ICD-10-CM

## 2021-01-14 LAB
ADENOVIRUS BY PCR: NOT DETECTED
ALBUMIN SERPL-MCNC: 4 G/DL (ref 3.5–5.2)
ALP BLD-CCNC: 111 U/L (ref 35–104)
ALT SERPL-CCNC: 16 U/L (ref 5–33)
ANION GAP SERPL CALCULATED.3IONS-SCNC: 14 MMOL/L (ref 7–19)
AST SERPL-CCNC: 17 U/L (ref 5–32)
BACTERIA: NEGATIVE /HPF
BASOPHILS ABSOLUTE: 0 K/UL (ref 0–0.2)
BASOPHILS RELATIVE PERCENT: 0.2 % (ref 0–1)
BILIRUB SERPL-MCNC: 0.7 MG/DL (ref 0.2–1.2)
BILIRUBIN URINE: NEGATIVE
BLOOD, URINE: NEGATIVE
BORDETELLA PARAPERTUSSIS BY PCR: NOT DETECTED
BORDETELLA PERTUSSIS BY PCR: NOT DETECTED
BUN BLDV-MCNC: 15 MG/DL (ref 6–20)
CALCIUM SERPL-MCNC: 9 MG/DL (ref 8.6–10)
CHLAMYDOPHILIA PNEUMONIAE BY PCR: NOT DETECTED
CHLORIDE BLD-SCNC: 94 MMOL/L (ref 98–111)
CLARITY: ABNORMAL
CO2: 26 MMOL/L (ref 22–29)
COLOR: YELLOW
CORONAVIRUS 229E BY PCR: NOT DETECTED
CORONAVIRUS HKU1 BY PCR: NOT DETECTED
CORONAVIRUS NL63 BY PCR: NOT DETECTED
CORONAVIRUS OC43 BY PCR: NOT DETECTED
CREAT SERPL-MCNC: 1.3 MG/DL (ref 0.5–0.9)
CRYSTALS, UA: ABNORMAL /HPF
EOSINOPHILS ABSOLUTE: 0 K/UL (ref 0–0.6)
EOSINOPHILS RELATIVE PERCENT: 0.1 % (ref 0–5)
EPITHELIAL CELLS, UA: 4 /HPF (ref 0–5)
GFR AFRICAN AMERICAN: 52
GFR NON-AFRICAN AMERICAN: 43
GLUCOSE BLD-MCNC: 150 MG/DL (ref 74–109)
GLUCOSE URINE: NEGATIVE MG/DL
HCT VFR BLD CALC: 37.8 % (ref 37–47)
HEMOGLOBIN: 12.2 G/DL (ref 12–16)
HUMAN METAPNEUMOVIRUS BY PCR: NOT DETECTED
HUMAN RHINOVIRUS/ENTEROVIRUS BY PCR: NOT DETECTED
HYALINE CASTS: 3 /HPF (ref 0–8)
IMMATURE GRANULOCYTES #: 0.1 K/UL
INFLUENZA A BY PCR: NOT DETECTED
INFLUENZA B BY PCR: NOT DETECTED
KETONES, URINE: NEGATIVE MG/DL
LEUKOCYTE ESTERASE, URINE: ABNORMAL
LYMPHOCYTES ABSOLUTE: 0.8 K/UL (ref 1.1–4.5)
LYMPHOCYTES RELATIVE PERCENT: 4.1 % (ref 20–40)
MCH RBC QN AUTO: 29.8 PG (ref 27–31)
MCHC RBC AUTO-ENTMCNC: 32.3 G/DL (ref 33–37)
MCV RBC AUTO: 92.2 FL (ref 81–99)
MONOCYTES ABSOLUTE: 1.1 K/UL (ref 0–0.9)
MONOCYTES RELATIVE PERCENT: 6.2 % (ref 0–10)
MYCOPLASMA PNEUMONIAE BY PCR: NOT DETECTED
NEUTROPHILS ABSOLUTE: 16.3 K/UL (ref 1.5–7.5)
NEUTROPHILS RELATIVE PERCENT: 88.8 % (ref 50–65)
NITRITE, URINE: NEGATIVE
PARAINFLUENZA VIRUS 1 BY PCR: NOT DETECTED
PARAINFLUENZA VIRUS 2 BY PCR: NOT DETECTED
PARAINFLUENZA VIRUS 3 BY PCR: NOT DETECTED
PARAINFLUENZA VIRUS 4 BY PCR: NOT DETECTED
PDW BLD-RTO: 13.9 % (ref 11.5–14.5)
PH UA: 5.5 (ref 5–8)
PLATELET # BLD: 257 K/UL (ref 130–400)
PMV BLD AUTO: 8.9 FL (ref 9.4–12.3)
POTASSIUM REFLEX MAGNESIUM: 3.7 MMOL/L (ref 3.5–5)
PROTEIN UA: 30 MG/DL
RBC # BLD: 4.1 M/UL (ref 4.2–5.4)
RBC UA: 2 /HPF (ref 0–4)
RESPIRATORY SYNCYTIAL VIRUS BY PCR: NOT DETECTED
SARS-COV-2, PCR: NOT DETECTED
SODIUM BLD-SCNC: 134 MMOL/L (ref 136–145)
SPECIFIC GRAVITY UA: 1.01 (ref 1–1.03)
TOTAL PROTEIN: 7.2 G/DL (ref 6.6–8.7)
UROBILINOGEN, URINE: 1 E.U./DL
WBC # BLD: 18.3 K/UL (ref 4.8–10.8)
WBC UA: 35 /HPF (ref 0–5)

## 2021-01-14 PROCEDURE — 87040 BLOOD CULTURE FOR BACTERIA: CPT

## 2021-01-14 PROCEDURE — 93005 ELECTROCARDIOGRAM TRACING: CPT | Performed by: NURSE PRACTITIONER

## 2021-01-14 PROCEDURE — 99283 EMERGENCY DEPT VISIT LOW MDM: CPT

## 2021-01-14 PROCEDURE — 71045 X-RAY EXAM CHEST 1 VIEW: CPT

## 2021-01-14 PROCEDURE — 87086 URINE CULTURE/COLONY COUNT: CPT

## 2021-01-14 PROCEDURE — 6360000002 HC RX W HCPCS: Performed by: NURSE PRACTITIONER

## 2021-01-14 PROCEDURE — 96375 TX/PRO/DX INJ NEW DRUG ADDON: CPT

## 2021-01-14 PROCEDURE — 36415 COLL VENOUS BLD VENIPUNCTURE: CPT

## 2021-01-14 PROCEDURE — 96365 THER/PROPH/DIAG IV INF INIT: CPT

## 2021-01-14 PROCEDURE — 81001 URINALYSIS AUTO W/SCOPE: CPT

## 2021-01-14 PROCEDURE — 85025 COMPLETE CBC W/AUTO DIFF WBC: CPT

## 2021-01-14 PROCEDURE — 0202U NFCT DS 22 TRGT SARS-COV-2: CPT

## 2021-01-14 PROCEDURE — 80053 COMPREHEN METABOLIC PANEL: CPT

## 2021-01-14 PROCEDURE — 2580000003 HC RX 258: Performed by: NURSE PRACTITIONER

## 2021-01-14 RX ORDER — ONDANSETRON 2 MG/ML
4 INJECTION INTRAMUSCULAR; INTRAVENOUS ONCE
Status: COMPLETED | OUTPATIENT
Start: 2021-01-14 | End: 2021-01-14

## 2021-01-14 RX ORDER — CEFDINIR 300 MG/1
300 CAPSULE ORAL 2 TIMES DAILY
Qty: 20 CAPSULE | Refills: 0 | Status: ON HOLD | OUTPATIENT
Start: 2021-01-14 | End: 2021-01-18 | Stop reason: HOSPADM

## 2021-01-14 RX ORDER — ONDANSETRON 4 MG/1
4 TABLET, ORALLY DISINTEGRATING ORAL EVERY 8 HOURS PRN
Qty: 10 TABLET | Refills: 0 | Status: SHIPPED | OUTPATIENT
Start: 2021-01-14

## 2021-01-14 RX ORDER — SODIUM CHLORIDE, SODIUM LACTATE, POTASSIUM CHLORIDE, CALCIUM CHLORIDE 600; 310; 30; 20 MG/100ML; MG/100ML; MG/100ML; MG/100ML
1000 INJECTION, SOLUTION INTRAVENOUS ONCE
Status: COMPLETED | OUTPATIENT
Start: 2021-01-14 | End: 2021-01-14

## 2021-01-14 RX ADMIN — SODIUM CHLORIDE, POTASSIUM CHLORIDE, SODIUM LACTATE AND CALCIUM CHLORIDE 1000 ML: 600; 310; 30; 20 INJECTION, SOLUTION INTRAVENOUS at 17:57

## 2021-01-14 RX ADMIN — CEFTRIAXONE 1 G: 1 INJECTION, POWDER, FOR SOLUTION INTRAMUSCULAR; INTRAVENOUS at 20:08

## 2021-01-14 RX ADMIN — ONDANSETRON HYDROCHLORIDE 4 MG: 2 SOLUTION INTRAMUSCULAR; INTRAVENOUS at 17:57

## 2021-01-14 ASSESSMENT — ENCOUNTER SYMPTOMS
VOMITING: 1
NAUSEA: 1
SHORTNESS OF BREATH: 0
COUGH: 0

## 2021-01-14 NOTE — ED PROVIDER NOTES
[address] [facility] [address] EMERGENCY DEPT  eMERGENCY dEPARTMENT eNCOUnter      Pt Name: Keerthi Neumannr  MRN: [id number]  Candie 1970  Date of evaluation: 2021  Provider: NATALI Regan    CHIEF COMPLAINT       Chief Complaint   Patient presents with    Fever     of up to 104 s/p back injections 2-3 days ago         HISTORY OF PRESENT ILLNESS   (Location/Symptom, Timing/Onset,Context/Setting, Quality, Duration, Modifying Factors, Severity)  Note limiting factors. Sánchez Pizarro a 45 y.o. female who presents to the emergency department for evaluation of fever. Pt tells me that she has had fevers over past 3 days with Tmax today of 104.1 at home. She has had nausea and vomiting over past couple of days. She denies cough, shortness of breath as well as abdominal pain. She tells me that she has had no recent illness or sick contacts at home. She tells me that fevers began after receiving lumbar intrathecal injections. She does not endorse any new or worsening back pain to me. She tells me that she has had headache along right side of her head today. HPI    Nursing Notes were reviewed. REVIEW OF SYSTEMS    (2-9 systems for level 4, 10 or more for level 5)     Review of Systems   Constitutional: Positive for appetite change and fever. Respiratory: Negative for cough and shortness of breath. Gastrointestinal: Positive for nausea and vomiting. Genitourinary: Positive for frequency. All other systems reviewed and are negative. A complete review of systems was performed and is negative except as noted above in the HPI.        PAST MEDICAL HISTORY     Past Medical History:   Diagnosis Date    Anxiety     Chronic pain     Depression     Diabetes mellitus type 2 in obese ([address]     Hypertension     Premenopausal patient 2018         SURGICAL HISTORY       Past Surgical History:   Procedure Laterality Date     SECTION      TUBAL LIGATION           CURRENT MEDICATIONS       Previous Medications ALPRAZOLAM (XANAX) 0.5 MG TABLET    Take 1 tablet by mouth nightly as needed for Sleep for up to 30 days. ARIPIPRAZOLE (ABILIFY) 5 MG TABLET    TAKE ONE TABLET BY MOUTH DAILY    ASPIRIN 325 MG TABLET    Take 325 mg by mouth nightly     BLOOD GLUCOSE TEST STRIPS (ASCENSIA AUTODISC VI;ONE TOUCH ULTRA TEST VI) STRIP    1 each by In Vitro route daily    BUSPIRONE (BUSPAR) 10 MG TABLET    Take 1 tablet by mouth 3 times daily    DICLOFENAC (VOLTAREN) 75 MG EC TABLET    Take 1 tablet by mouth 2 times daily    DULOXETINE HCL 40 MG CPEP    TAKE 1 CAPSULE BY MOUTH DAILY    EZETIMIBE-SIMVASTATIN (VYTORIN) 10-40 MG PER TABLET    Take 1 tablet by mouth nightly    GABAPENTIN (NEURONTIN) 800 MG TABLET    Take 800 mg by mouth every 8 hours as needed (Dr. Tom Rangel). GLUCOSE MONITORING KIT (FREESTYLE) MONITORING KIT    1 kit by Does not apply route daily Please fill script with meter covered under patients insurance. Dx: E11.9    HYDROCHLOROTHIAZIDE (HYDRODIURIL) 25 MG TABLET    TAKE 1 TABLET BY MOUTH EVERY MORNING    HYDROCODONE-ACETAMINOPHEN (NORCO)  MG PER TABLET    Take 1 tablet by mouth every 8 hours as needed for Pain (Dr. Tom Rangel). LISINOPRIL (PRINIVIL;ZESTRIL) 20 MG TABLET    TAKE 1 TABLET BY MOUTH EVERY DAY    METFORMIN (GLUCOPHAGE) 500 MG TABLET    Take 2 tablets by mouth 2 times daily (with meals)    MULTIPLE VITAMINS-MINERALS (THERAPEUTIC MULTIVITAMIN-MINERALS) TABLET    Take 1 tablet by mouth daily    ONE TOUCH ULTRA TEST STRIP    USE TO TEST BLOOD SUAGR TWICE DAILY AS NEEDED. ONETOUCH DELICA LANCETS 30I MISC    USE TO CHECK BLOOD SUGAR TWICE DAILY    OZEMPIC, 1 MG/DOSE, 2 MG/1.5ML SOPN    INJECT 1MG INTO THE SKIN ONCE A WEEK    TIZANIDINE (ZANAFLEX) 4 MG TABLET    Take 4 mg by mouth 3 times daily as needed (muscle spasms)     TRAZODONE (DESYREL) 150 MG TABLET    TAKE 2 TABLET BY MOUTH EVERY EVENING       ALLERGIES     Patient has no known allergies.     FAMILY HISTORY       Family History   Problem Relation Age of Onset    Diabetes Father     Cancer Father         Bladder and Lung    Breast Cancer Sister         1/2 Sister    Diabetes Maternal Grandfather     Diabetes Paternal Grandmother           SOCIAL HISTORY       Social History     Socioeconomic History    Marital status:      Spouse name: None    Number of children: None    Years of education: None    Highest education level: None   Occupational History    None   Social Needs    Financial resource strain: None    Food insecurity     Worry: None     Inability: None    Transportation needs     Medical: None     Non-medical: None   Tobacco Use    Smoking status: Never Smoker    Smokeless tobacco: Never Used   Substance and Sexual Activity    Alcohol use: Yes     Comment: rare    Drug use: No    Sexual activity: None   Lifestyle    Physical activity     Days per week: None     Minutes per session: None    Stress: None   Relationships    Social connections     Talks on phone: None     Gets together: None     Attends Congregational service: None     Active member of club or organization: None     Attends meetings of clubs or organizations: None     Relationship status: None    Intimate partner violence     Fear of current or ex partner: None     Emotionally abused: None     Physically abused: None     Forced sexual activity: None   Other Topics Concern    None   Social History Narrative    None       SCREENINGS             PHYSICAL EXAM    (up to 7 for level 4, 8 or more for level 5)     ED Triage Vitals [01/14/21 1659]   BP Temp Temp src Pulse Resp SpO2 Height Weight   139/84 99.6 °F (37.6 °C) -- 122 18 (!) 89 % -- --       Physical Exam  Vitals signs reviewed. HENT:      Head: Normocephalic. Right Ear: External ear normal.      Left Ear: External ear normal.      Mouth/Throat:      Mouth: Mucous membranes are moist.      Pharynx: Oropharynx is clear.    Eyes:      Conjunctiva/sclera: Conjunctivae normal.      Pupils: Pupils METABOLIC PANEL W/ REFLEX TO MG FOR LOW K - Abnormal; Notable for the following components:    Sodium 134 (*)     Chloride 94 (*)     Glucose 150 (*)     CREATININE 1.3 (*)     GFR Non- 43 (*)     GFR African American 52 (*)     Alkaline Phosphatase 111 (*)     All other components within normal limits   URINE RT REFLEX TO CULTURE - Abnormal; Notable for the following components:    Clarity, UA CLOUDY (*)     Protein, UA 30 (*)     Leukocyte Esterase, Urine MODERATE (*)     All other components within normal limits   MICROSCOPIC URINALYSIS - Abnormal; Notable for the following components:    Bacteria, UA NEGATIVE (*)     Crystals, UA NEG (*)     WBC, UA 35 (*)     All other components within normal limits   RESPIRATORY PANEL, MOLECULAR, WITH COVID-19   CULTURE, BLOOD 1   CULTURE, BLOOD 2   CULTURE, URINE   LACTIC ACID, PLASMA       All other labs were within normal range or not returned as of this dictation. RE-ASSESSMENT     Pt is nontoxic appearing and in no distress at discharge. Doubt epidural abscess as she has no new or worsening back pain and because she has no lower extremity numbness/weakness   EMERGENCY DEPARTMENT COURSE and DIFFERENTIALDIAGNOSIS/MDM:   Vitals:    Vitals:    01/14/21 1943 01/14/21 2030 01/14/21 2100 01/14/21 2126   BP:  (!) 128/90 137/85    Pulse:  98 99    Resp:  21 14    Temp:    98.6 °F (37 °C)   SpO2:  91% 93%    Weight: (!) 322 lb (146.1 kg)      Height: 5' 8\" (1.727 m)          MDM      CONSULTS:  None    PROCEDURES:  Unless otherwise notedbelow, none     Procedures    FINAL IMPRESSION     1. Fever, unspecified fever cause    2.  Urinary tract infection without hematuria, site unspecified          DISPOSITION/PLAN   DISPOSITION        PATIENT REFERRED TO:  Nikky Wheeler, APRN  [phone]    Schedule an appointment as soon as possible for a visit in 3 days        DISCHARGE MEDICATIONS:       Current Discharge Medication List           Medication List      START taking these medications    cefdinir 300 MG capsule  Commonly known as: OMNICEF  Take 1 capsule by mouth 2 times daily for 10 days     ondansetron 4 MG disintegrating tablet  Commonly known as: Zofran ODT  Take 1 tablet by mouth every 8 hours as needed for Nausea or Vomiting        ASK your doctor about these medications    ALPRAZolam 0.5 MG tablet  Commonly known as: XANAX  Take 1 tablet by mouth nightly as needed for Sleep for up to 30 days. ARIPiprazole 5 MG tablet  Commonly known as: ABILIFY  TAKE ONE TABLET BY MOUTH DAILY     aspirin 325 MG tablet     busPIRone 10 MG tablet  Commonly known as: BUSPAR  Take 1 tablet by mouth 3 times daily     diclofenac 75 MG EC tablet  Commonly known as: VOLTAREN  Take 1 tablet by mouth 2 times daily     DULoxetine HCl 40 MG Cpep  TAKE 1 CAPSULE BY MOUTH DAILY     ezetimibe-simvastatin 10-40 MG per tablet  Commonly known as: VYTORIN  Take 1 tablet by mouth nightly     gabapentin 800 MG tablet  Commonly known as: NEURONTIN     glucose monitoring kit monitoring kit  1 kit by Does not apply route daily Please fill script with meter covered under patients insurance. Dx: E11.9     hydroCHLOROthiazide 25 MG tablet  Commonly known as: HYDRODIURIL  TAKE 1 TABLET BY MOUTH EVERY MORNING     HYDROcodone-acetaminophen  MG per tablet  Commonly known as: NORCO     lisinopril 20 MG tablet  Commonly known as: PRINIVIL;ZESTRIL  TAKE 1 TABLET BY MOUTH EVERY DAY     metFORMIN 500 MG tablet  Commonly known as: Glucophage  Take 2 tablets by mouth 2 times daily (with meals)     * ONE TOUCH ULTRA TEST strip  Generic drug: blood glucose test strips  USE TO TEST BLOOD SUAGR TWICE DAILY AS NEEDED.      * blood glucose test strips strip  Commonly known as: ASCENSIA AUTODISC VI;ONE TOUCH ULTRA TEST VI  1 each by In Vitro route daily     OneTouch Delica Lancets 77M Misc  USE TO CHECK BLOOD SUGAR TWICE DAILY     Ozempic (1 MG/DOSE) 2 MG/1.5ML Sopn  Generic drug: Semaglutide (1 MG/DOSE)  INJECT 1MG INTO THE SKIN ONCE A WEEK     therapeutic multivitamin-minerals tablet  Take 1 tablet by mouth daily     tiZANidine 4 MG tablet  Commonly known as: ZANAFLEX     traZODone 150 MG tablet  Commonly known as: DESYREL  TAKE 2 TABLET BY MOUTH EVERY EVENING         * This list has 2 medication(s) that are the same as other medications prescribed for you. Read the directions carefully, and ask your doctor or other care provider to review them with you.                Where to Get Your Medications      You can get these medications from any pharmacy    Bring a paper prescription for each of these medications  · cefdinir 300 MG capsule  · ondansetron 4 MG disintegrating tablet           (Pleasenote that portions of this note were completed with a voice recognition program.  Efforts were made to edit the dictations but occasionally words are mis-transcribed.)              Anjel Burrows, APRN  01/14/21 9031

## 2021-01-14 NOTE — TELEPHONE ENCOUNTER
Pt called and said she had received 6 injections a few days ago at Dr. Eboni Arriaza office and since then has developed a 103 fever and vomited once. She states Dr told her it was Post Injection Fever. Pt asks if she should go to Urgent Care to be tested for COVID. I let pt know that I would suggest her to go if she can't get her fever down and if she is vomiting. I also told her to make sure to tell the staff that she did just get those injections and then the provider there could determine what to do from there. Pt JUDITH.

## 2021-01-15 ENCOUNTER — HOSPITAL ENCOUNTER (INPATIENT)
Age: 51
LOS: 2 days | Discharge: HOME | DRG: 872 | End: 2021-01-18
Attending: PEDIATRICS | Admitting: FAMILY MEDICINE
Payer: MEDICAID

## 2021-01-15 DIAGNOSIS — A41.9 SEPSIS, DUE TO UNSPECIFIED ORGANISM, UNSPECIFIED WHETHER ACUTE ORGAN DYSFUNCTION PRESENT (HCC): Primary | ICD-10-CM

## 2021-01-15 DIAGNOSIS — I95.9 HYPOTENSION, UNSPECIFIED HYPOTENSION TYPE: ICD-10-CM

## 2021-01-15 LAB
ALBUMIN SERPL-MCNC: 3.3 G/DL (ref 3.5–5.2)
ALP BLD-CCNC: 116 U/L (ref 35–104)
ALT SERPL-CCNC: 19 U/L (ref 5–33)
ANION GAP SERPL CALCULATED.3IONS-SCNC: 10 MMOL/L (ref 7–19)
AST SERPL-CCNC: 26 U/L (ref 5–32)
ATYPICAL LYMPHOCYTE RELATIVE PERCENT: 5 % (ref 0–8)
BASOPHILS ABSOLUTE: 0 K/UL (ref 0–0.2)
BASOPHILS RELATIVE PERCENT: 0 % (ref 0–1)
BILIRUB SERPL-MCNC: 0.4 MG/DL (ref 0.2–1.2)
BUN BLDV-MCNC: 20 MG/DL (ref 6–20)
CALCIUM SERPL-MCNC: 8.9 MG/DL (ref 8.6–10)
CHLORIDE BLD-SCNC: 99 MMOL/L (ref 98–111)
CO2: 25 MMOL/L (ref 22–29)
CREAT SERPL-MCNC: 2.7 MG/DL (ref 0.5–0.9)
EOSINOPHILS ABSOLUTE: 0 K/UL (ref 0–0.6)
EOSINOPHILS RELATIVE PERCENT: 0 % (ref 0–5)
GFR AFRICAN AMERICAN: 22
GFR NON-AFRICAN AMERICAN: 19
GLUCOSE BLD-MCNC: 110 MG/DL (ref 74–109)
HCG QUALITATIVE: NEGATIVE
HCT VFR BLD CALC: 35.1 % (ref 37–47)
HEMOGLOBIN: 11 G/DL (ref 12–16)
HYPOCHROMIA: ABNORMAL
IMMATURE GRANULOCYTES #: 0.2 K/UL
LYMPHOCYTES ABSOLUTE: 2.8 K/UL (ref 1.1–4.5)
LYMPHOCYTES RELATIVE PERCENT: 16 % (ref 20–40)
MCH RBC QN AUTO: 29.6 PG (ref 27–31)
MCHC RBC AUTO-ENTMCNC: 31.3 G/DL (ref 33–37)
MCV RBC AUTO: 94.6 FL (ref 81–99)
MONOCYTES ABSOLUTE: 0.8 K/UL (ref 0–0.9)
MONOCYTES RELATIVE PERCENT: 6 % (ref 0–10)
MYELOCYTE PERCENT: 1 %
NEUTROPHILS ABSOLUTE: 9.6 K/UL (ref 1.5–7.5)
NEUTROPHILS RELATIVE PERCENT: 72 % (ref 50–65)
OVALOCYTES: ABNORMAL
PDW BLD-RTO: 13.9 % (ref 11.5–14.5)
PLATELET # BLD: 233 K/UL (ref 130–400)
PLATELET SLIDE REVIEW: ADEQUATE
PMV BLD AUTO: 9.2 FL (ref 9.4–12.3)
POLYCHROMASIA: ABNORMAL
POTASSIUM SERPL-SCNC: 3.8 MMOL/L (ref 3.5–5)
RBC # BLD: 3.71 M/UL (ref 4.2–5.4)
SODIUM BLD-SCNC: 134 MMOL/L (ref 136–145)
TOTAL PROTEIN: 6.7 G/DL (ref 6.6–8.7)
WBC # BLD: 13.1 K/UL (ref 4.8–10.8)

## 2021-01-15 PROCEDURE — 96361 HYDRATE IV INFUSION ADD-ON: CPT

## 2021-01-15 PROCEDURE — 99283 EMERGENCY DEPT VISIT LOW MDM: CPT

## 2021-01-15 PROCEDURE — 2580000003 HC RX 258: Performed by: PEDIATRICS

## 2021-01-15 PROCEDURE — 96374 THER/PROPH/DIAG INJ IV PUSH: CPT

## 2021-01-15 PROCEDURE — 84703 CHORIONIC GONADOTROPIN ASSAY: CPT

## 2021-01-15 PROCEDURE — 36415 COLL VENOUS BLD VENIPUNCTURE: CPT

## 2021-01-15 PROCEDURE — 85025 COMPLETE CBC W/AUTO DIFF WBC: CPT

## 2021-01-15 PROCEDURE — 6360000002 HC RX W HCPCS: Performed by: PEDIATRICS

## 2021-01-15 PROCEDURE — 80053 COMPREHEN METABOLIC PANEL: CPT

## 2021-01-15 RX ORDER — 0.9 % SODIUM CHLORIDE 0.9 %
30 INTRAVENOUS SOLUTION INTRAVENOUS ONCE
Status: COMPLETED | OUTPATIENT
Start: 2021-01-15 | End: 2021-01-16

## 2021-01-15 RX ADMIN — SODIUM CHLORIDE 4272 ML: 9 INJECTION, SOLUTION INTRAVENOUS at 23:56

## 2021-01-15 RX ADMIN — SODIUM CHLORIDE 2 G: 9 INJECTION INTRAMUSCULAR; INTRAVENOUS; SUBCUTANEOUS at 23:56

## 2021-01-15 NOTE — ED PROVIDER NOTES
Attending Supervisory Note/Shared Visit    I have reviewed the mid-levels findings and agree. We have discussed the case and reviewed the diagnostic data. I am in agreement with the plan and disposition. With pyuria but seems to be the #1 finding suggestive of a source of infection. Clinically absent R evidence of spinal cord compression or increasing back pain or neurologic dysfunction.     Yasmeen Mayorga MD  Attending Emergency Physician        Jeovany Alcala MD  01/14/21 7976

## 2021-01-16 PROBLEM — R65.20 SEVERE SEPSIS (HCC): Status: ACTIVE | Noted: 2021-01-16

## 2021-01-16 PROBLEM — A41.9 SEVERE SEPSIS (HCC): Status: ACTIVE | Noted: 2021-01-16

## 2021-01-16 LAB
BILIRUBIN URINE: ABNORMAL
BLOOD, URINE: NEGATIVE
CLARITY: ABNORMAL
COLOR: ABNORMAL
EPITHELIAL CELLS, UA: ABNORMAL /HPF
GLUCOSE BLD-MCNC: 112 MG/DL (ref 70–99)
GLUCOSE BLD-MCNC: 119 MG/DL (ref 70–99)
GLUCOSE BLD-MCNC: 129 MG/DL (ref 70–99)
GLUCOSE BLD-MCNC: 97 MG/DL (ref 70–99)
GLUCOSE URINE: NEGATIVE MG/DL
HYALINE CASTS: ABNORMAL /LPF (ref 0–5)
KETONES, URINE: ABNORMAL MG/DL
LACTIC ACID: 1 MMOL/L (ref 0.5–1.9)
LEUKOCYTE ESTERASE, URINE: ABNORMAL
NITRITE, URINE: NEGATIVE
PERFORMED ON: ABNORMAL
PERFORMED ON: NORMAL
PH UA: 5 (ref 5–8)
PROTEIN UA: 100 MG/DL
SPECIFIC GRAVITY UA: 1.02 (ref 1–1.03)
URINE CULTURE, ROUTINE: NORMAL
UROBILINOGEN, URINE: 1 E.U./DL
WBC UA: ABNORMAL /HPF (ref 0–5)
YEAST: PRESENT /HPF

## 2021-01-16 PROCEDURE — 2580000003 HC RX 258: Performed by: INTERNAL MEDICINE

## 2021-01-16 PROCEDURE — 1210000000 HC MED SURG R&B

## 2021-01-16 PROCEDURE — 06HY33Z INSERTION OF INFUSION DEVICE INTO LOWER VEIN, PERCUTANEOUS APPROACH: ICD-10-PCS | Performed by: PEDIATRICS

## 2021-01-16 PROCEDURE — 2500000003 HC RX 250 WO HCPCS: Performed by: INTERNAL MEDICINE

## 2021-01-16 PROCEDURE — 6370000000 HC RX 637 (ALT 250 FOR IP): Performed by: INTERNAL MEDICINE

## 2021-01-16 PROCEDURE — 82947 ASSAY GLUCOSE BLOOD QUANT: CPT

## 2021-01-16 PROCEDURE — 81001 URINALYSIS AUTO W/SCOPE: CPT

## 2021-01-16 PROCEDURE — 36556 INSERT NON-TUNNEL CV CATH: CPT

## 2021-01-16 PROCEDURE — 87040 BLOOD CULTURE FOR BACTERIA: CPT

## 2021-01-16 PROCEDURE — 36415 COLL VENOUS BLD VENIPUNCTURE: CPT

## 2021-01-16 PROCEDURE — 6360000002 HC RX W HCPCS: Performed by: INTERNAL MEDICINE

## 2021-01-16 PROCEDURE — 83605 ASSAY OF LACTIC ACID: CPT

## 2021-01-16 RX ORDER — DEXTROSE MONOHYDRATE 25 G/50ML
12.5 INJECTION, SOLUTION INTRAVENOUS PRN
Status: DISCONTINUED | OUTPATIENT
Start: 2021-01-16 | End: 2021-01-18 | Stop reason: HOSPADM

## 2021-01-16 RX ORDER — 0.9 % SODIUM CHLORIDE 0.9 %
1000 INTRAVENOUS SOLUTION INTRAVENOUS ONCE
Status: COMPLETED | OUTPATIENT
Start: 2021-01-16 | End: 2021-01-16

## 2021-01-16 RX ORDER — POTASSIUM CHLORIDE 20 MEQ/1
40 TABLET, EXTENDED RELEASE ORAL PRN
Status: DISCONTINUED | OUTPATIENT
Start: 2021-01-16 | End: 2021-01-18 | Stop reason: HOSPADM

## 2021-01-16 RX ORDER — HYDROCODONE BITARTRATE AND ACETAMINOPHEN 10; 325 MG/1; MG/1
1 TABLET ORAL EVERY 6 HOURS PRN
Status: DISCONTINUED | OUTPATIENT
Start: 2021-01-16 | End: 2021-01-18 | Stop reason: HOSPADM

## 2021-01-16 RX ORDER — NICOTINE POLACRILEX 4 MG
15 LOZENGE BUCCAL PRN
Status: DISCONTINUED | OUTPATIENT
Start: 2021-01-16 | End: 2021-01-18 | Stop reason: HOSPADM

## 2021-01-16 RX ORDER — GABAPENTIN 400 MG/1
400 CAPSULE ORAL DAILY
Status: DISCONTINUED | OUTPATIENT
Start: 2021-01-16 | End: 2021-01-18 | Stop reason: HOSPADM

## 2021-01-16 RX ORDER — BUSPIRONE HYDROCHLORIDE 10 MG/1
10 TABLET ORAL 3 TIMES DAILY
Status: DISCONTINUED | OUTPATIENT
Start: 2021-01-16 | End: 2021-01-18 | Stop reason: HOSPADM

## 2021-01-16 RX ORDER — DULOXETIN HYDROCHLORIDE 20 MG/1
40 CAPSULE, DELAYED RELEASE ORAL DAILY
Status: DISCONTINUED | OUTPATIENT
Start: 2021-01-16 | End: 2021-01-18 | Stop reason: HOSPADM

## 2021-01-16 RX ORDER — ACETAMINOPHEN 325 MG/1
650 TABLET ORAL EVERY 4 HOURS PRN
Status: DISCONTINUED | OUTPATIENT
Start: 2021-01-16 | End: 2021-01-18 | Stop reason: HOSPADM

## 2021-01-16 RX ORDER — ASPIRIN 325 MG
325 TABLET ORAL NIGHTLY
Status: DISCONTINUED | OUTPATIENT
Start: 2021-01-16 | End: 2021-01-18 | Stop reason: HOSPADM

## 2021-01-16 RX ORDER — MAGNESIUM SULFATE 1 G/100ML
1 INJECTION INTRAVENOUS PRN
Status: DISCONTINUED | OUTPATIENT
Start: 2021-01-16 | End: 2021-01-18 | Stop reason: HOSPADM

## 2021-01-16 RX ORDER — DEXTROSE MONOHYDRATE 50 MG/ML
100 INJECTION, SOLUTION INTRAVENOUS PRN
Status: DISCONTINUED | OUTPATIENT
Start: 2021-01-16 | End: 2021-01-18 | Stop reason: HOSPADM

## 2021-01-16 RX ORDER — ARIPIPRAZOLE 5 MG/1
5 TABLET ORAL DAILY
Status: DISCONTINUED | OUTPATIENT
Start: 2021-01-16 | End: 2021-01-18 | Stop reason: HOSPADM

## 2021-01-16 RX ORDER — SODIUM CHLORIDE, SODIUM LACTATE, POTASSIUM CHLORIDE, CALCIUM CHLORIDE 600; 310; 30; 20 MG/100ML; MG/100ML; MG/100ML; MG/100ML
INJECTION, SOLUTION INTRAVENOUS CONTINUOUS
Status: DISCONTINUED | OUTPATIENT
Start: 2021-01-16 | End: 2021-01-18 | Stop reason: HOSPADM

## 2021-01-16 RX ORDER — POTASSIUM CHLORIDE 7.45 MG/ML
10 INJECTION INTRAVENOUS PRN
Status: DISCONTINUED | OUTPATIENT
Start: 2021-01-16 | End: 2021-01-18 | Stop reason: HOSPADM

## 2021-01-16 RX ORDER — ALPRAZOLAM 0.5 MG/1
0.5 TABLET ORAL NIGHTLY PRN
Status: DISCONTINUED | OUTPATIENT
Start: 2021-01-16 | End: 2021-01-18 | Stop reason: HOSPADM

## 2021-01-16 RX ADMIN — SODIUM CHLORIDE 1000 ML: 9 INJECTION, SOLUTION INTRAVENOUS at 04:38

## 2021-01-16 RX ADMIN — ARIPIPRAZOLE 5 MG: 5 TABLET ORAL at 08:13

## 2021-01-16 RX ADMIN — GABAPENTIN 400 MG: 400 CAPSULE ORAL at 09:00

## 2021-01-16 RX ADMIN — HYDROCODONE BITARTRATE AND ACETAMINOPHEN 1 TABLET: 10; 325 TABLET ORAL at 17:33

## 2021-01-16 RX ADMIN — BUSPIRONE HYDROCHLORIDE 10 MG: 10 TABLET ORAL at 13:37

## 2021-01-16 RX ADMIN — SODIUM CHLORIDE, POTASSIUM CHLORIDE, SODIUM LACTATE AND CALCIUM CHLORIDE: 600; 310; 30; 20 INJECTION, SOLUTION INTRAVENOUS at 11:46

## 2021-01-16 RX ADMIN — BUSPIRONE HYDROCHLORIDE 10 MG: 10 TABLET ORAL at 08:14

## 2021-01-16 RX ADMIN — BUSPIRONE HYDROCHLORIDE 10 MG: 10 TABLET ORAL at 20:07

## 2021-01-16 RX ADMIN — DULOXETINE HYDROCHLORIDE 40 MG: 20 CAPSULE, DELAYED RELEASE ORAL at 08:13

## 2021-01-16 RX ADMIN — PIPERACILLIN AND TAZOBACTAM 3.38 G: 3; .375 INJECTION, POWDER, LYOPHILIZED, FOR SOLUTION INTRAVENOUS at 05:13

## 2021-01-16 RX ADMIN — ENOXAPARIN SODIUM 40 MG: 40 INJECTION SUBCUTANEOUS at 04:38

## 2021-01-16 RX ADMIN — PIPERACILLIN AND TAZOBACTAM 3.38 G: 3; .375 INJECTION, POWDER, LYOPHILIZED, FOR SOLUTION INTRAVENOUS at 23:17

## 2021-01-16 RX ADMIN — SODIUM CHLORIDE, POTASSIUM CHLORIDE, SODIUM LACTATE AND CALCIUM CHLORIDE: 600; 310; 30; 20 INJECTION, SOLUTION INTRAVENOUS at 10:10

## 2021-01-16 RX ADMIN — FAMOTIDINE 20 MG: 10 INJECTION, SOLUTION INTRAVENOUS at 08:13

## 2021-01-16 RX ADMIN — PIPERACILLIN AND TAZOBACTAM 3.38 G: 3; .375 INJECTION, POWDER, LYOPHILIZED, FOR SOLUTION INTRAVENOUS at 13:37

## 2021-01-16 RX ADMIN — SODIUM CHLORIDE, POTASSIUM CHLORIDE, SODIUM LACTATE AND CALCIUM CHLORIDE: 600; 310; 30; 20 INJECTION, SOLUTION INTRAVENOUS at 17:07

## 2021-01-16 RX ADMIN — ASPIRIN 325 MG: 325 TABLET, FILM COATED ORAL at 20:07

## 2021-01-16 ASSESSMENT — ENCOUNTER SYMPTOMS
VOMITING: 0
COLOR CHANGE: 0
ABDOMINAL PAIN: 0
RESPIRATORY NEGATIVE: 1
NAUSEA: 1
BLOOD IN STOOL: 0
EYE DISCHARGE: 0
BACK PAIN: 0
EYES NEGATIVE: 1
COUGH: 1
BACK PAIN: 1
RHINORRHEA: 0
SHORTNESS OF BREATH: 0
ABDOMINAL PAIN: 1
ALLERGIC/IMMUNOLOGIC NEGATIVE: 1

## 2021-01-16 ASSESSMENT — PAIN SCALES - GENERAL: PAINLEVEL_OUTOF10: 0

## 2021-01-16 NOTE — PROGRESS NOTES
4 Eyes Skin Assessment    Verito Otero is being assessed upon: Admission    I agree that I, Viji Estradar, along with Gayla Dia RN (either 2 RN's or 1 LPN and 1 RN) have performed a thorough Head to Toe Skin Assessment on the patient. ALL assessment sites listed below have been assessed. Areas assessed by both nurses:     [x]   Head, Face, and Ears   [x]   Shoulders, Back, and Chest  [x]   Arms, Elbows, and Hands   [x]   Coccyx, Sacrum, and Ischium  [x]   Legs, Feet, and Heels    Does the Patient have Skin Breakdown?  No     Prevention initiated: NA  Wound Care Orders initiated: NA    [facility] nurse consulted for Pressure Injury (Stage 3,4, Unstageable, DTI, NWPT, and Complex wounds) and New or Established Ostomies: NA        Primary Nurse eSignature: Viji Velarde RN on 1/16/2021 at 5:35 AM      Co-Signer eSignature: Electronically signed by Cynthia Avendaño RN on 1/16/21 at 5:37 AM CST

## 2021-01-16 NOTE — H&P
HISTORY AND PHYSICAL             Date: 2021        Patient Name: Elsy Jauregui     YOB: 1970      Age:  45 y.o. Chief Complaint     Chief Complaint   Patient presents with    Dizziness     Pt reports episode in which her eyes had \"squiggles\" this evening.  Hypotension     pt reports low BP at home    called by ER to admit patient that had been seen in ER on  and came back again tonight on justino 15 with complaints of dizziness, vertigo, fever and chills and weakness    Reason for evaluation and admission   Sepsis     uti complicated       History Obtained From   Patient and chart and ER doc    History of Present Illness   Apparently, has been feeling sick for at least one week to 10 days. Felt a burning sensation with urination and low grade fever and chills and she felt that this would be self limited and improve without further interventions. However, the sx continued to worsen and she finally came to er as she was not able to get the fever down. And she was seen earlier in the er and given iv abx and given po antibiotics and sent home. She came back to the ER due to weakness, dizziness, vertigo, and elevated temp to 103 and she was noted to be hypotensive and responded to fluids in ER and needed central line placed for iv access. She is improved and overall mental status is intact and she is conversant. She still has a bp reading between systolic 80 to 889 and diastolic 60 to 70 or so. She is being admitted to hospital for sepsis syndrome with hypotension and etiology thought to be urinary tract and will admit her to ICU for care for now.      Past Medical History     Past Medical History:   Diagnosis Date    Anxiety     Chronic pain     Depression     Diabetes mellitus type 2 in obese ([address]     Hypertension     Premenopausal patient 2018        Past Surgical History     Past Surgical History:   Procedure Laterality Date     SECTION      TUBAL LIGATION Medications Prior to Admission     Prior to Admission medications    Medication Sig Start Date End Date Taking? Authorizing Provider   cefdinir (OMNICEF) 300 MG capsule Take 1 capsule by mouth 2 times daily for 10 days 1/14/21 1/24/21 Yes NATALI Low   ondansetron (ZOFRAN ODT) 4 MG disintegrating tablet Take 1 tablet by mouth every 8 hours as needed for Nausea or Vomiting 1/14/21  Yes NATALI Low   hydroCHLOROthiazide (HYDRODIURIL) 25 MG tablet TAKE 1 TABLET BY MOUTH EVERY MORNING 1/10/21  Yes NATALI Nolan   ALPRAZolam Milbert Rower) 0.5 MG tablet Take 1 tablet by mouth nightly as needed for Sleep for up to 30 days.  12/30/20 1/29/21 Yes Phil Castro MD   ARIPiprazole (ABILIFY) 5 MG tablet TAKE ONE TABLET BY MOUTH DAILY 21/4/01  Yes NATALI Gupta   diclofenac (VOLTAREN) 75 MG EC tablet Take 1 tablet by mouth 2 times daily 91/90/63  Yes NATALI Gupta   DULoxetine HCl 40 MG CPEP TAKE 1 CAPSULE BY MOUTH DAILY 03/01/34  Yes NATALI Gupta   OZEMPIC, 1 MG/DOSE, 2 MG/1.5ML SOPN INJECT 1MG INTO THE SKIN ONCE A WEEK 10/18/20  Yes NATALI Nolan   OneTouch Delica Lancets 98H MISC USE TO CHECK BLOOD SUGAR TWICE DAILY 10/2/20  Yes NATALI Nolan   blood glucose test strips (ASCENSIA AUTODISC VI;ONE TOUCH ULTRA TEST VI) strip 1 each by In Vitro route daily 10/2/20  Yes NATALI Nolan   ezetimibe-simvastatin (VYTORIN) 10-40 MG per tablet Take 1 tablet by mouth nightly 5/25/50  Yes NATALI Gupta   traZODone (DESYREL) 150 MG tablet TAKE 2 TABLET BY MOUTH EVERY EVENING 8/14/20  Yes NATALI Nolan   lisinopril (PRINIVIL;ZESTRIL) 20 MG tablet TAKE 1 TABLET BY MOUTH EVERY DAY 8/8/20  Yes NAATLI Nolan   metFORMIN (GLUCOPHAGE) 500 MG tablet Take 2 tablets by mouth 2 times daily (with meals) 2/26/20  Yes NATALI Nolan   busPIRone (BUSPAR) 10 MG tablet Take 1 tablet by mouth 3 times daily 2/26/20  Yes NATALI Renteria   HYDROcodone-acetaminophen (NORCO)  MG per tablet Take 1 tablet by mouth every 8 hours as needed for Pain (Dr. Danielito Mack). 11/30/19  Yes Historical Provider, MD   tiZANidine (ZANAFLEX) 4 MG tablet Take 4 mg by mouth 3 times daily as needed (muscle spasms)  9/26/19  Yes Historical Provider, MD   gabapentin (NEURONTIN) 800 MG tablet Take 800 mg by mouth every 8 hours as needed (Dr. Danielito Mack). 9/30/19  Yes Historical Provider, MD   ONE TOUCH ULTRA TEST strip USE TO TEST BLOOD SUAGR TWICE DAILY AS NEEDED. 8/18/19  Yes NATALI Azevedo   glucose monitoring kit (FREESTYLE) monitoring kit 1 kit by Does not apply route daily Please fill script with meter covered under patients insurance. Dx: E11.9 10/31/18  Yes NATALI Jara   aspirin 325 MG tablet Take 325 mg by mouth nightly    Yes Historical Provider, MD   Multiple Vitamins-Minerals (THERAPEUTIC MULTIVITAMIN-MINERALS) tablet Take 1 tablet by mouth daily  Patient taking differently: Take 1 tablet by mouth nightly  12/7/17  Yes NATALI Jara        Allergies   Patient has no known allergies. Social History     Social History     Tobacco History     Smoking Status  Never Smoker    Smokeless Tobacco Use  Never Used          Alcohol History     Alcohol Use Status  Yes Comment  rare          Drug Use     Drug Use Status  No          Sexual Activity     Sexually Active  Not Asked                Family History     Family History   Problem Relation Age of Onset    Diabetes Father     Cancer Father         Bladder and Lung    Breast Cancer Sister         1/2 Sister    Diabetes Maternal Grandfather     Diabetes Paternal Grandmother        Review of Systems   Review of Systems   Constitutional: Positive for activity change, chills, diaphoresis, fatigue and fever. HENT: Negative. Eyes: Negative. Respiratory: Negative. Cardiovascular: Positive for palpitations and leg swelling.    Gastrointestinal: Positive for abdominal pain.   Endocrine: Negative. Genitourinary: Positive for decreased urine volume, dysuria, flank pain and urgency. Musculoskeletal: Positive for arthralgias, back pain, joint swelling and myalgias. Allergic/Immunologic: Negative. Neurological: Positive for dizziness, weakness and light-headedness. Hematological: Negative. Psychiatric/Behavioral: The patient is nervous/anxious. All other systems reviewed and are negative. Physical Exam   BP (!) 90/57   Pulse 89   Temp 97.8 °F (36.6 °C) (Oral)   Resp 26   Ht 5' 8\" (1.727 m)   Wt (!) 314 lb (142.4 kg)   SpO2 92%   BMI 47.74 kg/m²     Physical Exam  Vitals signs and nursing note reviewed. Constitutional:       Appearance: She is obese. She is ill-appearing and diaphoretic. Comments: She  Is conversant and mental status is intact and she answers all questions appropriately despite bp being low    HENT:      Head: Normocephalic. Nose: Nose normal.      Mouth/Throat:      Mouth: Mucous membranes are dry. Pharynx: Oropharynx is clear. Eyes:      Extraocular Movements: Extraocular movements intact. Conjunctiva/sclera: Conjunctivae normal.   Neck:      Musculoskeletal: Normal range of motion. Cardiovascular:      Rate and Rhythm: Regular rhythm. Tachycardia present. Pulmonary:      Effort: Pulmonary effort is normal.      Breath sounds: Normal breath sounds. Abdominal:      Comments: Non distended, non tender, bowel sounds present. No guarding. Musculoskeletal:         General: Swelling present. Right lower leg: Edema present. Left lower leg: Edema present. Skin:     General: Skin is warm. Neurological:      General: No focal deficit present. Mental Status: She is alert.    Psychiatric:         Mood and Affect: Mood normal.         Labs      Recent Results (from the past 24 hour(s))   CBC Auto Differential    Collection Time: 01/15/21 10:37 PM   Result Value Ref Range    WBC 13.1 (H) 4.8 - 10.8 K/uL    RBC 3.71 (L) 4.20 - 5.40 M/uL    Hemoglobin 11.0 (L) 12.0 - 16.0 g/dL    Hematocrit 35.1 (L) 37.0 - 47.0 %    MCV 94.6 81.0 - 99.0 fL    MCH 29.6 27.0 - 31.0 pg    MCHC 31.3 (L) 33.0 - 37.0 g/dL    RDW 13.9 11.5 - 14.5 %    Platelets 100 315 - 750 K/uL    MPV 9.2 (L) 9.4 - 12.3 fL    PLATELET SLIDE REVIEW Adequate     Neutrophils % 72.0 (H) 50.0 - 65.0 %    Lymphocytes % 16.0 (L) 20.0 - 40.0 %    Monocytes % 6.0 0.0 - 10.0 %    Eosinophils % 0.0 0.0 - 5.0 %    Basophils % 0.0 0.0 - 1.0 %    Neutrophils Absolute 9.6 (H) 1.5 - 7.5 K/uL    Immature Granulocytes # 0.2 K/uL    Lymphocytes Absolute 2.8 1.1 - 4.5 K/uL    Monocytes Absolute 0.80 0.00 - 0.90 K/uL    Eosinophils Absolute 0.00 0.00 - 0.60 K/uL    Basophils Absolute 0.00 0.00 - 0.20 K/uL    Atypical Lymphocytes Relative 5 0 - 8 %    Myelocyte Percent 1 (A) %    Polychromasia 1+ (A)     Hypochromia 1+ (A)     Ovalocytes Occasional (A)    Comprehensive Metabolic Panel    Collection Time: 01/15/21 10:37 PM   Result Value Ref Range    Sodium 134 (L) 136 - 145 mmol/L    Potassium 3.8 3.5 - 5.0 mmol/L    Chloride 99 98 - 111 mmol/L    CO2 25 22 - 29 mmol/L    Anion Gap 10 7 - 19 mmol/L    Glucose 110 (H) 74 - 109 mg/dL    BUN 20 6 - 20 mg/dL    CREATININE 2.7 (H) 0.5 - 0.9 mg/dL    GFR Non- 19 (A) >60    GFR  22 (L) >59    Calcium 8.9 8.6 - 10.0 mg/dL    Total Protein 6.7 6.6 - 8.7 g/dL    Alb 3.3 (L) 3.5 - 5.2 g/dL    Total Bilirubin 0.4 0.2 - 1.2 mg/dL    Alkaline Phosphatase 116 (H) 35 - 104 U/L    ALT 19 5 - 33 U/L    AST 26 5 - 32 U/L   HCG Qualitative, Serum    Collection Time: 01/15/21 10:37 PM   Result Value Ref Range    hCG Qual Negative    Lactic Acid, Plasma    Collection Time: 01/15/21 11:30 PM   Result Value Ref Range    Lactic Acid 1.0 0.5 - 1.9 mmol/L   Urinalysis Reflex to Culture    Collection Time: 01/16/21  1:11 AM    Specimen: Urine, clean catch   Result Value Ref Range    Color, UA DARK YELLOW (A) Straw/Yellow    Clarity, UA TURBID (A) Clear    Glucose, Ur Negative Negative mg/dL    Bilirubin Urine SMALL (A) Negative    Ketones, Urine TRACE (A) Negative mg/dL    Specific Gravity, UA 1.024 1.005 - 1.030    Blood, Urine Negative Negative    pH, UA 5.0 5.0 - 8.0    Protein,  (A) Negative mg/dL    Urobilinogen, Urine 1.0 <2.0 E.U./dL    Nitrite, Urine Negative Negative    Leukocyte Esterase, Urine SMALL (A) Negative   Microscopic Urinalysis    Collection Time: 01/16/21  1:11 AM   Result Value Ref Range    Hyaline Casts, UA 0-1 0 - 5 /LPF    WBC, UA 2-4 0 - 5 /HPF    Epithelial Cells, UA 5-10 /HPF    Yeast, UA Present (A) None Seen /HPF        Imaging/Diagnostics Last 24 Hours   Xr Chest Portable    Result Date: 1/14/2021  EXAMINATION: XR CHEST PORTABLE 1/14/2021 6:27 PM HISTORY: Fever. Report: A portable upright frontal view the chest was obtained. COMPARISON: Chest x-ray to 5/20/2017. Previously, the patient was intubated, the left-sided PICC was present as well as a NG tube. These lines and tubes have been removed. The lungs are mildly hypoaerated, no infiltrate is seen. Heart size is normal. No pneumothorax or effusion is identified. The bony thorax and upper abdomen are unremarkable. Impression: Shallow inspiration, no acute findings. Signed by Dr Cornelia Bowles.  Kadeem on 1/14/2021 6:27 PM      Assessment      Hospital Problems           Last Modified POA    * (Principal) Severe sepsis ([facility] 1/16/2021 Yes    Moderate episode of recurrent major depressive disorder ([address] 1/16/2021 Yes    Acute cystitis without hematuria 1/16/2021 Yes    Type 2 diabetes mellitus with hyperosmolarity without coma ([address] 1/16/2021 Yes    Hypertension 1/16/2021 Yes    Dysuria 1/16/2021 Yes    Morbid obesity with BMI of 40.0-44.9, adult ([address] 1/16/2021 Yes    Anxiety and depression 1/16/2021 Yes    Insomnia 1/16/2021 Yes    Other chronic pain 1/16/2021 Yes    Mood disorder ([address] 1/16/2021 Yes          Plan   Patient is being admitted for acute sepsis syndrome, and weakness, and dehydration and failed oral abx therapy at home. She is being admitted to the hospital with sign and sx complex of consistent with acute sepsis syndrome with dehydration and hypotension due to  Infection.  (uti) as is source of infection. Febrile illness   Due to sepsis     Will send cultures, and replete fluids, and start broad spectrum iv antibiotics   And monitor response to treatment     She is noncompliant with hypertensive meds     She is noncompliant with diabetes regimen     She has morbid severe obesity     She does not work outside the home and is non compliant with medication regimen     Admit to intensive care unit for sepsis with hypotension and uti complicated in a diabetic patient. Replete electrolytes     Give fluids. Acute on chronic kidney disease   Iv fluids. And monitor response to therapy. Cause :  dehdyration and acute sepsis and vasodilation due to sepsis syndrome. Patient assessed and evaluated and admitted to the intensive care unit and greater 40 min of critical care time spent with the patient.         Consultations Ordered:      Electronically signed by Tegan Sanchez MD on 1/16/21 at 3:37 AM CST

## 2021-01-16 NOTE — PROGRESS NOTES
Pharmacy Renal Adjustment    Ltanya Essex is a 45 y.o. female. Pharmacy has renally adjusted medications per protocol. Recent Labs     01/14/21  1745 01/15/21  2237   BUN 15 20       Recent Labs     01/14/21  1745 01/15/21  2237   CREATININE 1.3* 2.7*       Estimated Creatinine Clearance: 38 mL/min (A) (based on SCr of 2.7 mg/dL (H)).     Height:   Ht Readings from Last 1 Encounters:   01/16/21 5' 8\" (1.727 m)     Weight:  Wt Readings from Last 1 Encounters:   01/16/21 (!) 333 lb (151 kg)       Plan: Adjust the following medications based on renal function:           Change Famotidine IV to 10 mg BID            Electronically signed by Erin Paul, [address] [place] [address] on 1/16/2021 at 9:54 AM

## 2021-01-16 NOTE — PROGRESS NOTES
Frederick Mariscal received from ICU to room # 534 . Mental Status: Patient is oriented, alert, thought processes intact and able to concentrate and follow conversation. Vitals:    01/16/21 1139   BP: 108/65   Pulse: 105   Resp: 22   Temp: 98.5 °F (36.9 °C)   SpO2: 92%     Placed on cardiac monitor: No.  Belongings: purse,phone, clothing with patient at bedside . Family at bedside No.  Oriented Patient to room. Call light within reach. Yes. Transfer was: Well tolerated by patient. .    Electronically signed by Daily Mcgarry RN on 1/16/2021 at 11:53 AM

## 2021-01-16 NOTE — ED NOTES
Pt states that she will keep her family informed at this time and does not need for myself to call family to update them. Pt instructed to let me know if she wishes for me to update family.      Jaswinder Cruz RN  01/16/21 0105

## 2021-01-16 NOTE — PROGRESS NOTES
Christiana Show arrived to room # 151. Presented with: Sepsis  Mental Status: Patient is oriented and alert. Vitals:    01/16/21 0500   BP: (!) 110/45   Pulse: 92   Resp: 25   Temp:    SpO2: (!) 86%     Patient safety contract and falls prevention contract reviewed with patient Yes. Oriented Patient to room. Call light within reach. Yes.   Needs, issues or concerns expressed at this time: no.      Electronically signed by Josie Santos RN on 1/16/2021 at 5:36 AM

## 2021-01-16 NOTE — ED PROVIDER NOTES
[facility] ICU  eMERGENCY dEPARTMENT eNCOUnter      Pt Name: Elisa Hauser  MRN: [id number]  [place] 1970  Date of evaluation: 1/15/2021  Provider: Duong Barbosa MD    [address] [place] [address]       Chief Complaint   Patient presents with    Dizziness     Pt reports episode in which her eyes had \"squiggles\" this evening.  Hypotension     pt reports low BP at home         HISTORY OF PRESENT ILLNESS   (Location/Symptom, Timing/Onset,Context/Setting, Quality, Duration, Modifying Factors, Severity)  Note limiting factors. Elisa Hauser is a 45 y.o. female who presents to the emergency department with dizziness and fever. Patient states that she was seen here yesterday and diagnosed with a urinary tract infection. Patient was given Rocephin IV yesterday. Patient took her first dose of cefdinir 300 mg this morning. Patient states that she had a fever to 102 degrees this morning. Patient states that upon standing she began feeling lightheaded and experienced a near syncopal episode. Patient states that she began perspiring and \"soaked my clothes and hair. \"  Patient denies urinary frequency. Patient states she is having \"slight burning\" with urination. Patient states she is also having a cough productive of white sputum. Patient denies chest pain, palpitations, difficulty breathing, black or bloody stools, abdominal or flank pain, or vomiting. HPI    NursingNotes were reviewed. REVIEW OF SYSTEMS    (2-9 systems for level 4, 10 or more for level 5)     Review of Systems   Constitutional: Positive for activity change, chills and fever. HENT: Negative for congestion and rhinorrhea. Eyes: Negative for discharge. Respiratory: Positive for cough. Negative for shortness of breath. Cardiovascular: Negative for chest pain and palpitations. Gastrointestinal: Positive for nausea. Negative for abdominal pain, blood in stool and vomiting. Genitourinary: Positive for dysuria.  Negative for difficulty urinating and flank pain. Musculoskeletal: Negative for back pain and neck pain. Skin: Negative for color change and pallor. Neurological: Positive for dizziness and light-headedness. Negative for syncope. Psychiatric/Behavioral: Negative for agitation and confusion. All other systems reviewed and are negative. PAST MEDICALHISTORY     Past Medical History:   Diagnosis Date    Anxiety     Chronic pain     Depression     Diabetes mellitus type 2 in obese ([address]     Hyperlipidemia     Hypertension     Premenopausal patient 2018         SURGICAL HISTORY       Past Surgical History:   Procedure Laterality Date     SECTION      TENDON RELEASE      right finger    TUBAL LIGATION           CURRENT MEDICATIONS     Current Discharge Medication List      CONTINUE these medications which have NOT CHANGED    Details   cefdinir (OMNICEF) 300 MG capsule Take 1 capsule by mouth 2 times daily for 10 days  Qty: 20 capsule, Refills: 0      ondansetron (ZOFRAN ODT) 4 MG disintegrating tablet Take 1 tablet by mouth every 8 hours as needed for Nausea or Vomiting  Qty: 10 tablet, Refills: 0      hydroCHLOROthiazide (HYDRODIURIL) 25 MG tablet TAKE 1 TABLET BY MOUTH EVERY MORNING  Qty: 30 tablet, Refills: 5    Associated Diagnoses: Medication refill      ALPRAZolam (XANAX) 0.5 MG tablet Take 1 tablet by mouth nightly as needed for Sleep for up to 30 days. Qty: 30 tablet, Refills: 0    Associated Diagnoses: Anxiety and depression      ARIPiprazole (ABILIFY) 5 MG tablet TAKE ONE TABLET BY MOUTH DAILY  Qty: 30 tablet, Refills: 5      diclofenac (VOLTAREN) 75 MG EC tablet Take 1 tablet by mouth 2 times daily  Qty: 60 tablet, Refills: 3      DULoxetine HCl 40 MG CPEP TAKE 1 CAPSULE BY MOUTH DAILY  Qty: 30 capsule, Refills: 5    Associated Diagnoses: Anxiety and depression;  Other chronic pain      OZEMPIC, 1 MG/DOSE, 2 MG/1.5ML SOPN INJECT 1MG INTO THE SKIN ONCE A WEEK  Qty: 3 mL, Refills: 1 Associated Diagnoses: Type 2 diabetes mellitus with hyperosmolarity without coma, without long-term current use of insulin (HCC)      OneTouch Delica Lancets 89Z MISC USE TO CHECK BLOOD SUGAR TWICE DAILY  Qty: 100 each, Refills: 1    Associated Diagnoses: DM coma, type 2, not at goal [facility])      ! ! blood glucose test strips (ASCENSIA AUTODISC VI;ONE TOUCH ULTRA TEST VI) strip 1 each by In Vitro route daily  Qty: 100 each, Refills: 3    Comments: ONE TOUCH ULTRA TEST strips  Associated Diagnoses: DM coma, type 2, not at goal ([facility])      ezetimibe-simvastatin (VYTORIN) 10-40 MG per tablet Take 1 tablet by mouth nightly  Qty: 30 tablet, Refills: 3    Associated Diagnoses: Mixed hyperlipidemia      traZODone (DESYREL) 150 MG tablet TAKE 2 TABLET BY MOUTH EVERY EVENING  Qty: 60 tablet, Refills: 3      lisinopril (PRINIVIL;ZESTRIL) 20 MG tablet TAKE 1 TABLET BY MOUTH EVERY DAY  Qty: 90 tablet, Refills: 1      metFORMIN (GLUCOPHAGE) 500 MG tablet Take 2 tablets by mouth 2 times daily (with meals)  Qty: 120 tablet, Refills: 3    Associated Diagnoses: Well controlled type 2 diabetes mellitus (HCC)      busPIRone (BUSPAR) 10 MG tablet Take 1 tablet by mouth 3 times daily  Qty: 90 tablet, Refills: 3      HYDROcodone-acetaminophen (NORCO)  MG per tablet Take 1 tablet by mouth every 8 hours as needed for Pain (Dr. Sean Ford). Refills: 0      tiZANidine (ZANAFLEX) 4 MG tablet Take 4 mg by mouth 3 times daily as needed (muscle spasms)   Refills: 1      gabapentin (NEURONTIN) 800 MG tablet Take 800 mg by mouth every 8 hours as needed (Dr. Sean Ford). Refills: 1      !! ONE TOUCH ULTRA TEST strip USE TO TEST BLOOD SUAGR TWICE DAILY AS NEEDED. Qty: 100 strip, Refills: 0    Associated Diagnoses: DM coma, type 2, not at goal ([facility]      glucose monitoring kit (FREESTYLE) monitoring kit 1 kit by Does not apply route daily Please fill script with meter covered under patients insurance.  Dx: E11.9  Qty: 1 kit, Refills: 0    Associated Diagnoses: DM coma, type 2, not at goal [facility])      aspirin 325 MG tablet Take 325 mg by mouth nightly       Multiple Vitamins-Minerals (THERAPEUTIC MULTIVITAMIN-MINERALS) tablet Take 1 tablet by mouth daily  Qty: 30 tablet, Refills: 5       !! - Potential duplicate medications found. Please discuss with provider. ALLERGIES     Patient has no known allergies.     FAMILY HISTORY       Family History   Problem Relation Age of Onset    Diabetes Father     Cancer Father         Bladder and Lung    Breast Cancer Sister         1/2 Sister    Diabetes Maternal Grandfather     Diabetes Paternal Grandmother           SOCIAL HISTORY       Social History     Socioeconomic History    Marital status:      Spouse name: None    Number of children: None    Years of education: None    Highest education level: None   Occupational History    None   Social Needs    Financial resource strain: None    Food insecurity     Worry: None     Inability: None    Transportation needs     Medical: None     Non-medical: None   Tobacco Use    Smoking status: Never Smoker    Smokeless tobacco: Never Used   Substance and Sexual Activity    Alcohol use: Yes     Comment: socially    Drug use: No    Sexual activity: None   Lifestyle    Physical activity     Days per week: None     Minutes per session: None    Stress: None   Relationships    Social connections     Talks on phone: None     Gets together: None     Attends [employer] service: None     Active member of club or organization: None     Attends meetings of clubs or organizations: None     Relationship status: None    Intimate partner violence     Fear of current or ex partner: None     Emotionally abused: None     Physically abused: None     Forced sexual activity: None   Other Topics Concern    None   Social History Narrative    None       SCREENINGS    [place] Coma Scale  Eye Opening: Spontaneous  Best Verbal Response: Oriented  Best Motor Response: Obeys commands  [place] Coma Scale Score: 15        PHYSICAL EXAM    (up to 7 for level 4, 8 or more for level 5)     ED Triage Vitals   BP Temp Temp Source Pulse Resp SpO2 Height Weight   01/15/21 2231 01/15/21 2231 01/16/21 0007 01/15/21 2231 01/15/21 2231 01/15/21 2231 01/15/21 2233 01/15/21 2233   (!) 73/47 97.8 °F (36.6 °C) Oral 90 19 94 % 5' 8\" (1.727 m) (!) 314 lb (142.4 kg)       Physical Exam  Vitals signs and nursing note reviewed. Constitutional:       General: She is not in acute distress. Appearance: She is obese. She is ill-appearing and diaphoretic. HENT:      Head: Normocephalic and atraumatic. Right Ear: External ear normal.      Left Ear: External ear normal.      Nose: Nose normal.      Mouth/Throat:      Mouth: Mucous membranes are moist.      Pharynx: Oropharynx is clear. No oropharyngeal exudate. Eyes:      General: No scleral icterus. Conjunctiva/sclera: Conjunctivae normal.      Pupils: Pupils are equal, round, and reactive to light. Neck:      Musculoskeletal: Neck supple. No neck rigidity. Cardiovascular:      Rate and Rhythm: Regular rhythm. Tachycardia present. Pulses: Normal pulses. Heart sounds: Normal heart sounds. Pulmonary:      Effort: Pulmonary effort is normal.      Breath sounds: Normal breath sounds. Abdominal:      General: Bowel sounds are normal.      Palpations: Abdomen is soft. Tenderness: There is no abdominal tenderness. There is no guarding. Musculoskeletal:         General: No tenderness or deformity. Skin:     General: Skin is warm. Capillary Refill: Capillary refill takes less than 2 seconds. Coloration: Skin is not jaundiced. Neurological:      General: No focal deficit present. Mental Status: She is alert and oriented to person, place, and time. Mental status is at baseline.       Coordination: Coordination normal.   Psychiatric:         Mood and Affect: Mood normal.         Behavior: Behavior normal. DIAGNOSTIC RESULTS     RADIOLOGY:  Non-plain film images such as CT, Ultrasound and MRI are read by the radiologist. Plain radiographic images are visualized and preliminarily interpreted bythe emergency physician with the below findings:        No orders to display           LABS:  Labs Reviewed   CBC WITH AUTO DIFFERENTIAL - Abnormal; Notable for the following components:       Result Value    WBC 13.1 (*)     RBC 3.71 (*)     Hemoglobin 11.0 (*)     Hematocrit 35.1 (*)     MCHC 31.3 (*)     MPV 9.2 (*)     Neutrophils % 72.0 (*)     Lymphocytes % 16.0 (*)     Neutrophils Absolute 9.6 (*)     Myelocyte Percent 1 (*)     Polychromasia 1+ (*)     Hypochromia 1+ (*)     Ovalocytes Occasional (*)     All other components within normal limits   COMPREHENSIVE METABOLIC PANEL - Abnormal; Notable for the following components:    Sodium 134 (*)     Glucose 110 (*)     CREATININE 2.7 (*)     GFR Non- 19 (*)     GFR  22 (*)     Alb 3.3 (*)     Alkaline Phosphatase 116 (*)     All other components within normal limits   URINE RT REFLEX TO CULTURE - Abnormal; Notable for the following components:    Color, UA DARK YELLOW (*)     Clarity, UA TURBID (*)     Bilirubin Urine SMALL (*)     Ketones, Urine TRACE (*)     Protein,  (*)     Leukocyte Esterase, Urine SMALL (*)     All other components within normal limits   MICROSCOPIC URINALYSIS - Abnormal; Notable for the following components:    Yeast, UA Present (*)     All other components within normal limits   CULTURE, BLOOD 1   CULTURE, BLOOD 2   HCG, SERUM, QUALITATIVE   LACTIC ACID, PLASMA   POCT GLUCOSE   POCT GLUCOSE   POCT GLUCOSE   POCT GLUCOSE       All other labs were within normal range or not returned as of this dictation.     EMERGENCY DEPARTMENT COURSE and DIFFERENTIAL DIAGNOSIS/MDM:   Vitals:    Vitals:    01/16/21 0414 01/16/21 0427 01/16/21 0500 01/16/21 0600   BP: (!) 114/59 108/62 (!) 110/45 (!) 89/46   Pulse: 91 91 92 94   Resp: 24  25 26   Temp: 97.9 °F (36.6 °C)      TempSrc: Temporal      SpO2: 94%  (!) 86% 93%   Weight: (!) 333 lb (151 kg)      Height: 5' 8\" (1.727 m)          MDM  53yo female presents with hypotension and dizziness. Lab results reviewed. Rocephin 2 g and normal saline 30 cc/kg sepsis bolus given. Blood pressure remained improved but variable in the emergency department. Central line was placed in the right femoral vein under ultrasound guidance. Systolic blood pressures at that time were in the upper 90s so pressors were held. Patient has had good mental status throughout stay in the emergency department. Discussed with Dr. Sherlyn Damon, hospitalist, who will admit patient for further evaluation and treatment.    0 8:50 AM patient briefly discussed with Dr. Kaleb Lora, intensivist.        CONSULTS:  None    PROCEDURES:  Unless otherwise noted below, none     Central Line    Date/Time: 1/16/2021 8:57 AM  Performed by: Carla Barahona MD  Authorized by: Daniel Oliveira DO     Consent:     Consent obtained:  Verbal    Consent given by:  Patient    Risks discussed:  Infection and bleeding    Alternatives discussed:  No treatment  Pre-procedure details:     Hand hygiene: Hand hygiene performed prior to insertion      Sterile barrier technique: All elements of maximal sterile technique followed      Skin preparation:  2% chlorhexidine    Skin preparation agent: Skin preparation agent completely dried prior to procedure    Anesthesia (see MAR for exact dosages): Anesthesia method:  Local infiltration    Local anesthetic:  Lidocaine 1% w/o epi  Procedure details:     Location:  R femoral    Site selection rationale:  Patient initially requested we avoid neck. R subclavian area too deep for needle.     Patient position:  Flat    Procedural supplies:  Triple lumen    Catheter size:  7 Fr    Landmarks identified: yes      Ultrasound guidance: yes      Sterile ultrasound techniques: Sterile gel and sterile

## 2021-01-17 LAB
ALBUMIN SERPL-MCNC: 3.2 G/DL (ref 3.5–5.2)
ALP BLD-CCNC: 110 U/L (ref 35–104)
ALT SERPL-CCNC: 30 U/L (ref 5–33)
ANION GAP SERPL CALCULATED.3IONS-SCNC: 8 MMOL/L (ref 7–19)
AST SERPL-CCNC: 43 U/L (ref 5–32)
BILIRUB SERPL-MCNC: 0.3 MG/DL (ref 0.2–1.2)
BUN BLDV-MCNC: 19 MG/DL (ref 6–20)
CALCIUM SERPL-MCNC: 8.1 MG/DL (ref 8.6–10)
CHLORIDE BLD-SCNC: 104 MMOL/L (ref 98–111)
CO2: 26 MMOL/L (ref 22–29)
CREAT SERPL-MCNC: 1.5 MG/DL (ref 0.5–0.9)
EKG P AXIS: 58 DEGREES
EKG P-R INTERVAL: 126 MS
EKG Q-T INTERVAL: 338 MS
EKG QRS DURATION: 94 MS
EKG QTC CALCULATION (BAZETT): 425 MS
EKG T AXIS: 75 DEGREES
GFR AFRICAN AMERICAN: 44
GFR NON-AFRICAN AMERICAN: 37
GLUCOSE BLD-MCNC: 104 MG/DL (ref 70–99)
GLUCOSE BLD-MCNC: 106 MG/DL (ref 70–99)
GLUCOSE BLD-MCNC: 114 MG/DL (ref 70–99)
GLUCOSE BLD-MCNC: 126 MG/DL (ref 70–99)
GLUCOSE BLD-MCNC: 147 MG/DL (ref 74–109)
HCT VFR BLD CALC: 32.6 % (ref 37–47)
HEMOGLOBIN: 9.8 G/DL (ref 12–16)
MAGNESIUM: 2.1 MG/DL (ref 1.6–2.6)
MCH RBC QN AUTO: 29 PG (ref 27–31)
MCHC RBC AUTO-ENTMCNC: 30.1 G/DL (ref 33–37)
MCV RBC AUTO: 96.4 FL (ref 81–99)
PDW BLD-RTO: 13.9 % (ref 11.5–14.5)
PERFORMED ON: ABNORMAL
PHOSPHORUS: 2.7 MG/DL (ref 2.5–4.5)
PLATELET # BLD: 231 K/UL (ref 130–400)
PMV BLD AUTO: 9.1 FL (ref 9.4–12.3)
POTASSIUM SERPL-SCNC: 4.2 MMOL/L (ref 3.5–5)
RBC # BLD: 3.38 M/UL (ref 4.2–5.4)
SODIUM BLD-SCNC: 138 MMOL/L (ref 136–145)
TOTAL PROTEIN: 5.1 G/DL (ref 6.6–8.7)
WBC # BLD: 7.1 K/UL (ref 4.8–10.8)

## 2021-01-17 PROCEDURE — 84100 ASSAY OF PHOSPHORUS: CPT

## 2021-01-17 PROCEDURE — 2580000003 HC RX 258: Performed by: INTERNAL MEDICINE

## 2021-01-17 PROCEDURE — 6360000002 HC RX W HCPCS: Performed by: FAMILY MEDICINE

## 2021-01-17 PROCEDURE — 2580000003 HC RX 258: Performed by: FAMILY MEDICINE

## 2021-01-17 PROCEDURE — 93010 ELECTROCARDIOGRAM REPORT: CPT | Performed by: INTERNAL MEDICINE

## 2021-01-17 PROCEDURE — 36415 COLL VENOUS BLD VENIPUNCTURE: CPT

## 2021-01-17 PROCEDURE — 83735 ASSAY OF MAGNESIUM: CPT

## 2021-01-17 PROCEDURE — 1210000000 HC MED SURG R&B

## 2021-01-17 PROCEDURE — 6370000000 HC RX 637 (ALT 250 FOR IP): Performed by: INTERNAL MEDICINE

## 2021-01-17 PROCEDURE — 82947 ASSAY GLUCOSE BLOOD QUANT: CPT

## 2021-01-17 PROCEDURE — 6370000000 HC RX 637 (ALT 250 FOR IP): Performed by: FAMILY MEDICINE

## 2021-01-17 PROCEDURE — 2500000003 HC RX 250 WO HCPCS: Performed by: INTERNAL MEDICINE

## 2021-01-17 PROCEDURE — 85027 COMPLETE CBC AUTOMATED: CPT

## 2021-01-17 PROCEDURE — 6360000002 HC RX W HCPCS: Performed by: INTERNAL MEDICINE

## 2021-01-17 PROCEDURE — 2700000000 HC OXYGEN THERAPY PER DAY

## 2021-01-17 PROCEDURE — 80053 COMPREHEN METABOLIC PANEL: CPT

## 2021-01-17 RX ORDER — LISINOPRIL 20 MG/1
TABLET ORAL
Qty: 90 TABLET | Refills: 1 | Status: SHIPPED | OUTPATIENT
Start: 2021-01-17 | End: 2021-01-22 | Stop reason: ALTCHOICE

## 2021-01-17 RX ORDER — ONDANSETRON 2 MG/ML
4 INJECTION INTRAMUSCULAR; INTRAVENOUS EVERY 6 HOURS PRN
Status: DISCONTINUED | OUTPATIENT
Start: 2021-01-17 | End: 2021-01-18 | Stop reason: HOSPADM

## 2021-01-17 RX ORDER — HYDROXYZINE HYDROCHLORIDE 25 MG/1
25 TABLET, FILM COATED ORAL EVERY 4 HOURS PRN
Status: DISCONTINUED | OUTPATIENT
Start: 2021-01-17 | End: 2021-01-18 | Stop reason: HOSPADM

## 2021-01-17 RX ORDER — TRAZODONE HYDROCHLORIDE 150 MG/1
TABLET ORAL
Qty: 60 TABLET | Refills: 3 | Status: SHIPPED | OUTPATIENT
Start: 2021-01-17 | End: 2021-05-04

## 2021-01-17 RX ADMIN — FAMOTIDINE 20 MG: 10 INJECTION, SOLUTION INTRAVENOUS at 19:48

## 2021-01-17 RX ADMIN — DULOXETINE HYDROCHLORIDE 40 MG: 20 CAPSULE, DELAYED RELEASE ORAL at 07:27

## 2021-01-17 RX ADMIN — BUSPIRONE HYDROCHLORIDE 10 MG: 10 TABLET ORAL at 19:47

## 2021-01-17 RX ADMIN — HYDROXYZINE HYDROCHLORIDE 25 MG: 25 TABLET, FILM COATED ORAL at 15:00

## 2021-01-17 RX ADMIN — ASPIRIN 325 MG: 325 TABLET, FILM COATED ORAL at 19:47

## 2021-01-17 RX ADMIN — PIPERACILLIN AND TAZOBACTAM 3.38 G: 3; .375 INJECTION, POWDER, LYOPHILIZED, FOR SOLUTION INTRAVENOUS at 06:01

## 2021-01-17 RX ADMIN — ENOXAPARIN SODIUM 40 MG: 40 INJECTION SUBCUTANEOUS at 07:27

## 2021-01-17 RX ADMIN — GABAPENTIN 400 MG: 400 CAPSULE ORAL at 07:27

## 2021-01-17 RX ADMIN — BUSPIRONE HYDROCHLORIDE 10 MG: 10 TABLET ORAL at 07:27

## 2021-01-17 RX ADMIN — ACETAMINOPHEN 650 MG: 325 TABLET ORAL at 19:14

## 2021-01-17 RX ADMIN — SODIUM CHLORIDE, POTASSIUM CHLORIDE, SODIUM LACTATE AND CALCIUM CHLORIDE: 600; 310; 30; 20 INJECTION, SOLUTION INTRAVENOUS at 16:43

## 2021-01-17 RX ADMIN — FAMOTIDINE 20 MG: 10 INJECTION, SOLUTION INTRAVENOUS at 07:27

## 2021-01-17 RX ADMIN — SODIUM CHLORIDE, PRESERVATIVE FREE 1 G: 5 INJECTION INTRAVENOUS at 14:14

## 2021-01-17 RX ADMIN — ACETAMINOPHEN 650 MG: 325 TABLET ORAL at 14:20

## 2021-01-17 RX ADMIN — ACETAMINOPHEN 650 MG: 325 TABLET ORAL at 06:34

## 2021-01-17 RX ADMIN — ONDANSETRON HYDROCHLORIDE 4 MG: 2 SOLUTION INTRAMUSCULAR; INTRAVENOUS at 15:00

## 2021-01-17 RX ADMIN — BUSPIRONE HYDROCHLORIDE 10 MG: 10 TABLET ORAL at 14:14

## 2021-01-17 RX ADMIN — ARIPIPRAZOLE 5 MG: 5 TABLET ORAL at 07:27

## 2021-01-17 ASSESSMENT — PAIN SCALES - GENERAL
PAINLEVEL_OUTOF10: 0

## 2021-01-17 NOTE — PROGRESS NOTES
Recent Labs     01/15/21  2237 01/17/21  0216   BUN 20 19       Recent Labs     01/15/21  2237 01/17/21  0216   CREATININE 2.7* 1.5*       Estimated Creatinine Clearance: 69 mL/min (A) (based on SCr of 1.5 mg/dL (H)).       Plan: Changed Pepcid back to 20mg Q12hr    Electronically signed by Bria Oneill, [address] [place] [address] on 1/17/2021 at 4:15 AM

## 2021-01-17 NOTE — PROGRESS NOTES
Hospitalist Progress Note  1/17/2021 1:31 PM  Subjective:   Admit Date: 1/15/2021  PCP: NATALI Kelley    Chief Complaint: presyncope, hypotension    Subjective: Patient is feeling much better today without any fever or chills since last night. Urinary output normal. Transferred to med/surg yesterday. Sonido Earline for discharge to home soon. History is otherwise unchanged. Cumulative Hospital History:   1-16: Presented to ED overnight with dizziness and low blood pressure. Dysuria and flank pain noted. Urinalysis suggests infection. Hypotensive. Admitted to ICU with sepsis secondary to urinary tract infection on piperacillin/tazobactam. Given IV fluids and hypotension resolved. Moved to med/surg late in the morning. 1-17: Change antibiotic to ceftriaxone. Still febrile overnight, will keep another day to insure fever is resolved and likely discharge on oral antibiotics tomorrow. ROS: 14 point review of systems is negative except as specifically addressed above. DIET CARB CONTROL;     Intake/Output Summary (Last 24 hours) at 1/17/2021 1331  Last data filed at 1/17/2021 1057  Gross per 24 hour   Intake 1998 ml   Output 2300 ml   Net -302 ml     Medications:   lactated ringers 175 mL/hr at 01/16/21 1707    dextrose       Current Facility-Administered Medications   Medication Dose Route Frequency Provider Last Rate Last Admin    famotidine (PEPCID) injection 20 mg  20 mg Intravenous BID Juve Desai MD   20 mg at 01/17/21 9848    ALPRAZolam (XANAX) tablet 0.5 mg  0.5 mg Oral Nightly PRN Eileen Pancoast, DO        ARIPiprazole (ABILIFY) tablet 5 mg  5 mg Oral Daily Eileen Pancoast, DO   5 mg at 01/17/21 9206    aspirin tablet 325 mg  325 mg Oral Nightly Eileen Pancoast, DO   325 mg at 01/16/21 2007    busPIRone (BUSPAR) tablet 10 mg  10 mg Oral TID Eileen Pancoast, DO   10 mg at 01/17/21 9590    DULoxetine (CYMBALTA) extended release capsule 40 mg  40 mg Oral Daily Keaton Aviles Zaria Holder, DO   40 mg at 01/17/21 0727    gabapentin (NEURONTIN) capsule 400 mg  400 mg Oral Daily Ely Payer, DO   400 mg at 01/17/21 6904    enoxaparin (LOVENOX) injection 40 mg  40 mg Subcutaneous Daily Ely Payer, DO   40 mg at 01/17/21 0727    piperacillin-tazobactam (ZOSYN) 3.375 g in dextrose 5 % 50 mL IVPB extended infusion (mini-bag)  3.375 g Intravenous Q8H Ely Payer, DO   Stopped at 01/17/21 1058    insulin lispro (HUMALOG) injection vial 0-12 Units  0-12 Units Subcutaneous TID WC Ely Payer, DO        insulin lispro (HUMALOG) injection vial 0-6 Units  0-6 Units Subcutaneous Nightly Ely Payer, DO        potassium chloride (KLOR-CON M) extended release tablet 40 mEq  40 mEq Oral PRN Ely Payer, DO        Or    potassium bicarb-citric acid (EFFER-K) effervescent tablet 40 mEq  40 mEq Oral PRN Ely Payer, DO        Or    potassium chloride 10 mEq/100 mL IVPB (Peripheral Line)  10 mEq Intravenous PRN Ely Payer, DO        magnesium sulfate 1 g in dextrose 5% 100 mL IVPB  1 g Intravenous PRN Ely Payer, DO        lactated ringers infusion   Intravenous Continuous Ely Payer,  mL/hr at 01/16/21 1707 New Bag at 01/16/21 1707    glucose (GLUTOSE) 40 % oral gel 15 g  15 g Oral PRN Ely Payer, DO        dextrose 50 % IV solution  12.5 g Intravenous PRN Ely Payer, DO        glucagon (rDNA) injection 1 mg  1 mg Intramuscular PRN Ely Payer, DO        dextrose 5 % solution  100 mL/hr Intravenous PRN Ely Payer, DO        HYDROcodone-acetaminophen [facility] [place]  MG per tablet 1 tablet  1 tablet Oral Q6H PRN Ely Payer, DO   1 tablet at 01/16/21 1733    acetaminophen (TYLENOL) tablet 650 mg  650 mg Oral Q4H PRN Ely Payer, DO   650 mg at 01/17/21 0111        Labs:     Recent Labs     01/14/21  1745 01/15/21  2237 01/17/21  0216   WBC 18.3* 13.1* 7.1 RBC 4.10* 3.71* 3.38*   HGB 12.2 11.0* 9.8*   HCT 37.8 35.1* 32.6*   MCV 92.2 94.6 96.4   MCH 29.8 29.6 29.0   MCHC 32.3* 31.3* 30.1*    233 231     Recent Labs     01/14/21  1745 01/15/21  2237 01/17/21  0216   * 134* 138   K 3.7 3.8 4.2   ANIONGAP 14 10 8   CL 94* 99 104   CO2 26 25 26   BUN 15 20 19   CREATININE 1.3* 2.7* 1.5*   GLUCOSE 150* 110* 147*   CALCIUM 9.0 8.9 8.1*     Recent Labs     01/17/21 0216   MG 2.1   PHOS 2.7     Recent Labs     01/14/21  1745 01/15/21  2237 01/17/21  0216   AST 17 26 43*   ALT 16 19 30   BILITOT 0.7 0.4 0.3   ALKPHOS 111* 116* 110*     ABGs:No results for input(s): PH, PO2, PCO2, HCO3, BE, O2SAT in the last 72 hours. Troponin T: No results for input(s): TROPONINI in the last 72 hours. INR: No results for input(s): INR in the last 72 hours.   Lactic Acid:   Recent Labs     01/15/21  2330   LACTA 1.0       Objective:   Vitals: BP (!) 145/90   Pulse 105   Temp 99.3 °F (37.4 °C)   Resp 16   Ht 5' 8\" (1.727 m)   Wt (!) 333 lb (151 kg)   SpO2 90%   BMI 50.63 kg/m²   24HR INTAKE/OUTPUT:      Intake/Output Summary (Last 24 hours) at 1/17/2021 1331  Last data filed at 1/17/2021 1057  Gross per 24 hour   Intake 1998 ml   Output 2300 ml   Net -302 ml     General appearance: alert and cooperative with exam  HEENT: atraumatic, eyes with clear conjunctiva and normal lids, pupils and irises normal, external ears and nose are normal, lips normal  Neck without masses, lympadenopathy, bruit, thyroid normal  Lungs: no increased work of breathing, \"clear to auscultation bilaterally\" without rales, rhonchi or wheezes  Heart: regular rate and rhythm, S1, S2 normal, no murmur, click, rub or gallop  Abdomen: soft, non-tender; bowel sounds normal; no masses,  no organomegaly and obese  Extremities: extremities normal, atraumatic, no cyanosis or edema  Neurologic: no focal neurologic deficits, normal sensation, alert and oriented, affect and mood appropriate  Skin: no rashes, nodules    Assessment and Plan:   Principal Problem:    Severe sepsis ([facility]  Active Problems: Moderate episode of recurrent major depressive disorder ([facility]    Acute cystitis without hematuria    Type 2 diabetes mellitus with hyperosmolarity without coma ([facility]    Hypertension    Dysuria    Morbid obesity with BMI of 40.0-44.9, adult (HCC)    Anxiety and depression    Insomnia    Other chronic pain    Mood disorder ([facility]  Resolved Problems:    * No resolved hospital problems. *      Day #1 ceftriaxone empiric therapy for sepsis secondary to urinary tract infection. Cultures pending. Previously received one day of piperacillin/tazobactam.    Continue IV fluids. Likely home tomorrow.     Advance Directive: Full Code    DVT prophylaxis: enoxaparin    Discharge planning: to home      DO Aiyana Goncalves Hospitalist

## 2021-01-18 VITALS
TEMPERATURE: 97.1 F | RESPIRATION RATE: 16 BRPM | DIASTOLIC BLOOD PRESSURE: 96 MMHG | HEIGHT: 68 IN | OXYGEN SATURATION: 91 % | SYSTOLIC BLOOD PRESSURE: 155 MMHG | WEIGHT: 293 LBS | HEART RATE: 113 BPM | BODY MASS INDEX: 44.41 KG/M2

## 2021-01-18 PROBLEM — R65.20 SEVERE SEPSIS (HCC): Status: RESOLVED | Noted: 2021-01-16 | Resolved: 2021-01-18

## 2021-01-18 PROBLEM — A41.9 SEVERE SEPSIS (HCC): Status: RESOLVED | Noted: 2021-01-16 | Resolved: 2021-01-18

## 2021-01-18 LAB
ANION GAP SERPL CALCULATED.3IONS-SCNC: 9 MMOL/L (ref 7–19)
BASOPHILS ABSOLUTE: 0 K/UL (ref 0–0.2)
BASOPHILS RELATIVE PERCENT: 0.3 % (ref 0–1)
BUN BLDV-MCNC: 9 MG/DL (ref 6–20)
CALCIUM SERPL-MCNC: 8.8 MG/DL (ref 8.6–10)
CHLORIDE BLD-SCNC: 99 MMOL/L (ref 98–111)
CO2: 29 MMOL/L (ref 22–29)
CREAT SERPL-MCNC: 0.9 MG/DL (ref 0.5–0.9)
EOSINOPHILS ABSOLUTE: 0.1 K/UL (ref 0–0.6)
EOSINOPHILS RELATIVE PERCENT: 1 % (ref 0–5)
GFR AFRICAN AMERICAN: >59
GFR NON-AFRICAN AMERICAN: >60
GLUCOSE BLD-MCNC: 104 MG/DL (ref 70–99)
GLUCOSE BLD-MCNC: 111 MG/DL (ref 74–109)
GLUCOSE BLD-MCNC: 117 MG/DL (ref 70–99)
HCT VFR BLD CALC: 30.4 % (ref 37–47)
HEMOGLOBIN: 9.8 G/DL (ref 12–16)
IMMATURE GRANULOCYTES #: 0.2 K/UL
LYMPHOCYTES ABSOLUTE: 1.3 K/UL (ref 1.1–4.5)
LYMPHOCYTES RELATIVE PERCENT: 13.7 % (ref 20–40)
MCH RBC QN AUTO: 29.8 PG (ref 27–31)
MCHC RBC AUTO-ENTMCNC: 32.2 G/DL (ref 33–37)
MCV RBC AUTO: 92.4 FL (ref 81–99)
MONOCYTES ABSOLUTE: 0.8 K/UL (ref 0–0.9)
MONOCYTES RELATIVE PERCENT: 8.1 % (ref 0–10)
NEUTROPHILS ABSOLUTE: 7.1 K/UL (ref 1.5–7.5)
NEUTROPHILS RELATIVE PERCENT: 75.1 % (ref 50–65)
PDW BLD-RTO: 13.4 % (ref 11.5–14.5)
PERFORMED ON: ABNORMAL
PERFORMED ON: ABNORMAL
PLATELET # BLD: 223 K/UL (ref 130–400)
PMV BLD AUTO: 9 FL (ref 9.4–12.3)
POTASSIUM REFLEX MAGNESIUM: 4 MMOL/L (ref 3.5–5)
RBC # BLD: 3.29 M/UL (ref 4.2–5.4)
SODIUM BLD-SCNC: 137 MMOL/L (ref 136–145)
WBC # BLD: 9.4 K/UL (ref 4.8–10.8)

## 2021-01-18 PROCEDURE — 2700000000 HC OXYGEN THERAPY PER DAY

## 2021-01-18 PROCEDURE — 80048 BASIC METABOLIC PNL TOTAL CA: CPT

## 2021-01-18 PROCEDURE — 36415 COLL VENOUS BLD VENIPUNCTURE: CPT

## 2021-01-18 PROCEDURE — 2500000003 HC RX 250 WO HCPCS: Performed by: INTERNAL MEDICINE

## 2021-01-18 PROCEDURE — 85025 COMPLETE CBC W/AUTO DIFF WBC: CPT

## 2021-01-18 PROCEDURE — 6360000002 HC RX W HCPCS: Performed by: FAMILY MEDICINE

## 2021-01-18 PROCEDURE — 6370000000 HC RX 637 (ALT 250 FOR IP): Performed by: INTERNAL MEDICINE

## 2021-01-18 PROCEDURE — 6360000002 HC RX W HCPCS: Performed by: INTERNAL MEDICINE

## 2021-01-18 PROCEDURE — 82947 ASSAY GLUCOSE BLOOD QUANT: CPT

## 2021-01-18 RX ORDER — SULFAMETHOXAZOLE AND TRIMETHOPRIM 800; 160 MG/1; MG/1
1 TABLET ORAL 2 TIMES DAILY
Qty: 14 TABLET | Refills: 0 | Status: SHIPPED | OUTPATIENT
Start: 2021-01-18 | End: 2021-01-25

## 2021-01-18 RX ORDER — FAMOTIDINE 20 MG/1
20 TABLET, FILM COATED ORAL 2 TIMES DAILY
Qty: 60 TABLET | Refills: 0 | Status: SHIPPED | OUTPATIENT
Start: 2021-01-18 | End: 2021-03-18

## 2021-01-18 RX ADMIN — ENOXAPARIN SODIUM 40 MG: 40 INJECTION SUBCUTANEOUS at 08:35

## 2021-01-18 RX ADMIN — ARIPIPRAZOLE 5 MG: 5 TABLET ORAL at 08:30

## 2021-01-18 RX ADMIN — ONDANSETRON HYDROCHLORIDE 4 MG: 2 SOLUTION INTRAMUSCULAR; INTRAVENOUS at 08:30

## 2021-01-18 RX ADMIN — DULOXETINE HYDROCHLORIDE 40 MG: 20 CAPSULE, DELAYED RELEASE ORAL at 08:30

## 2021-01-18 RX ADMIN — FAMOTIDINE 20 MG: 10 INJECTION, SOLUTION INTRAVENOUS at 08:30

## 2021-01-18 RX ADMIN — GABAPENTIN 400 MG: 400 CAPSULE ORAL at 08:30

## 2021-01-18 RX ADMIN — BUSPIRONE HYDROCHLORIDE 10 MG: 10 TABLET ORAL at 08:30

## 2021-01-18 RX ADMIN — BUSPIRONE HYDROCHLORIDE 10 MG: 10 TABLET ORAL at 15:19

## 2021-01-18 NOTE — PROGRESS NOTES
CLINICAL PHARMACY NOTE: MEDS TO [address] Select Patient?: No  Total # of Prescriptions Filled: 4   The following medications were delivered to the patient:  · Trazodone 150 mg  · Lisinopril 20 mg  · Famotidine 20 mg  · Bactrim /160 mg  Total # of Interventions Completed: 0  Time Spent (min): 30    Additional Documentation:    Handed scripts to Jaquan Truong)

## 2021-01-18 NOTE — DISCHARGE SUMMARY
Kaiden Luu  :  1970  MRN:  [id number]    Admit date:  1/15/2021  Discharge date:      Admitting Physician:  Ivett Hsieh DO    Advance Directive: Full Code    Consults: none    Primary Care Physician:  NATALI Sanchez    Discharge Diagnoses:  Principal Problem (Resolved):    Severe sepsis [facility])  Active Problems: Moderate episode of recurrent major depressive disorder ([facility]    Acute cystitis without hematuria    Type 2 diabetes mellitus with hyperosmolarity without coma ([facility]    Hypertension    Dysuria    Morbid obesity with BMI of 40.0-44.9, adult (HCC)    Anxiety and depression    Insomnia    Other chronic pain    Mood disorder ([facility]      Significant Diagnostic Studies:   No results found. Pertinent Labs:   CBC:   Recent Labs     01/15/21  2237 01/17/21  0216 01/18/21  0952   WBC 13.1* 7.1 9.4   HGB 11.0* 9.8* 9.8*    231 223     BMP:    Recent Labs     01/15/21  2237 01/17/21  0216 01/18/21  0952   * 138 137   K 3.8 4.2 4.0   CL 99 104 99   CO2 25 26 29   BUN 20 19 9   CREATININE 2.7* 1.5* 0.9   GLUCOSE 110* 147* 111*     INR: No results for input(s): INR in the last 72 hours. ABGs:No results for input(s): PH, PO2, PCO2, HCO3, BE, O2SAT in the last 72 hours. Lactic Acid:  Recent Labs     01/15/21  2330   LACTA 1.0       Procedures: None performed. Hospital Course:   1-16: Presented to ED overnight with dizziness and low blood pressure. Dysuria and flank pain noted. Urinalysis suggests infection. Hypotensive. Admitted to ICU with sepsis secondary to urinary tract infection on piperacillin/tazobactam. Given IV fluids and hypotension resolved. Moved to med/surg late in the morning. 1-17: Change antibiotic to ceftriaxone. Still febrile overnight, will keep another day to insure fever is resolved and likely discharge on oral antibiotics tomorrow. 1-18: Slight fever but patient's leukocytosis is resolved and she feels well.  Will discharge to home on Bactrim DS x7 days with urine culture and sensitivity pending. Will call with antibiotic changes as indicated. Physical Exam:  Vitals: BP (!) 155/96   Pulse 113   Temp 97.1 °F (36.2 °C) (Temporal)   Resp 16   Ht 5' 8\" (1.727 m)   Wt (!) 333 lb (151 kg)   SpO2 91%   BMI 50.63 kg/m²   24HR INTAKE/OUTPUT:      Intake/Output Summary (Last 24 hours) at 1/18/2021 1329  Last data filed at 1/18/2021 1200  Gross per 24 hour   Intake 3735 ml   Output 550 ml   Net 3185 ml     General appearance: alert and cooperative with exam  HEENT: atraumatic, eyes with clear conjunctiva and normal lids, pupils and irises normal, external ears and nose are normal, lips normal. Neck without masses, lympadenopathy, bruit, thyroid normal  Lungs: no increased work of breathing, clear to auscultation bilaterally without rales, rhonchi or wheezes  Heart: regular rate and rhythm, S1, S2 normal, no murmur, click, rub or gallop  Abdomen: soft, non-tender; bowel sounds normal; no masses,  no organomegaly and obese  Extremities: extremities normal without clubbing, atraumatic, no cyanosis or edema  Neurologic: no focal neurologic deficits, normal sensation, alert and oriented, affect and mood appropriate  Skin: no jaundice, rashes, or nodules    Discharge Medications:       Irish Goldman   Home Medication Instructions FVK:[id number]    Printed on:01/18/21 1329   Medication Information                      ALPRAZolam (XANAX) 0.5 MG tablet  Take 1 tablet by mouth nightly as needed for Sleep for up to 30 days.              ARIPiprazole (ABILIFY) 5 MG tablet  TAKE ONE TABLET BY MOUTH DAILY             aspirin 325 MG tablet  Take 325 mg by mouth nightly              busPIRone (BUSPAR) 10 MG tablet  Take 1 tablet by mouth 3 times daily             DULoxetine HCl 40 MG CPEP  TAKE 1 CAPSULE BY MOUTH DAILY             ezetimibe-simvastatin (VYTORIN) 10-40 MG per tablet  Take 1 tablet by mouth nightly             famotidine (PEPCID) 20 MG tablet  Take 1 tablet by mouth 2 times daily             gabapentin (NEURONTIN) 800 MG tablet  Take 800 mg by mouth every 8 hours as needed (Dr. Isabella Stanton). glucose monitoring kit (FREESTYLE) monitoring kit  1 kit by Does not apply route daily Please fill script with meter covered under patients insurance. Dx: E11.9             HYDROcodone-acetaminophen (NORCO)  MG per tablet  Take 1 tablet by mouth every 8 hours as needed for Pain (Dr. Isabella Stanton). lisinopril (PRINIVIL;ZESTRIL) 20 MG tablet  TAKE 1 TABLET BY MOUTH EVERY DAY             metFORMIN (GLUCOPHAGE) 500 MG tablet  Take 2 tablets by mouth 2 times daily (with meals)             Multiple Vitamins-Minerals (THERAPEUTIC MULTIVITAMIN-MINERALS) tablet  Take 1 tablet by mouth daily             ondansetron (ZOFRAN ODT) 4 MG disintegrating tablet  Take 1 tablet by mouth every 8 hours as needed for Nausea or Vomiting             ONE TOUCH ULTRA TEST strip  USE TO TEST BLOOD SUAGR TWICE DAILY AS NEEDED. OneTouch Delica Lancets 09V MISC  USE TO CHECK BLOOD SUGAR TWICE DAILY             OZEMPIC, 1 MG/DOSE, 2 MG/1.5ML SOPN  INJECT 1MG INTO THE SKIN ONCE A WEEK             sulfamethoxazole-trimethoprim (BACTRIM DS;SEPTRA DS) 800-160 MG per tablet  Take 1 tablet by mouth 2 times daily for 7 days             traZODone (DESYREL) 150 MG tablet  TAKE 2 TABLET BY MOUTH EVERY EVENING                 Discharge Instructions: Follow up with NATALI Burroughs in 7 days. Take medications as directed. Resume activity as tolerated. Diet: DIET CARB CONTROL;     Disposition: Patient is medically stable and will be discharged Home. Time spent on discharge less than 30 minutes.     Signed:  Chanel Palomares DO

## 2021-01-19 ENCOUNTER — TELEPHONE (OUTPATIENT)
Dept: PRIMARY CARE CLINIC | Age: 51
End: 2021-01-19

## 2021-01-19 LAB
BLOOD CULTURE, ROUTINE: NORMAL
CULTURE, BLOOD 2: NORMAL

## 2021-01-19 NOTE — TELEPHONE ENCOUNTER
Pt called and stated she was just released from the hospital on 1/18 for sepsis. She states her blood pressure has been running a little high and she took her lisinopril and its still running in the 150's for her top number. I told pt that if it continues to rise I would suggest her to go to Urgent Care or ER because that could put her at risk for multiple things. Pt asked me how often she should check her BP and I told her she could check it once every hour or every other hour and she had any more concerns to give me a call back. I also let the pt know that I would send a note to the provider and get back to her once I hear something. Pt VU. Please advise.

## 2021-01-20 ENCOUNTER — TELEPHONE (OUTPATIENT)
Dept: PRIMARY CARE CLINIC | Age: 51
End: 2021-01-20

## 2021-01-20 NOTE — TELEPHONE ENCOUNTER
[facility] Transitions Initial Follow Up Call    Outreach made within 2 business days of discharge: Yes    Patient: Devyn Lara Patient : 1970   MRN: [id number]    Reason for Admission:  Severe sepsis   Discharge Date: 21       Spoke with: Patient    Discharge department/facility: [facility] Interactive Patient Contact:  Was patient able to fill all prescriptions: Yes  Was patient instructed to bring all medications to the follow-up visit: Yes  Is patient taking all medications as directed in the discharge summary? Yes  Does patient understand their discharge instructions: Yes  Does patient have questions or concerns that need addressed prior to 7-14 day follow up office visit: no      Spoke with patient. She states she is starting to feel better. currently taking Bactrim for UTI. Her HCTZ was discontinued while inpatient. Her appetite has improved. She is having regular bowel movements. She does not have any UTI symptoms. F/U scheduled for 21 with Bertha Edwards. She was advised to call if she has any issues and needs to be seen sooner.   Scheduled appointment with PCP within 7-14 days    Follow Up  Future Appointments   Date Time Provider Sherly Patterson   2021  2:00 PM [facility]den, [facility]   2021  1:15 PM Orest Cowden, APRN [facility]   2021 10:30 AM Orest Cowden, APRN [address]

## 2021-01-21 LAB
BLOOD CULTURE, ROUTINE: NORMAL
CULTURE, BLOOD 2: NORMAL

## 2021-01-22 ENCOUNTER — TELEPHONE (OUTPATIENT)
Dept: PRIMARY CARE CLINIC | Age: 51
End: 2021-01-22

## 2021-01-22 DIAGNOSIS — I10 ESSENTIAL HYPERTENSION: Primary | ICD-10-CM

## 2021-01-22 RX ORDER — LISINOPRIL 20 MG/1
TABLET ORAL
Qty: 60 TABLET | Refills: 0 | Status: SHIPPED | OUTPATIENT
Start: 2021-01-22 | End: 2021-02-04 | Stop reason: ALTCHOICE

## 2021-01-22 NOTE — TELEPHONE ENCOUNTER
Contacted pt at home. Will begin lisinopril 10mg in the morning and 20mg at bedtime. Will follow up next week. Will send new script to the pharmacy.

## 2021-01-22 NOTE — TELEPHONE ENCOUNTER
Called pt after receiving voicemail about having questions about her BP. Pt states she just got out of the hospital on Monday and BP has been elevated. Pt checked BP while on the phone and it was 111/89. Pt states she takes Lisinopril 20mg once daily and hospital stopped hydrochlorothiazide. Pt wants to know if the dosage could be increased to help levels. Pt states BP was 153/92 this morning. Please advise.

## 2021-02-04 ENCOUNTER — OFFICE VISIT (OUTPATIENT)
Dept: PRIMARY CARE CLINIC | Age: 51
End: 2021-02-04
Payer: MEDICAID

## 2021-02-04 VITALS
OXYGEN SATURATION: 97 % | BODY MASS INDEX: 44.41 KG/M2 | RESPIRATION RATE: 18 BRPM | HEIGHT: 68 IN | WEIGHT: 293 LBS | HEART RATE: 105 BPM | TEMPERATURE: 97.5 F

## 2021-02-04 DIAGNOSIS — F32.A ANXIETY AND DEPRESSION: ICD-10-CM

## 2021-02-04 DIAGNOSIS — F41.9 ANXIETY AND DEPRESSION: ICD-10-CM

## 2021-02-04 DIAGNOSIS — R39.198 DIFFICULTY VOIDING: ICD-10-CM

## 2021-02-04 DIAGNOSIS — I10 ESSENTIAL HYPERTENSION: ICD-10-CM

## 2021-02-04 DIAGNOSIS — Z79.899 DRUG THERAPY: ICD-10-CM

## 2021-02-04 DIAGNOSIS — Z09 HOSPITAL DISCHARGE FOLLOW-UP: Primary | ICD-10-CM

## 2021-02-04 LAB
ALCOHOL URINE: NORMAL
AMPHETAMINE SCREEN, URINE: NORMAL
APPEARANCE FLUID: CLEAR
BARBITURATE SCREEN, URINE: NORMAL
BENZODIAZEPINE SCREEN, URINE: NORMAL
BILIRUBIN, POC: ABNORMAL
BLOOD URINE, POC: ABNORMAL
BUPRENORPHINE URINE: NORMAL
CLARITY, POC: CLEAR
COCAINE METABOLITE SCREEN URINE: NORMAL
COLOR, POC: YELLOW
FENTANYL SCREEN, URINE: NORMAL
GABAPENTIN SCREEN, URINE: NORMAL
GLUCOSE URINE, POC: ABNORMAL
KETONES, POC: ABNORMAL
LEUKOCYTE EST, POC: ABNORMAL
MDMA URINE: NORMAL
METHADONE SCREEN, URINE: NORMAL
METHAMPHETAMINE, URINE: NORMAL
NITRITE, POC: ABNORMAL
OPIATE SCREEN URINE: NORMAL
OXYCODONE SCREEN URINE: NORMAL
PH, POC: 7
PHENCYCLIDINE SCREEN URINE: NORMAL
PROPOXYPHENE SCREEN, URINE: NORMAL
PROTEIN, POC: ABNORMAL
SPECIFIC GRAVITY, POC: 1.02
SYNTHETIC CANNABINOIDS(K2) SCREEN, URINE: NORMAL
THC SCREEN, URINE: NORMAL
TRAMADOL SCREEN URINE: NORMAL
TRICYCLIC ANTIDEPRESSANTS, UR: NORMAL
UROBILINOGEN, POC: 0.2

## 2021-02-04 PROCEDURE — 1111F DSCHRG MED/CURRENT MED MERGE: CPT | Performed by: NURSE PRACTITIONER

## 2021-02-04 PROCEDURE — 80305 DRUG TEST PRSMV DIR OPT OBS: CPT | Performed by: NURSE PRACTITIONER

## 2021-02-04 PROCEDURE — 99214 OFFICE O/P EST MOD 30 MIN: CPT | Performed by: NURSE PRACTITIONER

## 2021-02-04 PROCEDURE — 81002 URINALYSIS NONAUTO W/O SCOPE: CPT | Performed by: NURSE PRACTITIONER

## 2021-02-04 RX ORDER — ALPRAZOLAM 0.5 MG/1
0.5 TABLET ORAL NIGHTLY PRN
Qty: 30 TABLET | Refills: 0 | Status: SHIPPED | OUTPATIENT
Start: 2021-02-04 | End: 2021-03-02

## 2021-02-04 RX ORDER — LISINOPRIL 10 MG/1
10 TABLET ORAL 2 TIMES DAILY
Qty: 30 TABLET | Refills: 0 | Status: SHIPPED | OUTPATIENT
Start: 2021-02-04 | End: 2021-03-25

## 2021-02-04 RX ORDER — TAMSULOSIN HYDROCHLORIDE 0.4 MG/1
0.4 CAPSULE ORAL DAILY
Qty: 30 CAPSULE | Refills: 2 | Status: SHIPPED | OUTPATIENT
Start: 2021-02-04 | End: 2021-05-04

## 2021-02-04 ASSESSMENT — ENCOUNTER SYMPTOMS
PHOTOPHOBIA: 0
SHORTNESS OF BREATH: 0
NAUSEA: 0
RHINORRHEA: 0
VOICE CHANGE: 0
BACK PAIN: 0
VOMITING: 0
COLOR CHANGE: 0
COUGH: 0

## 2021-02-04 NOTE — PATIENT INSTRUCTIONS
Change lisinopril to 10mg twice daily. Flomax 0.4mg daily to help get urine out. Have fasting labs before follow up on the 25th.

## 2021-02-04 NOTE — PROGRESS NOTES
tablet  TAKE ONE TABLET BY MOUTH DAILY             aspirin 325 MG tablet  Take 325 mg by mouth nightly              busPIRone (BUSPAR) 10 MG tablet  Take 1 tablet by mouth 3 times daily             DULoxetine HCl 40 MG CPEP  TAKE 1 CAPSULE BY MOUTH DAILY             ezetimibe-simvastatin (VYTORIN) 10-40 MG per tablet  Take 1 tablet by mouth nightly             famotidine (PEPCID) 20 MG tablet  Take 1 tablet by mouth 2 times daily             gabapentin (NEURONTIN) 800 MG tablet  Take 800 mg by mouth every 8 hours as needed (Dr. Veronica Stokes). glucose monitoring kit (FREESTYLE) monitoring kit  1 kit by Does not apply route daily Please fill script with meter covered under patients insurance. Dx: E11.9             HYDROcodone-acetaminophen (NORCO)  MG per tablet  Take 1 tablet by mouth every 8 hours as needed for Pain (Dr. Veronica Stokes). lisinopril (PRINIVIL;ZESTRIL) 10 MG tablet  Take 1 tablet by mouth 2 times daily             metFORMIN (GLUCOPHAGE) 500 MG tablet  Take 2 tablets by mouth 2 times daily (with meals)             Multiple Vitamins-Minerals (THERAPEUTIC MULTIVITAMIN-MINERALS) tablet  Take 1 tablet by mouth daily             ondansetron (ZOFRAN ODT) 4 MG disintegrating tablet  Take 1 tablet by mouth every 8 hours as needed for Nausea or Vomiting             ONE TOUCH ULTRA TEST strip  USE TO TEST BLOOD SUAGR TWICE DAILY AS NEEDED.              OneTouch Delica Lancets 05E MISC  USE TO CHECK BLOOD SUGAR TWICE DAILY             OZEMPIC, 1 MG/DOSE, 2 MG/1.5ML SOPN  INJECT 1MG INTO THE SKIN ONCE A WEEK             tamsulosin (FLOMAX) 0.4 MG capsule  Take 1 capsule by mouth daily             traZODone (DESYREL) 150 MG tablet  TAKE 2 TABLET BY MOUTH EVERY EVENING                   Medications marked \"taking\" at this time  Outpatient Medications Marked as Taking for the 2/4/21 encounter (Office Visit) with NATALI Pete   Medication Sig Dispense Refill    tamsulosin (FLOMAX) 0.4 MG capsule Take 1 capsule by mouth daily 30 capsule 2    ALPRAZolam (XANAX) 0.5 MG tablet Take 1 tablet by mouth nightly as needed for Sleep for up to 30 days. 30 tablet 0    lisinopril (PRINIVIL;ZESTRIL) 10 MG tablet Take 1 tablet by mouth 2 times daily 30 tablet 0    famotidine (PEPCID) 20 MG tablet Take 1 tablet by mouth 2 times daily 60 tablet 0    traZODone (DESYREL) 150 MG tablet TAKE 2 TABLET BY MOUTH EVERY EVENING 60 tablet 3    ondansetron (ZOFRAN ODT) 4 MG disintegrating tablet Take 1 tablet by mouth every 8 hours as needed for Nausea or Vomiting 10 tablet 0    ARIPiprazole (ABILIFY) 5 MG tablet TAKE ONE TABLET BY MOUTH DAILY 30 tablet 5    DULoxetine HCl 40 MG CPEP TAKE 1 CAPSULE BY MOUTH DAILY 30 capsule 5    OZEMPIC, 1 MG/DOSE, 2 MG/1.5ML SOPN INJECT 1MG INTO THE SKIN ONCE A WEEK 3 mL 1    OneTouch Delica Lancets 88E MISC USE TO CHECK BLOOD SUGAR TWICE DAILY 100 each 1    ezetimibe-simvastatin (VYTORIN) 10-40 MG per tablet Take 1 tablet by mouth nightly 30 tablet 3    metFORMIN (GLUCOPHAGE) 500 MG tablet Take 2 tablets by mouth 2 times daily (with meals) 120 tablet 3    busPIRone (BUSPAR) 10 MG tablet Take 1 tablet by mouth 3 times daily 90 tablet 3    HYDROcodone-acetaminophen (NORCO)  MG per tablet Take 1 tablet by mouth every 8 hours as needed for Pain (Dr. Fausto Alcazar). 0    gabapentin (NEURONTIN) 800 MG tablet Take 800 mg by mouth every 8 hours as needed (Dr. Fausto Alcazar). 1    ONE TOUCH ULTRA TEST strip USE TO TEST BLOOD SUAGR TWICE DAILY AS NEEDED. 100 strip 0    glucose monitoring kit (FREESTYLE) monitoring kit 1 kit by Does not apply route daily Please fill script with meter covered under patients insurance.  Dx: E11.9 1 kit 0    aspirin 325 MG tablet Take 325 mg by mouth nightly       Multiple Vitamins-Minerals (THERAPEUTIC MULTIVITAMIN-MINERALS) tablet Take 1 tablet by mouth daily (Patient taking differently: Take 1 tablet by mouth nightly ) 30 tablet 5        Medications patient taking as of now reconciled against medications ordered at time of hospital discharge: Yes    Chief Complaint   Patient presents with    Follow-Up from Hospital     Pt states sepsis has gotten better. HPI    Discharge Diagnoses:  Principal Problem (Resolved):    Severe sepsis ([facility]  Active Problems: Moderate episode of recurrent major depressive disorder ([facility]    Acute cystitis without hematuria    Type 2 diabetes mellitus with hyperosmolarity without coma ([facility]    Hypertension    Dysuria    Morbid obesity with BMI of 40.0-44.9, adult (HCC)    Anxiety and depression    Insomnia    Other chronic pain    Mood disorder [facility])    Inpatient course: Discharge summary reviewed- see chart. Interval history/Current status: much improved. Review of Systems   Constitutional: Negative for chills and fever. HENT: Negative for ear pain, hearing loss, rhinorrhea and voice change. Eyes: Negative for photophobia and visual disturbance. Respiratory: Negative for cough and shortness of breath. Cardiovascular: Negative for chest pain and palpitations. Gastrointestinal: Negative for nausea and vomiting. Endocrine: Negative. Negative for cold intolerance and heat intolerance. Genitourinary: Positive for frequency. Negative for difficulty urinating and flank pain. Difficulty voiding   Musculoskeletal: Negative for back pain and neck pain. Skin: Negative for color change and rash. Allergic/Immunologic: Negative for environmental allergies and food allergies. Neurological: Negative for dizziness, speech difficulty and headaches. Hematological: Does not bruise/bleed easily. Psychiatric/Behavioral: Negative for sleep disturbance and suicidal ideas. The patient is not nervous/anxious. Vitals:    02/04/21 1352   Pulse: 105   Resp: 18   Temp: 97.5 °F (36.4 °C)   TempSrc: Temporal   SpO2: 97%   Weight: (!) 325 lb (147.4 kg)   Height: 5' 8\" (1.727 m)     Body mass index is 49.42 kg/m². straightforward and moderate complexity

## 2021-02-19 ENCOUNTER — NURSE TRIAGE (OUTPATIENT)
Dept: OTHER | Facility: CLINIC | Age: 51
End: 2021-02-19

## 2021-02-19 NOTE — TELEPHONE ENCOUNTER
Patient called [facility] at [facility])  with red flag complaint. Brief description of triage: Right Groin redness, tender and odor. S/p right groin central line while in hospital in January. Triage indicates for patient to be seen today. Pt advised to seek urgent care today if she can not be seen in office. Care advice provided, patient verbalizes understanding; denies any other questions or concerns; instructed to call back for any new or worsening symptoms. Writer provided warm transfer to [facility] for appointment scheduling. Attention Provider: Thank you for allowing me to participate in the care of your patient. The patient was connected to triage in response to information provided to the ECC. Please do not respond through this encounter as the response is not directed to a shared pool. Reason for Disposition   Skin redness around the wound larger than 2 inches (5 cm)    Answer Assessment - Initial Assessment Questions  1. LOCATION: \"Where is the wound located? \"     Right groin central line d/c 1/18/21  2. WOUND APPEARANCE: \"What does the wound look like? \"      Now right groin is red, tender to touch, itchy, has odor. 3. SIZE: If redness is present, ask: \"What is the size of the red area? \" (Inches, centimeters, or compare to size of a coin)   No lump or bump in right  groin  4. SPREAD: \"What's changed in the last day? \"  \"Do you see any red streaks coming from the wound? \"    Denies  5. ONSET: \"When did it start to look infected? \"  Last night  6. MECHANISM: \"How did the wound start, what was the cause?\"      7. PAIN: \"Is there any pain? \" If so, ask: \"How bad is the pain? \"   (Scale 1-10; or mild, moderate, severe)     1/10  8. FEVER: \"Do you have a fever? \" If so, ask: \"What is your temperature, how was it measured, and when did it start? \"     denies  9. OTHER SYMPTOMS: \"Do you have any other symptoms? \" (e.g., shaking chills, weakness, rash elsewhere on body)     10. PREGNANCY: \"Is there any chance you are pregnant? \" \"When was your last menstrual period? \"  NA    Protocols used: WOUND INFECTION-ADULT-[place]

## 2021-02-20 ENCOUNTER — OFFICE VISIT (OUTPATIENT)
Dept: URGENT CARE | Age: 51
End: 2021-02-20
Payer: MEDICAID

## 2021-02-20 VITALS
TEMPERATURE: 98 F | WEIGHT: 293 LBS | HEART RATE: 86 BPM | HEIGHT: 68 IN | RESPIRATION RATE: 18 BRPM | DIASTOLIC BLOOD PRESSURE: 85 MMHG | OXYGEN SATURATION: 98 % | BODY MASS INDEX: 44.41 KG/M2 | SYSTOLIC BLOOD PRESSURE: 147 MMHG

## 2021-02-20 DIAGNOSIS — B37.89 CANDIDA RASH OF GROIN: Primary | ICD-10-CM

## 2021-02-20 PROCEDURE — 1036F TOBACCO NON-USER: CPT | Performed by: NURSE PRACTITIONER

## 2021-02-20 PROCEDURE — G8427 DOCREV CUR MEDS BY ELIG CLIN: HCPCS | Performed by: NURSE PRACTITIONER

## 2021-02-20 PROCEDURE — G8484 FLU IMMUNIZE NO ADMIN: HCPCS | Performed by: NURSE PRACTITIONER

## 2021-02-20 PROCEDURE — 99202 OFFICE O/P NEW SF 15 MIN: CPT | Performed by: NURSE PRACTITIONER

## 2021-02-20 PROCEDURE — 3017F COLORECTAL CA SCREEN DOC REV: CPT | Performed by: NURSE PRACTITIONER

## 2021-02-20 PROCEDURE — G8417 CALC BMI ABV UP PARAM F/U: HCPCS | Performed by: NURSE PRACTITIONER

## 2021-02-20 RX ORDER — CLOTRIMAZOLE 1 %
CREAM (GRAM) TOPICAL
Qty: 24 G | Refills: 0 | Status: SHIPPED | OUTPATIENT
Start: 2021-02-20 | End: 2021-02-27

## 2021-02-20 RX ORDER — NYSTATIN 100000 [USP'U]/G
POWDER TOPICAL
Qty: 30 G | Refills: 0 | Status: SHIPPED | OUTPATIENT
Start: 2021-02-20 | End: 2021-04-27 | Stop reason: SDUPTHER

## 2021-02-20 ASSESSMENT — ENCOUNTER SYMPTOMS
DIARRHEA: 0
CONSTIPATION: 0
RHINORRHEA: 0
VOMITING: 0
SORE THROAT: 0
NAUSEA: 0

## 2021-02-20 NOTE — PROGRESS NOTES
[address] [facility]  [address] [address] [place] [address]  Dept: [phone]  Dept Fax: [phone]  Loc: [phone]    Neena Sue is a 45 y.o. female who presents today for her medical conditions/complaintsas noted below. Neena Sue is c/o of Rash (to R groin for 2 days, states it does have a foul odor)        HPI:     Rash  This is a new problem. The current episode started in the past 7 days. The problem has been gradually worsening since onset. The affected locations include the groin. The rash is characterized by redness. She was exposed to nothing. Pertinent negatives include no congestion, diarrhea, fever, rhinorrhea, sore throat or vomiting. Past treatments include nothing. Past Medical History:   Diagnosis Date    Anxiety     Chronic pain     Depression     Diabetes mellitus type 2 in obese (HCC)     Hyperlipidemia     Hypertension     Premenopausal patient 2018     Past Surgical History:   Procedure Laterality Date     SECTION      TENDON RELEASE      right finger    TUBAL LIGATION         Family History   Problem Relation Age of Onset    Diabetes Father     Cancer Father         Bladder and Lung    Breast Cancer Sister         1/2 Sister    Diabetes Maternal Grandfather     Diabetes Paternal Grandmother        Social History     Tobacco Use    Smoking status: Never Smoker    Smokeless tobacco: Never Used   Substance Use Topics    Alcohol use: Yes     Comment: socially      Current Outpatient Medications   Medication Sig Dispense Refill    nystatin (MYCOSTATIN) 758299 UNIT/GM powder Apply daily to skin folds as needed up to 3 times a day.  30 g 0    clotrimazole (LOTRIMIN AF) 1 % cream Apply topically 2 times daily to right groin 24 g 0    tamsulosin (FLOMAX) 0.4 MG capsule Take 1 capsule by mouth daily 30 capsule 2    ALPRAZolam (XANAX) 0.5 MG tablet Take 1 tablet by mouth nightly as needed for Sleep for up to 30 days. 30 tablet 0    lisinopril (PRINIVIL;ZESTRIL) 10 MG tablet Take 1 tablet by mouth 2 times daily 30 tablet 0    famotidine (PEPCID) 20 MG tablet Take 1 tablet by mouth 2 times daily 60 tablet 0    traZODone (DESYREL) 150 MG tablet TAKE 2 TABLET BY MOUTH EVERY EVENING 60 tablet 3    ondansetron (ZOFRAN ODT) 4 MG disintegrating tablet Take 1 tablet by mouth every 8 hours as needed for Nausea or Vomiting 10 tablet 0    ARIPiprazole (ABILIFY) 5 MG tablet TAKE ONE TABLET BY MOUTH DAILY 30 tablet 5    DULoxetine HCl 40 MG CPEP TAKE 1 CAPSULE BY MOUTH DAILY 30 capsule 5    OZEMPIC, 1 MG/DOSE, 2 MG/1.5ML SOPN INJECT 1MG INTO THE SKIN ONCE A WEEK 3 mL 1    OneTouch Delica Lancets 50Z MISC USE TO CHECK BLOOD SUGAR TWICE DAILY 100 each 1    ezetimibe-simvastatin (VYTORIN) 10-40 MG per tablet Take 1 tablet by mouth nightly 30 tablet 3    metFORMIN (GLUCOPHAGE) 500 MG tablet Take 2 tablets by mouth 2 times daily (with meals) 120 tablet 3    busPIRone (BUSPAR) 10 MG tablet Take 1 tablet by mouth 3 times daily 90 tablet 3    HYDROcodone-acetaminophen (NORCO)  MG per tablet Take 1 tablet by mouth every 8 hours as needed for Pain (Dr. Matt Maurice). 0    gabapentin (NEURONTIN) 800 MG tablet Take 800 mg by mouth every 8 hours as needed (Dr. Matt Maurice). 1    ONE TOUCH ULTRA TEST strip USE TO TEST BLOOD SUAGR TWICE DAILY AS NEEDED. 100 strip 0    glucose monitoring kit (FREESTYLE) monitoring kit 1 kit by Does not apply route daily Please fill script with meter covered under patients insurance. Dx: E11.9 1 kit 0    aspirin 325 MG tablet Take 325 mg by mouth nightly       Multiple Vitamins-Minerals (THERAPEUTIC MULTIVITAMIN-MINERALS) tablet Take 1 tablet by mouth daily (Patient taking differently: Take 1 tablet by mouth nightly ) 30 tablet 5     No current facility-administered medications for this visit.       No Known Allergies    Health Maintenance   Topic Date Due    Hepatitis C screen  1970    skin fold of right groin consistent with candida   Neurological:      General: No focal deficit present. Mental Status: She is alert and oriented to person, place, and time. Motor: No weakness. Psychiatric:         Mood and Affect: Mood normal.       BP (!) 147/85   Pulse 86   Temp 98 °F (36.7 °C) (Temporal)   Resp 18   Ht 5' 8\" (1.727 m)   Wt (!) 327 lb (148.3 kg)   SpO2 98%   BMI 49.72 kg/m²     Assessment:       Diagnosis Orders   1. Candida rash of groin  nystatin (MYCOSTATIN) 380884 UNIT/GM powder    clotrimazole (LOTRIMIN AF) 1 % cream       Plan:    No orders of the defined types were placed in this encounter. Return if symptoms worsen or fail to improve. Orders Placed This Encounter   Medications    nystatin (MYCOSTATIN) 885451 UNIT/GM powder     Sig: Apply daily to skin folds as needed up to 3 times a day. Dispense:  30 g     Refill:  0    clotrimazole (LOTRIMIN AF) 1 % cream     Sig: Apply topically 2 times daily to right groin     Dispense:  24 g     Refill:  0       Patient given educational materials- see patient instructions. Discussed use, benefit, and side effects of prescribedmedications. All patient questions answered. Pt voiced understanding. Reviewedhealth maintenance. Instructed to continue current medications, diet and exercise. Patient agreed with treatment plan. Follow up as directed. Patient Instructions     1. Use cream as prescribed until rash is gone. 2. Use powder daily to skin folds as we discussed. Can also use as needed. 3. Try to keep all skin folds clean and dry to reduce possibility of yeast growth. 4. If symptoms worsen or do not resolve return for re-evaluation. Patient Education        Candidiasis: Care Instructions  Your Care Instructions  Candidiasis (say \"cex-kfx-FR-uh-kari\") is a yeast infection. Yeast normally lives in your body. But it can cause problems if your body's defenses don't work as they should.   Some medicines can increase your chance of getting a yeast infection. These include antibiotics, steroids, and cancer drugs. And some diseases like AIDS and diabetes can make you more likely to get yeast infections. There are different types of yeast infections. Nonlenkae Baldy is a yeast infection in the mouth. It usually occurs in people with weak immune systems. It causes white patches inside the mouth and throat. Yeast infections of the skin usually occur in skin folds where the skin stays moist. They cause red, oozing patches on your skin. Babies can get these infections under the diaper. People who often wear gloves can get them on their hands. Many women get vaginal yeast infections. They are most common when women take antibiotics. These infections can cause the vagina to itch and burn. They also cause white discharge that looks like cottage cheese. In rare cases, yeast infects the blood. This can cause serious disease. This kind of infection is treated with medicine given through a needle into a vein (IV). After you start treatment, a yeast infection usually goes away quickly. But if your immune system is weak, the infection may come back. Tell your doctor if you get yeast infections often. Follow-up care is a key part of your treatment and safety. Be sure to make and go to all appointments, and call your doctor if you are having problems. It's also a good idea to know your test results and keep a list of the medicines you take. How can you care for yourself at home? · Take your medicines exactly as prescribed. Call your doctor if you think you are having a problem with your medicine. · Use antibiotics only as directed by your doctor. · Eat yogurt with live cultures. It has bacteria called lactobacillus. It may help prevent some types of yeast infections. · Keep your skin clean and dry. Put powder on moist places.   · If you are using a cream or suppository to treat a vaginal yeast infection, don't use condoms or a diaphragm. Use a different type of birth control. · Eat a healthy diet and get regular exercise. This will help keep your immune system strong. When should you call for help? Watch closely for changes in your health, and be sure to contact your doctor if:    · You do not get better as expected. Where can you learn more? Go to https://chpepiceweb.healthMemfoACT. org and sign in to your [employer] account. Enter Z798 in the Seeder box to learn more about \"Candidiasis: Care Instructions. \"     If you do not have an account, please click on the \"Sign Up Now\" link. Current as of: November 8, 2019               Content Version: 12.6  © 1587-0470 [employer]. Care instructions adapted under license by [facility] ([facility]). If you have questions about a medical condition or this instruction, always ask your healthcare professional. [address] any warranty or liability for your use of this information.                Electronically signed by NATALI Francis CNP on 2/20/2021 at 1:15 PM

## 2021-02-20 NOTE — PATIENT INSTRUCTIONS
1. Use cream as prescribed until rash is gone. 2. Use powder daily to skin folds as we discussed. Can also use as needed. 3. Try to keep all skin folds clean and dry to reduce possibility of yeast growth. 4. If symptoms worsen or do not resolve return for re-evaluation. Patient Education        Candidiasis: Care Instructions  Your Care Instructions  Candidiasis (say \"jaj-jie-PI-uh-kari\") is a yeast infection. Yeast normally lives in your body. But it can cause problems if your body's defenses don't work as they should. Some medicines can increase your chance of getting a yeast infection. These include antibiotics, steroids, and cancer drugs. And some diseases like AIDS and diabetes can make you more likely to get yeast infections. There are different types of yeast infections. Caprice Freeze is a yeast infection in the mouth. It usually occurs in people with weak immune systems. It causes white patches inside the mouth and throat. Yeast infections of the skin usually occur in skin folds where the skin stays moist. They cause red, oozing patches on your skin. Babies can get these infections under the diaper. People who often wear gloves can get them on their hands. Many women get vaginal yeast infections. They are most common when women take antibiotics. These infections can cause the vagina to itch and burn. They also cause white discharge that looks like cottage cheese. In rare cases, yeast infects the blood. This can cause serious disease. This kind of infection is treated with medicine given through a needle into a vein (IV). After you start treatment, a yeast infection usually goes away quickly. But if your immune system is weak, the infection may come back. Tell your doctor if you get yeast infections often. Follow-up care is a key part of your treatment and safety. Be sure to make and go to all appointments, and call your doctor if you are having problems.  It's also a good idea to know your test results and keep a list of the medicines you take. How can you care for yourself at home? · Take your medicines exactly as prescribed. Call your doctor if you think you are having a problem with your medicine. · Use antibiotics only as directed by your doctor. · Eat yogurt with live cultures. It has bacteria called lactobacillus. It may help prevent some types of yeast infections. · Keep your skin clean and dry. Put powder on moist places. · If you are using a cream or suppository to treat a vaginal yeast infection, don't use condoms or a diaphragm. Use a different type of birth control. · Eat a healthy diet and get regular exercise. This will help keep your immune system strong. When should you call for help? Watch closely for changes in your health, and be sure to contact your doctor if:    · You do not get better as expected. Where can you learn more? Go to https://ET [email or web address].[email or web address] org and sign in to your [employer] account. Enter C679 in the [employer]Fixed box to learn more about \"Candidiasis: Care Instructions. \"     If you do not have an account, please click on the \"Sign Up Now\" link. Current as of: November 8, 2019               Content Version: 12.6  © 3431-5500 [employer]. Care instructions adapted under license by [facility] ([facility]). If you have questions about a medical condition or this instruction, always ask your healthcare professional. [address] [address] any warranty or liability for your use of this information.

## 2021-02-23 DIAGNOSIS — F32.A ANXIETY AND DEPRESSION: ICD-10-CM

## 2021-02-23 DIAGNOSIS — E11.00 TYPE 2 DIABETES MELLITUS WITH HYPEROSMOLARITY WITHOUT COMA, WITHOUT LONG-TERM CURRENT USE OF INSULIN (HCC): ICD-10-CM

## 2021-02-23 DIAGNOSIS — F41.9 ANXIETY AND DEPRESSION: ICD-10-CM

## 2021-02-23 LAB
ALBUMIN SERPL-MCNC: 4.2 G/DL (ref 3.5–5.2)
ALP BLD-CCNC: 110 U/L (ref 35–104)
ALT SERPL-CCNC: 23 U/L (ref 5–33)
ANION GAP SERPL CALCULATED.3IONS-SCNC: 18 MMOL/L (ref 7–19)
AST SERPL-CCNC: 18 U/L (ref 5–32)
BASOPHILS ABSOLUTE: 0.1 K/UL (ref 0–0.2)
BASOPHILS RELATIVE PERCENT: 0.9 % (ref 0–1)
BILIRUB SERPL-MCNC: <0.2 MG/DL (ref 0.2–1.2)
BUN BLDV-MCNC: 17 MG/DL (ref 6–20)
CALCIUM SERPL-MCNC: 9.8 MG/DL (ref 8.6–10)
CHLORIDE BLD-SCNC: 103 MMOL/L (ref 98–111)
CHOLESTEROL, TOTAL: 185 MG/DL (ref 160–199)
CO2: 26 MMOL/L (ref 22–29)
CREAT SERPL-MCNC: 1.1 MG/DL (ref 0.5–0.9)
EOSINOPHILS ABSOLUTE: 0.3 K/UL (ref 0–0.6)
EOSINOPHILS RELATIVE PERCENT: 3.4 % (ref 0–5)
GFR AFRICAN AMERICAN: >59
GFR NON-AFRICAN AMERICAN: 52
GLUCOSE BLD-MCNC: 119 MG/DL (ref 74–109)
HBA1C MFR BLD: 5.6 % (ref 4–6)
HCT VFR BLD CALC: 40.7 % (ref 37–47)
HDLC SERPL-MCNC: 58 MG/DL (ref 65–121)
HEMOGLOBIN: 12.3 G/DL (ref 12–16)
IMMATURE GRANULOCYTES #: 0 K/UL
LDL CHOLESTEROL CALCULATED: 87 MG/DL
LYMPHOCYTES ABSOLUTE: 2.1 K/UL (ref 1.1–4.5)
LYMPHOCYTES RELATIVE PERCENT: 26.6 % (ref 20–40)
MCH RBC QN AUTO: 29.3 PG (ref 27–31)
MCHC RBC AUTO-ENTMCNC: 30.2 G/DL (ref 33–37)
MCV RBC AUTO: 96.9 FL (ref 81–99)
MONOCYTES ABSOLUTE: 0.4 K/UL (ref 0–0.9)
MONOCYTES RELATIVE PERCENT: 5.3 % (ref 0–10)
NEUTROPHILS ABSOLUTE: 5.1 K/UL (ref 1.5–7.5)
NEUTROPHILS RELATIVE PERCENT: 63.3 % (ref 50–65)
PDW BLD-RTO: 13.8 % (ref 11.5–14.5)
PLATELET # BLD: 344 K/UL (ref 130–400)
PMV BLD AUTO: 8.7 FL (ref 9.4–12.3)
POTASSIUM SERPL-SCNC: 5.2 MMOL/L (ref 3.5–5)
RBC # BLD: 4.2 M/UL (ref 4.2–5.4)
SODIUM BLD-SCNC: 147 MMOL/L (ref 136–145)
TOTAL PROTEIN: 7.3 G/DL (ref 6.6–8.7)
TRIGL SERPL-MCNC: 202 MG/DL (ref 0–149)
TSH SERPL DL<=0.05 MIU/L-ACNC: 1.94 UIU/ML (ref 0.27–4.2)
WBC # BLD: 8 K/UL (ref 4.8–10.8)

## 2021-03-02 DIAGNOSIS — E11.00 TYPE 2 DIABETES MELLITUS WITH HYPEROSMOLARITY WITHOUT COMA, WITHOUT LONG-TERM CURRENT USE OF INSULIN (HCC): Primary | ICD-10-CM

## 2021-03-02 DIAGNOSIS — F41.9 ANXIETY AND DEPRESSION: ICD-10-CM

## 2021-03-02 DIAGNOSIS — F32.A ANXIETY AND DEPRESSION: ICD-10-CM

## 2021-03-02 RX ORDER — ALPRAZOLAM 0.5 MG/1
TABLET ORAL
Qty: 30 TABLET | Refills: 0 | Status: SHIPPED | OUTPATIENT
Start: 2021-03-02 | End: 2021-04-01

## 2021-03-02 NOTE — TELEPHONE ENCOUNTER
----- Message from NATALI Barr sent at 3/2/2021  4:07 PM CST -----  Please have patient come in for repeat BMP 3/3/21. Her potassium was elevated and renal function slightly decreased.

## 2021-03-02 NOTE — TELEPHONE ENCOUNTER
Kay Mead called to request a refill on her medication. Last Annmarie Eden: 12/30/20            Medication Contract: 2/2017 pt needs new one   Last Fill: 2/4/21        Last office visit : 2/4/2021   Next office visit : 3/24/2021     Last UDS:   Amphetamine Screen, Urine   Date Value Ref Range Status   02/04/2021 -  Final     Barbiturates   Date Value Ref Range Status   01/17/2011 Negative () Final     Barbiturate Screen, Urine   Date Value Ref Range Status   02/04/2021 -  Final     Benzodiazepine Screen, Urine   Date Value Ref Range Status   02/04/2021 +  Final     Comment:     Xanax     Buprenorphine Urine   Date Value Ref Range Status   02/04/2021 -  Final     Cocaine Metabolite Screen, Urine   Date Value Ref Range Status   02/04/2021 -  Final     Gabapentin Screen, Urine   Date Value Ref Range Status   02/04/2021 -  Final     MDMA, Urine   Date Value Ref Range Status   02/04/2021 -  Final     Methamphetamine, Urine   Date Value Ref Range Status   02/04/2021 -  Final     Opiate Scrn, Ur   Date Value Ref Range Status   02/04/2021 -  Final     Oxycodone Screen, Ur   Date Value Ref Range Status   02/04/2021 -  Final     PCP Screen, Urine   Date Value Ref Range Status   02/04/2021 -  Final     Propoxyphene Screen, Urine   Date Value Ref Range Status   02/04/2021 -  Final     THC Screen, Urine   Date Value Ref Range Status   02/04/2021 -  Final     Tricyclic Antidepressants, Urine   Date Value Ref Range Status   02/04/2021 -  Final           Requested Prescriptions     Pending Prescriptions Disp Refills    ALPRAZolam (XANAX) 0.5 MG tablet [Pharmacy Med Name: ALPRAZOLAM 0.5MG TABLETS] 30 tablet      Sig: TAKE 1 TABLET BY MOUTH EVERY NIGHT AS NEEDED FOR SLEEP         Please approve or refuse this medication.    Maurice Jett, [facility]

## 2021-03-03 DIAGNOSIS — E11.00 TYPE 2 DIABETES MELLITUS WITH HYPEROSMOLARITY WITHOUT COMA, WITHOUT LONG-TERM CURRENT USE OF INSULIN (HCC): ICD-10-CM

## 2021-03-03 LAB
ANION GAP SERPL CALCULATED.3IONS-SCNC: 12 MMOL/L (ref 7–19)
BUN BLDV-MCNC: 20 MG/DL (ref 6–20)
CALCIUM SERPL-MCNC: 9.5 MG/DL (ref 8.6–10)
CHLORIDE BLD-SCNC: 97 MMOL/L (ref 98–111)
CO2: 29 MMOL/L (ref 22–29)
CREAT SERPL-MCNC: 1.1 MG/DL (ref 0.5–0.9)
GFR AFRICAN AMERICAN: >59
GFR NON-AFRICAN AMERICAN: 52
GLUCOSE BLD-MCNC: 84 MG/DL (ref 74–109)
POTASSIUM SERPL-SCNC: 4.4 MMOL/L (ref 3.5–5)
SODIUM BLD-SCNC: 138 MMOL/L (ref 136–145)

## 2021-03-18 ENCOUNTER — OFFICE VISIT (OUTPATIENT)
Dept: PRIMARY CARE CLINIC | Age: 51
End: 2021-03-18
Payer: MEDICAID

## 2021-03-18 VITALS
TEMPERATURE: 97.4 F | HEART RATE: 108 BPM | DIASTOLIC BLOOD PRESSURE: 78 MMHG | HEIGHT: 68 IN | OXYGEN SATURATION: 97 % | WEIGHT: 293 LBS | RESPIRATION RATE: 18 BRPM | SYSTOLIC BLOOD PRESSURE: 124 MMHG | BODY MASS INDEX: 44.41 KG/M2

## 2021-03-18 DIAGNOSIS — E11.00 TYPE 2 DIABETES MELLITUS WITH HYPEROSMOLARITY WITHOUT COMA, WITHOUT LONG-TERM CURRENT USE OF INSULIN (HCC): ICD-10-CM

## 2021-03-18 DIAGNOSIS — E66.01 MORBID OBESITY WITH BMI OF 40.0-44.9, ADULT (HCC): ICD-10-CM

## 2021-03-18 DIAGNOSIS — I10 ESSENTIAL HYPERTENSION: ICD-10-CM

## 2021-03-18 DIAGNOSIS — Z12.31 BREAST CANCER SCREENING BY MAMMOGRAM: Primary | ICD-10-CM

## 2021-03-18 DIAGNOSIS — F41.9 ANXIETY: ICD-10-CM

## 2021-03-18 PROCEDURE — G8484 FLU IMMUNIZE NO ADMIN: HCPCS | Performed by: NURSE PRACTITIONER

## 2021-03-18 PROCEDURE — 2022F DILAT RTA XM EVC RTNOPTHY: CPT | Performed by: NURSE PRACTITIONER

## 2021-03-18 PROCEDURE — 1036F TOBACCO NON-USER: CPT | Performed by: NURSE PRACTITIONER

## 2021-03-18 PROCEDURE — 3044F HG A1C LEVEL LT 7.0%: CPT | Performed by: NURSE PRACTITIONER

## 2021-03-18 PROCEDURE — 3017F COLORECTAL CA SCREEN DOC REV: CPT | Performed by: NURSE PRACTITIONER

## 2021-03-18 PROCEDURE — G8427 DOCREV CUR MEDS BY ELIG CLIN: HCPCS | Performed by: NURSE PRACTITIONER

## 2021-03-18 PROCEDURE — 99401 PREV MED CNSL INDIV APPRX 15: CPT | Performed by: NURSE PRACTITIONER

## 2021-03-18 PROCEDURE — 99214 OFFICE O/P EST MOD 30 MIN: CPT | Performed by: NURSE PRACTITIONER

## 2021-03-18 PROCEDURE — G8417 CALC BMI ABV UP PARAM F/U: HCPCS | Performed by: NURSE PRACTITIONER

## 2021-03-18 RX ORDER — MELOXICAM 15 MG/1
TABLET ORAL
COMMUNITY
Start: 2021-01-06 | End: 2022-08-04 | Stop reason: ALTCHOICE

## 2021-03-18 RX ORDER — NALOXONE HYDROCHLORIDE 4 MG/.1ML
SPRAY NASAL
COMMUNITY
Start: 2021-02-02

## 2021-03-18 RX ORDER — ALPRAZOLAM 0.5 MG/1
0.5 TABLET ORAL 3 TIMES DAILY PRN
Qty: 30 TABLET | Refills: 0 | Status: SHIPPED | OUTPATIENT
Start: 2021-03-18 | End: 2021-05-04

## 2021-03-18 RX ORDER — TIZANIDINE 4 MG/1
TABLET ORAL
COMMUNITY
Start: 2021-03-11

## 2021-03-18 RX ORDER — ATORVASTATIN CALCIUM 20 MG/1
TABLET, FILM COATED ORAL
COMMUNITY
Start: 2021-03-11 | End: 2021-04-11

## 2021-03-18 NOTE — PATIENT INSTRUCTIONS
Patient Education        Learning About Low-Carbohydrate Diets  What is a low-carbohydrate diet? A low-carbohydrate (or \"low-carb\") diet limits foods and drinks that have carbohydrates. This includes grains, fruits, milk and yogurt, and starchy vegetables like potatoes, beans, and corn. It also avoids foods and drinks that have added sugar. Instead, low-carb diets include foods that are high in protein and fat. Why might you follow a low-carb diet? Low-carb diets may be used for a variety of reasons, such as for weight loss. People who have diabetes may use a low-carb diet to help manage their blood sugar levels. What should you do before you start the diet? Talk to your doctor before you try any diet. This is even more important if you have health problems like kidney disease, heart disease, or diabetes. Your doctor may suggest that you meet with a registered dietitian. A dietitian can help you make an eating plan that works for you. What foods do you eat on a low-carb diet? On a low-carb diet, you choose foods that are high in protein and fat. Examples of these are:  · Meat, poultry, and fish. · Eggs. · Nuts, such as walnuts, pecans, almonds, and peanuts. · Peanut butter and other nut butters. · Tofu. · Avocado. · Clevester December. · Non-starchy vegetables like broccoli, cauliflower, green beans, mushrooms, peppers, lettuce, and spinach. · Unsweetened non-dairy milks like almond milk and coconut milk. · Cheese, cottage cheese, and cream cheese. Current as of: December 17, 2020               Content Version: 12.8  © 2006-2021 Healthwise, [employer]. Care instructions adapted under license by [facility] ([facility]). If you have questions about a medical condition or this instruction, always ask your healthcare professional. [address] any warranty or liability for your use of this information.       Patient Education        Learning About Meal Planning for Diabetes  Why plan your meals? Meal planning can be a key part of managing diabetes. Planning meals and snacks with the right balance of carbohydrate, protein, and fat can help you keep your blood sugar at the target level you set with your doctor. You don't have to eat special foods. You can eat what your family eats, including sweets once in a while. But you do have to pay attention to how often you eat and how much you eat of certain foods. You may want to work with a dietitian or a certified diabetes educator. He or she can give you tips and meal ideas and can answer your questions about meal planning. This health professional can also help you reach a healthy weight if that is one of your goals. What plan is right for you? Your dietitian or diabetes educator may suggest that you start with the plate format or carbohydrate counting. The plate format  The plate format is a simple way to help you manage how you eat. You plan meals by learning how much space each food should take on a plate. Using the plate format helps you spread carbohydrate throughout the day. It can make it easier to keep your blood sugar level within your target range. It also helps you see if you're eating healthy portion sizes. To use the plate format, you put non-starchy vegetables on half your plate. Add meat or meat substitutes on one-quarter of the plate. Put a grain or starchy vegetable (such as brown rice or a potato) on the final quarter of the plate. You can add a small piece of fruit and some low-fat or fat-free milk or yogurt, depending on your carbohydrate goal for each meal.  Here are some tips for using the plate format:  · Make sure that you are not using an oversized plate. A 9-inch plate is best. Many restaurants use larger plates. · Get used to using the plate format at home. Then you can use it when you eat out. · Write down your questions about using the plate format.  Talk to your doctor, a dietitian, or a diabetes educator about your concerns. Carbohydrate counting  With carbohydrate counting, you plan meals based on the amount of carbohydrate in each food. Carbohydrate raises blood sugar higher and more quickly than any other nutrient. It is found in desserts, breads and cereals, and fruit. It's also found in starchy vegetables such as potatoes and corn, grains such as rice and pasta, and milk and yogurt. Spreading carbohydrate throughout the day helps keep your blood sugar levels within your target range. Your daily amount depends on several things, including your weight, how active you are, which diabetes medicines you take, and what your goals are for your blood sugar levels. A registered dietitian or diabetes educator can help you plan how much carbohydrate to include in each meal and snack. A guideline for your daily amount of carbohydrate is:  · 45 to 60 grams at each meal. That's about the same as 3 to 4 carbohydrate servings. · 15 to 20 grams at each snack. That's about the same as 1 carbohydrate serving. The Nutrition Facts label on packaged foods tells you how much carbohydrate is in a serving of the food. First, look at the serving size on the food label. Is that the amount you eat in a serving? All of the nutrition information on a food label is based on that serving size. So if you eat more or less than that, you'll need to adjust the other numbers. Total carbohydrate is the next thing you need to look for on the label. If you count carbohydrate servings, one serving of carbohydrate is 15 grams. For foods that don't come with labels, such as fresh fruits and vegetables, you'll need a guide that lists carbohydrate in these foods. Ask your doctor, dietitian, or diabetes educator about books or other nutrition guides you can use. If you take insulin, you need to know how many grams of carbohydrate are in a meal. This lets you know how much rapid-acting insulin to take before you eat.  If you use an insulin pump, you get a under license by [facility] ([facility]). If you have questions about a medical condition or this instruction, always ask your healthcare professional. [address] any warranty or liability for your use of this information. We are committed to providing you with the best care possible. In order to help us achieve these goals please remember to bring all medications, herbal products, and over the counter supplements with you to each visit. If your provider has ordered testing for you, please be sure to follow up with our office if you have not received results within 7 days after the testing took place. *If you receive a survey after visiting one of our offices, please take time to share your experience concerning your physician office visit. These surveys are confidential and no health information about you is shared. We are eager to improve for you and we are counting on your feedback to help make that happen.

## 2021-03-18 NOTE — PROGRESS NOTES
[address] [facility]  [address] [place] [address]  [address] [address]  Dept: [phone]  Dept Fax: [phone]  Loc: [phone]    Christiana Rodriguez is a 45 y.o. female who presents today for her medical conditions/complaints as noted below. Christiana Rodriguez is c/o of Diabetes (seems to be under control), Hypertension (has questions about her BP medication), and Knee Pain (has some popping and hurting in her knees that seems to be getting worse)        HPI:     HPI   Chief Complaint   Patient presents with    Diabetes     seems to be under control    Hypertension     has questions about her BP medication    Knee Pain     has some popping and hurting in her knees that seems to be getting worse     Patient presents today for follow-up diabetes, hypertension. Patient states blood sugars range 100-120; she is taking metformin, not always consistently but A1C is 5.6. She complains of bilateral knee pain; she is taking m  She sees Dr Rajinder Low for chronic back pain; she is taking Norco 10 TID. Patient BMI >50. She expresses she would like to lose weight.      Past Medical History:   Diagnosis Date    Anxiety     Chronic pain     Depression     Diabetes mellitus type 2 in obese (HCC)     Hyperlipidemia     Hypertension     Premenopausal patient 2018      Past Surgical History:   Procedure Laterality Date     SECTION      TENDON RELEASE      right finger    TUBAL LIGATION         Vitals 3/18/2021 2021 2021 2021 2021 7206   SYSTOLIC [phone] - [phone]   DIASTOLIC 78 85 - 96 88 88   Pulse 108 86 105 113 112 108   Temp 97.4 98 97.5 97.1 99.4 98.9   Resp 18 18 18 16 16 18   SpO2 97 98 97 91 90 91   Weight 332 lb 327 lb 325 lb - - -   Height 5' 8\" 5' 8\" 5' 8\" - - -   Body mass index 50.47 kg/m2 49.71 kg/m2 49.41 kg/m2 - - -   Pain Level - - - - - -   Some recent data might be hidden       Family History   Problem Relation Age of Onset    Diabetes Father     Cancer Father         Bladder and Lung    Breast Cancer Sister         1/2 Sister    Diabetes Maternal Grandfather     Diabetes Paternal Grandmother        Social History     Tobacco Use    Smoking status: Never Smoker    Smokeless tobacco: Never Used   Substance Use Topics    Alcohol use: Yes     Comment: socially      Current Outpatient Medications   Medication Sig Dispense Refill    atorvastatin (LIPITOR) 20 MG tablet       meloxicam (MOBIC) 15 MG tablet TAKE 1 TABLET BY MOUTH EVERY DAY      tiZANidine (ZANAFLEX) 4 MG tablet       NARCAN 4 MG/0.1ML LIQD nasal spray USE 1 SPRAY AS NEEDED      diclofenac sodium (VOLTAREN) 1 % GEL Apply topically 2 times daily 1 g 0    ALPRAZolam (XANAX) 0.5 MG tablet Take 1 tablet by mouth 3 times daily as needed for Sleep or Anxiety for up to 30 days. 30 tablet 0    ALPRAZolam (XANAX) 0.5 MG tablet TAKE 1 TABLET BY MOUTH EVERY NIGHT AS NEEDED FOR SLEEP 30 tablet 0    nystatin (MYCOSTATIN) 043996 UNIT/GM powder Apply daily to skin folds as needed up to 3 times a day. 30 g 0    tamsulosin (FLOMAX) 0.4 MG capsule Take 1 capsule by mouth daily 30 capsule 2    traZODone (DESYREL) 150 MG tablet TAKE 2 TABLET BY MOUTH EVERY EVENING 60 tablet 3    ondansetron (ZOFRAN ODT) 4 MG disintegrating tablet Take 1 tablet by mouth every 8 hours as needed for Nausea or Vomiting 10 tablet 0    ARIPiprazole (ABILIFY) 5 MG tablet TAKE ONE TABLET BY MOUTH DAILY 30 tablet 5    DULoxetine HCl 40 MG CPEP TAKE 1 CAPSULE BY MOUTH DAILY 30 capsule 5    OZEMPIC, 1 MG/DOSE, 2 MG/1.5ML SOPN INJECT 1MG INTO THE SKIN ONCE A WEEK 3 mL 1    OneTouch Delica Lancets 45R MISC USE TO CHECK BLOOD SUGAR TWICE DAILY 100 each 1    metFORMIN (GLUCOPHAGE) 500 MG tablet Take 2 tablets by mouth 2 times daily (with meals) 120 tablet 3    HYDROcodone-acetaminophen (NORCO)  MG per tablet Take 1 tablet by mouth every 8 hours as needed for Pain (Dr. Matt Maurice).    0    gabapentin (NEURONTIN) 800 MG tablet Take 800 mg by mouth every 8 hours as needed (Dr. Campos Brennan). 1    ONE TOUCH ULTRA TEST strip USE TO TEST BLOOD SUAGR TWICE DAILY AS NEEDED. 100 strip 0    glucose monitoring kit (FREESTYLE) monitoring kit 1 kit by Does not apply route daily Please fill script with meter covered under patients insurance. Dx: E11.9 1 kit 0    aspirin 325 MG tablet Take 325 mg by mouth nightly       Multiple Vitamins-Minerals (THERAPEUTIC MULTIVITAMIN-MINERALS) tablet Take 1 tablet by mouth daily (Patient taking differently: Take 1 tablet by mouth nightly ) 30 tablet 5    lisinopril (PRINIVIL;ZESTRIL) 10 MG tablet TAKE 1 TABLET BY MOUTH TWICE DAILY 30 tablet 0     No current facility-administered medications for this visit. No Known Allergies    Health Maintenance   Topic Date Due    Hepatitis C screen  Never done    Pneumococcal 0-64 years Vaccine (1 of 1 - PPSV23) Never done    HIV screen  Never done    COVID-19 Vaccine (1) Never done    Hepatitis B vaccine (1 of 3 - Risk 3-dose series) Never done    DTaP/Tdap/Td vaccine (1 - Tdap) Never done    Cervical cancer screen  Never done    Shingles Vaccine (1 of 2) Never done    Diabetic microalbuminuria test  12/26/2020    Breast cancer screen  01/07/2021    Diabetic retinal exam  01/09/2021    Flu vaccine (1) 11/25/2021 (Originally 9/1/2020)    Diabetic foot exam  11/25/2021    A1C test (Diabetic or Prediabetic)  02/23/2022    Lipid screen  02/23/2022    Potassium monitoring  03/03/2022    Creatinine monitoring  03/03/2022    Colon cancer screen fecal DNA test (Cologuard)  01/09/2023    Hepatitis A vaccine  Aged Out    Hib vaccine  Aged Out    Meningococcal (ACWY) vaccine  Aged Out       Subjective:      Review of Systems   Constitutional: Negative for chills and fever. HENT: Negative for ear pain, hearing loss, rhinorrhea and voice change. Eyes: Negative for photophobia and visual disturbance.    Respiratory: Negative for cough and shortness of breath. Cardiovascular: Negative for chest pain and palpitations. Gastrointestinal: Negative for nausea and vomiting. Endocrine: Negative. Negative for cold intolerance and heat intolerance. Genitourinary: Negative for difficulty urinating and flank pain. Musculoskeletal: Positive for arthralgias. Negative for back pain and neck pain. Skin: Negative for color change and rash. Allergic/Immunologic: Negative for environmental allergies and food allergies. Neurological: Negative for dizziness, speech difficulty and headaches. Hematological: Does not bruise/bleed easily. Psychiatric/Behavioral: Negative for sleep disturbance and suicidal ideas. Objective:     Physical Exam  Vitals signs and nursing note reviewed. Constitutional:       Appearance: She is well-developed. HENT:      Head: Atraumatic. Right Ear: External ear normal.      Left Ear: External ear normal.      Nose: Nose normal.   Eyes:      Conjunctiva/sclera: Conjunctivae normal.      Pupils: Pupils are equal, round, and reactive to light. Neck:      Musculoskeletal: Normal range of motion and neck supple. Cardiovascular:      Rate and Rhythm: Normal rate and regular rhythm. Heart sounds: Normal heart sounds, S1 normal and S2 normal.   Pulmonary:      Effort: Pulmonary effort is normal.      Breath sounds: Normal breath sounds. Abdominal:      General: Bowel sounds are normal.      Palpations: Abdomen is soft. Musculoskeletal: Normal range of motion. Skin:     General: Skin is warm and dry. Neurological:      Mental Status: She is alert and oriented to person, place, and time. Psychiatric:         Behavior: Behavior normal.       /78   Pulse 108   Temp 97.4 °F (36.3 °C) (Temporal)   Resp 18   Ht 5' 8\" (1.727 m)   Wt (!) 332 lb (150.6 kg)   SpO2 97%   BMI 50.48 kg/m²     Assessment:       Diagnosis Orders   1.  Breast cancer screening by mammogram  BALAJI DIGITAL SCREEN W OR WO CAD BILATERAL   2. Anxiety  ALPRAZolam (XANAX) 0.5 MG tablet         Plan:   More than 50% of the time was spent counseling and coordinating care for a total time of 30 min face to face. Blood pressure and diabetes well controlled. Encouraged weight loss which would likely improve knee pain substantially. Patient was asked about her current diet and exercise habits, and personalized advice was provided regarding recommended lifestyle changes. Patient's comorbid health conditions associated with elevated BMI were discussed, including diabetes, dyslipidemia and hypertension, as well as the likely benefits of weight loss. Based upon patient's motivation to change her behavior, the following plan was agreed upon to work toward a weight loss goal of  pounds: low carbohydrate diet. Educational materials for  weight loss were provided. Patient will follow-up in 3 month(s) with PCP. Provider spent 15 minutes counseling patient. Patient given educational materials -see patient instructions. Discussed use, benefit, and side effects of prescribed medications. All patient questions answered. Pt voiced understanding. Reviewed health maintenance. Instructed to continue currentmedications, diet and exercise. Patient agreed with treatment plan. Follow up as directed. MEDICATIONS:  Orders Placed This Encounter   Medications    diclofenac sodium (VOLTAREN) 1 % GEL     Sig: Apply topically 2 times daily     Dispense:  1 g     Refill:  0    ALPRAZolam (XANAX) 0.5 MG tablet     Sig: Take 1 tablet by mouth 3 times daily as needed for Sleep or Anxiety for up to 30 days. Dispense:  30 tablet     Refill:  0         ORDERS:  Orders Placed This Encounter   Procedures    BALAJI DIGITAL SCREEN W OR WO CAD BILATERAL       Follow-up:  Return in about 4 weeks (around 4/15/2021) for weight check , in office.     PATIENT INSTRUCTIONS:  Patient Instructions           Patient Education        Learning About Low-Carbohydrate Diets  What is a low-carbohydrate diet? A low-carbohydrate (or \"low-carb\") diet limits foods and drinks that have carbohydrates. This includes grains, fruits, milk and yogurt, and starchy vegetables like potatoes, beans, and corn. It also avoids foods and drinks that have added sugar. Instead, low-carb diets include foods that are high in protein and fat. Why might you follow a low-carb diet? Low-carb diets may be used for a variety of reasons, such as for weight loss. People who have diabetes may use a low-carb diet to help manage their blood sugar levels. What should you do before you start the diet? Talk to your doctor before you try any diet. This is even more important if you have health problems like kidney disease, heart disease, or diabetes. Your doctor may suggest that you meet with a registered dietitian. A dietitian can help you make an eating plan that works for you. What foods do you eat on a low-carb diet? On a low-carb diet, you choose foods that are high in protein and fat. Examples of these are:  · Meat, poultry, and fish. · Eggs. · Nuts, such as walnuts, pecans, almonds, and peanuts. · Peanut butter and other nut butters. · Tofu. · Avocado. · Clevester December. · Non-starchy vegetables like broccoli, cauliflower, green beans, mushrooms, peppers, lettuce, and spinach. · Unsweetened non-dairy milks like almond milk and coconut milk. · Cheese, cottage cheese, and cream cheese. Current as of: December 17, 2020               Content Version: 12.8  © 2006-2021 Healthwise, [employer]. Care instructions adapted under license by [facility] ([facility]). If you have questions about a medical condition or this instruction, always ask your healthcare professional. [place] [address] any warranty or liability for your use of this information. Patient Education        Learning About Meal Planning for Diabetes  Why plan your meals?      Meal planning can be a cardenas part of managing diabetes. Planning meals and snacks with the right balance of carbohydrate, protein, and fat can help you keep your blood sugar at the target level you set with your doctor. You don't have to eat special foods. You can eat what your family eats, including sweets once in a while. But you do have to pay attention to how often you eat and how much you eat of certain foods. You may want to work with a dietitian or a certified diabetes educator. He or she can give you tips and meal ideas and can answer your questions about meal planning. This health professional can also help you reach a healthy weight if that is one of your goals. What plan is right for you? Your dietitian or diabetes educator may suggest that you start with the plate format or carbohydrate counting. The plate format  The plate format is a simple way to help you manage how you eat. You plan meals by learning how much space each food should take on a plate. Using the plate format helps you spread carbohydrate throughout the day. It can make it easier to keep your blood sugar level within your target range. It also helps you see if you're eating healthy portion sizes. To use the plate format, you put non-starchy vegetables on half your plate. Add meat or meat substitutes on one-quarter of the plate. Put a grain or starchy vegetable (such as brown rice or a potato) on the final quarter of the plate. You can add a small piece of fruit and some low-fat or fat-free milk or yogurt, depending on your carbohydrate goal for each meal.  Here are some tips for using the plate format:  · Make sure that you are not using an oversized plate. A 9-inch plate is best. Many restaurants use larger plates. · Get used to using the plate format at home. Then you can use it when you eat out. · Write down your questions about using the plate format. Talk to your doctor, a dietitian, or a diabetes educator about your concerns.   Carbohydrate counting  With the pump must be programmed at meals to give you extra insulin to cover the rise in blood sugar after meals. When you know how much carbohydrate you will eat, you can take the right amount of insulin. Or, if you always use the same amount of insulin, you need to make sure that you eat the same amount of carbohydrate at meals. If you need more help to understand carbohydrate counting and food labels, ask your doctor, dietitian, or diabetes educator. How can you plan healthy meals? Here are some tips to get started:  · Plan your meals a week at a time. Don't forget to include snacks too. · Use cookbooks or online recipes to plan several main meals. Plan some quick meals for busy nights. You also can double some recipes that freeze well. Then you can save half for other busy nights when you don't have time to cook. · Make sure you have the ingredients you need for your recipes. If you're running low on basic items, put these items on your shopping list too. · List foods that you use to make breakfasts, lunches, and snacks. List plenty of fruits and vegetables. · Post this list on the refrigerator. Add to it as you think of more things you need. · Take the list to the store to do your weekly shopping. Follow-up care is a key part of your treatment and safety. Be sure to make and go to all appointments, and call your doctor if you are having problems. It's also a good idea to know your test results and keep a list of the medicines you take. Where can you learn more? Go to https://[employer]. org and sign in to your [employer] account. Enter Z255 in the [employer] box to learn more about \"Learning About Meal Planning for Diabetes. \"     If you do not have an account, please click on the \"Sign Up Now\" link. Current as of: August 31, 2020               Content Version: 12.8  © 9496-5531 Healthwise, [employer]. Care instructions adapted under license by [facility] ([facility]).  If you have questions about a medical condition or this instruction, always ask your healthcare professional. [address] any warranty or liability for your use of this information. We are committed to providing you with the best care possible. In order to help us achieve these goals please remember to bring all medications, herbal products, and over the counter supplements with you to each visit. If your provider has ordered testing for you, please be sure to follow up with our office if you have not received results within 7 days after the testing took place. *If you receive a survey after visiting one of our offices, please take time to share your experience concerning your physician office visit. These surveys are confidential and no health information about you is shared. We are eager to improve for you and we are counting on your feedback to help make that happen. Electronically signed by NATALI Sun on 3/28/2021 at 4:42 PM    EMR Dragon/transcription disclaimer:  Much of thisencounter note is electronic transcription/translation of spoken language to printed texts. The electronic translation of spoken language may be erroneous, or at times, nonsensical words or phrases may be inadvertentlytranscribed.   Although I have reviewed the note for such errors, some may still exist.

## 2021-03-24 DIAGNOSIS — I10 ESSENTIAL HYPERTENSION: ICD-10-CM

## 2021-03-25 RX ORDER — LISINOPRIL 10 MG/1
TABLET ORAL
Qty: 30 TABLET | Refills: 0 | Status: SHIPPED | OUTPATIENT
Start: 2021-03-25 | End: 2021-04-14

## 2021-03-28 ASSESSMENT — ENCOUNTER SYMPTOMS
NAUSEA: 0
SHORTNESS OF BREATH: 0
PHOTOPHOBIA: 0
RHINORRHEA: 0
VOMITING: 0
VOICE CHANGE: 0
BACK PAIN: 0
COUGH: 0
COLOR CHANGE: 0

## 2021-04-12 DIAGNOSIS — I10 ESSENTIAL HYPERTENSION: ICD-10-CM

## 2021-04-14 RX ORDER — LISINOPRIL 10 MG/1
TABLET ORAL
Qty: 180 TABLET | Refills: 0 | Status: SHIPPED | OUTPATIENT
Start: 2021-04-14 | End: 2021-07-06

## 2021-04-26 ENCOUNTER — TELEPHONE (OUTPATIENT)
Dept: PRIMARY CARE CLINIC | Age: 51
End: 2021-04-26

## 2021-04-26 DIAGNOSIS — B37.89 CANDIDA RASH OF GROIN: ICD-10-CM

## 2021-04-26 NOTE — TELEPHONE ENCOUNTER
Patient called stating she has a small yeast infection under a skin fold and was wanting to know if there was something that could be sent in. She states she was on it in the past and it seemed to work for her. Please advise.

## 2021-04-27 RX ORDER — NYSTATIN 100000 [USP'U]/G
POWDER TOPICAL
Qty: 30 G | Refills: 0 | Status: SHIPPED | OUTPATIENT
Start: 2021-04-27 | End: 2021-05-25 | Stop reason: SDUPTHER

## 2021-04-29 ENCOUNTER — HOSPITAL ENCOUNTER (OUTPATIENT)
Dept: GENERAL RADIOLOGY | Facility: HOSPITAL | Age: 51
Discharge: HOME | End: 2021-04-29
Admitting: NURSE PRACTITIONER

## 2021-04-29 ENCOUNTER — TRANSCRIBE ORDERS (OUTPATIENT)
Dept: ADMINISTRATIVE | Facility: HOSPITAL | Age: 51
End: 2021-04-29

## 2021-04-29 DIAGNOSIS — M25.561 RIGHT KNEE PAIN, UNSPECIFIED CHRONICITY: Primary | ICD-10-CM

## 2021-04-29 DIAGNOSIS — M25.561 RIGHT KNEE PAIN, UNSPECIFIED CHRONICITY: ICD-10-CM

## 2021-04-29 PROCEDURE — 73562 X-RAY EXAM OF KNEE 3: CPT

## 2021-05-03 DIAGNOSIS — R39.198 DIFFICULTY VOIDING: ICD-10-CM

## 2021-05-04 DIAGNOSIS — F41.9 ANXIETY: ICD-10-CM

## 2021-05-04 RX ORDER — ALPRAZOLAM 0.5 MG/1
TABLET ORAL
Qty: 30 TABLET | Refills: 0 | Status: SHIPPED | OUTPATIENT
Start: 2021-05-04 | End: 2021-06-01

## 2021-05-04 RX ORDER — TRAZODONE HYDROCHLORIDE 150 MG/1
TABLET ORAL
Qty: 60 TABLET | Refills: 3 | Status: SHIPPED | OUTPATIENT
Start: 2021-05-04 | End: 2021-09-07

## 2021-05-04 RX ORDER — TAMSULOSIN HYDROCHLORIDE 0.4 MG/1
0.4 CAPSULE ORAL DAILY
Qty: 30 CAPSULE | Refills: 2 | Status: SHIPPED | OUTPATIENT
Start: 2021-05-04 | End: 2021-08-13

## 2021-05-04 NOTE — TELEPHONE ENCOUNTER
Michela Bay called to request a refill on her medication. Last office visit : 3/18/2021   Next office visit : 5/4/2021     Last UDS:   Amphetamine Screen, Urine   Date Value Ref Range Status   02/04/2021 -  Final     Barbiturates   Date Value Ref Range Status   01/17/2011 Negative () Final     Barbiturate Screen, Urine   Date Value Ref Range Status   02/04/2021 -  Final     Benzodiazepine Screen, Urine   Date Value Ref Range Status   02/04/2021 +  Final     Comment:     Xanax     Buprenorphine Urine   Date Value Ref Range Status   02/04/2021 -  Final     Cocaine Metabolite Screen, Urine   Date Value Ref Range Status   02/04/2021 -  Final     Gabapentin Screen, Urine   Date Value Ref Range Status   02/04/2021 -  Final     MDMA, Urine   Date Value Ref Range Status   02/04/2021 -  Final     Methamphetamine, Urine   Date Value Ref Range Status   02/04/2021 -  Final     Opiate Scrn, Ur   Date Value Ref Range Status   02/04/2021 -  Final     Oxycodone Screen, Ur   Date Value Ref Range Status   02/04/2021 -  Final     PCP Screen, Urine   Date Value Ref Range Status   02/04/2021 -  Final     Propoxyphene Screen, Urine   Date Value Ref Range Status   02/04/2021 -  Final     THC Screen, Urine   Date Value Ref Range Status   02/04/2021 -  Final     Tricyclic Antidepressants, Urine   Date Value Ref Range Status   02/04/2021 -  Final       Last Viki Bagleyhuy: 3/18/2021  Medication Contract: 2/17/2017  Last Fill: 3/2/2021    Requested Prescriptions     Pending Prescriptions Disp Refills    ALPRAZolam (XANAX) 0.5 MG tablet [Pharmacy Med Name: ALPRAZOLAM 0.5MG TABLETS] 30 tablet 0     Sig: TAKE 1 TABLET BY MOUTH EVERY NIGHT AS NEEDED FOR SLEEP         Please approve or refuse this medication.    Justina Newsome

## 2021-05-09 DIAGNOSIS — E11.00 TYPE 2 DIABETES MELLITUS WITH HYPEROSMOLARITY WITHOUT COMA, WITHOUT LONG-TERM CURRENT USE OF INSULIN (HCC): ICD-10-CM

## 2021-05-10 RX ORDER — SEMAGLUTIDE 1.34 MG/ML
INJECTION, SOLUTION SUBCUTANEOUS
Qty: 3 ML | Refills: 1 | Status: SHIPPED | OUTPATIENT
Start: 2021-05-10 | End: 2021-05-25 | Stop reason: SDUPTHER

## 2021-05-25 ENCOUNTER — VIRTUAL VISIT (OUTPATIENT)
Dept: PRIMARY CARE CLINIC | Age: 51
End: 2021-05-25
Payer: MEDICAID

## 2021-05-25 DIAGNOSIS — E11.00 TYPE 2 DIABETES MELLITUS WITH HYPEROSMOLARITY WITHOUT COMA, WITHOUT LONG-TERM CURRENT USE OF INSULIN (HCC): ICD-10-CM

## 2021-05-25 DIAGNOSIS — M19.90 ARTHRITIS: Primary | ICD-10-CM

## 2021-05-25 PROCEDURE — 99213 OFFICE O/P EST LOW 20 MIN: CPT | Performed by: NURSE PRACTITIONER

## 2021-05-25 PROCEDURE — 2022F DILAT RTA XM EVC RTNOPTHY: CPT | Performed by: NURSE PRACTITIONER

## 2021-05-25 PROCEDURE — 3044F HG A1C LEVEL LT 7.0%: CPT | Performed by: NURSE PRACTITIONER

## 2021-05-25 PROCEDURE — G8417 CALC BMI ABV UP PARAM F/U: HCPCS | Performed by: NURSE PRACTITIONER

## 2021-05-25 PROCEDURE — G8427 DOCREV CUR MEDS BY ELIG CLIN: HCPCS | Performed by: NURSE PRACTITIONER

## 2021-05-25 PROCEDURE — 3017F COLORECTAL CA SCREEN DOC REV: CPT | Performed by: NURSE PRACTITIONER

## 2021-05-25 PROCEDURE — 1036F TOBACCO NON-USER: CPT | Performed by: NURSE PRACTITIONER

## 2021-05-25 RX ORDER — NYSTATIN 100000 [USP'U]/G
POWDER TOPICAL
Qty: 30 G | Refills: 0 | Status: SHIPPED | OUTPATIENT
Start: 2021-05-25 | End: 2021-07-09 | Stop reason: SDUPTHER

## 2021-05-25 RX ORDER — SEMAGLUTIDE 1.34 MG/ML
INJECTION, SOLUTION SUBCUTANEOUS
Qty: 3 ML | Refills: 2 | Status: SHIPPED | OUTPATIENT
Start: 2021-05-25 | End: 2022-01-19 | Stop reason: SDUPTHER

## 2021-05-25 ASSESSMENT — ENCOUNTER SYMPTOMS
RHINORRHEA: 0
ABDOMINAL PAIN: 0
SHORTNESS OF BREATH: 0
BLOOD IN STOOL: 0
SORE THROAT: 0
VOICE CHANGE: 0
WHEEZING: 0
VOMITING: 0
CHEST TIGHTNESS: 0
TROUBLE SWALLOWING: 0
COUGH: 0
EYE REDNESS: 0
NAUSEA: 0
DIARRHEA: 0
CONSTIPATION: 0

## 2021-05-25 ASSESSMENT — PATIENT HEALTH QUESTIONNAIRE - PHQ9
2. FEELING DOWN, DEPRESSED OR HOPELESS: 0
SUM OF ALL RESPONSES TO PHQ9 QUESTIONS 1 & 2: 0
SUM OF ALL RESPONSES TO PHQ QUESTIONS 1-9: 0
SUM OF ALL RESPONSES TO PHQ QUESTIONS 1-9: 0
1. LITTLE INTEREST OR PLEASURE IN DOING THINGS: 0

## 2021-05-25 NOTE — PROGRESS NOTES
Coy Diaz (:  1970) is a 45 y.o. female,Established patient, here for evaluation of the following chief complaint(s): Other (Pt wants to start back on metformin and ozempic being that she has slacked off on them.)         ASSESSMENT/PLAN:  1. Arthritis  -     diclofenac sodium (VOLTAREN) 1 % GEL; Apply topically 2 times daily, Topical, 2 TIMES DAILY Starting 2021, Until 2021, For 30 days, Disp-1 g, R-2, Normal  2. Type 2 diabetes mellitus with hyperosmolarity without coma, without long-term current use of insulin (HCC)  -     Semaglutide, 1 MG/DOSE, (OZEMPIC, 1 MG/DOSE,) 2 MG/1.5ML SOPN; 1mg weekly. , Disp-3 mL, R-2Normal  -     metFORMIN (GLUCOPHAGE) 500 MG tablet; Take 2 tablets by mouth 2 times daily (with meals), Disp-120 tablet, R-3Normal  -     nystatin (MYCOSTATIN) 935325 UNIT/GM powder; Apply daily to skin folds as needed up to 3 times a day., Disp-30 g, R-0, Normal      No follow-ups on file. SUBJECTIVE/OBJECTIVE:  HPI    DM2: pt request refill of ozempic and metformin. Did well on that regimen but notes that she has not been taking it as it was prescribed. Request refills. Review of Systems   Constitutional: Negative for activity change, appetite change, fatigue, fever and unexpected weight change. HENT: Negative for congestion, ear pain, nosebleeds, rhinorrhea, sore throat, trouble swallowing and voice change. Eyes: Negative for redness and visual disturbance. Respiratory: Negative for cough, chest tightness, shortness of breath and wheezing. Cardiovascular: Negative for chest pain, palpitations and leg swelling. Gastrointestinal: Negative for abdominal pain, blood in stool, constipation, diarrhea, nausea and vomiting. Endocrine: Negative for polydipsia, polyphagia and polyuria. Genitourinary: Negative for dysuria, frequency and urgency. Musculoskeletal: Negative for myalgias. Skin: Negative for rash and wound.    Neurological: Negative for dizziness, speech difficulty, light-headedness and headaches. Psychiatric/Behavioral: Negative for agitation, confusion, self-injury and suicidal ideas. The patient is not nervous/anxious.         Patient-Reported Vitals 5/25/2021   Patient-Reported Weight 332 lbs   Patient-Reported Height 5' 8\"   Patient-Reported Systolic 517   Patient-Reported Diastolic 97   Patient-Reported Pulse 86        Physical Exam    [INSTRUCTIONS:  \"[x]\" Indicates a positive item  \"[]\" Indicates a negative item  -- DELETE ALL ITEMS NOT EXAMINED]    Constitutional: [x] Appears well-developed and well-nourished [x] No apparent distress      [] Abnormal -     Mental status: [x] Alert and awake  [x] Oriented to person/place/time [x] Able to follow commands    [] Abnormal -     Eyes:   EOM    [x]  Normal    [] Abnormal -   Sclera  [x]  Normal    [] Abnormal -          Discharge [x]  None visible   [] Abnormal -     HENT: [x] Normocephalic, atraumatic  [] Abnormal -   [x] Mouth/Throat: Mucous membranes are moist    External Ears [x] Normal  [] Abnormal -    Neck: [x] No visualized mass [] Abnormal -     Pulmonary/Chest: [x] Respiratory effort normal   [x] No visualized signs of difficulty breathing or respiratory distress        [] Abnormal -      Musculoskeletal:   [x] Normal gait with no signs of ataxia         [x] Normal range of motion of neck        [] Abnormal -     Neurological:        [x] No Facial Asymmetry (Cranial nerve 7 motor function) (limited exam due to video visit)          [x] No gaze palsy        [] Abnormal -          Skin:        [x] No significant exanthematous lesions or discoloration noted on facial skin         [] Abnormal -            Psychiatric:       [x] Normal Affect [] Abnormal -        [x] No Hallucinations    Other pertinent observable physical exam findings:-          On this date 5/25/2021 I have spent 22 minutes reviewing previous notes, test results and face to face (virtual) with the patient discussing the diagnosis and importance of compliance with the treatment plan as well as documenting on the day of the visit. Silvia Lei, was evaluated through a synchronous (real-time) audio-video encounter. The patient (or guardian if applicable) is aware that this is a billable service. Verbal consent to proceed has been obtained within the past 12 months. The visit was conducted pursuant to the emergency declaration under the [address] [place] [address] and the [employer] Act. Patient identification was verified, and a caregiver was present when appropriate. The patient was located in a state where the provider was credentialed to provide care. An electronic signature was used to authenticate this note.     --Jayy Clayton, APRN

## 2021-06-01 DIAGNOSIS — F41.9 ANXIETY: ICD-10-CM

## 2021-06-01 RX ORDER — ALPRAZOLAM 0.5 MG/1
TABLET ORAL
Qty: 30 TABLET | Refills: 0 | Status: SHIPPED | OUTPATIENT
Start: 2021-06-01 | End: 2021-07-06

## 2021-06-01 NOTE — TELEPHONE ENCOUNTER
Coy Diaz called to request a refill on her medication. Last office visit : 5/25/2021   Next office visit : Visit date not found     Last UDS:   Amphetamine Screen, Urine   Date Value Ref Range Status   02/04/2021 -  Final     Barbiturates   Date Value Ref Range Status   01/17/2011 Negative () Final     Barbiturate Screen, Urine   Date Value Ref Range Status   02/04/2021 -  Final     Benzodiazepine Screen, Urine   Date Value Ref Range Status   02/04/2021 +  Final     Comment:     Xanax     Buprenorphine Urine   Date Value Ref Range Status   02/04/2021 -  Final     Cocaine Metabolite Screen, Urine   Date Value Ref Range Status   02/04/2021 -  Final     Gabapentin Screen, Urine   Date Value Ref Range Status   02/04/2021 -  Final     MDMA, Urine   Date Value Ref Range Status   02/04/2021 -  Final     Methamphetamine, Urine   Date Value Ref Range Status   02/04/2021 -  Final     Opiate Scrn, Ur   Date Value Ref Range Status   02/04/2021 -  Final     Oxycodone Screen, Ur   Date Value Ref Range Status   02/04/2021 -  Final     PCP Screen, Urine   Date Value Ref Range Status   02/04/2021 -  Final     Propoxyphene Screen, Urine   Date Value Ref Range Status   02/04/2021 -  Final     THC Screen, Urine   Date Value Ref Range Status   02/04/2021 -  Final     Tricyclic Antidepressants, Urine   Date Value Ref Range Status   02/04/2021 -  Final       Last Marcy Natan: 6/1/2021  Medication Contract: 2/17/2017   Last Fill: 5/4/2021    Requested Prescriptions     Pending Prescriptions Disp Refills    ALPRAZolam (XANAX) 0.5 MG tablet [Pharmacy Med Name: ALPRAZOLAM 0.5MG TABLETS] 30 tablet 0     Sig: TAKE 1 TABLET BY MOUTH EVERY NIGHT AS NEEDED FOR SLEEP         Please approve or refuse this medication.    Justina Munoz

## 2021-06-11 RX ORDER — ARIPIPRAZOLE 5 MG/1
TABLET ORAL
Qty: 30 TABLET | Refills: 5 | Status: SHIPPED | OUTPATIENT
Start: 2021-06-11 | End: 2021-12-10

## 2021-07-05 DIAGNOSIS — F41.9 ANXIETY: ICD-10-CM

## 2021-07-05 DIAGNOSIS — I10 ESSENTIAL HYPERTENSION: ICD-10-CM

## 2021-07-06 RX ORDER — LISINOPRIL 10 MG/1
TABLET ORAL
Qty: 180 TABLET | Refills: 0 | Status: SHIPPED | OUTPATIENT
Start: 2021-07-06 | End: 2021-10-06

## 2021-07-06 RX ORDER — ALPRAZOLAM 0.5 MG/1
TABLET ORAL
Qty: 30 TABLET | Refills: 0 | Status: SHIPPED | OUTPATIENT
Start: 2021-07-06 | End: 2021-08-03

## 2021-07-06 NOTE — TELEPHONE ENCOUNTER
Called patient informed will need appt by end of August and UDS and med contract   Ean Pike obtained

## 2021-07-06 NOTE — TELEPHONE ENCOUNTER
Alprazolam 0.5 mg tablets #30 approved. Patient needs an appointment in office scheduled by the end of August 2021 and an updated med contract. Crystal Russell and UDS appropriate.

## 2021-07-06 NOTE — TELEPHONE ENCOUNTER
Lumaon Tone called to request a refill on her medication. Last office visit : 5/25/2021   Next office visit : Visit date not found     Last UDS:   Amphetamine Screen, Urine   Date Value Ref Range Status   02/04/2021 -  Final     Barbiturates   Date Value Ref Range Status   01/17/2011 Negative () Final     Barbiturate Screen, Urine   Date Value Ref Range Status   02/04/2021 -  Final     Benzodiazepine Screen, Urine   Date Value Ref Range Status   02/04/2021 +  Final     Comment:     Xanax     Buprenorphine Urine   Date Value Ref Range Status   02/04/2021 -  Final     Cocaine Metabolite Screen, Urine   Date Value Ref Range Status   02/04/2021 -  Final     Gabapentin Screen, Urine   Date Value Ref Range Status   02/04/2021 -  Final     MDMA, Urine   Date Value Ref Range Status   02/04/2021 -  Final     Methamphetamine, Urine   Date Value Ref Range Status   02/04/2021 -  Final     Opiate Scrn, Ur   Date Value Ref Range Status   02/04/2021 -  Final     Oxycodone Screen, Ur   Date Value Ref Range Status   02/04/2021 -  Final     PCP Screen, Urine   Date Value Ref Range Status   02/04/2021 -  Final     Propoxyphene Screen, Urine   Date Value Ref Range Status   02/04/2021 -  Final     THC Screen, Urine   Date Value Ref Range Status   02/04/2021 -  Final     Tricyclic Antidepressants, Urine   Date Value Ref Range Status   02/04/2021 -  Final       Last Ijeoma Haas: [id number] pending  Medication Contract: 2017   Last Fill: 6-1    Requested Prescriptions     Pending Prescriptions Disp Refills    lisinopril (PRINIVIL;ZESTRIL) 10 MG tablet [Pharmacy Med Name: LISINOPRIL 10MG TABLETS] 180 tablet 0     Sig: TAKE 1 TABLET BY MOUTH TWICE DAILY    ALPRAZolam (XANAX) 0.5 MG tablet [Pharmacy Med Name: ALPRAZOLAM 0.5MG TABLETS] 30 tablet      Sig: TAKE 1 TABLET BY MOUTH EVERY NIGHT AS NEEDED FOR SLEEP         Please approve or refuse this medication.    Oneida Lee, CAROLIN

## 2021-07-09 DIAGNOSIS — E11.00 TYPE 2 DIABETES MELLITUS WITH HYPEROSMOLARITY WITHOUT COMA, WITHOUT LONG-TERM CURRENT USE OF INSULIN (HCC): ICD-10-CM

## 2021-07-09 RX ORDER — NYSTATIN 100000 [USP'U]/G
POWDER TOPICAL
Qty: 30 G | Refills: 0 | Status: SHIPPED | OUTPATIENT
Start: 2021-07-09 | End: 2021-10-25

## 2021-08-06 ENCOUNTER — OFFICE VISIT (OUTPATIENT)
Dept: URGENT CARE | Age: 51
End: 2021-08-06
Payer: MEDICAID

## 2021-08-06 ENCOUNTER — OFFICE VISIT (OUTPATIENT)
Age: 51
End: 2021-08-06

## 2021-08-06 VITALS
DIASTOLIC BLOOD PRESSURE: 78 MMHG | RESPIRATION RATE: 16 BRPM | OXYGEN SATURATION: 95 % | TEMPERATURE: 98.2 F | HEIGHT: 68 IN | WEIGHT: 293 LBS | HEART RATE: 107 BPM | SYSTOLIC BLOOD PRESSURE: 127 MMHG | BODY MASS INDEX: 44.41 KG/M2

## 2021-08-06 DIAGNOSIS — R50.9 FEVER, UNSPECIFIED FEVER CAUSE: ICD-10-CM

## 2021-08-06 DIAGNOSIS — R11.0 NAUSEA: ICD-10-CM

## 2021-08-06 DIAGNOSIS — R05.9 COUGH: Primary | ICD-10-CM

## 2021-08-06 DIAGNOSIS — Z11.59 SCREENING FOR VIRAL DISEASE: Primary | ICD-10-CM

## 2021-08-06 DIAGNOSIS — R51.9 NONINTRACTABLE HEADACHE, UNSPECIFIED CHRONICITY PATTERN, UNSPECIFIED HEADACHE TYPE: ICD-10-CM

## 2021-08-06 PROCEDURE — G8417 CALC BMI ABV UP PARAM F/U: HCPCS | Performed by: NURSE PRACTITIONER

## 2021-08-06 PROCEDURE — 99999 PR OFFICE/OUTPT VISIT,PROCEDURE ONLY: CPT | Performed by: NURSE PRACTITIONER

## 2021-08-06 PROCEDURE — 1036F TOBACCO NON-USER: CPT | Performed by: NURSE PRACTITIONER

## 2021-08-06 PROCEDURE — G8427 DOCREV CUR MEDS BY ELIG CLIN: HCPCS | Performed by: NURSE PRACTITIONER

## 2021-08-06 PROCEDURE — 3017F COLORECTAL CA SCREEN DOC REV: CPT | Performed by: NURSE PRACTITIONER

## 2021-08-06 PROCEDURE — 99213 OFFICE O/P EST LOW 20 MIN: CPT | Performed by: NURSE PRACTITIONER

## 2021-08-06 ASSESSMENT — ENCOUNTER SYMPTOMS
SINUS PRESSURE: 0
SORE THROAT: 1
DIARRHEA: 0
SHORTNESS OF BREATH: 0
ABDOMINAL PAIN: 0
NAUSEA: 1
VOMITING: 0
COUGH: 1

## 2021-08-06 ASSESSMENT — VISUAL ACUITY: OU: 1

## 2021-08-06 NOTE — PROGRESS NOTES
[address] [facility]  [address] [address] [place] [address]  Dept: [phone]  Dept Fax: [phone]  Loc: [phone]    Radames Espinosa is a 45 y.o. female who presents today for her medical conditions/complaintsas noted below. Radames Espinosa is c/o of Cough, Fever, Headache, and Nausea. HPI:     Cough  This is a new problem. The current episode started in the past 7 days (3 days). The problem has been unchanged. The cough is non-productive. Associated symptoms include a fever, headaches, nasal congestion and a sore throat. Pertinent negatives include no chills, myalgias, rash or shortness of breath. Associated symptoms comments: Nausea  Headache  Poor sleep  Fatigue. Nothing aggravates the symptoms. She has tried rest and body position changes for the symptoms. The treatment provided mild relief. Kev Sheridan is here with fever up to 101.4 last night. She has cough, headache and nausea. Her son is here as well with similar symptoms. She has not been vaccinated for COVID.    Past Medical History:   Diagnosis Date    Anxiety     Chronic pain     Depression     Diabetes mellitus type 2 in obese (HCC)     Hyperlipidemia     Hypertension     Premenopausal patient 2018     Past Surgical History:   Procedure Laterality Date     SECTION      TENDON RELEASE      right finger    TUBAL LIGATION         Family History   Problem Relation Age of Onset    Diabetes Father     Cancer Father         Bladder and Lung    Breast Cancer Sister         1/2 Sister    Diabetes Maternal Grandfather     Diabetes Paternal Grandmother        Social History     Tobacco Use    Smoking status: Never Smoker    Smokeless tobacco: Never Used   Substance Use Topics    Alcohol use: Yes     Comment: socially      Current Outpatient Medications   Medication Sig Dispense Refill    ALPRAZolam (XANAX) 0.5 MG tablet TAKE 1 TABLET BY MOUTH EVERY NIGHT AS NEEDED FOR SLEEP 30 tablet 0    nystatin (MYCOSTATIN) 681669 UNIT/GM powder Apply daily to skin folds as needed up to 3 times a day. 30 g 0    lisinopril (PRINIVIL;ZESTRIL) 10 MG tablet TAKE 1 TABLET BY MOUTH TWICE DAILY 180 tablet 0    DULoxetine HCl 40 MG CPEP TAKE 1 CAPSULE BY MOUTH DAILY 30 capsule 2    ARIPiprazole (ABILIFY) 5 MG tablet TAKE 1 TABLET BY MOUTH EVERY DAY 30 tablet 5    diclofenac sodium (VOLTAREN) 1 % GEL Apply topically 2 times daily 1 g 2    Semaglutide, 1 MG/DOSE, (OZEMPIC, 1 MG/DOSE,) 2 MG/1.5ML SOPN 1mg weekly. 3 mL 2    metFORMIN (GLUCOPHAGE) 500 MG tablet Take 2 tablets by mouth 2 times daily (with meals) 120 tablet 3    tamsulosin (FLOMAX) 0.4 MG capsule TAKE 1 CAPSULE BY MOUTH DAILY 30 capsule 2    traZODone (DESYREL) 150 MG tablet TAKE 2 TABLET BY MOUTH EVERY EVENING 60 tablet 3    atorvastatin (LIPITOR) 20 MG tablet TAKE 1 TABLET BY MOUTH DAILY 30 tablet 11    meloxicam (MOBIC) 15 MG tablet TAKE 1 TABLET BY MOUTH EVERY DAY      tiZANidine (ZANAFLEX) 4 MG tablet       NARCAN 4 MG/0.1ML LIQD nasal spray USE 1 SPRAY AS NEEDED      ondansetron (ZOFRAN ODT) 4 MG disintegrating tablet Take 1 tablet by mouth every 8 hours as needed for Nausea or Vomiting 10 tablet 0    OneTouch Delica Lancets 82L MISC USE TO CHECK BLOOD SUGAR TWICE DAILY 100 each 1    HYDROcodone-acetaminophen (NORCO)  MG per tablet Take 1 tablet by mouth every 8 hours as needed for Pain (Dr. Kulwant Arias). 0    gabapentin (NEURONTIN) 800 MG tablet Take 800 mg by mouth every 8 hours as needed (Dr. Kulwant Arias). 1    ONE TOUCH ULTRA TEST strip USE TO TEST BLOOD SUAGR TWICE DAILY AS NEEDED. 100 strip 0    glucose monitoring kit (FREESTYLE) monitoring kit 1 kit by Does not apply route daily Please fill script with meter covered under patients insurance.  Dx: E11.9 1 kit 0    aspirin 325 MG tablet Take 325 mg by mouth nightly       Multiple Vitamins-Minerals (THERAPEUTIC MULTIVITAMIN-MINERALS) tablet Take 1 tablet by mouth daily (Patient taking differently: Take 1 tablet by mouth nightly ) 30 tablet 5     No current facility-administered medications for this visit. No Known Allergies    Health Maintenance   Topic Date Due    Hepatitis C screen  Never done    Pneumococcal 0-64 years Vaccine (1 of 2 - PPSV23) Never done    COVID-19 Vaccine (1) Never done    HIV screen  Never done    Hepatitis B vaccine (1 of 3 - Risk 3-dose series) Never done    DTaP/Tdap/Td vaccine (1 - Tdap) Never done    Cervical cancer screen  Never done    Shingles Vaccine (1 of 2) Never done    Diabetic microalbuminuria test  12/26/2020    Breast cancer screen  01/07/2021    Diabetic retinal exam  01/09/2021    Flu vaccine (1) 09/01/2021    Diabetic foot exam  11/25/2021    A1C test (Diabetic or Prediabetic)  02/23/2022    Lipid screen  02/23/2022    Potassium monitoring  03/03/2022    Creatinine monitoring  03/03/2022    Colon cancer screen fecal DNA test (Cologuard)  01/09/2023    Hepatitis A vaccine  Aged Out    Hib vaccine  Aged Out    Meningococcal (ACWY) vaccine  Aged Out       Subjective:     Review of Systems   Constitutional: Positive for fatigue and fever. Negative for activity change, appetite change and chills. HENT: Positive for congestion and sore throat. Negative for ear discharge and sinus pressure. Respiratory: Positive for cough. Negative for shortness of breath. Cardiovascular: Negative. Gastrointestinal: Positive for nausea. Negative for abdominal pain, diarrhea and vomiting. Musculoskeletal: Negative for arthralgias and myalgias. Skin: Negative for rash. Neurological: Positive for headaches.       :Objective      Physical Exam  Vitals and nursing note reviewed. Constitutional:       General: She is awake. She is not in acute distress. Appearance: Normal appearance. She is well-developed and well-groomed. She is morbidly obese. She is ill-appearing. She is not toxic-appearing.    HENT:      Head: Normocephalic. Right Ear: Hearing, tympanic membrane, ear canal and external ear normal.      Left Ear: Hearing, tympanic membrane, ear canal and external ear normal.      Nose: Nose normal.      Mouth/Throat:      Lips: Pink. Mouth: Mucous membranes are moist.      Pharynx: Oropharynx is clear. Uvula midline. Tonsils: 0 on the right. 0 on the left. Eyes:      General: Vision grossly intact. Conjunctiva/sclera: Conjunctivae normal.   Neck:      Trachea: Phonation normal.   Cardiovascular:      Rate and Rhythm: Regular rhythm. Tachycardia present. Heart sounds: Normal heart sounds, S1 normal and S2 normal. No murmur heard. No friction rub. No gallop. Pulmonary:      Effort: Pulmonary effort is normal. No respiratory distress. Breath sounds: Normal breath sounds and air entry. No wheezing, rhonchi or rales. Abdominal:      General: Abdomen is protuberant. Palpations: Abdomen is soft. Musculoskeletal:         General: No tenderness or deformity. Normal range of motion. Cervical back: Full passive range of motion without pain and neck supple. Lymphadenopathy:      Head:      Right side of head: No tonsillar adenopathy. Left side of head: No tonsillar adenopathy. Skin:     General: Skin is warm and dry. Capillary Refill: Capillary refill takes less than 2 seconds. Neurological:      General: No focal deficit present. Mental Status: She is alert, oriented to person, place, and time and easily aroused. Psychiatric:         Attention and Perception: Attention normal.         Mood and Affect: Mood normal.         Speech: Speech normal.         Behavior: Behavior normal. Behavior is cooperative. /78   Pulse 107   Temp 98.2 °F (36.8 °C)   Resp 16   Ht 5' 8\" (1.727 m)   Wt (!) 337 lb 6.4 oz (153 kg)   SpO2 95%   BMI 51.30 kg/m²     :Assessment       Diagnosis Orders   1. Cough     2. Fever, unspecified fever cause     3. Nausea     4. Nonintractable headache, unspecified chronicity pattern, unspecified headache type         :Plan    No orders of the defined types were placed in this encounter. No follow-ups on file. No orders of the defined types were placed in this encounter. Since pt is being tested for COVID pt has been instructed to quarantine from contacts until testing has been resulted. Further instructions will follow, as of now, this is 14 days unless otherwise specified when results are back. If SOB or worsening sx's develop, need to go to ED or return to clinic, pt voiced understanding. Pt was given printed instructions today on Possible COVID-19 infection with self-quarantine and management of symptoms    Call or return to clinic prn if these symptoms worsen or fail to improve as anticipated. Patient Instructions     Plenty of fluids  Rest  OTC Tylenol or Motrin as needed  Stay home and stay in until we call with COVID results  Follow up with PCP or return to Urgent Care for worsening or unresolved symptoms. Patient Education        Learning About Coronavirus [phone])  What is coronavirus (COVID-19)? COVID-19 is a disease caused by a new type of coronavirus. This illness was first found in December 2019. It has since spread worldwide. Coronaviruses are a large group of viruses. They cause the common cold. They also cause more serious illnesses like [place] East respiratory syndrome (MERS) and severe acute respiratory syndrome (SARS). COVID-19 is caused by a novel coronavirus. That means it's a new type that has not been seen in people before. What are the symptoms? Coronavirus (COVID-19) symptoms may include:  · Fever. · Cough. · Trouble breathing. · Chills or repeated shaking with chills. · Muscle pain. · Headache. · Sore throat. · New loss of taste or smell. · Vomiting. · Diarrhea. In severe cases, COVID-19 can cause pneumonia and make it hard to breathe without help from a machine. It can cause death. How is it diagnosed? COVID-19 is diagnosed with a viral test. This may also be called a PCR test or antigen test. It looks for evidence of the virus in your breathing passages or lungs (respiratory system). The test is most often done on a sample from the nose, throat, or lungs. It's sometimes done on a sample of saliva. One way a sample is collected is by putting a long swab into the back of your nose. How is it treated? Mild cases of COVID-19 can be treated at home. Serious cases need treatment in the hospital. Treatment may include medicines to reduce symptoms, plus breathing support such as oxygen therapy or a ventilator. Some people may be placed on their belly to help their oxygen levels. Treatments that may help people who have COVID-19 include:  Antiviral medicines. These medicines treat viral infections. Remdesivir is an example. Immune-based therapy. These medicines help the immune system fight COVID-19. One example is bamlanivimab. It's a monoclonal antibody. Blood thinners. These medicines help prevent blood clots. People with severe illness are at risk for blood clots. How can you protect yourself and others? The best way to protect yourself from getting sick is to:  · Avoid areas where there is an outbreak. · Avoid contact with people who may be infected. · Avoid crowds and try to stay at least 6 feet away from other people. · Wash your hands often, especially after you cough or sneeze. Use soap and water, and scrub for at least 20 seconds. If soap and water aren't available, use an alcohol-based hand . · Avoid touching your mouth, nose, and eyes. To help avoid spreading the virus to others:  · Stay home if you are sick or have been exposed to the virus. Don't go to school, work, or public areas. And don't use public transportation, ride-shares, or taxis unless you have no choice. · Wear a cloth face cover if you have to go to public areas.   · Cover your mouth with a tissue when you cough or sneeze. Then throw the tissue in the trash and wash your hands right away. · If you're sick:  ? Leave your home only if you need to get medical care. But call the doctor's office first so they know you're coming. And wear a face cover. ? Wear the face cover whenever you're around other people. It can help stop the spread of the virus. ? Limit contact with pets and people in your home. If possible, stay in a separate bedroom and use a separate bathroom. ? Clean and disinfect your home every day. Use household  and disinfectant wipes or sprays. Take special care to clean things that you grab with your hands. These include doorknobs, remote controls, phones, and handles on your refrigerator and microwave. And don't forget countertops, tabletops, bathrooms, and computer keyboards. When should you call for help? Call 911 anytime you think you may need emergency care. For example, call if you have life-threatening symptoms, such as:    · You have severe trouble breathing. (You can't talk at all.)     · You have constant chest pain or pressure.     · You are severely dizzy or lightheaded.     · You are confused or can't think clearly.     · Your face and lips have a blue color.     · You pass out (lose consciousness) or are very hard to wake up. Call your doctor now or seek immediate medical care if:    · You have moderate trouble breathing. (You can't speak a full sentence.)     · You are coughing up blood (more than about 1 teaspoon).     · You have signs of low blood pressure. These include feeling lightheaded; being too weak to stand; and having cold, pale, clammy skin. Watch closely for changes in your health, and be sure to contact your doctor if:    · Your symptoms get worse.     · You are not getting better as expected. Call before you go to the doctor's office. Follow their instructions. And wear a cloth face cover.   Current as of: March 26, 2021               Content Version: 12.9  © 2006-2021 [employer]. Care instructions adapted under license by [facility] ([facility]). If you have questions about a medical condition or this instruction, always ask your healthcare professional. [address] any warranty or liability for your use of this information. Patient Education        Coronavirus (FSVCI-50): Care Instructions  Overview  The coronavirus disease (COVID-19) is caused by a virus. Symptoms may include a fever, a cough, and shortness of breath. It mainly spreads person-to-person through droplets from coughing and sneezing. The virus also can spread when people are in close contact with someone who is infected. Most people have mild symptoms and can take care of themselves at home. If their symptoms get worse, they may need care in a hospital. Treatment may include medicines to reduce symptoms, plus breathing support such as oxygen therapy or a ventilator. It's important to not spread the virus to others. If you have COVID-19, wear a face cover anytime you are around other people. It can help stop the spread of the virus. You need to isolate yourself while you are sick. Leave your home only if you need to get medical care or testing. Follow-up care is a key part of your treatment and safety. Be sure to make and go to all appointments, and call your doctor if you are having problems. It's also a good idea to know your test results and keep a list of the medicines you take. How can you care for yourself at home? · Get extra rest. It can help you feel better. · Drink plenty of fluids. This helps replace fluids lost from fever. Fluids also help ease a scratchy throat. Water, soup, fruit juice, and hot tea with lemon are good choices. · Take acetaminophen (such as Tylenol) to reduce a fever. It may also help with muscle aches. Read and follow all instructions on the label. · Use petroleum jelly on sore skin.  This can help if the skin around your nose and lips becomes sore from rubbing a lot with tissues. If you use oxygen, use a water-based product instead of petroleum jelly. Tips for self-isolation  · Limit contact with people in your home. If possible, stay in a separate bedroom and use a separate bathroom. · Wear a cloth face cover when you are around other people. It can help stop the spread of the virus when you cough or sneeze. · If you have to leave home, avoid crowds and try to stay at least 6 feet away from other people. · Avoid contact with pets and other animals. · Cover your mouth and nose with a tissue when you cough or sneeze. Then throw it in the trash right away. · Wash your hands often, especially after you cough or sneeze. Use soap and water, and scrub for at least 20 seconds. If soap and water aren't available, use an alcohol-based hand . · Don't share personal household items. These include bedding, towels, cups and glasses, and eating utensils. · [address] in the warmest water allowed for the fabric type, and dry it completely. It's okay to wash other people's laundry with yours. · Clean and disinfect your home every day. Use household  and disinfectant wipes or sprays. Take special care to clean things that you grab with your hands. These include doorknobs, remote controls, phones, and handles on your refrigerator and microwave. And don't forget countertops, tabletops, bathrooms, and computer keyboards. When you can end self-isolation  · If you know or suspect that you have COVID-19, stay in self-isolation until:  ? You haven't had a fever for 24 hours while not taking medicines to lower the fever, and  ? Your symptoms have improved, and  ? It's been at least 10 days since your symptoms started. · Talk to your doctor about whether you also need testing, especially if you have a weakened immune system. When should you call for help?    Call 911 anytime you think you may need emergency care. For example, call if you have life-threatening symptoms, such as:    · You have severe trouble breathing. (You can't talk at all.)     · You have constant chest pain or pressure.     · You are severely dizzy or lightheaded.     · You are confused or can't think clearly.     · Your face and lips have a blue color.     · You pass out (lose consciousness) or are very hard to wake up. Call your doctor now or seek immediate medical care if:    · You have moderate trouble breathing. (You can't speak a full sentence.)     · You are coughing up blood (more than about 1 teaspoon).     · You have signs of low blood pressure. These include feeling lightheaded; being too weak to stand; and having cold, pale, clammy skin. Watch closely for changes in your health, and be sure to contact your doctor if:    · Your symptoms get worse.     · You are not getting better as expected. Call before you go to the doctor's office. Follow their instructions. And wear a cloth face cover. Current as of: March 26, 2021               Content Version: 12.9  © 2006-2021 [employer]. Care instructions adapted under license by [facility] ([facility]). If you have questions about a medical condition or this instruction, always ask your healthcare professional. [address] [address] any warranty or liability for your use of this information. Patient given educational materials- see patient instructions. Discussed use, benefit, and side effects of prescribedmedications. All patient questions answered. Pt voiced understanding.        Electronically signed by NATALI Tran CNP on 8/6/2021 at 12:09 PM

## 2021-08-06 NOTE — PROGRESS NOTES
Patient was evaluated in the clinic, swab was collected due to  testing requirement to screen for COVID-19 using [facility] Max testing.

## 2021-08-06 NOTE — PATIENT INSTRUCTIONS
Plenty of fluids  Rest  OTC Tylenol or Motrin as needed  Stay home and stay in until we call with COVID results  Follow up with PCP or return to Urgent Care for worsening or unresolved symptoms. Patient Education        Learning About Coronavirus [phone])  What is coronavirus (COVID-19)? COVID-19 is a disease caused by a new type of coronavirus. This illness was first found in December 2019. It has since spread worldwide. Coronaviruses are a large group of viruses. They cause the common cold. They also cause more serious illnesses like [place] respiratory syndrome (MERS) and severe acute respiratory syndrome (SARS). COVID-19 is caused by a novel coronavirus. That means it's a new type that has not been seen in people before. What are the symptoms? Coronavirus (COVID-19) symptoms may include:  · Fever. · Cough. · Trouble breathing. · Chills or repeated shaking with chills. · Muscle pain. · Headache. · Sore throat. · New loss of taste or smell. · Vomiting. · Diarrhea. In severe cases, COVID-19 can cause pneumonia and make it hard to breathe without help from a machine. It can cause death. How is it diagnosed? COVID-19 is diagnosed with a viral test. This may also be called a PCR test or antigen test. It looks for evidence of the virus in your breathing passages or lungs (respiratory system). The test is most often done on a sample from the nose, throat, or lungs. It's sometimes done on a sample of saliva. One way a sample is collected is by putting a long swab into the back of your nose. How is it treated? Mild cases of COVID-19 can be treated at home. Serious cases need treatment in the hospital. Treatment may include medicines to reduce symptoms, plus breathing support such as oxygen therapy or a ventilator. Some people may be placed on their belly to help their oxygen levels. Treatments that may help people who have COVID-19 include:  Antiviral medicines.    These medicines treat viral infections. Remdesivir is an example. Immune-based therapy. These medicines help the immune system fight COVID-19. One example is bamlanivimab. It's a monoclonal antibody. Blood thinners. These medicines help prevent blood clots. People with severe illness are at risk for blood clots. How can you protect yourself and others? The best way to protect yourself from getting sick is to:  · Avoid areas where there is an outbreak. · Avoid contact with people who may be infected. · Avoid crowds and try to stay at least 6 feet away from other people. · Wash your hands often, especially after you cough or sneeze. Use soap and water, and scrub for at least 20 seconds. If soap and water aren't available, use an alcohol-based hand . · Avoid touching your mouth, nose, and eyes. To help avoid spreading the virus to others:  · Stay home if you are sick or have been exposed to the virus. Don't go to school, work, or public areas. And don't use public transportation, ride-shares, or taxis unless you have no choice. · Wear a cloth face cover if you have to go to public areas. · Cover your mouth with a tissue when you cough or sneeze. Then throw the tissue in the trash and wash your hands right away. · If you're sick:  ? Leave your home only if you need to get medical care. But call the doctor's office first so they know you're coming. And wear a face cover. ? Wear the face cover whenever you're around other people. It can help stop the spread of the virus. ? Limit contact with pets and people in your home. If possible, stay in a separate bedroom and use a separate bathroom. ? Clean and disinfect your home every day. Use household  and disinfectant wipes or sprays. Take special care to clean things that you grab with your hands. These include doorknobs, remote controls, phones, and handles on your refrigerator and microwave.  And don't forget countertops, tabletops, bathrooms, and computer keyboards. When should you call for help? Call 911 anytime you think you may need emergency care. For example, call if you have life-threatening symptoms, such as:    · You have severe trouble breathing. (You can't talk at all.)     · You have constant chest pain or pressure.     · You are severely dizzy or lightheaded.     · You are confused or can't think clearly.     · Your face and lips have a blue color.     · You pass out (lose consciousness) or are very hard to wake up. Call your doctor now or seek immediate medical care if:    · You have moderate trouble breathing. (You can't speak a full sentence.)     · You are coughing up blood (more than about 1 teaspoon).     · You have signs of low blood pressure. These include feeling lightheaded; being too weak to stand; and having cold, pale, clammy skin. Watch closely for changes in your health, and be sure to contact your doctor if:    · Your symptoms get worse.     · You are not getting better as expected. Call before you go to the doctor's office. Follow their instructions. And wear a cloth face cover. Current as of: March 26, 2021               Content Version: 12.9  © 2006-2021 [employer]. Care instructions adapted under license by [facility] ([facility]). If you have questions about a medical condition or this instruction, always ask your healthcare professional. [place] [address] any warranty or liability for your use of this information. Patient Education        Coronavirus ([id number]-35): Care Instructions  Overview  The coronavirus disease (COVID-19) is caused by a virus. Symptoms may include a fever, a cough, and shortness of breath. It mainly spreads person-to-person through droplets from coughing and sneezing. The virus also can spread when people are in close contact with someone who is infected. Most people have mild symptoms and can take care of themselves at home.  If their symptoms get worse, they may need care use an alcohol-based hand . · Don't share personal household items. These include bedding, towels, cups and glasses, and eating utensils. · [address] in the warmest water allowed for the fabric type, and dry it completely. It's okay to wash other people's laundry with yours. · Clean and disinfect your home every day. Use household  and disinfectant wipes or sprays. Take special care to clean things that you grab with your hands. These include doorknobs, remote controls, phones, and handles on your refrigerator and microwave. And don't forget countertops, tabletops, bathrooms, and computer keyboards. When you can end self-isolation  · If you know or suspect that you have COVID-19, stay in self-isolation until:  ? You haven't had a fever for 24 hours while not taking medicines to lower the fever, and  ? Your symptoms have improved, and  ? It's been at least 10 days since your symptoms started. · Talk to your doctor about whether you also need testing, especially if you have a weakened immune system. When should you call for help? Call 911 anytime you think you may need emergency care. For example, call if you have life-threatening symptoms, such as:    · You have severe trouble breathing. (You can't talk at all.)     · You have constant chest pain or pressure.     · You are severely dizzy or lightheaded.     · You are confused or can't think clearly.     · Your face and lips have a blue color.     · You pass out (lose consciousness) or are very hard to wake up. Call your doctor now or seek immediate medical care if:    · You have moderate trouble breathing. (You can't speak a full sentence.)     · You are coughing up blood (more than about 1 teaspoon).     · You have signs of low blood pressure. These include feeling lightheaded; being too weak to stand; and having cold, pale, clammy skin.    Watch closely for changes in your health, and be sure to contact your doctor if:    · Your symptoms get worse.     · You are not getting better as expected. Call before you go to the doctor's office. Follow their instructions. And wear a cloth face cover. Current as of: March 26, 2021               Content Version: 12.9  © 2006-2021 [employer]. Care instructions adapted under license by [facility] ([facility]). If you have questions about a medical condition or this instruction, always ask your healthcare professional. [place] [address] any warranty or liability for your use of this information.

## 2021-08-07 LAB — SARS-COV-2, PCR: DETECTED

## 2021-08-08 ENCOUNTER — NURSE TRIAGE (OUTPATIENT)
Dept: CALL CENTER | Facility: HOSPITAL | Age: 51
End: 2021-08-08

## 2021-08-08 NOTE — TELEPHONE ENCOUNTER
Reason for Disposition  • Fever present > 3 days (72 hours)    Additional Information  • Negative: SEVERE difficulty breathing (e.g., struggling for each breath, speaks in single words)  • Negative: Difficult to awaken or acting confused (e.g., disoriented, slurred speech)  • Negative: Bluish (or gray) lips or face now  • Negative: Shock suspected (e.g., cold/pale/clammy skin, too weak to stand, low BP, rapid pulse)  • Negative: Sounds like a life-threatening emergency to the triager  • Negative: [1] COVID-19 exposure AND [2] has not completed COVID-19 vaccine series AND [3] no symptoms  • Negative: [1] COVID-19 exposure AND [2] completed COVID-19 vaccine series (fully vaccinated) AND [3] no symptoms  • Negative: COVID-19 vaccine reaction suspected (e.g., fever, headache, muscle aches) occurring during days 1-3 after getting vaccine  • Negative: COVID-19 vaccine, questions about  • Negative: [1] COVID-19 vaccine series completed (fully vaccinated) AND [2] new-onset of COVID-19 symptoms BUT [3] no known exposure  • Negative: [1] Had lab test confirmed COVID-19 infection within last 3 months AND [2] new-onset of COVID-19 symptoms BUT [3] no known exposure  • Negative: [1] Lives with someone known to have influenza (flu test positive) AND [2] flu-like symptoms (e.g., cough, runny nose, sore throat, SOB; with or without fever)  • Negative: [1] Adult with possible COVID-19 symptoms AND [2] triager concerned about severity of symptoms or other causes  • Negative: COVID-19 and breastfeeding, questions about  • Negative: SEVERE or constant chest pain or pressure (Exception: mild central chest pain, present only when coughing)  • Negative: MODERATE difficulty breathing (e.g., speaks in phrases, SOB even at rest, pulse 100-120)  • Negative: [1] Headache AND [2] stiff neck (can't touch chin to chest)  • Negative: MILD difficulty breathing (e.g., minimal/no SOB at rest, SOB with walking, pulse <100)  • Negative: Chest pain  "or pressure  • Negative: Patient sounds very sick or weak to the triager  • Negative: Fever > 103 F (39.4 C)  • Negative: [1] Fever > 101 F (38.3 C) AND [2] age > 60 years  • Negative: [1] Fever > 100.0 F (37.8 C) AND [2] bedridden (e.g., nursing home patient, CVA, chronic illness, recovering from surgery)  • Negative: [1] HIGH RISK patient (e.g., age > 64 years, diabetes, heart or lung disease, weak immune system) AND [2] new or worsening symptoms  • Negative: [1] HIGH RISK patient AND [2] influenza is widespread in the community AND [3] ONE OR MORE respiratory symptoms: cough, sore throat, runny or stuffy nose  • Negative: [1] HIGH RISK patient AND [2] influenza exposure within the last 7 days AND [3] ONE OR MORE respiratory symptoms: cough, sore throat, runny or stuffy nose    Answer Assessment - Initial Assessment Questions  1. COVID-19 DIAGNOSIS: \"Who made your Coronavirus (COVID-19) diagnosis?\" \"Was it confirmed by a positive lab test?\" If not diagnosed by a HCP, ask \"Are there lots of cases (community spread) where you live?\" (See public health department website, if unsure)  [facility] 08/05/2021 tested positive  2. COVID-19 EXPOSURE: \"Was there any known exposure to COVID before the symptoms began?\" CDC Definition of close contact: within 6 feet (2 meters) for a total of 15 minutes or more over a 24-hour period.       *No Answer*  3. ONSET: \"When did the COVID-19 symptoms start?\"    08/03/2021  4. WORST SYMPTOM: \"What is your worst symptom?\" (e.g., cough, fever, shortness of breath, muscle aches)      Fever 101.4  5. COUGH: \"Do you have a cough?\" If Yes, ask: \"How bad is the cough?\"    Cough, a little sputum production, white in color  6. FEVER: \"Do you have a fever?\" If Yes, ask: \"What is your temperature, how was it measured, and when did it start?\"   101.4  7. RESPIRATORY STATUS: \"Describe your breathing?\" (e.g., shortness of breath, wheezing, unable to speak)   Denies SOB, no wheezing  8. " "BETTER-SAME-WORSE: \"Are you getting better, staying the same or getting worse compared to yesterday?\"  If getting worse, ask, \"In what way?\"   same  9. HIGH RISK DISEASE: \"Do you have any chronic medical problems?\" (e.g., asthma, heart or lung disease, weak immune system, obesity, etc.)  Diabetic, oral agents  10. PREGNANCY: \"Is there any chance you are pregnant?\" \"When was your last menstrual period?\"  na  11. OTHER SYMPTOMS: \"Do you have any other symptoms?\"  (e.g., chills, fatigue, headache, loss of smell or taste, muscle pain, sore throat; new loss of smell or taste especially support the diagnosis of COVID-19)        Loss of taste smell chills    Protocols used: CORONAVIRUS (COVID-19) DIAGNOSED OR SUSPECTED-ADULT-AH      "

## 2021-08-09 ENCOUNTER — TELEPHONE (OUTPATIENT)
Dept: PRIMARY CARE CLINIC | Age: 51
End: 2021-08-09

## 2021-08-09 NOTE — TELEPHONE ENCOUNTER
Pt called and stated she tested positive for COVID and didn't receive any antibiotics. Pt states her sx includes nausea, fever, hoarseness, and persistent cough. I let pt know that she does have a VV with NATALI Alarcon tomorrow but if she wasn't feeling too well then she doesn't need to wait for an appointment, she needs to report to the ER to be evaluated and treated. Pt thanked me and VU.

## 2021-08-10 ENCOUNTER — VIRTUAL VISIT (OUTPATIENT)
Dept: PRIMARY CARE CLINIC | Age: 51
End: 2021-08-10
Payer: MEDICAID

## 2021-08-10 DIAGNOSIS — J06.9 UPPER RESPIRATORY TRACT INFECTION, UNSPECIFIED TYPE: ICD-10-CM

## 2021-08-10 DIAGNOSIS — E55.9 VITAMIN D DEFICIENCY: Primary | ICD-10-CM

## 2021-08-10 DIAGNOSIS — U07.1 COVID-19: ICD-10-CM

## 2021-08-10 PROCEDURE — G8428 CUR MEDS NOT DOCUMENT: HCPCS | Performed by: NURSE PRACTITIONER

## 2021-08-10 PROCEDURE — 99214 OFFICE O/P EST MOD 30 MIN: CPT | Performed by: NURSE PRACTITIONER

## 2021-08-10 PROCEDURE — 3017F COLORECTAL CA SCREEN DOC REV: CPT | Performed by: NURSE PRACTITIONER

## 2021-08-10 RX ORDER — DEXAMETHASONE 1.5 MG/1
TABLET ORAL
Qty: 1 EACH | Refills: 0 | Status: SHIPPED | OUTPATIENT
Start: 2021-08-10 | End: 2021-08-10

## 2021-08-10 RX ORDER — AZITHROMYCIN 250 MG/1
250 TABLET, FILM COATED ORAL SEE ADMIN INSTRUCTIONS
Qty: 6 TABLET | Refills: 0 | Status: SHIPPED | OUTPATIENT
Start: 2021-08-10 | End: 2021-08-15

## 2021-08-10 ASSESSMENT — ENCOUNTER SYMPTOMS
VOICE CHANGE: 0
COUGH: 1
SHORTNESS OF BREATH: 0
CHEST TIGHTNESS: 0
WHEEZING: 0
BLOOD IN STOOL: 0
EYE REDNESS: 0
VOMITING: 0
NAUSEA: 0
DIARRHEA: 0
RHINORRHEA: 0
CONSTIPATION: 0
SORE THROAT: 1
TROUBLE SWALLOWING: 0
ABDOMINAL PAIN: 0

## 2021-08-10 NOTE — PATIENT INSTRUCTIONS
Vit d 5,000 daily, may take 10,000 daily while on steroids then reduce back down to 5,000 daily. Take vit C daily. Take zinc daily. Begin zpack and dex pack as directed.

## 2021-08-10 NOTE — PROGRESS NOTES
Neela Montalvo (:  1970) is a 45 y.o. female,Established patient, here for evaluation of the following chief complaint(s): Positive For Covid-19         ASSESSMENT/PLAN:  1. Vitamin D deficiency  2. COVID-19  -     Dexamethasone 1.5 MG (21) TBPK; Take as directed., Disp-1 each, R-0Normal  -     azithromycin (ZITHROMAX) 250 MG tablet; Take 1 tablet by mouth See Admin Instructions for 5 days 500mg on day 1 followed by 250mg on days 2 - 5, Disp-6 tablet, R-0Normal  3. Upper respiratory tract infection, unspecified type      Return if symptoms worsen or fail to improve. SUBJECTIVE/OBJECTIVE:  HPI    Symptoms of covid started on 2021 with s/t and then developed inability to smell or taste. States that she tested positive for covid-19 on 2021. Patient reports that symptoms seem to be increasing. She states that her fever is persistant at 101.4. She reports productive cough, and nausea. Patient denies sob. Review of Systems   Constitutional: Positive for activity change, appetite change, fatigue and fever. Negative for unexpected weight change. HENT: Positive for congestion and sore throat. Negative for ear pain, nosebleeds, rhinorrhea, trouble swallowing and voice change. Eyes: Negative for redness and visual disturbance. Respiratory: Positive for cough. Negative for chest tightness, shortness of breath and wheezing. Cardiovascular: Negative for chest pain, palpitations and leg swelling. Gastrointestinal: Negative for abdominal pain, blood in stool, constipation, diarrhea, nausea and vomiting. Endocrine: Negative for polydipsia, polyphagia and polyuria. Genitourinary: Negative for dysuria, frequency and urgency. Musculoskeletal: Negative for myalgias. Skin: Negative for rash and wound. Neurological: Positive for dizziness and headaches. Negative for speech difficulty and light-headedness.    Psychiatric/Behavioral: Negative for agitation, confusion, self-injury and visit.      Neela Montalvo, was evaluated through a synchronous (real-time) audio-video encounter. The patient (or guardian if applicable) is aware that this is a billable service. Verbal consent to proceed has been obtained within the past 12 months. The visit was conducted pursuant to the emergency declaration under the [address] [place] [address] and the [email or web address] and [employer] Act. Patient identification was verified, and a caregiver was present when appropriate. The patient was located in a state where the provider was credentialed to provide care. An electronic signature was used to authenticate this note.     --NATALI White

## 2021-08-13 DIAGNOSIS — R39.198 DIFFICULTY VOIDING: ICD-10-CM

## 2021-08-13 RX ORDER — TAMSULOSIN HYDROCHLORIDE 0.4 MG/1
0.4 CAPSULE ORAL DAILY
Qty: 30 CAPSULE | Refills: 2 | Status: SHIPPED | OUTPATIENT
Start: 2021-08-13 | End: 2022-08-04

## 2021-08-27 ENCOUNTER — TELEPHONE (OUTPATIENT)
Dept: PRIMARY CARE CLINIC | Age: 51
End: 2021-08-27

## 2021-08-27 NOTE — TELEPHONE ENCOUNTER
Attempted to call pt in regards to questions about COVID vaccine. Pt didn't answer. LM stating to call the office back.

## 2021-09-02 DIAGNOSIS — F41.9 ANXIETY: Primary | ICD-10-CM

## 2021-09-02 RX ORDER — ALPRAZOLAM 0.25 MG/1
0.25 TABLET ORAL NIGHTLY
Qty: 30 TABLET | Refills: 0 | Status: SHIPPED | OUTPATIENT
Start: 2021-09-02 | End: 2021-10-08

## 2021-09-07 RX ORDER — TRAZODONE HYDROCHLORIDE 150 MG/1
TABLET ORAL
Qty: 60 TABLET | Refills: 3 | Status: SHIPPED | OUTPATIENT
Start: 2021-09-07 | End: 2022-01-05 | Stop reason: SDUPTHER

## 2021-09-07 NOTE — TELEPHONE ENCOUNTER
Savita Corey called to request a refill on her medication.       Last office visit : 8/10/2021   Next office visit : Visit date not found     Requested Prescriptions     Pending Prescriptions Disp Refills    traZODone (DESYREL) 150 MG tablet [Pharmacy Med Name: TRAZODONE 150MG (HUNDRED-FIFTY) TAB] 60 tablet 3     Sig: TAKE 2 TABLET BY MOUTH EVERY EVENING            Maggie Hurst

## 2021-10-04 DIAGNOSIS — I10 ESSENTIAL HYPERTENSION: ICD-10-CM

## 2021-10-06 RX ORDER — LISINOPRIL 10 MG/1
TABLET ORAL
Qty: 180 TABLET | Refills: 0 | Status: SHIPPED | OUTPATIENT
Start: 2021-10-06 | End: 2022-01-14

## 2021-10-07 DIAGNOSIS — F41.9 ANXIETY: ICD-10-CM

## 2021-10-08 DIAGNOSIS — F32.A ANXIETY AND DEPRESSION: ICD-10-CM

## 2021-10-08 DIAGNOSIS — F41.9 ANXIETY AND DEPRESSION: ICD-10-CM

## 2021-10-08 DIAGNOSIS — G89.29 OTHER CHRONIC PAIN: ICD-10-CM

## 2021-10-08 RX ORDER — DULOXETINE 40 MG/1
CAPSULE, DELAYED RELEASE ORAL
Qty: 30 CAPSULE | Refills: 2 | Status: SHIPPED | OUTPATIENT
Start: 2021-10-08 | End: 2022-01-11

## 2021-10-08 NOTE — TELEPHONE ENCOUNTER
Mili Seek called to request a refill on her medication. Last office visit : 8/10/2021   Next office visit : 10/8/2021     Last UDS:   Amphetamine Screen, Urine   Date Value Ref Range Status   02/04/2021 -  Final     Barbiturates   Date Value Ref Range Status   01/17/2011 Negative () Final     Barbiturate Screen, Urine   Date Value Ref Range Status   02/04/2021 -  Final     Benzodiazepine Screen, Urine   Date Value Ref Range Status   02/04/2021 +  Final     Comment:     Xanax     Buprenorphine Urine   Date Value Ref Range Status   02/04/2021 -  Final     Cocaine Metabolite Screen, Urine   Date Value Ref Range Status   02/04/2021 -  Final     Gabapentin Screen, Urine   Date Value Ref Range Status   02/04/2021 -  Final     MDMA, Urine   Date Value Ref Range Status   02/04/2021 -  Final     Methamphetamine, Urine   Date Value Ref Range Status   02/04/2021 -  Final     Opiate Scrn, Ur   Date Value Ref Range Status   02/04/2021 -  Final     Oxycodone Screen, Ur   Date Value Ref Range Status   02/04/2021 -  Final     PCP Screen, Urine   Date Value Ref Range Status   02/04/2021 -  Final     Propoxyphene Screen, Urine   Date Value Ref Range Status   02/04/2021 -  Final     THC Screen, Urine   Date Value Ref Range Status   02/04/2021 -  Final     Tricyclic Antidepressants, Urine   Date Value Ref Range Status   02/04/2021 -  Final       Last David Marrerodonnell: 9/2/21  Medication Contract: needs update at next visit   Last Fill: 9/2/21    Requested Prescriptions     Pending Prescriptions Disp Refills    ALPRAZolam (XANAX) 0.25 MG tablet [Pharmacy Med Name: ALPRAZOLAM 0.25MG TABLETS] 30 tablet 0     Sig: TAKE 1 TABLET BY MOUTH EVERY NIGHT         Please approve or refuse this medication.    Rachel Real

## 2021-10-11 RX ORDER — ALPRAZOLAM 0.25 MG/1
TABLET ORAL
Qty: 30 TABLET | Refills: 0 | Status: SHIPPED | OUTPATIENT
Start: 2021-10-11 | End: 2021-11-15 | Stop reason: SDUPTHER

## 2021-10-25 DIAGNOSIS — E11.00 TYPE 2 DIABETES MELLITUS WITH HYPEROSMOLARITY WITHOUT COMA, WITHOUT LONG-TERM CURRENT USE OF INSULIN (HCC): ICD-10-CM

## 2021-10-25 RX ORDER — NYSTATIN 100000 [USP'U]/G
POWDER TOPICAL
Qty: 30 G | Refills: 0 | Status: SHIPPED | OUTPATIENT
Start: 2021-10-25 | End: 2022-03-23

## 2021-11-15 DIAGNOSIS — F41.9 ANXIETY: ICD-10-CM

## 2021-11-15 RX ORDER — ALPRAZOLAM 0.25 MG/1
TABLET ORAL
Qty: 30 TABLET | Refills: 0 | Status: SHIPPED | OUTPATIENT
Start: 2021-11-15 | End: 2021-12-08

## 2021-12-08 DIAGNOSIS — F41.9 ANXIETY: ICD-10-CM

## 2021-12-08 RX ORDER — ALPRAZOLAM 0.25 MG/1
TABLET ORAL
Qty: 30 TABLET | Refills: 0 | Status: SHIPPED | OUTPATIENT
Start: 2021-12-08 | End: 2022-01-18

## 2021-12-08 NOTE — TELEPHONE ENCOUNTER
Grabiel Dejesus called to request a refill on her medication. Last office visit : 8/10/2021   Next office visit : Visit date not found     Last UDS:   Amphetamine Screen, Urine   Date Value Ref Range Status   02/04/2021 -  Final     Barbiturates   Date Value Ref Range Status   01/17/2011 Negative () Final     Barbiturate Screen, Urine   Date Value Ref Range Status   02/04/2021 -  Final     Benzodiazepine Screen, Urine   Date Value Ref Range Status   02/04/2021 +  Final     Comment:     Xanax     Buprenorphine Urine   Date Value Ref Range Status   02/04/2021 -  Final     Cocaine Metabolite Screen, Urine   Date Value Ref Range Status   02/04/2021 -  Final     Gabapentin Screen, Urine   Date Value Ref Range Status   02/04/2021 -  Final     MDMA, Urine   Date Value Ref Range Status   02/04/2021 -  Final     Methamphetamine, Urine   Date Value Ref Range Status   02/04/2021 -  Final     Opiate Scrn, Ur   Date Value Ref Range Status   02/04/2021 -  Final     Oxycodone Screen, Ur   Date Value Ref Range Status   02/04/2021 -  Final     PCP Screen, Urine   Date Value Ref Range Status   02/04/2021 -  Final     Propoxyphene Screen, Urine   Date Value Ref Range Status   02/04/2021 -  Final     THC Screen, Urine   Date Value Ref Range Status   02/04/2021 -  Final     Tricyclic Antidepressants, Urine   Date Value Ref Range Status   02/04/2021 -  Final       Last Chiara Mort: 12/8/21  Medication Contract: needs updated   Last Fill: 11/15/21    Requested Prescriptions     Pending Prescriptions Disp Refills    ALPRAZolam (XANAX) 0.25 MG tablet [Pharmacy Med Name: ALPRAZOLAM 0.25MG TABLETS] 30 tablet      Sig: TAKE 1 TABLET BY MOUTH EVERY NIGHT         Please approve or refuse this medication.    Phu Butt

## 2021-12-10 RX ORDER — ARIPIPRAZOLE 5 MG/1
TABLET ORAL
Qty: 30 TABLET | Refills: 5 | Status: SHIPPED | OUTPATIENT
Start: 2021-12-10 | End: 2022-06-24 | Stop reason: SDUPTHER

## 2021-12-25 ENCOUNTER — APPOINTMENT (OUTPATIENT)
Dept: GENERAL RADIOLOGY | Facility: HOSPITAL | Age: 51
End: 2021-12-25

## 2021-12-25 ENCOUNTER — HOSPITAL ENCOUNTER (EMERGENCY)
Facility: HOSPITAL | Age: 51
Discharge: HOME | End: 2021-12-25
Admitting: FAMILY MEDICINE

## 2021-12-25 VITALS
TEMPERATURE: 98.9 F | HEART RATE: 90 BPM | DIASTOLIC BLOOD PRESSURE: 70 MMHG | WEIGHT: 293 LBS | RESPIRATION RATE: 16 BRPM | BODY MASS INDEX: 44.41 KG/M2 | SYSTOLIC BLOOD PRESSURE: 140 MMHG | HEIGHT: 68 IN | OXYGEN SATURATION: 95 %

## 2021-12-25 DIAGNOSIS — M17.11 ARTHRITIS OF RIGHT KNEE: Primary | ICD-10-CM

## 2021-12-25 PROCEDURE — 99283 EMERGENCY DEPT VISIT LOW MDM: CPT

## 2021-12-25 PROCEDURE — 73562 X-RAY EXAM OF KNEE 3: CPT

## 2021-12-25 RX ORDER — METHYLPREDNISOLONE 4 MG/1
TABLET ORAL
Qty: 21 EACH | Refills: 0 | Status: SHIPPED | OUTPATIENT
Start: 2021-12-25

## 2021-12-25 NOTE — ED PROVIDER NOTES
"Subjective   Patient is a 51-year-old white female presents the emergency department with right knee pain which is chronic for her.  She states the pain has been worse over the past 2 days.  She does receive injections in her knee but has not had one since November 10.  She states that she has started to have to use a knee brace because the pain is so bad.  She denies any fall.  She denies any known injury.  She states that she is hurting her worse today      History provided by:  Patient   used: No        Review of Systems   Constitutional: Negative.    HENT: Negative.    Eyes: Negative.    Respiratory: Negative.    Cardiovascular: Negative.    Gastrointestinal: Negative.    Endocrine: Negative.    Genitourinary: Negative.    Musculoskeletal:        Patient is a 51-year-old white female presents the emergency department with right knee pain which is chronic for her.  She states the pain has been worse over the past 2 days.  She does receive injections in her knee but has not had one since November 10.  She states that she has started to have to use a knee brace because the pain is so bad.  She denies any fall.  She denies any known injury.  She states that she is hurting her worse today     Skin: Negative.    Allergic/Immunologic: Negative.    Neurological: Negative.    Hematological: Negative.    Psychiatric/Behavioral: Negative.    All other systems reviewed and are negative.      No past medical history on file.    No Known Allergies    No past surgical history on file.    Family History   Problem Relation Age of Onset   • Breast cancer Half-Sister        Social History     Socioeconomic History   • Marital status:        Prior to Admission medications    Not on File       /70 (BP Location: Right arm, Patient Position: Sitting)   Pulse 90   Temp 98.9 °F (37.2 °C) (Oral)   Resp 16   Ht 172.7 cm (68\")   Wt (!) 153 kg (338 lb)   SpO2 95%   BMI 51.39 kg/m²     Objective "   Physical Exam  Vitals and nursing note reviewed.   Constitutional:       Appearance: She is well-developed.   HENT:      Head: Normocephalic and atraumatic.   Eyes:      Conjunctiva/sclera: Conjunctivae normal.      Pupils: Pupils are equal, round, and reactive to light.   Neck:      Thyroid: No thyromegaly.      Trachea: No tracheal deviation.   Cardiovascular:      Rate and Rhythm: Normal rate and regular rhythm.      Heart sounds: Normal heart sounds.   Pulmonary:      Effort: Pulmonary effort is normal. No respiratory distress.      Breath sounds: Normal breath sounds. No wheezing or rales.   Chest:      Chest wall: No tenderness.   Musculoskeletal:         General: Normal range of motion.      Cervical back: Normal range of motion and neck supple.      Comments: Right knee: There is tenderness to the lateral aspect of the right knee.  She has increased pain with extension.  Pedal pulses are palpable.  Foot push pull strong and equal.  There is no joint instability noted.   Skin:     General: Skin is warm and dry.   Neurological:      Mental Status: She is alert and oriented to person, place, and time.      Cranial Nerves: No cranial nerve deficit.      Deep Tendon Reflexes: Reflexes are normal and symmetric.   Psychiatric:         Behavior: Behavior normal.         Thought Content: Thought content normal.         Judgment: Judgment normal.         Procedures         Lab Results (last 24 hours)     ** No results found for the last 24 hours. **          XR Knee 3 View Right   Final Result   No fracture, no acute osseous abnormality. Degenerative   changes, most advanced at the medial and patellofemoral compartments.   Small joint effusion.   This report was finalized on 12/25/2021 11:35 by Dr. Garfield Lorenzo MD.          ED Course  ED Course as of 12/25/21 1515   Sat Dec 25, 2021   1141 Reviewed x-ray result with patient.  Advised that she had extensive arthritis in her right knee.  She states she is post see  pain management again on 25 January.  She has not seen orthopedic because she does not want surgery on her knee.  Will place her in a knee sleeve and will prescribe steroid pack at this time.  Advised to apply ice to the area.  Advised to follow-up with her doctors as scheduled. [CW]      ED Course User Index  [CW] Marika Herrera, APRN          MDM  Number of Diagnoses or Management Options  Arthritis of right knee: minor     Amount and/or Complexity of Data Reviewed  Tests in the radiology section of CPT®: ordered and reviewed    Patient Progress  Patient progress: stable      Final diagnoses:   Arthritis of right knee          Marika Herrera, APRN  12/25/21 1510

## 2021-12-25 NOTE — ED TRIAGE NOTES
Patient presents to ED with CC of right knee pain. Patient states her last injection in it was November 30th. Denies new injury.

## 2022-01-04 ENCOUNTER — TRANSCRIBE ORDERS (OUTPATIENT)
Dept: ADMINISTRATIVE | Facility: HOSPITAL | Age: 52
End: 2022-01-04

## 2022-01-04 DIAGNOSIS — M17.11 ARTHRITIS OF RIGHT KNEE: Primary | ICD-10-CM

## 2022-01-05 ENCOUNTER — HOSPITAL ENCOUNTER (OUTPATIENT)
Dept: MRI IMAGING | Facility: HOSPITAL | Age: 52
Discharge: HOME | End: 2022-01-05
Admitting: NURSE PRACTITIONER

## 2022-01-05 DIAGNOSIS — M17.11 ARTHRITIS OF RIGHT KNEE: ICD-10-CM

## 2022-01-05 PROCEDURE — 73721 MRI JNT OF LWR EXTRE W/O DYE: CPT

## 2022-01-05 RX ORDER — TRAZODONE HYDROCHLORIDE 150 MG/1
TABLET ORAL
Qty: 60 TABLET | Refills: 3 | Status: SHIPPED | OUTPATIENT
Start: 2022-01-05 | End: 2022-05-05

## 2022-01-05 NOTE — TELEPHONE ENCOUNTER
Rusty Noble called to request a refill on her medication.       Last office visit : 8/10/2021   Next office visit : Visit date not found     Requested Prescriptions     Pending Prescriptions Disp Refills    traZODone (DESYREL) 150 MG tablet 60 tablet 3     Sig: TAKE 2 TABLET BY MOUTH EVERY EVENING            Lillie Keyes

## 2022-01-10 DIAGNOSIS — F41.9 ANXIETY AND DEPRESSION: ICD-10-CM

## 2022-01-10 DIAGNOSIS — G89.29 OTHER CHRONIC PAIN: ICD-10-CM

## 2022-01-10 DIAGNOSIS — F32.A ANXIETY AND DEPRESSION: ICD-10-CM

## 2022-01-11 RX ORDER — DULOXETINE 40 MG/1
CAPSULE, DELAYED RELEASE ORAL
Qty: 30 CAPSULE | Refills: 2 | Status: SHIPPED | OUTPATIENT
Start: 2022-01-11 | End: 2022-04-19

## 2022-01-14 ENCOUNTER — APPOINTMENT (OUTPATIENT)
Dept: MRI IMAGING | Facility: HOSPITAL | Age: 52
End: 2022-01-14

## 2022-01-14 DIAGNOSIS — I10 ESSENTIAL HYPERTENSION: ICD-10-CM

## 2022-01-14 RX ORDER — LISINOPRIL 10 MG/1
TABLET ORAL
Qty: 180 TABLET | Refills: 0 | Status: SHIPPED | OUTPATIENT
Start: 2022-01-14 | End: 2022-05-01

## 2022-01-17 DIAGNOSIS — F41.9 ANXIETY: ICD-10-CM

## 2022-01-18 RX ORDER — ALPRAZOLAM 0.25 MG/1
TABLET ORAL
Qty: 30 TABLET | Refills: 0 | Status: SHIPPED | OUTPATIENT
Start: 2022-01-18 | End: 2022-02-17 | Stop reason: SDUPTHER

## 2022-01-18 NOTE — TELEPHONE ENCOUNTER
Flores Rozina called to request a refill on her medication. Last office visit : 8/10/2021   Next office visit : 1/19/2022     Last UDS:   Amphetamine Screen, Urine   Date Value Ref Range Status   02/04/2021 -  Final     Barbiturates   Date Value Ref Range Status   01/17/2011 Negative () Final     Barbiturate Screen, Urine   Date Value Ref Range Status   02/04/2021 -  Final     Benzodiazepine Screen, Urine   Date Value Ref Range Status   02/04/2021 +  Final     Comment:     Xanax     Buprenorphine Urine   Date Value Ref Range Status   02/04/2021 -  Final     Cocaine Metabolite Screen, Urine   Date Value Ref Range Status   02/04/2021 -  Final     Gabapentin Screen, Urine   Date Value Ref Range Status   02/04/2021 -  Final     MDMA, Urine   Date Value Ref Range Status   02/04/2021 -  Final     Methamphetamine, Urine   Date Value Ref Range Status   02/04/2021 -  Final     Opiate Scrn, Ur   Date Value Ref Range Status   02/04/2021 -  Final     Oxycodone Screen, Ur   Date Value Ref Range Status   02/04/2021 -  Final     PCP Screen, Urine   Date Value Ref Range Status   02/04/2021 -  Final     Propoxyphene Screen, Urine   Date Value Ref Range Status   02/04/2021 -  Final     THC Screen, Urine   Date Value Ref Range Status   02/04/2021 -  Final     Tricyclic Antidepressants, Urine   Date Value Ref Range Status   02/04/2021 -  Final       Last Markelljeanie Karen: 12/8/21  Medication Contract: needs updated   Last Fill: 12/8/21    Requested Prescriptions     Pending Prescriptions Disp Refills    ALPRAZolam (XANAX) 0.25 MG tablet [Pharmacy Med Name: ALPRAZOLAM 0.25MG TABLETS] 30 tablet      Sig: TAKE 1 TABLET BY MOUTH EVERY NIGHT         Please approve or refuse this medication.    Brian Gay

## 2022-01-19 ENCOUNTER — TELEPHONE (OUTPATIENT)
Dept: PRIMARY CARE CLINIC | Age: 52
End: 2022-01-19

## 2022-01-19 ENCOUNTER — OFFICE VISIT (OUTPATIENT)
Dept: PRIMARY CARE CLINIC | Age: 52
End: 2022-01-19
Payer: MEDICAID

## 2022-01-19 VITALS
SYSTOLIC BLOOD PRESSURE: 126 MMHG | TEMPERATURE: 97.2 F | WEIGHT: 293 LBS | HEIGHT: 68 IN | BODY MASS INDEX: 44.41 KG/M2 | OXYGEN SATURATION: 93 % | HEART RATE: 93 BPM | DIASTOLIC BLOOD PRESSURE: 78 MMHG

## 2022-01-19 DIAGNOSIS — E11.00 TYPE 2 DIABETES MELLITUS WITH HYPEROSMOLARITY WITHOUT COMA, WITHOUT LONG-TERM CURRENT USE OF INSULIN (HCC): ICD-10-CM

## 2022-01-19 DIAGNOSIS — S89.91XD INJURY OF RIGHT KNEE, SUBSEQUENT ENCOUNTER: ICD-10-CM

## 2022-01-19 DIAGNOSIS — R35.0 URINARY FREQUENCY: ICD-10-CM

## 2022-01-19 DIAGNOSIS — R60.9 EDEMA, UNSPECIFIED TYPE: Primary | ICD-10-CM

## 2022-01-19 PROBLEM — S89.91XA INJURY OF RIGHT KNEE: Status: ACTIVE | Noted: 2022-01-19

## 2022-01-19 PROCEDURE — G8484 FLU IMMUNIZE NO ADMIN: HCPCS | Performed by: NURSE PRACTITIONER

## 2022-01-19 PROCEDURE — 99214 OFFICE O/P EST MOD 30 MIN: CPT | Performed by: NURSE PRACTITIONER

## 2022-01-19 PROCEDURE — G8427 DOCREV CUR MEDS BY ELIG CLIN: HCPCS | Performed by: NURSE PRACTITIONER

## 2022-01-19 PROCEDURE — 2022F DILAT RTA XM EVC RTNOPTHY: CPT | Performed by: NURSE PRACTITIONER

## 2022-01-19 PROCEDURE — G8417 CALC BMI ABV UP PARAM F/U: HCPCS | Performed by: NURSE PRACTITIONER

## 2022-01-19 PROCEDURE — 3046F HEMOGLOBIN A1C LEVEL >9.0%: CPT | Performed by: NURSE PRACTITIONER

## 2022-01-19 PROCEDURE — 3017F COLORECTAL CA SCREEN DOC REV: CPT | Performed by: NURSE PRACTITIONER

## 2022-01-19 PROCEDURE — 1036F TOBACCO NON-USER: CPT | Performed by: NURSE PRACTITIONER

## 2022-01-19 RX ORDER — SEMAGLUTIDE 1.34 MG/ML
INJECTION, SOLUTION SUBCUTANEOUS
Qty: 9 ML | Refills: 0 | Status: SHIPPED | OUTPATIENT
Start: 2022-01-19 | End: 2022-01-19 | Stop reason: ALTCHOICE

## 2022-01-19 RX ORDER — HYDROCHLOROTHIAZIDE 12.5 MG/1
12.5 CAPSULE, GELATIN COATED ORAL DAILY
Qty: 30 CAPSULE | Refills: 0 | Status: SHIPPED | OUTPATIENT
Start: 2022-01-19 | End: 2022-02-21

## 2022-01-19 ASSESSMENT — ENCOUNTER SYMPTOMS
CHEST TIGHTNESS: 0
DIARRHEA: 0
BLOOD IN STOOL: 0
CONSTIPATION: 0
SHORTNESS OF BREATH: 0
SORE THROAT: 0
EYE REDNESS: 0
RHINORRHEA: 0
VOMITING: 0
VOICE CHANGE: 0
NAUSEA: 0
TROUBLE SWALLOWING: 0
WHEEZING: 0
COUGH: 0
ABDOMINAL PAIN: 0

## 2022-01-19 NOTE — PROGRESS NOTES
[facility]  [address] [place], [place] [id number]  [facility] [id number]  Dept: [phone]  Dept Fax: [phone]  Loc: [phone]        Hilton Bernal is a 45 y.o. female who presents today for her medical conditions/ complaints as noted below. Hilton Bernal is c/o Foot Swelling (in both feet) and Other (frequent urination. pt said she has no other urinary symptoms. she said she urinates about every 2 hours during the night and every 2-3 hours during the day.)        Chief Complaint   Patient presents with    Foot Swelling     in both feet    Other     frequent urination. pt said she has no other urinary symptoms. she said she urinates about every 2 hours during the night and every 2-3 hours during the day. HPI:     HPI     Edema of bilateral lower extremities/ worse RLE  Under the care of Dr. Kenya Guido for Right knee injury. Pt reports that she had her first appointment with physical therapy today at [facility]. Reports that she has been taking mobic 15 mg daily. She denies taking any other NSAIDS. Increased urination per pt report she is up every 2 hours at night to urinate. Notes that she is not making it to the bathroom sometimes. Believes that the symptoms have been present for around 2 weeks. Denies symptoms of UTI. Patient reports that they have been compliant with taking medications as directed.      Past Medical History:   Diagnosis Date    Anxiety     Chronic pain     Depression     Diabetes mellitus type 2 in obese ([address]     Hyperlipidemia     Hypertension     Premenopausal patient 2018       Past Surgical History:   Procedure Laterality Date     SECTION      TENDON RELEASE      right finger    TUBAL LIGATION         Social History     Socioeconomic History    Marital status:      Spouse name: None    Number of children: None    Years of education: None    Highest education level: None   Occupational History    None   Tobacco Use    Smoking status: Never Smoker    Smokeless tobacco: Never Used   Vaping Use    Vaping Use: Never used   Substance and Sexual Activity    Alcohol use: Yes     Comment: socially    Drug use: No    Sexual activity: None   Other Topics Concern    None   Social History Narrative    None     Social Determinants of Health     Financial Resource Strain:     Difficulty of Paying Living Expenses: Not on file   Food Insecurity:     Worried About Running Out of Food in the Last Year: Not on file    Howard of Food in the Last Year: Not on file   Transportation Needs:     Lack of Transportation (Medical): Not on file    Lack of Transportation (Non-Medical):  Not on file   Physical Activity:     Days of Exercise per Week: Not on file    Minutes of Exercise per Session: Not on file   Stress:     Feeling of Stress : Not on file   Social Connections:     Frequency of Communication with Friends and Family: Not on file    Frequency of Social Gatherings with Friends and Family: Not on file    Attends Yazdanism Services: Not on file    Active Member of [address] [place] [address] or Organizations: Not on file    Attends Club or Organization Meetings: Not on file    Marital Status: Not on file   Intimate Partner Violence:     Fear of Current or Ex-Partner: Not on file    Emotionally Abused: Not on file    Physically Abused: Not on file    Sexually Abused: Not on file   Housing Stability:     Unable to Pay for Housing in the Last Year: Not on file    Number of [place] in the Last Year: Not on file    Unstable Housing in the Last Year: Not on file       Family History   Problem Relation Age of Onset    Diabetes Father     Cancer Father         Bladder and Lung    Breast Cancer Sister         1/2 Sister    Diabetes Maternal Grandfather     Diabetes Paternal Grandmother        Current Outpatient Medications   Medication Sig Dispense Refill    hydroCHLOROthiazide (MICROZIDE) 12.5 MG capsule Take 1 capsule by mouth daily 30 capsule 0    metFORMIN (GLUCOPHAGE) 500 MG tablet Take 2 tablets by mouth 2 times daily (with meals) 120 tablet 2    Semaglutide,0.25 or 0.5MG/DOS, 2 MG/1.5ML SOPN 0.25mg weekly x 4 weeks then 0.5mg weekly for 4 weeks. 3 pen 0    ALPRAZolam (XANAX) 0.25 MG tablet TAKE 1 TABLET BY MOUTH EVERY NIGHT 30 tablet 0    lisinopril (PRINIVIL;ZESTRIL) 10 MG tablet TAKE 1 TABLET BY MOUTH TWICE DAILY 180 tablet 0    DULoxetine HCl 40 MG CPEP TAKE 1 CAPSULE BY MOUTH DAILY 30 capsule 2    traZODone (DESYREL) 150 MG tablet TAKE 2 TABLET BY MOUTH EVERY EVENING 60 tablet 3    ARIPiprazole (ABILIFY) 5 MG tablet TAKE 1 TABLET BY MOUTH EVERY DAY 30 tablet 5    nystatin (NYSTATIN) 254726 UNIT/GM powder APPLY DAILY TO SKIN FOLDS AS NEEDED UP TO 3 TIMES A DAY 30 g 0    tamsulosin (FLOMAX) 0.4 MG capsule TAKE 1 CAPSULE BY MOUTH DAILY 30 capsule 2    diclofenac sodium (VOLTAREN) 1 % GEL Apply topically 2 times daily 1 g 2    atorvastatin (LIPITOR) 20 MG tablet TAKE 1 TABLET BY MOUTH DAILY 30 tablet 11    meloxicam (MOBIC) 15 MG tablet TAKE 1 TABLET BY MOUTH EVERY DAY      tiZANidine (ZANAFLEX) 4 MG tablet       NARCAN 4 MG/0.1ML LIQD nasal spray USE 1 SPRAY AS NEEDED      ondansetron (ZOFRAN ODT) 4 MG disintegrating tablet Take 1 tablet by mouth every 8 hours as needed for Nausea or Vomiting 10 tablet 0    OneTouch Delica Lancets 73G MISC USE TO CHECK BLOOD SUGAR TWICE DAILY 100 each 1    HYDROcodone-acetaminophen (NORCO)  MG per tablet Take 1 tablet by mouth every 8 hours as needed for Pain (Dr. Cindy Velazquez). 0    gabapentin (NEURONTIN) 800 MG tablet Take 800 mg by mouth every 8 hours as needed (Dr. Cindy Velazquez). 1    ONE TOUCH ULTRA TEST strip USE TO TEST BLOOD SUAGR TWICE DAILY AS NEEDED. 100 strip 0    glucose monitoring kit (FREESTYLE) monitoring kit 1 kit by Does not apply route daily Please fill script with meter covered under patients insurance.  Dx: E11.9 1 kit 0    aspirin 325 MG tablet Take 325 mg by mouth nightly       Multiple Vitamins-Minerals (THERAPEUTIC MULTIVITAMIN-MINERALS) tablet Take 1 tablet by mouth daily (Patient taking differently: Take 1 tablet by mouth nightly ) 30 tablet 5     No current facility-administered medications for this visit. No Known Allergies    Lab Review not applicable  notapplicable    Subjective:   Review of Systems   Constitutional: Negative for activity change, appetite change, fatigue, fever and unexpected weight change. HENT: Negative for congestion, ear pain, nosebleeds, rhinorrhea, sore throat, trouble swallowing and voice change. Eyes: Negative for redness and visual disturbance. Respiratory: Negative for cough, chest tightness, shortness of breath and wheezing. Cardiovascular: Positive for leg swelling. Negative for chest pain and palpitations. Gastrointestinal: Negative for abdominal pain, blood in stool, constipation, diarrhea, nausea and vomiting. Endocrine: Negative for polydipsia, polyphagia and polyuria. Genitourinary: Positive for urgency. Negative for dysuria and frequency. Musculoskeletal: Negative for myalgias. Skin: Negative for rash and wound. Neurological: Negative for dizziness, speech difficulty, light-headedness and headaches. Psychiatric/Behavioral: Negative for agitation, confusion, self-injury and suicidal ideas. The patient is not nervous/anxious. Objective:     Physical Exam  Vitals and nursing note reviewed. Constitutional:       General: She is not in acute distress. Appearance: She is well-developed. She is not diaphoretic. HENT:      Head: Normocephalic and atraumatic. Right Ear: External ear normal.      Left Ear: External ear normal.      Nose: Nose normal.      Mouth/Throat:      Pharynx: No oropharyngeal exudate. Eyes:      General:         Right eye: No discharge. Left eye: No discharge.       Conjunctiva/sclera: Conjunctivae normal.      Pupils: Pupils are equal, round, and reactive to light. Cardiovascular:      Rate and Rhythm: Normal rate and regular rhythm. Heart sounds: Normal heart sounds. No murmur heard. Pulmonary:      Effort: Pulmonary effort is normal. No respiratory distress. Breath sounds: Normal breath sounds. No stridor. No wheezing or rales. Chest:      Chest wall: No tenderness. Breasts:      Right: No inverted nipple, mass, nipple discharge, skin change or tenderness. Left: No inverted nipple, mass, nipple discharge, skin change or tenderness. Abdominal:      General: Bowel sounds are normal. There is no distension. Palpations: Abdomen is soft. Tenderness: There is no abdominal tenderness. Musculoskeletal:         General: No tenderness or deformity. Normal range of motion. Cervical back: Normal range of motion and neck supple. Right lower le+ Pitting Edema present. Left lower le+ Pitting Edema present. Skin:     General: Skin is warm and dry. Findings: No erythema or rash. Neurological:      Mental Status: She is alert and oriented to person, place, and time. Cranial Nerves: No cranial nerve deficit. Coordination: Coordination normal.      Deep Tendon Reflexes: Reflexes are normal and symmetric. Psychiatric:         Behavior: Behavior normal.         Thought Content: Thought content normal.       /78   Pulse 93   Temp 97.2 °F (36.2 °C) (Temporal)   Ht 5' 8\" (1.727 m)   Wt (!) 349 lb 3.2 oz (158.4 kg)   SpO2 93%   BMI 53.10 kg/m²     Assessment:      Diagnosis Orders   1. Edema, unspecified type  hydroCHLOROthiazide (MICROZIDE) 12.5 MG capsule   2. Injury of right knee, subsequent encounter     3. Type 2 diabetes mellitus with hyperosmolarity without coma, without long-term current use of insulin ([facility])  metFORMIN (GLUCOPHAGE) 500 MG tablet    DISCONTINUED: Semaglutide, 1 MG/DOSE, (OZEMPIC, 1 MG/DOSE,) 2 MG/1.5ML SOPN   4.  Urinary frequency       No results found for this visit on 22. Plan:     1. Edema, unspecified type    2. Injury of right knee, subsequent encounter    3. Type 2 diabetes mellitus with hyperosmolarity without coma, without long-term current use of insulin ([facility] [address]    4. Urinary frequency      Over 50% of the total visit time of 30 minutes was spent on counseling and or coordination of care of edema, injury of right knee, DM2, urinary frequency. No follow-ups on file. No orders of the defined types were placed in this encounter. Orders Placed This Encounter   Medications    hydroCHLOROthiazide (MICROZIDE) 12.5 MG capsule     Sig: Take 1 capsule by mouth daily     Dispense:  30 capsule     Refill:  0    metFORMIN (GLUCOPHAGE) 500 MG tablet     Sig: Take 2 tablets by mouth 2 times daily (with meals)     Dispense:  120 tablet     Refill:  2    DISCONTD: Semaglutide, 1 MG/DOSE, (OZEMPIC, 1 MG/DOSE,) 2 MG/1.5ML SOPN     Si.25 weekly x 4 weeks then 0.5mg weekly x 4 weeks, then 1mg weekly. Dispense:  9 mL     Refill:  0    Semaglutide,0.25 or 0.5MG/DOS, 2 MG/1.5ML SOPN     Si.25mg weekly x 4 weeks then 0.5mg weekly for 4 weeks. Dispense:  3 pen     Refill:  0       There are no Patient Instructions on file for this visit.    /family given educational materials - see patient instructions. Discussed use, benefit, and side effects of prescribed medications. All patient/family questions answered and voiced understanding. Instructed to continue current medications, diet and exercise. Pt/family agreed with treatment plan. Follow up as directed and sooner if needed. Patient/ family instructed that is symptoms worsen or persist they are to contact office or report to nearest ER. They voice understanding and agreement with this plan.   signed by NATAIL Keenan on 2022 at 1:44 PM CST    This dictation was generated by voice recognition computer software.   Although all attempts are made to edit the dictation for accuracy, there may be errors in the transcription that are not intended.

## 2022-02-17 ENCOUNTER — OFFICE VISIT (OUTPATIENT)
Dept: PRIMARY CARE CLINIC | Age: 52
End: 2022-02-17
Payer: MEDICAID

## 2022-02-17 VITALS
SYSTOLIC BLOOD PRESSURE: 124 MMHG | HEART RATE: 93 BPM | BODY MASS INDEX: 44.41 KG/M2 | HEIGHT: 68 IN | WEIGHT: 293 LBS | OXYGEN SATURATION: 98 % | DIASTOLIC BLOOD PRESSURE: 78 MMHG | TEMPERATURE: 96.2 F

## 2022-02-17 DIAGNOSIS — Z11.59 ENCOUNTER FOR HEPATITIS C SCREENING TEST FOR LOW RISK PATIENT: ICD-10-CM

## 2022-02-17 DIAGNOSIS — R60.9 EDEMA, UNSPECIFIED TYPE: ICD-10-CM

## 2022-02-17 DIAGNOSIS — F41.9 ANXIETY: ICD-10-CM

## 2022-02-17 DIAGNOSIS — Z11.4 SCREENING FOR HUMAN IMMUNODEFICIENCY VIRUS WITHOUT PRESENCE OF RISK FACTORS: ICD-10-CM

## 2022-02-17 DIAGNOSIS — R60.9 EDEMA, UNSPECIFIED TYPE: Primary | ICD-10-CM

## 2022-02-17 DIAGNOSIS — Z74.09 IMMOBILITY: ICD-10-CM

## 2022-02-17 DIAGNOSIS — M25.561 ACUTE PAIN OF RIGHT KNEE: ICD-10-CM

## 2022-02-17 LAB
ALBUMIN SERPL-MCNC: 3.8 G/DL (ref 3.5–5.2)
ALP BLD-CCNC: 126 U/L (ref 35–104)
ALT SERPL-CCNC: 18 U/L (ref 5–33)
ANION GAP SERPL CALCULATED.3IONS-SCNC: 18 MMOL/L (ref 7–19)
AST SERPL-CCNC: 18 U/L (ref 5–32)
BASOPHILS ABSOLUTE: 0.1 K/UL (ref 0–0.2)
BASOPHILS RELATIVE PERCENT: 0.5 % (ref 0–1)
BILIRUB SERPL-MCNC: <0.2 MG/DL (ref 0.2–1.2)
BUN BLDV-MCNC: 16 MG/DL (ref 6–20)
CALCIUM SERPL-MCNC: 9.2 MG/DL (ref 8.6–10)
CHLORIDE BLD-SCNC: 96 MMOL/L (ref 98–111)
CO2: 24 MMOL/L (ref 22–29)
CREAT SERPL-MCNC: 1 MG/DL (ref 0.5–0.9)
EOSINOPHILS ABSOLUTE: 0.2 K/UL (ref 0–0.6)
EOSINOPHILS RELATIVE PERCENT: 1.4 % (ref 0–5)
GFR AFRICAN AMERICAN: >59
GFR NON-AFRICAN AMERICAN: 58
GLUCOSE BLD-MCNC: 71 MG/DL (ref 74–109)
HCT VFR BLD CALC: 39 % (ref 37–47)
HEMOGLOBIN: 11.7 G/DL (ref 12–16)
HEPATITIS C ANTIBODY INTERPRETATION: NORMAL
HIV-1 P24 AG: NORMAL
IMMATURE GRANULOCYTES #: 0.1 K/UL
LYMPHOCYTES ABSOLUTE: 2.4 K/UL (ref 1.1–4.5)
LYMPHOCYTES RELATIVE PERCENT: 20.3 % (ref 20–40)
MCH RBC QN AUTO: 28.1 PG (ref 27–31)
MCHC RBC AUTO-ENTMCNC: 30 G/DL (ref 33–37)
MCV RBC AUTO: 93.8 FL (ref 81–99)
MONOCYTES ABSOLUTE: 0.8 K/UL (ref 0–0.9)
MONOCYTES RELATIVE PERCENT: 6.4 % (ref 0–10)
NEUTROPHILS ABSOLUTE: 8.4 K/UL (ref 1.5–7.5)
NEUTROPHILS RELATIVE PERCENT: 70.7 % (ref 50–65)
PDW BLD-RTO: 14.4 % (ref 11.5–14.5)
PLATELET # BLD: 346 K/UL (ref 130–400)
PMV BLD AUTO: 8.8 FL (ref 9.4–12.3)
POTASSIUM SERPL-SCNC: 4 MMOL/L (ref 3.5–5)
PRO-BNP: 78 PG/ML (ref 0–900)
RAPID HIV 1&2: NORMAL
RBC # BLD: 4.16 M/UL (ref 4.2–5.4)
SODIUM BLD-SCNC: 138 MMOL/L (ref 136–145)
TOTAL PROTEIN: 7 G/DL (ref 6.6–8.7)
WBC # BLD: 11.8 K/UL (ref 4.8–10.8)

## 2022-02-17 PROCEDURE — G8427 DOCREV CUR MEDS BY ELIG CLIN: HCPCS | Performed by: NURSE PRACTITIONER

## 2022-02-17 PROCEDURE — G8484 FLU IMMUNIZE NO ADMIN: HCPCS | Performed by: NURSE PRACTITIONER

## 2022-02-17 PROCEDURE — 3017F COLORECTAL CA SCREEN DOC REV: CPT | Performed by: NURSE PRACTITIONER

## 2022-02-17 PROCEDURE — 1036F TOBACCO NON-USER: CPT | Performed by: NURSE PRACTITIONER

## 2022-02-17 PROCEDURE — G8417 CALC BMI ABV UP PARAM F/U: HCPCS | Performed by: NURSE PRACTITIONER

## 2022-02-17 PROCEDURE — 99214 OFFICE O/P EST MOD 30 MIN: CPT | Performed by: NURSE PRACTITIONER

## 2022-02-17 RX ORDER — ALPRAZOLAM 0.25 MG/1
TABLET ORAL
Qty: 30 TABLET | Refills: 0 | Status: SHIPPED | OUTPATIENT
Start: 2022-02-17 | End: 2022-03-17

## 2022-02-17 ASSESSMENT — ENCOUNTER SYMPTOMS
SORE THROAT: 0
NAUSEA: 0
VOMITING: 0
WHEEZING: 0
DIARRHEA: 0
SHORTNESS OF BREATH: 0
EYE REDNESS: 0
CHEST TIGHTNESS: 0
TROUBLE SWALLOWING: 0
BLOOD IN STOOL: 0
VOICE CHANGE: 0
CONSTIPATION: 0
RHINORRHEA: 0
COUGH: 0
ABDOMINAL PAIN: 0

## 2022-02-17 NOTE — PROGRESS NOTES
[address] [facility]  [facility]  [id number]  [address] [address]  Dept: [phone]  Dept Fax: [phone]  Loc: [phone]        Reji Naidu is a 45 y.o. female who presents today for her medical conditions/ complaints as noted below. Reji Naidu is c/o Knee Pain and Edema        Chief Complaint   Patient presents with    Knee Pain    Edema       HPI:     Foot Pain   Pertinent negatives include no fever. Edema of bilateral lower extremities/ worse RLE  2022: Patient reports persistent edema to bilateral lower extremities. Notes compliacne with HCTZ. States that she is setting most days due to inability to ambulate related to right knee pain/ injury. Pt states that she has an appointment with Dr. Trav Salgado to discuss surgical options. Pt reports riding around her home to try and take care of her children. She reports also walking with crutches at times. Pt has had 6lb weight loss since last visit but she doesn't feel that the HCTZ has done much to help her. Patient reports that they have been compliant with taking medications as directed.      Past Medical History:   Diagnosis Date    Anxiety     Chronic pain     Depression     Diabetes mellitus type 2 in obese (HCC)     Hyperlipidemia     Hypertension     Premenopausal patient 2018       Past Surgical History:   Procedure Laterality Date     SECTION      TENDON RELEASE      right finger    TUBAL LIGATION         Social History     Socioeconomic History    Marital status:      Spouse name: None    Number of children: None    Years of education: None    Highest education level: None   Occupational History    None   Tobacco Use    Smoking status: Never Smoker    Smokeless tobacco: Never Used   Vaping Use    Vaping Use: Never used   Substance and Sexual Activity    Alcohol use: Yes     Comment: socially    Drug use: No    Sexual activity: None   Other Topics Concern    None   Social History Narrative    None     Social Determinants of Health     Financial Resource Strain:     Difficulty of Paying Living Expenses: Not on file   Food Insecurity:     Worried About Running Out of Food in the Last Year: Not on file    Howard of Food in the Last Year: Not on file   Transportation Needs:     Lack of Transportation (Medical): Not on file    Lack of Transportation (Non-Medical):  Not on file   Physical Activity:     Days of Exercise per Week: Not on file    Minutes of Exercise per Session: Not on file   Stress:     Feeling of Stress : Not on file   Social Connections:     Frequency of Communication with Friends and Family: Not on file    Frequency of Social Gatherings with Friends and Family: Not on file    Attends [employer] Services: Not on file    Active Member of [address] [place] [address] [employer] or Organizations: Not on file    Attends Club or Organization Meetings: Not on file    Marital Status: Not on file   Intimate Partner Violence:     Fear of Current or Ex-Partner: Not on file    Emotionally Abused: Not on file    Physically Abused: Not on file    Sexually Abused: Not on file   Housing Stability:     Unable to Pay for Housing in the Last Year: Not on file    Number of [place] in the Last Year: Not on file    Unstable Housing in the Last Year: Not on file       Family History   Problem Relation Age of Onset    Diabetes Father     Cancer Father         Bladder and Lung    Breast Cancer Sister         1/2 Sister    Diabetes Maternal Grandfather     Diabetes Paternal Grandmother        Current Outpatient Medications   Medication Sig Dispense Refill    ALPRAZolam (XANAX) 0.25 MG tablet TAKE 1 TABLET BY MOUTH EVERY NIGHT 30 tablet 0    metFORMIN (GLUCOPHAGE) 500 MG tablet Take 2 tablets by mouth 2 times daily (with meals) 120 tablet 2    lisinopril (PRINIVIL;ZESTRIL) 10 MG tablet TAKE 1 TABLET BY MOUTH TWICE DAILY 180 tablet 0    DULoxetine HCl 40 MG CPEP TAKE 1 CAPSULE BY MOUTH DAILY 30 capsule 2    traZODone (DESYREL) 150 MG tablet TAKE 2 TABLET BY MOUTH EVERY EVENING 60 tablet 3    ARIPiprazole (ABILIFY) 5 MG tablet TAKE 1 TABLET BY MOUTH EVERY DAY 30 tablet 5    nystatin (NYSTATIN) 580001 UNIT/GM powder APPLY DAILY TO SKIN FOLDS AS NEEDED UP TO 3 TIMES A DAY 30 g 0    tamsulosin (FLOMAX) 0.4 MG capsule TAKE 1 CAPSULE BY MOUTH DAILY 30 capsule 2    diclofenac sodium (VOLTAREN) 1 % GEL Apply topically 2 times daily 1 g 2    atorvastatin (LIPITOR) 20 MG tablet TAKE 1 TABLET BY MOUTH DAILY 30 tablet 11    meloxicam (MOBIC) 15 MG tablet TAKE 1 TABLET BY MOUTH EVERY DAY      tiZANidine (ZANAFLEX) 4 MG tablet       NARCAN 4 MG/0.1ML LIQD nasal spray USE 1 SPRAY AS NEEDED      ondansetron (ZOFRAN ODT) 4 MG disintegrating tablet Take 1 tablet by mouth every 8 hours as needed for Nausea or Vomiting 10 tablet 0    OneTouch Delica Lancets 76C MISC USE TO CHECK BLOOD SUGAR TWICE DAILY 100 each 1    HYDROcodone-acetaminophen (NORCO)  MG per tablet Take 1 tablet by mouth every 8 hours as needed for Pain (Dr. Sandra Allison). 0    gabapentin (NEURONTIN) 800 MG tablet Take 800 mg by mouth every 8 hours as needed (Dr. Sandra Allisno). 1    ONE TOUCH ULTRA TEST strip USE TO TEST BLOOD SUAGR TWICE DAILY AS NEEDED. 100 strip 0    glucose monitoring kit (FREESTYLE) monitoring kit 1 kit by Does not apply route daily Please fill script with meter covered under patients insurance.  Dx: E11.9 1 kit 0    aspirin 325 MG tablet Take 325 mg by mouth nightly       Multiple Vitamins-Minerals (THERAPEUTIC MULTIVITAMIN-MINERALS) tablet Take 1 tablet by mouth daily (Patient taking differently: Take 1 tablet by mouth nightly ) 30 tablet 5    Semaglutide,0.25 or 0.5MG/DOS, (OZEMPIC, 0.25 OR 0.5 MG/DOSE,) 2 MG/1.5ML SOPN INJECT 0.25MG WEEKLY FOR FOUR WEEKS, THEN INJECT 0.5MG WEEKLY FOR 4 WEEKS 1.5 mL 2    hydroCHLOROthiazide (MICROZIDE) 12.5 MG capsule TAKE 1 CAPSULE BY MOUTH DAILY 30 capsule 0     No current facility-administered medications for this visit. No Known Allergies    Lab Review not applicable  notapplicable    Subjective:   Review of Systems   Constitutional: Negative for activity change, appetite change, fatigue, fever and unexpected weight change. HENT: Negative for congestion, ear pain, nosebleeds, rhinorrhea, sore throat, trouble swallowing and voice change. Eyes: Negative for redness and visual disturbance. Respiratory: Negative for cough, chest tightness, shortness of breath and wheezing. Cardiovascular: Positive for leg swelling. Negative for chest pain and palpitations. Gastrointestinal: Negative for abdominal pain, blood in stool, constipation, diarrhea, nausea and vomiting. Endocrine: Negative for polydipsia, polyphagia and polyuria. Genitourinary: Negative for dysuria, frequency and urgency. Musculoskeletal: Negative for myalgias. Skin: Negative for rash and wound. Neurological: Negative for dizziness, speech difficulty, light-headedness and headaches. Psychiatric/Behavioral: Negative for agitation, confusion, self-injury and suicidal ideas. The patient is not nervous/anxious. Objective:     Physical Exam  Vitals and nursing note reviewed. Constitutional:       General: She is not in acute distress. Appearance: She is well-developed. She is not diaphoretic. HENT:      Head: Normocephalic and atraumatic. Right Ear: External ear normal.      Left Ear: External ear normal.      Nose: Nose normal.      Mouth/Throat:      Pharynx: No oropharyngeal exudate. Eyes:      General:         Right eye: No discharge. Left eye: No discharge. Conjunctiva/sclera: Conjunctivae normal.      Pupils: Pupils are equal, round, and reactive to light. Cardiovascular:      Rate and Rhythm: Normal rate and regular rhythm. Heart sounds: Normal heart sounds. No murmur heard.       Pulmonary:      Effort: Pulmonary effort is normal. No respiratory distress. Breath sounds: Normal breath sounds. No stridor. No wheezing or rales. Chest:      Chest wall: No tenderness. Breasts:      Right: No inverted nipple, mass, nipple discharge, skin change or tenderness. Left: No inverted nipple, mass, nipple discharge, skin change or tenderness. Abdominal:      General: Bowel sounds are normal. There is no distension. Palpations: Abdomen is soft. Tenderness: There is no abdominal tenderness. Musculoskeletal:         General: No tenderness or deformity. Cervical back: Normal range of motion and neck supple. Right lower le+ Pitting Edema present. Left lower le+ Pitting Edema present. Comments: Pt in a wheel chair   Skin:     General: Skin is warm and dry. Findings: No erythema or rash. Neurological:      Mental Status: She is alert and oriented to person, place, and time. Cranial Nerves: No cranial nerve deficit. Coordination: Coordination normal.      Deep Tendon Reflexes: Reflexes are normal and symmetric. Psychiatric:         Behavior: Behavior normal.         Thought Content: Thought content normal.       /78   Pulse 93   Temp 96.2 °F (35.7 °C) (Temporal)   Ht 5' 8\" (1.727 m)   Wt (!) 343 lb (155.6 kg)   SpO2 98%   BMI 52.15 kg/m²     Assessment:      Diagnosis Orders   1. Edema, unspecified type  Brain Natriuretic Peptide   2. Acute pain of right knee     3. Anxiety  ALPRAZolam (XANAX) 0.25 MG tablet    Comprehensive Metabolic Panel    CBC with Auto Differential   4. Immobility     5. Screening for human immunodeficiency virus without presence of risk factors  HIV Rapid 1&2   6.  Encounter for hepatitis C screening test for low risk patient  Hepatitis C Antibody     Results for orders placed or performed in visit on 22   HIV Rapid 1&2   Result Value Ref Range    Rapid HIV 1&2 Non-reactive Non-reactive    HIV-1 P24 Ag Non-reactive Non-reactive Plan:     1. Edema, unspecified type    2. Acute pain of right knee    3. Anxiety    4. Immobility    5. Screening for human immunodeficiency virus without presence of risk factors    6. Encounter for hepatitis C screening test for low risk patient      Over 50% of the total visit time of 30 minutes was spent on counseling and or coordination of care of edema, acute pain of right knee, edema, anxiety, immobility, screen for HIV, and encounter for hep C screen. Return in about 1 month (around 3/17/2022). Orders Placed This Encounter   Procedures    Hepatitis C Antibody     Standing Status:   Future     Number of Occurrences:   1     Standing Expiration Date:   2/17/2023    HIV Rapid 1&2     Order Specific Question:   Specify Date & Time of Incident     Answer:   n/a    Comprehensive Metabolic Panel     Standing Status:   Future     Number of Occurrences:   1     Standing Expiration Date:   2/17/2023    CBC with Auto Differential     Standing Status:   Future     Number of Occurrences:   1     Standing Expiration Date:   2/17/2023    Brain Natriuretic Peptide     Standing Status:   Future     Number of Occurrences:   1     Standing Expiration Date:   2/17/2023     Orders Placed This Encounter   Medications    ALPRAZolam (XANAX) 0.25 MG tablet     Sig: TAKE 1 TABLET BY MOUTH EVERY NIGHT     Dispense:  30 tablet     Refill:  0       Patient Instructions   Labs on the 4th floor. We will adjust on diuretic after labs returned.       Ava Ashraf given educational materials - see patient instructions. Discussed use, benefit, and side effects of prescribed medications. All patient/family questions answered and voiced understanding. Instructed to continue current medications, diet and exercise. Pt/family agreed with treatment plan. Follow up as directed and sooner if needed. Patient/ family instructed that is symptoms worsen or persist they are to contact office or report to nearest ER.   They voice understanding and agreement with this plan.   signed by NATALI Holcomb on 2/25/2022 at 1:44 PM CST    This dictation was generated by voice recognition computer software. Although all attempts are made to edit the dictation for accuracy, there may be errors in the transcription that are not intended.

## 2022-02-18 NOTE — RESULT ENCOUNTER NOTE
Results are abnormal. Please have pt increase HCTZ to 1 full tablet daily. If she notes that she is having cramps, please tell her to dose back down to 1/2 tablet daily. Elevating legs will help with the edema.

## 2022-02-20 DIAGNOSIS — R60.9 EDEMA, UNSPECIFIED TYPE: ICD-10-CM

## 2022-02-21 RX ORDER — HYDROCHLOROTHIAZIDE 12.5 MG/1
12.5 CAPSULE, GELATIN COATED ORAL DAILY
Qty: 30 CAPSULE | Refills: 0 | Status: SHIPPED | OUTPATIENT
Start: 2022-02-21 | End: 2022-03-30

## 2022-02-25 RX ORDER — SEMAGLUTIDE 1.34 MG/ML
INJECTION, SOLUTION SUBCUTANEOUS
Qty: 1.5 ML | Refills: 2 | Status: SHIPPED | OUTPATIENT
Start: 2022-02-25 | End: 2022-03-24 | Stop reason: SDUPTHER

## 2022-02-25 NOTE — TELEPHONE ENCOUNTER
Isabela Arevalo called to request a refill on her medication.       Last office visit : 2/17/2022   Next office visit : 3/24/2022     Requested Prescriptions     Pending Prescriptions Disp Refills    Semaglutide,0.25 or 0.5MG/DOS, (OZEMPIC, 0.25 OR 0.5 MG/DOSE,) 2 MG/1.5ML SOPN [Pharmacy Med Name: OZEMPIC 0.25 OR 0.5MG/DOS 1X2MG PEN] 1.5 mL      Sig: INJECT 0.25MG WEEKLY FOR FOUR WEEKS, THEN INJECT 0.5MG WEEKLY FOR [facility]

## 2022-03-17 DIAGNOSIS — F41.9 ANXIETY: ICD-10-CM

## 2022-03-19 PROBLEM — Z11.59 ENCOUNTER FOR HEPATITIS C SCREENING TEST FOR LOW RISK PATIENT: Status: RESOLVED | Noted: 2019-12-26 | Resolved: 2022-03-19

## 2022-03-23 DIAGNOSIS — E11.00 TYPE 2 DIABETES MELLITUS WITH HYPEROSMOLARITY WITHOUT COMA, WITHOUT LONG-TERM CURRENT USE OF INSULIN (HCC): ICD-10-CM

## 2022-03-23 RX ORDER — ALPRAZOLAM 0.25 MG/1
TABLET ORAL
Qty: 30 TABLET | Refills: 0 | Status: SHIPPED | OUTPATIENT
Start: 2022-03-23 | End: 2022-05-08

## 2022-03-23 RX ORDER — NYSTATIN 100000 [USP'U]/G
POWDER TOPICAL
Qty: 30 G | Refills: 0 | Status: SHIPPED | OUTPATIENT
Start: 2022-03-23 | End: 2022-09-08

## 2022-03-23 NOTE — TELEPHONE ENCOUNTER
June Cassidy called to request a refill on her medication.       Last office visit : 2/17/2022   Next office visit : 3/24/2022     Requested Prescriptions     Pending Prescriptions Disp Refills    nystatin (NYSTATIN) 246789 UNIT/GM powder [Pharmacy Med Name: Jenaro Pardo PWDR 100,000 30GM] 30 g 0     Sig: APPLY DAILY TO SKIN FOLDS AS NEEDED UP TO [address]

## 2022-03-24 ENCOUNTER — OFFICE VISIT (OUTPATIENT)
Dept: PRIMARY CARE CLINIC | Age: 52
End: 2022-03-24
Payer: MEDICARE

## 2022-03-24 VITALS
DIASTOLIC BLOOD PRESSURE: 78 MMHG | HEIGHT: 68 IN | OXYGEN SATURATION: 99 % | SYSTOLIC BLOOD PRESSURE: 124 MMHG | BODY MASS INDEX: 44.41 KG/M2 | WEIGHT: 293 LBS | TEMPERATURE: 97.2 F | HEART RATE: 92 BPM

## 2022-03-24 DIAGNOSIS — R60.9 EDEMA, UNSPECIFIED TYPE: ICD-10-CM

## 2022-03-24 DIAGNOSIS — E11.00 TYPE 2 DIABETES MELLITUS WITH HYPEROSMOLARITY WITHOUT COMA, WITHOUT LONG-TERM CURRENT USE OF INSULIN (HCC): Primary | ICD-10-CM

## 2022-03-24 DIAGNOSIS — R79.89 CREATININE ELEVATION: ICD-10-CM

## 2022-03-24 PROCEDURE — 1036F TOBACCO NON-USER: CPT | Performed by: NURSE PRACTITIONER

## 2022-03-24 PROCEDURE — 3046F HEMOGLOBIN A1C LEVEL >9.0%: CPT | Performed by: NURSE PRACTITIONER

## 2022-03-24 PROCEDURE — 2022F DILAT RTA XM EVC RTNOPTHY: CPT | Performed by: NURSE PRACTITIONER

## 2022-03-24 PROCEDURE — G8417 CALC BMI ABV UP PARAM F/U: HCPCS | Performed by: NURSE PRACTITIONER

## 2022-03-24 PROCEDURE — G8484 FLU IMMUNIZE NO ADMIN: HCPCS | Performed by: NURSE PRACTITIONER

## 2022-03-24 PROCEDURE — 3017F COLORECTAL CA SCREEN DOC REV: CPT | Performed by: NURSE PRACTITIONER

## 2022-03-24 PROCEDURE — 99214 OFFICE O/P EST MOD 30 MIN: CPT | Performed by: NURSE PRACTITIONER

## 2022-03-24 PROCEDURE — G8427 DOCREV CUR MEDS BY ELIG CLIN: HCPCS | Performed by: NURSE PRACTITIONER

## 2022-03-24 RX ORDER — SEMAGLUTIDE 1.34 MG/ML
1 INJECTION, SOLUTION SUBCUTANEOUS WEEKLY
Qty: 1.5 ML | Refills: 2 | Status: SHIPPED | OUTPATIENT
Start: 2022-03-24 | End: 2022-06-20

## 2022-03-24 ASSESSMENT — ENCOUNTER SYMPTOMS
SORE THROAT: 0
SHORTNESS OF BREATH: 0
BLOOD IN STOOL: 0
TROUBLE SWALLOWING: 0
NAUSEA: 0
CONSTIPATION: 0
DIARRHEA: 0
CHEST TIGHTNESS: 0
WHEEZING: 0
RHINORRHEA: 0
VOMITING: 0
ABDOMINAL PAIN: 0
COUGH: 0
VOICE CHANGE: 0
EYE REDNESS: 0

## 2022-03-24 NOTE — PROGRESS NOTES
[facility] PRIMARY CARE  [address] [place] [address]  [id number]  [id number]  Dept: [phone]  Dept Fax: [phone]  Loc: [phone]        Brittany Tim is a 45 y.o. female who presents today for her medical conditions/ complaints as noted below. Brittany Tim is c/o Edema (pt reports the edema is getting better. she said she can put her own shoes on now. )        Chief Complaint   Patient presents with    Edema     pt reports the edema is getting better. she said she can put her own shoes on now. HPI:     Foot Pain   Pertinent negatives include no fever. Edema of bilateral lower extremities/ worse RLE  3/25/2022: Pt reports improvement of edema with increase of HCTZ to 25mg daily. States that she has also been able to increase her activity. States that she is using crutches and no longer using her motorized scooter. 2/17/2022: Patient reports persistent edema to bilateral lower extremities. Notes compliacne with HCTZ. States that she is setting most days due to inability to ambulate related to right knee pain/ injury. Pt states that she has an appointment with Dr. Jan Weldon to discuss surgical options. Pt reports riding around her home to try and take care of her children. She reports also walking with crutches at times. Pt has had 6lb weight loss since last visit but she doesn't feel that the HCTZ has done much to help her. DM 2: notes that she has lost weight on ozempic. States that she is currently on 0.5mtg weekly and has been for the last 4 weeks. Wishes to increase the dose of ozempic to 1 mg weekly if possible. Patient reports that they have been compliant with taking medications as directed.      Past Medical History:   Diagnosis Date    Anxiety     Chronic pain     Depression     Diabetes mellitus type 2 in obese (HCC)     Hyperlipidemia     Hypertension     Premenopausal patient 07/05/2018       Past Surgical History:   Procedure Laterality Date     SECTION      TENDON RELEASE      right finger    TUBAL LIGATION         Social History     Socioeconomic History    Marital status:      Spouse name: None    Number of children: None    Years of education: None    Highest education level: None   Occupational History    None   Tobacco Use    Smoking status: Never Smoker    Smokeless tobacco: Never Used   Vaping Use    Vaping Use: Never used   Substance and Sexual Activity    Alcohol use: Yes     Comment: socially    Drug use: No    Sexual activity: None   Other Topics Concern    None   Social History Narrative    None     Social Determinants of Health     Financial Resource Strain:     Difficulty of Paying Living Expenses: Not on file   Food Insecurity:     Worried About Running Out of Food in the Last Year: Not on file    Howard of Food in the Last Year: Not on file   Transportation Needs:     Lack of Transportation (Medical): Not on file    Lack of Transportation (Non-Medical):  Not on file   Physical Activity:     Days of Exercise per Week: Not on file    Minutes of Exercise per Session: Not on file   Stress:     Feeling of Stress : Not on file   Social Connections:     Frequency of Communication with Friends and Family: Not on file    Frequency of Social Gatherings with Friends and Family: Not on file    Attends Tenriism Services: Not on file    Active Member of [address] [place] [address] [employer] or Organizations: Not on file    Attends Club or Organization Meetings: Not on file    Marital Status: Not on file   Intimate Partner Violence:     Fear of Current or Ex-Partner: Not on file    Emotionally Abused: Not on file    Physically Abused: Not on file    Sexually Abused: Not on file   Housing Stability:     Unable to Pay for Housing in the Last Year: Not on file    Number of [place] in the Last Year: Not on file    Unstable Housing in the Last Year: Not on file       Family History   Problem Relation Age of Onset  Diabetes Father     Cancer Father         Bladder and Lung    Breast Cancer Sister         1/2 Sister    Diabetes Maternal Grandfather     Diabetes Paternal Grandmother        Current Outpatient Medications   Medication Sig Dispense Refill    Semaglutide,0.25 or 0.5MG/DOS, (OZEMPIC, 0.25 OR 0.5 MG/DOSE,) 2 MG/1.5ML SOPN Inject 1 mg into the skin once a week 1.5 mL 2    ALPRAZolam (XANAX) 0.25 MG tablet TAKE 1 TABLET BY MOUTH EVERY NIGHT 30 tablet 0    nystatin (NYSTATIN) 735581 UNIT/GM powder APPLY DAILY TO SKIN FOLDS AS NEEDED UP TO 3 TIMES A DAY 30 g 0    hydroCHLOROthiazide (MICROZIDE) 12.5 MG capsule TAKE 1 CAPSULE BY MOUTH DAILY 30 capsule 0    metFORMIN (GLUCOPHAGE) 500 MG tablet Take 2 tablets by mouth 2 times daily (with meals) 120 tablet 2    lisinopril (PRINIVIL;ZESTRIL) 10 MG tablet TAKE 1 TABLET BY MOUTH TWICE DAILY 180 tablet 0    DULoxetine HCl 40 MG CPEP TAKE 1 CAPSULE BY MOUTH DAILY 30 capsule 2    traZODone (DESYREL) 150 MG tablet TAKE 2 TABLET BY MOUTH EVERY EVENING 60 tablet 3    ARIPiprazole (ABILIFY) 5 MG tablet TAKE 1 TABLET BY MOUTH EVERY DAY 30 tablet 5    tamsulosin (FLOMAX) 0.4 MG capsule TAKE 1 CAPSULE BY MOUTH DAILY 30 capsule 2    diclofenac sodium (VOLTAREN) 1 % GEL Apply topically 2 times daily 1 g 2    atorvastatin (LIPITOR) 20 MG tablet TAKE 1 TABLET BY MOUTH DAILY 30 tablet 11    meloxicam (MOBIC) 15 MG tablet TAKE 1 TABLET BY MOUTH EVERY DAY      tiZANidine (ZANAFLEX) 4 MG tablet       NARCAN 4 MG/0.1ML LIQD nasal spray USE 1 SPRAY AS NEEDED      ondansetron (ZOFRAN ODT) 4 MG disintegrating tablet Take 1 tablet by mouth every 8 hours as needed for Nausea or Vomiting 10 tablet 0    OneTouch Delica Lancets 59K MISC USE TO CHECK BLOOD SUGAR TWICE DAILY 100 each 1    HYDROcodone-acetaminophen (NORCO)  MG per tablet Take 1 tablet by mouth every 8 hours as needed for Pain (Dr. Hanna Saleh).    0    gabapentin (NEURONTIN) 800 MG tablet Take 800 mg by mouth every 8 hours as needed (Dr. Va Roque). 1    ONE TOUCH ULTRA TEST strip USE TO TEST BLOOD SUAGR TWICE DAILY AS NEEDED. 100 strip 0    glucose monitoring kit (FREESTYLE) monitoring kit 1 kit by Does not apply route daily Please fill script with meter covered under patients insurance. Dx: E11.9 1 kit 0    aspirin 325 MG tablet Take 325 mg by mouth nightly       Multiple Vitamins-Minerals (THERAPEUTIC MULTIVITAMIN-MINERALS) tablet Take 1 tablet by mouth daily (Patient taking differently: Take 1 tablet by mouth nightly ) 30 tablet 5     No current facility-administered medications for this visit. No Known Allergies    Lab Review not applicable  notapplicable    Subjective:   Review of Systems   Constitutional: Negative for activity change, appetite change, fatigue, fever and unexpected weight change. HENT: Negative for congestion, ear pain, nosebleeds, rhinorrhea, sore throat, trouble swallowing and voice change. Eyes: Negative for redness and visual disturbance. Respiratory: Negative for cough, chest tightness, shortness of breath and wheezing. Cardiovascular: Positive for leg swelling. Negative for chest pain and palpitations. Gastrointestinal: Negative for abdominal pain, blood in stool, constipation, diarrhea, nausea and vomiting. Endocrine: Negative for polydipsia, polyphagia and polyuria. Genitourinary: Negative for dysuria, frequency and urgency. Musculoskeletal: Negative for myalgias. Skin: Negative for rash and wound. Neurological: Negative for dizziness, speech difficulty, light-headedness and headaches. Psychiatric/Behavioral: Negative for agitation, confusion, self-injury and suicidal ideas. The patient is not nervous/anxious. Objective:     Physical Exam  Vitals and nursing note reviewed. Constitutional:       General: She is not in acute distress. Appearance: She is well-developed. She is not diaphoretic. HENT:      Head: Normocephalic and atraumatic.       Right Ear: External ear normal.      Left Ear: External ear normal.      Nose: Nose normal.      Mouth/Throat:      Pharynx: No oropharyngeal exudate. Eyes:      General:         Right eye: No discharge. Left eye: No discharge. Conjunctiva/sclera: Conjunctivae normal.      Pupils: Pupils are equal, round, and reactive to light. Cardiovascular:      Rate and Rhythm: Normal rate and regular rhythm. Heart sounds: Normal heart sounds. No murmur heard. Pulmonary:      Effort: Pulmonary effort is normal. No respiratory distress. Breath sounds: Normal breath sounds. No stridor. No wheezing or rales. Chest:      Chest wall: No tenderness. Breasts:      Right: No inverted nipple, mass, nipple discharge, skin change or tenderness. Left: No inverted nipple, mass, nipple discharge, skin change or tenderness. Abdominal:      General: Bowel sounds are normal. There is no distension. Palpations: Abdomen is soft. Tenderness: There is no abdominal tenderness. Musculoskeletal:         General: No tenderness or deformity. Cervical back: Normal range of motion and neck supple. Right lower le+ Pitting Edema present. Left lower le+ Pitting Edema present. Comments: Pt using crutches today. Skin:     General: Skin is warm and dry. Findings: No erythema or rash. Neurological:      Mental Status: She is alert and oriented to person, place, and time. Cranial Nerves: No cranial nerve deficit. Coordination: Coordination normal.      Deep Tendon Reflexes: Reflexes are normal and symmetric. Psychiatric:         Behavior: Behavior normal.         Thought Content: Thought content normal.       /78   Pulse 92   Temp 97.2 °F (36.2 °C) (Temporal)   Ht 5' 8\" (1.727 m)   Wt (!) 326 lb 3.2 oz (148 kg)   SpO2 99%   BMI 49.60 kg/m²     Assessment:      Diagnosis Orders   1.  Type 2 diabetes mellitus with hyperosmolarity without coma, without long-term current use of insulin ([facility])  Lipid, Fasting    Hemoglobin A1C    Semaglutide,0.25 or 0.5MG/DOS, (OZEMPIC, 0.25 OR 0.5 MG/DOSE,) 2 MG/1.5ML SOPN    Comprehensive Metabolic Panel    CBC with Auto Differential    Hemoglobin A1C    Lipid Panel    TSH    T4, Free   2. Edema, unspecified type     3. Creatinine elevation  Comprehensive Metabolic Panel     No results found for this visit on 03/24/22. Plan:     1. Type 2 diabetes mellitus with hyperosmolarity without coma, without long-term current use of insulin ([facility] [address]    2. Edema, unspecified type    3. Creatinine elevation      Over 50% of the total visit time of 30 minutes was spent on counseling and or coordination of care of DM2, edema, and elevated creatinine. Return in about 3 months (around 6/24/2022). Orders Placed This Encounter   Procedures    Lipid, Fasting     Standing Status:   Future     Standing Expiration Date:   3/24/2023    Hemoglobin A1C     Standing Status:   Future     Standing Expiration Date:   3/24/2023    Comprehensive Metabolic Panel     Standing Status:   Future     Standing Expiration Date:   3/24/2023    CBC with Auto Differential     Standing Status:   Future     Standing Expiration Date:   3/24/2023    Hemoglobin A1C     Standing Status:   Future     Standing Expiration Date:   3/24/2023    Lipid Panel     Standing Status:   Future     Standing Expiration Date:   3/24/2023     Order Specific Question:   Is Patient Fasting?/# of Hours     Answer:   yes    TSH     Standing Status:   Future     Standing Expiration Date:   3/24/2023    T4, Free     Standing Status:   Future     Standing Expiration Date:   3/24/2023     Orders Placed This Encounter   Medications    Semaglutide,0.25 or 0.5MG/DOS, (OZEMPIC, 0.25 OR 0.5 MG/DOSE,) 2 MG/1.5ML SOPN     Sig: Inject 1 mg into the skin once a week     Dispense:  1.5 mL     Refill:  2       Patient Instructions   Increase ozempic to 1mg weekly.      Repeat cmp before follow up in 3 months.       Ronni Cavanaugh given educational materials - see patient instructions. Discussed use, benefit, and side effects of prescribed medications. All patient/family questions answered and voiced understanding. Instructed to continue current medications, diet and exercise. Pt/family agreed with treatment plan. Follow up as directed and sooner if needed. Patient/ family instructed that is symptoms worsen or persist they are to contact office or report to nearest ER. They voice understanding and agreement with this plan.   signed by NATALI Ramachandran on 3/25/2022 at 1:44 PM CST    This dictation was generated by voice recognition computer software. Although all attempts are made to edit the dictation for accuracy, there may be errors in the transcription that are not intended.

## 2022-03-30 DIAGNOSIS — R60.9 EDEMA, UNSPECIFIED TYPE: ICD-10-CM

## 2022-03-30 RX ORDER — HYDROCHLOROTHIAZIDE 12.5 MG/1
12.5 CAPSULE, GELATIN COATED ORAL DAILY
Qty: 30 CAPSULE | Refills: 0 | Status: SHIPPED | OUTPATIENT
Start: 2022-03-30 | End: 2022-04-29

## 2022-03-30 RX ORDER — HYDROCHLOROTHIAZIDE 12.5 MG/1
12.5 CAPSULE, GELATIN COATED ORAL DAILY
Qty: 90 CAPSULE | OUTPATIENT
Start: 2022-03-30 | End: 2022-04-29

## 2022-04-05 RX ORDER — ATORVASTATIN CALCIUM 20 MG/1
TABLET, FILM COATED ORAL
Qty: 90 TABLET | OUTPATIENT
Start: 2022-04-05

## 2022-04-05 RX ORDER — ATORVASTATIN CALCIUM 20 MG/1
TABLET, FILM COATED ORAL
Qty: 30 TABLET | Refills: 0 | Status: SHIPPED | OUTPATIENT
Start: 2022-04-05 | End: 2022-05-09

## 2022-04-05 NOTE — TELEPHONE ENCOUNTER
Krysten Gudino called to request a refill on her medication.       Last office visit : 3/24/2022   Next office visit : 6/28/2022     Requested Prescriptions     Pending Prescriptions Disp Refills    atorvastatin (LIPITOR) 20 MG tablet [Pharmacy Med Name: ATORVASTATIN 20MG TABLETS] 30 tablet 11     Sig: TAKE 1 TABLET BY MOUTH DAILY            Vangie Lopez MA

## 2022-04-19 DIAGNOSIS — F41.9 ANXIETY AND DEPRESSION: ICD-10-CM

## 2022-04-19 DIAGNOSIS — F32.A ANXIETY AND DEPRESSION: ICD-10-CM

## 2022-04-19 DIAGNOSIS — G89.29 OTHER CHRONIC PAIN: ICD-10-CM

## 2022-04-19 RX ORDER — DULOXETINE 40 MG/1
CAPSULE, DELAYED RELEASE ORAL
Qty: 30 CAPSULE | Refills: 2 | Status: SHIPPED | OUTPATIENT
Start: 2022-04-19 | End: 2022-08-26

## 2022-04-19 NOTE — TELEPHONE ENCOUNTER
Justin Leblanc called to request a refill on her medication.       Last office visit : 3/24/2022   Next office visit : 6/28/2022     Requested Prescriptions     Pending Prescriptions Disp Refills    DULoxetine HCl 40 MG CPEP [Pharmacy Med Name: DULOXETINE DR 40MG CAPSULES] 30 capsule 2     Sig: TAKE 1 CAPSULE BY MOUTH DAILY            Norm Prima

## 2022-04-29 DIAGNOSIS — R60.9 EDEMA, UNSPECIFIED TYPE: ICD-10-CM

## 2022-04-29 DIAGNOSIS — I10 ESSENTIAL HYPERTENSION: ICD-10-CM

## 2022-04-29 RX ORDER — HYDROCHLOROTHIAZIDE 12.5 MG/1
12.5 CAPSULE, GELATIN COATED ORAL DAILY
Qty: 90 CAPSULE | OUTPATIENT
Start: 2022-04-29 | End: 2022-05-29

## 2022-04-29 RX ORDER — HYDROCHLOROTHIAZIDE 12.5 MG/1
12.5 CAPSULE, GELATIN COATED ORAL DAILY
Qty: 30 CAPSULE | Refills: 0 | Status: SHIPPED | OUTPATIENT
Start: 2022-04-29 | End: 2022-05-26

## 2022-05-01 RX ORDER — LISINOPRIL 10 MG/1
TABLET ORAL
Qty: 180 TABLET | Refills: 0 | Status: SHIPPED | OUTPATIENT
Start: 2022-05-01 | End: 2022-08-01

## 2022-05-03 DIAGNOSIS — F41.9 ANXIETY: ICD-10-CM

## 2022-05-03 DIAGNOSIS — E11.00 TYPE 2 DIABETES MELLITUS WITH HYPEROSMOLARITY WITHOUT COMA, WITHOUT LONG-TERM CURRENT USE OF INSULIN (HCC): ICD-10-CM

## 2022-05-05 RX ORDER — TRAZODONE HYDROCHLORIDE 150 MG/1
TABLET ORAL
Qty: 60 TABLET | Refills: 3 | Status: SHIPPED | OUTPATIENT
Start: 2022-05-05 | End: 2022-08-04 | Stop reason: SDUPTHER

## 2022-05-08 NOTE — TELEPHONE ENCOUNTER
Chelo Day called to request a refill on her medication. Last office visit : 3/24/2022   Next office visit : 5/5/2022     Last UDS:   Amphetamine Screen, Urine   Date Value Ref Range Status   02/04/2021 -  Final     Barbiturates   Date Value Ref Range Status   01/17/2011 Negative () Final     Barbiturate Screen, Urine   Date Value Ref Range Status   02/04/2021 -  Final     Benzodiazepine Screen, Urine   Date Value Ref Range Status   02/04/2021 +  Final     Comment:     Xanax     Buprenorphine Urine   Date Value Ref Range Status   02/04/2021 -  Final     Cocaine Metabolite Screen, Urine   Date Value Ref Range Status   02/04/2021 -  Final     Gabapentin Screen, Urine   Date Value Ref Range Status   02/04/2021 -  Final     MDMA, Urine   Date Value Ref Range Status   02/04/2021 -  Final     Methamphetamine, Urine   Date Value Ref Range Status   02/04/2021 -  Final     Opiate Scrn, Ur   Date Value Ref Range Status   02/04/2021 -  Final     Oxycodone Screen, Ur   Date Value Ref Range Status   02/04/2021 -  Final     PCP Screen, Urine   Date Value Ref Range Status   02/04/2021 -  Final     Propoxyphene Screen, Urine   Date Value Ref Range Status   02/04/2021 -  Final     THC Screen, Urine   Date Value Ref Range Status   02/04/2021 -  Final     Tricyclic Antidepressants, Urine   Date Value Ref Range Status   02/04/2021 -  Final       Last Lyla Rear: 3-17  Medication Contract: 3-2022   Last Fill: 4-17    Requested Prescriptions     Pending Prescriptions Disp Refills    ALPRAZolam (XANAX) 0.25 MG tablet [Pharmacy Med Name: ALPRAZOLAM 0.25MG TABLETS] 30 tablet      Sig: TAKE 1 TABLET BY MOUTH EVERY NIGHT    metFORMIN (GLUCOPHAGE) 500 MG tablet [Pharmacy Med Name: METFORMIN 500MG TABLETS] 120 tablet 2     Sig: TAKE 2 TABLETS BY MOUTH TWICE DAILY WITH MEALS                 Please approve or refuse this medication.    Jona Jeffrey LPN

## 2022-05-09 RX ORDER — ATORVASTATIN CALCIUM 20 MG/1
TABLET, FILM COATED ORAL
Qty: 90 TABLET | Refills: 1 | Status: SHIPPED | OUTPATIENT
Start: 2022-05-09 | End: 2022-10-02

## 2022-05-09 RX ORDER — ATORVASTATIN CALCIUM 20 MG/1
TABLET, FILM COATED ORAL
Qty: 30 TABLET | Refills: 1 | Status: SHIPPED | OUTPATIENT
Start: 2022-05-09 | End: 2022-05-09

## 2022-05-09 NOTE — TELEPHONE ENCOUNTER
Patient last seen in office on 03-. Pharmacy requested refill on atorvastatin 20mg. Patient is scheduled for follow up visit on 06-. Sent medication into [facility]'s pharmacy with 1 additional refill.

## 2022-05-10 RX ORDER — ALPRAZOLAM 0.25 MG/1
TABLET ORAL
Qty: 30 TABLET | Refills: 0 | Status: SHIPPED | OUTPATIENT
Start: 2022-05-14 | End: 2022-06-08

## 2022-05-26 DIAGNOSIS — R60.9 EDEMA, UNSPECIFIED TYPE: ICD-10-CM

## 2022-05-26 RX ORDER — HYDROCHLOROTHIAZIDE 12.5 MG/1
12.5 CAPSULE, GELATIN COATED ORAL DAILY
Qty: 30 CAPSULE | Refills: 0 | Status: SHIPPED | OUTPATIENT
Start: 2022-05-26 | End: 2022-07-17

## 2022-05-26 RX ORDER — HYDROCHLOROTHIAZIDE 12.5 MG/1
12.5 CAPSULE, GELATIN COATED ORAL DAILY
Qty: 90 CAPSULE | OUTPATIENT
Start: 2022-05-26 | End: 2022-06-25

## 2022-05-26 NOTE — TELEPHONE ENCOUNTER
Beatris Cope called to request a refill on her medication.       Last office visit : 3/24/2022   Next office visit : 6/28/2022     Requested Prescriptions     Pending Prescriptions Disp Refills    hydroCHLOROthiazide (MICROZIDE) 12.5 MG capsule [Pharmacy Med Name: HYDROCHLOROTHIAZIDE 12.5MG CAPSULES] 30 capsule 0     Sig: TAKE 1 CAPSULE BY MOUTH DAILY            Padmaja Courtney

## 2022-06-07 DIAGNOSIS — F41.9 ANXIETY: ICD-10-CM

## 2022-06-07 NOTE — TELEPHONE ENCOUNTER
Amy Workman called to request a refill on her medication. Last office visit : 3/24/2022   Next office visit : 6/28/2022     Last UDS:   Amphetamine Screen, Urine   Date Value Ref Range Status   02/04/2021 -  Final     Barbiturates   Date Value Ref Range Status   01/17/2011 Negative () Final     Barbiturate Screen, Urine   Date Value Ref Range Status   02/04/2021 -  Final     Benzodiazepine Screen, Urine   Date Value Ref Range Status   02/04/2021 +  Final     Comment:     Xanax     Buprenorphine Urine   Date Value Ref Range Status   02/04/2021 -  Final     Cocaine Metabolite Screen, Urine   Date Value Ref Range Status   02/04/2021 -  Final     Gabapentin Screen, Urine   Date Value Ref Range Status   02/04/2021 -  Final     MDMA, Urine   Date Value Ref Range Status   02/04/2021 -  Final     Methamphetamine, Urine   Date Value Ref Range Status   02/04/2021 -  Final     Opiate Scrn, Ur   Date Value Ref Range Status   02/04/2021 -  Final     Oxycodone Screen, Ur   Date Value Ref Range Status   02/04/2021 -  Final     PCP Screen, Urine   Date Value Ref Range Status   02/04/2021 -  Final     Propoxyphene Screen, Urine   Date Value Ref Range Status   02/04/2021 -  Final     THC Screen, Urine   Date Value Ref Range Status   02/04/2021 -  Final     Tricyclic Antidepressants, Urine   Date Value Ref Range Status   02/04/2021 -  Final       Last Celester Kemar: 3/17/22  Medication Contract: 3/24/22   Last Fill: 5/14/22    Requested Prescriptions     Pending Prescriptions Disp Refills    ALPRAZolam (XANAX) 0.25 MG tablet [Pharmacy Med Name: ALPRAZOLAM 0.25MG TABLETS] 30 tablet      Sig: TAKE 1 TABLET BY MOUTH EVERY NIGHT         Please approve or refuse this medication.    Ponce Mohs

## 2022-06-08 RX ORDER — ALPRAZOLAM 0.25 MG/1
TABLET ORAL
Qty: 30 TABLET | Refills: 0 | Status: SHIPPED | OUTPATIENT
Start: 2022-06-08 | End: 2022-07-12

## 2022-06-20 DIAGNOSIS — E11.00 TYPE 2 DIABETES MELLITUS WITH HYPEROSMOLARITY WITHOUT COMA, WITHOUT LONG-TERM CURRENT USE OF INSULIN (HCC): ICD-10-CM

## 2022-06-20 RX ORDER — SEMAGLUTIDE 1.34 MG/ML
INJECTION, SOLUTION SUBCUTANEOUS
Qty: 3 ML | Refills: 0 | Status: SHIPPED | OUTPATIENT
Start: 2022-06-20 | End: 2022-07-14

## 2022-06-24 RX ORDER — ARIPIPRAZOLE 5 MG/1
TABLET ORAL
Qty: 30 TABLET | Refills: 5 | Status: SHIPPED | OUTPATIENT
Start: 2022-06-24

## 2022-06-24 NOTE — TELEPHONE ENCOUNTER
Radha Aguilera called to request a refill on her medication.       Last office visit : 3/24/2022   Next office visit : Visit date not found     Requested Prescriptions     Pending Prescriptions Disp Refills    ARIPiprazole (ABILIFY) 5 MG tablet 30 tablet 5     Sig: TAKE 1 TABLET BY MOUTH EVERY DAY            [employer]

## 2022-07-11 DIAGNOSIS — F41.9 ANXIETY: ICD-10-CM

## 2022-07-11 NOTE — TELEPHONE ENCOUNTER
Mckay Sample called to request a refill on her medication. Last office visit : 3/24/2022   Next office visit : Visit date not found     Last UDS:   Amphetamine Screen, Urine   Date Value Ref Range Status   02/04/2021 -  Final     Barbiturates   Date Value Ref Range Status   01/17/2011 Negative () Final     Barbiturate Screen, Urine   Date Value Ref Range Status   02/04/2021 -  Final     Benzodiazepine Screen, Urine   Date Value Ref Range Status   02/04/2021 +  Final     Comment:     Xanax     Buprenorphine Urine   Date Value Ref Range Status   02/04/2021 -  Final     Cocaine Metabolite Screen, Urine   Date Value Ref Range Status   02/04/2021 -  Final     Gabapentin Screen, Urine   Date Value Ref Range Status   02/04/2021 -  Final     MDMA, Urine   Date Value Ref Range Status   02/04/2021 -  Final     Methamphetamine, Urine   Date Value Ref Range Status   02/04/2021 -  Final     Opiate Scrn, Ur   Date Value Ref Range Status   02/04/2021 -  Final     Oxycodone Screen, Ur   Date Value Ref Range Status   02/04/2021 -  Final     PCP Screen, Urine   Date Value Ref Range Status   02/04/2021 -  Final     Propoxyphene Screen, Urine   Date Value Ref Range Status   02/04/2021 -  Final     THC Screen, Urine   Date Value Ref Range Status   02/04/2021 -  Final     Tricyclic Antidepressants, Urine   Date Value Ref Range Status   02/04/2021 -  Final       Last Gianna Emerald: 7/11/22  Medication Contract: 3/24/22   Last Fill: 5/10/22    Requested Prescriptions     Pending Prescriptions Disp Refills    ALPRAZolam (XANAX) 0.25 MG tablet [Pharmacy Med Name: ALPRAZOLAM 0.25MG TABLETS] 30 tablet      Sig: TAKE 1 TABLET BY MOUTH EVERY NIGHT         Please approve or refuse this medication.    [employer]

## 2022-07-12 RX ORDER — ALPRAZOLAM 0.25 MG/1
TABLET ORAL
Qty: 30 TABLET | Refills: 0 | Status: SHIPPED | OUTPATIENT
Start: 2022-07-12 | End: 2022-08-04 | Stop reason: SDUPTHER

## 2022-07-13 DIAGNOSIS — E11.00 TYPE 2 DIABETES MELLITUS WITH HYPEROSMOLARITY WITHOUT COMA, WITHOUT LONG-TERM CURRENT USE OF INSULIN (HCC): ICD-10-CM

## 2022-07-14 RX ORDER — SEMAGLUTIDE 1.34 MG/ML
INJECTION, SOLUTION SUBCUTANEOUS
Qty: 3 ML | Refills: 0 | Status: SHIPPED | OUTPATIENT
Start: 2022-07-14 | End: 2022-08-04 | Stop reason: ALTCHOICE

## 2022-07-16 DIAGNOSIS — R60.9 EDEMA, UNSPECIFIED TYPE: ICD-10-CM

## 2022-07-17 DIAGNOSIS — R60.9 EDEMA, UNSPECIFIED TYPE: ICD-10-CM

## 2022-07-17 RX ORDER — HYDROCHLOROTHIAZIDE 12.5 MG/1
12.5 CAPSULE, GELATIN COATED ORAL DAILY
Qty: 90 CAPSULE | OUTPATIENT
Start: 2022-07-17 | End: 2022-08-16

## 2022-07-17 RX ORDER — HYDROCHLOROTHIAZIDE 12.5 MG/1
12.5 CAPSULE, GELATIN COATED ORAL DAILY
Qty: 30 CAPSULE | Refills: 0 | Status: SHIPPED | OUTPATIENT
Start: 2022-07-17 | End: 2022-08-04 | Stop reason: ALTCHOICE

## 2022-07-29 DIAGNOSIS — R79.89 CREATININE ELEVATION: ICD-10-CM

## 2022-07-29 DIAGNOSIS — E11.00 TYPE 2 DIABETES MELLITUS WITH HYPEROSMOLARITY WITHOUT COMA, WITHOUT LONG-TERM CURRENT USE OF INSULIN (HCC): ICD-10-CM

## 2022-07-29 DIAGNOSIS — I10 ESSENTIAL HYPERTENSION: ICD-10-CM

## 2022-07-29 LAB
ALBUMIN SERPL-MCNC: 3.9 G/DL (ref 3.5–5.2)
ALP BLD-CCNC: 130 U/L (ref 35–104)
ALT SERPL-CCNC: 29 U/L (ref 5–33)
ANION GAP SERPL CALCULATED.3IONS-SCNC: 11 MMOL/L (ref 7–19)
AST SERPL-CCNC: 23 U/L (ref 5–32)
BASOPHILS ABSOLUTE: 0.1 K/UL (ref 0–0.2)
BASOPHILS RELATIVE PERCENT: 0.6 % (ref 0–1)
BILIRUB SERPL-MCNC: 0.3 MG/DL (ref 0.2–1.2)
BUN BLDV-MCNC: 11 MG/DL (ref 6–20)
CALCIUM SERPL-MCNC: 9.1 MG/DL (ref 8.6–10)
CHLORIDE BLD-SCNC: 100 MMOL/L (ref 98–111)
CHOLESTEROL, FASTING: 175 MG/DL (ref 160–199)
CO2: 27 MMOL/L (ref 22–29)
CREAT SERPL-MCNC: 1 MG/DL (ref 0.5–0.9)
EOSINOPHILS ABSOLUTE: 0.1 K/UL (ref 0–0.6)
EOSINOPHILS RELATIVE PERCENT: 1.2 % (ref 0–5)
GFR AFRICAN AMERICAN: >59
GFR NON-AFRICAN AMERICAN: 58
GLUCOSE BLD-MCNC: 93 MG/DL (ref 74–109)
HBA1C MFR BLD: 5 % (ref 4–6)
HCT VFR BLD CALC: 40 % (ref 37–47)
HDLC SERPL-MCNC: 62 MG/DL (ref 65–121)
HEMOGLOBIN: 12.5 G/DL (ref 12–16)
IMMATURE GRANULOCYTES #: 0 K/UL
LDL CHOLESTEROL CALCULATED: 81 MG/DL
LYMPHOCYTES ABSOLUTE: 2.2 K/UL (ref 1.1–4.5)
LYMPHOCYTES RELATIVE PERCENT: 20.4 % (ref 20–40)
MCH RBC QN AUTO: 28.9 PG (ref 27–31)
MCHC RBC AUTO-ENTMCNC: 31.3 G/DL (ref 33–37)
MCV RBC AUTO: 92.4 FL (ref 81–99)
MONOCYTES ABSOLUTE: 0.5 K/UL (ref 0–0.9)
MONOCYTES RELATIVE PERCENT: 4.9 % (ref 0–10)
NEUTROPHILS ABSOLUTE: 7.9 K/UL (ref 1.5–7.5)
NEUTROPHILS RELATIVE PERCENT: 72.5 % (ref 50–65)
PDW BLD-RTO: 14.4 % (ref 11.5–14.5)
PLATELET # BLD: 389 K/UL (ref 130–400)
PMV BLD AUTO: 8.9 FL (ref 9.4–12.3)
POTASSIUM SERPL-SCNC: 4.5 MMOL/L (ref 3.5–5)
RBC # BLD: 4.33 M/UL (ref 4.2–5.4)
SODIUM BLD-SCNC: 138 MMOL/L (ref 136–145)
T4 FREE: 1.15 NG/DL (ref 0.93–1.7)
TOTAL PROTEIN: 6.9 G/DL (ref 6.6–8.7)
TRIGLYCERIDE, FASTING: 160 MG/DL (ref 0–149)
TSH SERPL DL<=0.05 MIU/L-ACNC: 1.4 UIU/ML (ref 0.27–4.2)
WBC # BLD: 11 K/UL (ref 4.8–10.8)

## 2022-07-29 NOTE — TELEPHONE ENCOUNTER
Moise Cuadra called to request a refill on her medication.       Last office visit : 3/24/2022   Next office visit : 8/4/2022     Requested Prescriptions     Pending Prescriptions Disp Refills    lisinopril (PRINIVIL;ZESTRIL) 10 MG tablet [Pharmacy Med Name: LISINOPRIL 10MG TABLETS] 180 tablet 0     Sig: TAKE 1 TABLET BY MOUTH TWICE DAILY            [employer]

## 2022-08-01 RX ORDER — LISINOPRIL 10 MG/1
TABLET ORAL
Qty: 180 TABLET | Refills: 0 | Status: SHIPPED | OUTPATIENT
Start: 2022-08-01 | End: 2022-10-27

## 2022-08-03 DIAGNOSIS — E11.00 TYPE 2 DIABETES MELLITUS WITH HYPEROSMOLARITY WITHOUT COMA, WITHOUT LONG-TERM CURRENT USE OF INSULIN (HCC): ICD-10-CM

## 2022-08-03 NOTE — TELEPHONE ENCOUNTER
Anita Ceballos called to request a refill on her medication.       Last office visit : 3/24/2022   Next office visit : 8/4/2022     Requested Prescriptions     Pending Prescriptions Disp Refills    traZODone (DESYREL) 150 MG tablet [Pharmacy Med Name: TRAZODONE 150MG (HUNDRED-FIFTY) TAB] 60 tablet 3     Sig: TAKE 2 TABLETS BY MOUTH EVERY EVENING            [employer]

## 2022-08-03 NOTE — TELEPHONE ENCOUNTER
Tari Tanika called to request a refill on her medication.       Last office visit : 3/24/2022   Next office visit : 8/4/2022     Requested Prescriptions     Pending Prescriptions Disp Refills    metFORMIN (GLUCOPHAGE) 500 MG tablet [Pharmacy Med Name: METFORMIN 500MG TABLETS] 120 tablet 2     Sig: TAKE 2 TABLETS BY MOUTH TWICE DAILY WITH MEALS            [employer]

## 2022-08-04 ENCOUNTER — OFFICE VISIT (OUTPATIENT)
Dept: PRIMARY CARE CLINIC | Age: 52
End: 2022-08-04
Payer: MEDICAID

## 2022-08-04 VITALS
HEIGHT: 68 IN | HEART RATE: 102 BPM | DIASTOLIC BLOOD PRESSURE: 70 MMHG | OXYGEN SATURATION: 97 % | WEIGHT: 293 LBS | BODY MASS INDEX: 44.41 KG/M2 | TEMPERATURE: 97.2 F | SYSTOLIC BLOOD PRESSURE: 110 MMHG

## 2022-08-04 DIAGNOSIS — Z12.31 ENCOUNTER FOR SCREENING MAMMOGRAM FOR MALIGNANT NEOPLASM OF BREAST: ICD-10-CM

## 2022-08-04 DIAGNOSIS — E11.69 TYPE 2 DIABETES MELLITUS WITH OTHER SPECIFIED COMPLICATION, WITHOUT LONG-TERM CURRENT USE OF INSULIN (HCC): Primary | ICD-10-CM

## 2022-08-04 DIAGNOSIS — F39 MOOD DISORDER (HCC): ICD-10-CM

## 2022-08-04 DIAGNOSIS — N18.31 STAGE 3A CHRONIC KIDNEY DISEASE (HCC): ICD-10-CM

## 2022-08-04 DIAGNOSIS — R60.9 EDEMA, UNSPECIFIED TYPE: ICD-10-CM

## 2022-08-04 DIAGNOSIS — F41.9 ANXIETY: ICD-10-CM

## 2022-08-04 PROCEDURE — 99214 OFFICE O/P EST MOD 30 MIN: CPT | Performed by: NURSE PRACTITIONER

## 2022-08-04 PROCEDURE — 3017F COLORECTAL CA SCREEN DOC REV: CPT | Performed by: NURSE PRACTITIONER

## 2022-08-04 PROCEDURE — G8427 DOCREV CUR MEDS BY ELIG CLIN: HCPCS | Performed by: NURSE PRACTITIONER

## 2022-08-04 PROCEDURE — G8417 CALC BMI ABV UP PARAM F/U: HCPCS | Performed by: NURSE PRACTITIONER

## 2022-08-04 PROCEDURE — 2022F DILAT RTA XM EVC RTNOPTHY: CPT | Performed by: NURSE PRACTITIONER

## 2022-08-04 PROCEDURE — 1036F TOBACCO NON-USER: CPT | Performed by: NURSE PRACTITIONER

## 2022-08-04 PROCEDURE — 3044F HG A1C LEVEL LT 7.0%: CPT | Performed by: NURSE PRACTITIONER

## 2022-08-04 RX ORDER — TRAZODONE HYDROCHLORIDE 150 MG/1
TABLET ORAL
Qty: 60 TABLET | Refills: 3 | OUTPATIENT
Start: 2022-08-04

## 2022-08-04 RX ORDER — ALPRAZOLAM 0.25 MG/1
TABLET ORAL
Qty: 30 TABLET | Refills: 0 | Status: SHIPPED | OUTPATIENT
Start: 2022-08-04 | End: 2022-09-08

## 2022-08-04 RX ORDER — TRAZODONE HYDROCHLORIDE 150 MG/1
TABLET ORAL
Qty: 60 TABLET | Refills: 3 | Status: SHIPPED | OUTPATIENT
Start: 2022-08-04 | End: 2022-11-04

## 2022-08-04 ASSESSMENT — ENCOUNTER SYMPTOMS
COUGH: 0
SHORTNESS OF BREATH: 0
RHINORRHEA: 0
EYE REDNESS: 0
CONSTIPATION: 0
BLOOD IN STOOL: 0
CHEST TIGHTNESS: 0
WHEEZING: 0
DIARRHEA: 0
ABDOMINAL PAIN: 0
SORE THROAT: 0
NAUSEA: 0
VOMITING: 0
VOICE CHANGE: 0
TROUBLE SWALLOWING: 0

## 2022-08-04 ASSESSMENT — PATIENT HEALTH QUESTIONNAIRE - PHQ9
3. TROUBLE FALLING OR STAYING ASLEEP: 2
SUM OF ALL RESPONSES TO PHQ QUESTIONS 1-9: 2
SUM OF ALL RESPONSES TO PHQ QUESTIONS 1-9: 2
5. POOR APPETITE OR OVEREATING: 0
6. FEELING BAD ABOUT YOURSELF - OR THAT YOU ARE A FAILURE OR HAVE LET YOURSELF OR YOUR FAMILY DOWN: 0
SUM OF ALL RESPONSES TO PHQ QUESTIONS 1-9: 2
9. THOUGHTS THAT YOU WOULD BE BETTER OFF DEAD, OR OF HURTING YOURSELF: 0
8. MOVING OR SPEAKING SO SLOWLY THAT OTHER PEOPLE COULD HAVE NOTICED. OR THE OPPOSITE, BEING SO FIGETY OR RESTLESS THAT YOU HAVE BEEN MOVING AROUND A LOT MORE THAN USUAL: 0
1. LITTLE INTEREST OR PLEASURE IN DOING THINGS: 0
7. TROUBLE CONCENTRATING ON THINGS, SUCH AS READING THE NEWSPAPER OR WATCHING TELEVISION: 0
4. FEELING TIRED OR HAVING LITTLE ENERGY: 0
2. FEELING DOWN, DEPRESSED OR HOPELESS: 0
10. IF YOU CHECKED OFF ANY PROBLEMS, HOW DIFFICULT HAVE THESE PROBLEMS MADE IT FOR YOU TO DO YOUR WORK, TAKE CARE OF THINGS AT HOME, OR GET ALONG WITH OTHER PEOPLE: 0
SUM OF ALL RESPONSES TO PHQ QUESTIONS 1-9: 2
SUM OF ALL RESPONSES TO PHQ9 QUESTIONS 1 & 2: 0

## 2022-08-04 NOTE — PROGRESS NOTES
[facility]  [address] [facility]  [id number]  [address] [facility] [address]  Dept: [phone]  Dept Fax: [phone]  Loc: [phone]        Moise Cuadra is a 45 y.o. female who presents today for her medical conditions/ complaints as noted below. Moise Cuadra is c/o Diabetes        Chief Complaint   Patient presents with    Diabetes       HPI:     Foot Pain   Pertinent negatives include no fever. Diabetes  Pertinent negatives for hypoglycemia include no confusion, dizziness, headaches, nervousness/anxiousness or speech difficulty. Pertinent negatives for diabetes include no chest pain, no fatigue, no polydipsia, no polyphagia and no polyuria. DM 2:   8/4/2022: Weight down to 308. Doing well on ozempic 1mg weekly, hemoglobin A1c on 7/29/2022 was 5.0 which is an improvement from February 23, 2021 at 5.6. Patient request to increase on Ozempic to 2 mg to further help with diabetes and weight loss. Patient notes that since she has lost weight she has noted improvement in her overall chronic pain fatigue blood pressure and edema. Edema of bilateral lower extremities/ worse RLE  8/4/2022: No issues, resolved. 3/25/2022: Pt reports improvement of edema with increase of HCTZ to 25mg daily. States that she has also been able to increase her activity. States that she is using crutches and no longer using her motorized scooter. 2/17/2022: Patient reports persistent edema to bilateral lower extremities. Notes compliacne with HCTZ. States that she is setting most days due to inability to ambulate related to right knee pain/ injury. Pt states that she has an appointment with Dr. Cristhian Lemos to discuss surgical options. Pt reports riding around her home to try and take care of her children. She reports also walking with crutches at times. Pt has had 6lb weight loss since last visit but she doesn't feel that the HCTZ has done much to help her.             Patient reports that they have been compliant with taking medications as directed.      Past Medical History:   Diagnosis Date    Anxiety     Chronic pain     Depression     Diabetes mellitus type 2 in obese ([address]     Hyperlipidemia     Hypertension     Premenopausal patient 2018       Past Surgical History:   Procedure Laterality Date     SECTION      TENDON RELEASE      right finger    TUBAL LIGATION         Social History     Socioeconomic History    Marital status:      Spouse name: None    Number of children: None    Years of education: None    Highest education level: None   Tobacco Use    Smoking status: Never    Smokeless tobacco: Never   Vaping Use    Vaping Use: Never used   Substance and Sexual Activity    Alcohol use: Yes     Comment: socially    Drug use: No       Family History   Problem Relation Age of Onset    Diabetes Father     Cancer Father         Bladder and Lung    Breast Cancer Sister         1/2 Sister    Diabetes Maternal Grandfather     Diabetes Paternal Grandmother        Current Outpatient Medications   Medication Sig Dispense Refill    ALPRAZolam (XANAX) 0.25 MG tablet TAKE 1 TABLET BY MOUTH EVERY NIGHT 30 tablet 0    metFORMIN (GLUCOPHAGE) 500 MG tablet TAKE 2 TABLETS BY MOUTH TWICE DAILY WITH MEALS 120 tablet 2    traZODone (DESYREL) 150 MG tablet TAKE 2 TABLETS BY MOUTH EVERY EVENING 60 tablet 3    Semaglutide, 2 MG/DOSE, 8 MG/3ML SOPN Inject 2 mg into the skin once a week 3 mL 2    lisinopril (PRINIVIL;ZESTRIL) 10 MG tablet TAKE 1 TABLET BY MOUTH TWICE DAILY 180 tablet 0    ARIPiprazole (ABILIFY) 5 MG tablet TAKE 1 TABLET BY MOUTH EVERY DAY 30 tablet 5    atorvastatin (LIPITOR) 20 MG tablet TAKE 1 TABLET BY MOUTH DAILY 90 tablet 1    DULoxetine HCl 40 MG CPEP TAKE 1 CAPSULE BY MOUTH DAILY 30 capsule 2    nystatin (NYSTATIN) 679462 UNIT/GM powder APPLY DAILY TO SKIN FOLDS AS NEEDED UP TO 3 TIMES A DAY 30 g 0    diclofenac sodium (VOLTAREN) 1 % GEL Apply topically 2 times daily 1 g 2 polyphagia and polyuria. Genitourinary:  Negative for dysuria, frequency and urgency. Musculoskeletal:  Negative for myalgias. Skin:  Negative for rash and wound. Neurological:  Negative for dizziness, speech difficulty, light-headedness and headaches. Psychiatric/Behavioral:  Negative for agitation, confusion, self-injury and suicidal ideas. The patient is not nervous/anxious. Objective:     Physical Exam  Vitals and nursing note reviewed. Constitutional:       General: She is not in acute distress. Appearance: She is well-developed. She is not diaphoretic. HENT:      Head: Normocephalic and atraumatic. Right Ear: External ear normal.      Left Ear: External ear normal.      Nose: Nose normal.      Mouth/Throat:      Pharynx: No oropharyngeal exudate. Eyes:      General:         Right eye: No discharge. Left eye: No discharge. Conjunctiva/sclera: Conjunctivae normal.      Pupils: Pupils are equal, round, and reactive to light. Cardiovascular:      Rate and Rhythm: Normal rate and regular rhythm. Heart sounds: Normal heart sounds. No murmur heard. Pulmonary:      Effort: Pulmonary effort is normal. No respiratory distress. Breath sounds: Normal breath sounds. No stridor. No wheezing or rales. Chest:      Chest wall: No tenderness. Breasts:     Right: No inverted nipple, mass, nipple discharge, skin change or tenderness. Left: No inverted nipple, mass, nipple discharge, skin change or tenderness. Abdominal:      General: Bowel sounds are normal. There is no distension. Palpations: Abdomen is soft. Tenderness: There is no abdominal tenderness. Musculoskeletal:         General: No tenderness or deformity. Cervical back: Normal range of motion and neck supple. Right lower leg: No edema. Left lower leg: No edema. Skin:     General: Skin is warm and dry. Findings: No erythema or rash.    Neurological:      Mental Status: She is alert and oriented to person, place, and time. Cranial Nerves: No cranial nerve deficit. Coordination: Coordination normal.      Deep Tendon Reflexes: Reflexes are normal and symmetric. Psychiatric:         Behavior: Behavior normal.         Thought Content: Thought content normal.     Diabetic foot exam:   Left Foot:   Visual Exam: normal   Pulse DP: 2+ (normal)   Filament test: normal sensation   Vibratory Sensation: normal  Right Foot:   Visual Exam: normal   Pulse DP: 2+ (normal)   Filament test: normal sensation   Vibratory Sensation: normal      /70   Pulse (!) 102   Temp 97.2 °F (36.2 °C) (Temporal)   Ht 5' 8\" (1.727 m)   Wt (!) 308 lb (139.7 kg)   SpO2 97%   BMI 46.83 kg/m²     Assessment:      Diagnosis Orders   1. Type 2 diabetes mellitus with other specified complication, without long-term current use of insulin (HCC)  metFORMIN (GLUCOPHAGE) 500 MG tablet    Diabetic Foot Exam    Semaglutide, 2 MG/DOSE, 8 MG/3ML SOPN    CBC    Comprehensive Metabolic Panel    Hemoglobin A1C    Lipid Panel    Microalbumin / Creatinine Urine Ratio    TSH    Urinalysis      2. Stage 3a chronic kidney disease (HCC)        3. Edema, unspecified type        4. Mood disorder ([address]        5. Anxiety  ALPRAZolam (XANAX) 0.25 MG tablet      6. Body mass index (BMI) 45.0-49.9, adult ([address]        7. Encounter for screening mammogram for malignant neoplasm of breast  BALAJI DIGITAL SCREEN W OR WO CAD BILATERAL        No results found for this visit on 08/04/22. Plan:     1. Type 2 diabetes mellitus with other specified complication, without long-term current use of insulin (HCC)    2. Stage 3a chronic kidney disease (HCC)    3. Edema, unspecified type    4. Mood disorder ([address]    5. Anxiety    6. Body mass index (BMI) 45.0-49.9, adult ([address]    7.  Encounter for screening mammogram for malignant neoplasm of breast      Over 50% of the total visit time of 30 minutes was spent on counseling and or coordination of care of DM2, stage III chronic kidney disease, edema, mood disorder, anxiety, BMI 45-49 and encounter for screening mammogram.  Patient will try stopping meloxicam to see if this will improve her kidney function. If she is not able to completely stop this medication she will try to decrease dose down to 7.5 mg daily. She will also try to stop HCTZ for that this could also be aiding in her chronic kidney disease. She will have fasting labs before follow-up in 3 months. She will also increase Ozempic to 2 mg weekly. She will also schedule mammogram I have provided number on after visit summary. No follow-ups on file. Orders Placed This Encounter   Procedures    BALAJI DIGITAL SCREEN W OR WO CAD BILATERAL     Standing Status:   Future     Standing Expiration Date:   10/4/2023     Order Specific Question:   Does this patient have implants? Answer:   No     Order Specific Question:   Does this patient have a personal history of breast cancer?      Answer:   No    CBC     Standing Status:   Future     Standing Expiration Date:   8/4/2023    Comprehensive Metabolic Panel     Standing Status:   Future     Standing Expiration Date:   8/4/2023    Hemoglobin A1C     Standing Status:   Future     Standing Expiration Date:   8/4/2023    Lipid Panel     Standing Status:   Future     Standing Expiration Date:   8/4/2023     Order Specific Question:   Is Patient Fasting?/# of Hours     Answer:   yes, 8 hours    Microalbumin / Creatinine Urine Ratio     Standing Status:   Future     Standing Expiration Date:   8/4/2023    TSH     Standing Status:   Future     Standing Expiration Date:   8/4/2023    Urinalysis     Standing Status:   Future     Standing Expiration Date:   8/4/2023    Diabetic Foot Exam     Orders Placed This Encounter   Medications    ALPRAZolam (XANAX) 0.25 MG tablet     Sig: TAKE 1 TABLET BY MOUTH EVERY NIGHT     Dispense:  30 tablet     Refill:  0    metFORMIN (GLUCOPHAGE) 500 MG tablet Sig: TAKE 2 TABLETS BY MOUTH TWICE DAILY WITH MEALS     Dispense:  120 tablet     Refill:  2    traZODone (DESYREL) 150 MG tablet     Sig: TAKE 2 TABLETS BY MOUTH EVERY EVENING     Dispense:  60 tablet     Refill:  3    Semaglutide, 2 MG/DOSE, 8 MG/3ML SOPN     Sig: Inject 2 mg into the skin once a week     Dispense:  3 mL     Refill:  2       Patient Instructions   Try stopping meloxicam if you cannot, you may begin taking 7.5mg daily instead of 15mg daily to help improve kidney function. Try to stop HCTZ if you note edema returns may return to every other day dosing. Have fasting labs before follow up I 3 months. Increase ozempic to 2mg weekly. Mammogram [phone] to schedule.       /family given educational materials - see patient instructions. Discussed use, benefit, and side effects of prescribed medications. All patient/family questions answered and voiced understanding. Instructed to continue current medications, diet and exercise. Pt/family agreed with treatment plan. Follow up as directed and sooner if needed. Patient/ family instructed that is symptoms worsen or persist they are to contact office or report to nearest ER. They voice understanding and agreement with this plan.   signed by NATALI Head on 8/7/2022 at 1:44 PM CST    This dictation was generated by voice recognition computer software. Although all attempts are made to edit the dictation for accuracy, there may be errors in the transcription that are not intended.

## 2022-08-04 NOTE — PATIENT INSTRUCTIONS
Try stopping meloxicam if you cannot, you may begin taking 7.5mg daily instead of 15mg daily to help improve kidney function. Try to stop HCTZ if you note edema returns may return to every other day dosing. Have fasting labs before follow up I 3 months. Increase ozempic to 2mg weekly. Mammogram [phone] to schedule.

## 2022-08-07 PROBLEM — E11.9 TYPE 2 DIABETES MELLITUS (HCC): Status: ACTIVE | Noted: 2022-08-07

## 2022-08-07 PROBLEM — N18.30 CHRONIC RENAL DISEASE, STAGE III (HCC): Status: ACTIVE | Noted: 2022-08-07

## 2022-08-08 ENCOUNTER — TELEPHONE (OUTPATIENT)
Dept: PRIMARY CARE CLINIC | Age: 52
End: 2022-08-08

## 2022-08-08 NOTE — TELEPHONE ENCOUNTER
Patient called and said [facility] not have Ozempic 2 mg at this time. They were having a hard time getting it filled. I called pt back and told her that she would need to call around to other pharmacies to see if they can fill it, and then have that pharmacy contact [facility] to have the script sent over. I told her that if she is still having problems getting it filled, to call and let me know. She thanked me and verbalized understanding.

## 2022-08-23 DIAGNOSIS — G89.29 OTHER CHRONIC PAIN: ICD-10-CM

## 2022-08-23 DIAGNOSIS — F41.9 ANXIETY AND DEPRESSION: ICD-10-CM

## 2022-08-23 DIAGNOSIS — F32.A ANXIETY AND DEPRESSION: ICD-10-CM

## 2022-08-23 DIAGNOSIS — R60.9 EDEMA, UNSPECIFIED TYPE: ICD-10-CM

## 2022-08-23 NOTE — TELEPHONE ENCOUNTER
Ramiro Ku called to request a refill on her medication.       Last office visit : 8/4/2022   Next office visit : 11/8/2022     Requested Prescriptions     Pending Prescriptions Disp Refills    DULoxetine HCl 40 MG CPEP [Pharmacy Med Name: DULOXETINE DR 40MG CAPSULES] 30 capsule 2     Sig: TAKE 1 CAPSULE BY MOUTH DAILY    hydroCHLOROthiazide (MICROZIDE) 12.5 MG capsule [Pharmacy Med Name: HYDROCHLOROTHIAZIDE 12.5MG CAPSULES] 30 capsule 0     Sig: TAKE 1 CAPSULE BY MOUTH DAILY            [employer]

## 2022-08-26 RX ORDER — HYDROCHLOROTHIAZIDE 12.5 MG/1
12.5 CAPSULE, GELATIN COATED ORAL DAILY
Qty: 30 CAPSULE | Refills: 0 | OUTPATIENT
Start: 2022-08-26 | End: 2022-09-25

## 2022-08-26 RX ORDER — DULOXETINE 40 MG/1
CAPSULE, DELAYED RELEASE ORAL
Qty: 30 CAPSULE | Refills: 2 | Status: SHIPPED | OUTPATIENT
Start: 2022-08-26

## 2022-09-02 ENCOUNTER — TRANSCRIBE ORDERS (OUTPATIENT)
Dept: ADMINISTRATIVE | Facility: HOSPITAL | Age: 52
End: 2022-09-02

## 2022-09-02 DIAGNOSIS — M17.12 ARTHRITIS OF LEFT KNEE: Primary | ICD-10-CM

## 2022-09-06 DIAGNOSIS — F41.9 ANXIETY: ICD-10-CM

## 2022-09-06 PROBLEM — Z12.31 ENCOUNTER FOR SCREENING MAMMOGRAM FOR MALIGNANT NEOPLASM OF BREAST: Status: RESOLVED | Noted: 2019-12-26 | Resolved: 2022-09-06

## 2022-09-07 DIAGNOSIS — E11.00 TYPE 2 DIABETES MELLITUS WITH HYPEROSMOLARITY WITHOUT COMA, WITHOUT LONG-TERM CURRENT USE OF INSULIN (HCC): ICD-10-CM

## 2022-09-08 ENCOUNTER — HOSPITAL ENCOUNTER (OUTPATIENT)
Dept: GENERAL RADIOLOGY | Facility: HOSPITAL | Age: 52
Discharge: HOME | End: 2022-09-08
Admitting: NURSE PRACTITIONER

## 2022-09-08 ENCOUNTER — TRANSCRIBE ORDERS (OUTPATIENT)
Dept: ADMINISTRATIVE | Facility: HOSPITAL | Age: 52
End: 2022-09-08

## 2022-09-08 DIAGNOSIS — M17.12 ARTHRITIS OF LEFT KNEE: Primary | ICD-10-CM

## 2022-09-08 DIAGNOSIS — M17.12 ARTHRITIS OF LEFT KNEE: ICD-10-CM

## 2022-09-08 PROCEDURE — 73562 X-RAY EXAM OF KNEE 3: CPT

## 2022-09-08 RX ORDER — ALPRAZOLAM 0.25 MG/1
TABLET ORAL
Qty: 30 TABLET | Refills: 0 | Status: SHIPPED | OUTPATIENT
Start: 2022-09-08 | End: 2022-10-05

## 2022-09-08 RX ORDER — NYSTATIN 100000 [USP'U]/G
POWDER TOPICAL
Qty: 30 G | Refills: 0 | Status: SHIPPED | OUTPATIENT
Start: 2022-09-08

## 2022-09-08 NOTE — TELEPHONE ENCOUNTER
Indira Gomez called to request a refill on her medication.       Last office visit : 8/4/2022   Next office visit : 11/8/2022     Requested Prescriptions     Pending Prescriptions Disp Refills    nystatin 814811 UNIT/GM powder [Pharmacy Med Name: Fernanda Meneses PWDR 100,000 30GM] 30 g 0     Sig: APPLY DAILY TO SKIN FOLDS AS NEEDED UP TO [address] [facility]

## 2022-09-08 NOTE — TELEPHONE ENCOUNTER
Jacy Ulloa called to request a refill on her medication. Last office visit : 8/4/2022   Next office visit : 9/7/2022     Last UDS:   Amphetamine Screen, Urine   Date Value Ref Range Status   02/04/2021 -  Final     Barbiturates   Date Value Ref Range Status   01/17/2011 Negative () Final     Barbiturate Screen, Urine   Date Value Ref Range Status   02/04/2021 -  Final     Benzodiazepine Screen, Urine   Date Value Ref Range Status   02/04/2021 +  Final     Comment:     Xanax     Buprenorphine Urine   Date Value Ref Range Status   02/04/2021 -  Final     Cocaine Metabolite Screen, Urine   Date Value Ref Range Status   02/04/2021 -  Final     Gabapentin Screen, Urine   Date Value Ref Range Status   02/04/2021 -  Final     MDMA, Urine   Date Value Ref Range Status   02/04/2021 -  Final     Methamphetamine, Urine   Date Value Ref Range Status   02/04/2021 -  Final     Opiate Scrn, Ur   Date Value Ref Range Status   02/04/2021 -  Final     Oxycodone Screen, Ur   Date Value Ref Range Status   02/04/2021 -  Final     PCP Screen, Urine   Date Value Ref Range Status   02/04/2021 -  Final     Propoxyphene Screen, Urine   Date Value Ref Range Status   02/04/2021 -  Final     THC Screen, Urine   Date Value Ref Range Status   02/04/2021 -  Final     Tricyclic Antidepressants, Urine   Date Value Ref Range Status   02/04/2021 -  Final       Last Climmie Amel: 7/11/22  Medication Contract: 3/24/22   Last Fill: 8/4/22    Requested Prescriptions     Pending Prescriptions Disp Refills    ALPRAZolam (XANAX) 0.25 MG tablet [Pharmacy Med Name: ALPRAZOLAM 0.25MG TABLETS] 30 tablet      Sig: TAKE 1 TABLET BY MOUTH EVERY NIGHT         Please approve or refuse this medication.    [employer]

## 2022-09-16 ENCOUNTER — APPOINTMENT (OUTPATIENT)
Dept: MRI IMAGING | Facility: HOSPITAL | Age: 52
End: 2022-09-16

## 2022-10-02 RX ORDER — ATORVASTATIN CALCIUM 20 MG/1
TABLET, FILM COATED ORAL
Qty: 90 TABLET | Refills: 1 | Status: SHIPPED | OUTPATIENT
Start: 2022-10-02

## 2022-10-05 DIAGNOSIS — F41.9 ANXIETY: ICD-10-CM

## 2022-10-05 RX ORDER — ALPRAZOLAM 0.25 MG/1
TABLET ORAL
Qty: 30 TABLET | Refills: 0 | Status: SHIPPED | OUTPATIENT
Start: 2022-10-05 | End: 2022-11-05

## 2022-10-12 ENCOUNTER — APPOINTMENT (OUTPATIENT)
Dept: MRI IMAGING | Facility: HOSPITAL | Age: 52
End: 2022-10-12

## 2022-10-14 ENCOUNTER — TELEMEDICINE (OUTPATIENT)
Dept: PRIMARY CARE CLINIC | Age: 52
End: 2022-10-14
Payer: MEDICARE

## 2022-10-14 DIAGNOSIS — K04.7 ABSCESSED TOOTH: Primary | ICD-10-CM

## 2022-10-14 PROCEDURE — 3017F COLORECTAL CA SCREEN DOC REV: CPT | Performed by: NURSE PRACTITIONER

## 2022-10-14 PROCEDURE — G8427 DOCREV CUR MEDS BY ELIG CLIN: HCPCS | Performed by: NURSE PRACTITIONER

## 2022-10-14 PROCEDURE — 99213 OFFICE O/P EST LOW 20 MIN: CPT | Performed by: NURSE PRACTITIONER

## 2022-10-14 RX ORDER — SEMAGLUTIDE 2.68 MG/ML
INJECTION, SOLUTION SUBCUTANEOUS
COMMUNITY
Start: 2022-09-10

## 2022-10-14 RX ORDER — AMOXICILLIN AND CLAVULANATE POTASSIUM 875; 125 MG/1; MG/1
1 TABLET, FILM COATED ORAL 2 TIMES DAILY
Qty: 20 TABLET | Refills: 0 | Status: SHIPPED | OUTPATIENT
Start: 2022-10-14 | End: 2022-10-24

## 2022-10-14 ASSESSMENT — ENCOUNTER SYMPTOMS
TROUBLE SWALLOWING: 0
EYE REDNESS: 0
NAUSEA: 0
CONSTIPATION: 0
RHINORRHEA: 0
SHORTNESS OF BREATH: 0
DIARRHEA: 0
ABDOMINAL PAIN: 0
COUGH: 0
SORE THROAT: 0
CHEST TIGHTNESS: 0
BLOOD IN STOOL: 0
WHEEZING: 0
VOICE CHANGE: 0
VOMITING: 0

## 2022-10-14 NOTE — PROGRESS NOTES
Ana Jarvis (:  1970) is a 47 y.o. female,Established patient, here for evaluation of the following chief complaint(s): Dental Pain         ASSESSMENT/PLAN:  1. Abscessed tooth    Return if symptoms worsen or fail to improve. SUBJECTIVE/OBJECTIVE:  HPI    Pt unable to see her dentist until next week, concerned about the possibility of infection increasing. Review of Systems   Constitutional:  Negative for activity change, appetite change, fatigue, fever and unexpected weight change. HENT:  Negative for congestion, ear pain, nosebleeds, rhinorrhea, sore throat, trouble swallowing and voice change. Eyes:  Negative for redness and visual disturbance. Respiratory:  Negative for cough, chest tightness, shortness of breath and wheezing. Cardiovascular:  Negative for chest pain, palpitations and leg swelling. Gastrointestinal:  Negative for abdominal pain, blood in stool, constipation, diarrhea, nausea and vomiting. Endocrine: Negative for polydipsia, polyphagia and polyuria. Genitourinary:  Negative for dysuria, frequency and urgency. Musculoskeletal:  Negative for myalgias. Skin:  Negative for rash and wound. Neurological:  Negative for dizziness, speech difficulty, light-headedness and headaches. Psychiatric/Behavioral:  Negative for agitation, confusion, self-injury and suicidal ideas. The patient is not nervous/anxious.       Patient-Reported Vitals 2021   Patient-Reported Weight 332 lbs   Patient-Reported Height 5' 8\"   Patient-Reported Systolic 285   Patient-Reported Diastolic 97   Patient-Reported Pulse 86        Physical Exam    [INSTRUCTIONS:  \"[x]\" Indicates a positive item  \"[]\" Indicates a negative item  -- DELETE ALL ITEMS NOT EXAMINED]    Constitutional: [x] Appears well-developed and well-nourished [x] No apparent distress      [] Abnormal -     Mental status: [x] Alert and awake  [x] Oriented to person/place/time [x] Able to follow commands    [] Abnormal - Eyes:   EOM    [x]  Normal    [] Abnormal -   Sclera  [x]  Normal    [] Abnormal -          Discharge [x]  None visible   [] Abnormal -     HENT: [x] Normocephalic, atraumatic  [] Abnormal -   [x] Mouth/Throat: Mucous membranes are moist    External Ears [x] Normal  [] Abnormal -    Neck: [x] No visualized mass [] Abnormal -     Pulmonary/Chest: [x] Respiratory effort normal   [x] No visualized signs of difficulty breathing or respiratory distress        [] Abnormal -      Musculoskeletal:   [x] Normal gait with no signs of ataxia         [x] Normal range of motion of neck        [] Abnormal -     Neurological:        [x] No Facial Asymmetry (Cranial nerve 7 motor function) (limited exam due to video visit)          [x] No gaze palsy        [] Abnormal -          Skin:        [x] No significant exanthematous lesions or discoloration noted on facial skin         [] Abnormal -            Psychiatric:       [x] Normal Affect [] Abnormal -        [x] No Hallucinations    Other pertinent observable physical exam findings:-      There are no Patient Instructions on file for this visit. On this date 10/14/2022 I have spent 15 minutes reviewing previous notes, test results and face to face (virtual) with the patient discussing the diagnosis and importance of compliance with the treatment plan as well as documenting on the day of the visitWillis Sanchez was evaluated through a synchronous (real-time) audio-video encounter. The patient (or guardian if applicable) is aware that this is a billable service. Verbal consent to proceed has been obtained within the past 12 months. The visit was conducted pursuant to the emergency declaration under the [address] [place] [address], [address] [place] [address] and the [employer] and [employer] General Act. Patient identification was verified, and a caregiver was present when appropriate.  The patient was located in a [facility] where the provider was credentialed to provide care. An electronic signature was used to authenticate this note.     --NATALI Landeros

## 2022-10-19 ENCOUNTER — TELEPHONE (OUTPATIENT)
Dept: PRIMARY CARE CLINIC | Age: 52
End: 2022-10-19

## 2022-10-25 ENCOUNTER — TELEPHONE (OUTPATIENT)
Dept: PRIMARY CARE CLINIC | Age: 52
End: 2022-10-25

## 2022-10-25 NOTE — TELEPHONE ENCOUNTER
Scott Osborne is on back order she cannot find it to fill her rx , she would like a call back please advise [phone]

## 2022-10-26 DIAGNOSIS — I10 ESSENTIAL HYPERTENSION: ICD-10-CM

## 2022-10-27 RX ORDER — LISINOPRIL 10 MG/1
TABLET ORAL
Qty: 180 TABLET | Refills: 1 | Status: SHIPPED | OUTPATIENT
Start: 2022-10-27

## 2022-10-27 NOTE — TELEPHONE ENCOUNTER
Tipblakejeanie Mujica called to request a refill on her medication.       Last office visit : 10/14/2022   Next office visit : 11/23/2022     Requested Prescriptions     Pending Prescriptions Disp Refills    lisinopril (PRINIVIL;ZESTRIL) 10 MG tablet [Pharmacy Med Name: LISINOPRIL 10MG TABLETS] 180 tablet 1     Sig: TAKE 1 TABLET BY MOUTH TWICE DAILY            Jamison Valentin MA

## 2022-11-04 RX ORDER — TRAZODONE HYDROCHLORIDE 150 MG/1
TABLET ORAL
Qty: 60 TABLET | Refills: 3 | Status: SHIPPED | OUTPATIENT
Start: 2022-11-04

## 2022-11-07 DIAGNOSIS — F41.9 ANXIETY: ICD-10-CM

## 2022-11-08 ENCOUNTER — HOSPITAL ENCOUNTER (OUTPATIENT)
Dept: MRI IMAGING | Facility: HOSPITAL | Age: 52
Discharge: HOME | End: 2022-11-08
Admitting: NURSE PRACTITIONER

## 2022-11-08 DIAGNOSIS — M17.12 ARTHRITIS OF LEFT KNEE: ICD-10-CM

## 2022-11-08 PROCEDURE — 73721 MRI JNT OF LWR EXTRE W/O DYE: CPT

## 2022-11-10 DIAGNOSIS — F41.9 ANXIETY: ICD-10-CM

## 2022-11-10 DIAGNOSIS — E11.69 TYPE 2 DIABETES MELLITUS WITH OTHER SPECIFIED COMPLICATION, WITHOUT LONG-TERM CURRENT USE OF INSULIN (HCC): ICD-10-CM

## 2022-11-10 LAB
ALBUMIN SERPL-MCNC: 4.5 G/DL (ref 3.5–5.2)
ALP BLD-CCNC: 132 U/L (ref 35–104)
ALT SERPL-CCNC: 37 U/L (ref 5–33)
ANION GAP SERPL CALCULATED.3IONS-SCNC: 10 MMOL/L (ref 7–19)
AST SERPL-CCNC: 26 U/L (ref 5–32)
BACTERIA: NEGATIVE /HPF
BILIRUB SERPL-MCNC: 0.3 MG/DL (ref 0.2–1.2)
BILIRUBIN URINE: NEGATIVE
BLOOD, URINE: NEGATIVE
BUN BLDV-MCNC: 18 MG/DL (ref 6–20)
CALCIUM SERPL-MCNC: 9.5 MG/DL (ref 8.6–10)
CHLORIDE BLD-SCNC: 99 MMOL/L (ref 98–111)
CHOLESTEROL, TOTAL: 210 MG/DL (ref 160–199)
CLARITY: CLEAR
CO2: 27 MMOL/L (ref 22–29)
COLOR: YELLOW
CREAT SERPL-MCNC: 1 MG/DL (ref 0.5–0.9)
CREATININE URINE: 215.3 MG/DL (ref 4.2–622)
CRYSTALS, UA: ABNORMAL /HPF
EPITHELIAL CELLS, UA: 4 /HPF (ref 0–5)
GFR SERPL CREATININE-BSD FRML MDRD: >60 ML/MIN/{1.73_M2}
GLUCOSE BLD-MCNC: 110 MG/DL (ref 74–109)
GLUCOSE URINE: NEGATIVE MG/DL
HBA1C MFR BLD: 4.9 % (ref 4–6)
HCT VFR BLD CALC: 42.7 % (ref 37–47)
HDLC SERPL-MCNC: 67 MG/DL (ref 65–121)
HEMOGLOBIN: 13 G/DL (ref 12–16)
HYALINE CASTS: 1 /HPF (ref 0–8)
KETONES, URINE: NEGATIVE MG/DL
LDL CHOLESTEROL CALCULATED: 80 MG/DL
LEUKOCYTE ESTERASE, URINE: ABNORMAL
MCH RBC QN AUTO: 28.2 PG (ref 27–31)
MCHC RBC AUTO-ENTMCNC: 30.4 G/DL (ref 33–37)
MCV RBC AUTO: 92.6 FL (ref 81–99)
MICROALBUMIN UR-MCNC: <1.2 MG/DL (ref 0–19)
MICROALBUMIN/CREAT UR-RTO: NORMAL MG/G
NITRITE, URINE: NEGATIVE
PDW BLD-RTO: 14.4 % (ref 11.5–14.5)
PH UA: 6 (ref 5–8)
PLATELET # BLD: 343 K/UL (ref 130–400)
PMV BLD AUTO: 8.8 FL (ref 9.4–12.3)
POTASSIUM SERPL-SCNC: 4.8 MMOL/L (ref 3.5–5)
PROTEIN UA: NEGATIVE MG/DL
RBC # BLD: 4.61 M/UL (ref 4.2–5.4)
RBC UA: 2 /HPF (ref 0–4)
SODIUM BLD-SCNC: 136 MMOL/L (ref 136–145)
SPECIFIC GRAVITY UA: 1.02 (ref 1–1.03)
TOTAL PROTEIN: 7.3 G/DL (ref 6.6–8.7)
TRIGL SERPL-MCNC: 313 MG/DL (ref 0–149)
TSH SERPL DL<=0.05 MIU/L-ACNC: 2.21 UIU/ML (ref 0.27–4.2)
UROBILINOGEN, URINE: 0.2 E.U./DL
WBC # BLD: 11.1 K/UL (ref 4.8–10.8)
WBC UA: 7 /HPF (ref 0–5)

## 2022-11-10 RX ORDER — ALPRAZOLAM 0.25 MG/1
TABLET ORAL
Qty: 30 TABLET | OUTPATIENT
Start: 2022-11-10

## 2022-11-10 NOTE — TELEPHONE ENCOUNTER
Floyd Muñiz called to request a refill on her medication. Last office visit : 10/14/2022   Next office visit : 11/23/2022     Last UDS:   Amphetamine Screen, Urine   Date Value Ref Range Status   02/04/2021 -  Final     Barbiturates   Date Value Ref Range Status   01/17/2011 Negative () Final     Barbiturate Screen, Urine   Date Value Ref Range Status   02/04/2021 -  Final     Benzodiazepine Screen, Urine   Date Value Ref Range Status   02/04/2021 +  Final     Comment:     Xanax     Buprenorphine Urine   Date Value Ref Range Status   02/04/2021 -  Final     Cocaine Metabolite Screen, Urine   Date Value Ref Range Status   02/04/2021 -  Final     Gabapentin Screen, Urine   Date Value Ref Range Status   02/04/2021 -  Final     MDMA, Urine   Date Value Ref Range Status   02/04/2021 -  Final     Methamphetamine, Urine   Date Value Ref Range Status   02/04/2021 -  Final     Opiate Scrn, Ur   Date Value Ref Range Status   02/04/2021 -  Final     Oxycodone Screen, Ur   Date Value Ref Range Status   02/04/2021 -  Final     PCP Screen, Urine   Date Value Ref Range Status   02/04/2021 -  Final     Propoxyphene Screen, Urine   Date Value Ref Range Status   02/04/2021 -  Final     THC Screen, Urine   Date Value Ref Range Status   02/04/2021 -  Final     Tricyclic Antidepressants, Urine   Date Value Ref Range Status   02/04/2021 -  Final       Last Vanessa Nilson: 11/10/22  Medication Contract: 03/24/22   Last Fill: 10/10/22    Requested Prescriptions     Pending Prescriptions Disp Refills    ALPRAZolam (XANAX) 0.25 MG tablet 30 tablet 0         Please approve or refuse this medication.    Gilmer Montenegro MA

## 2022-11-11 RX ORDER — ALPRAZOLAM 0.25 MG/1
TABLET ORAL
Qty: 30 TABLET | Refills: 0 | Status: SHIPPED | OUTPATIENT
Start: 2022-11-11 | End: 2022-12-11

## 2022-11-12 DIAGNOSIS — E11.69 TYPE 2 DIABETES MELLITUS WITH OTHER SPECIFIED COMPLICATION, WITHOUT LONG-TERM CURRENT USE OF INSULIN (HCC): ICD-10-CM

## 2022-11-21 ENCOUNTER — TELEPHONE (OUTPATIENT)
Dept: PRIMARY CARE CLINIC | Age: 52
End: 2022-11-21

## 2022-11-21 NOTE — TELEPHONE ENCOUNTER
----- Message from NATALI Bearden sent at 11/18/2022  1:43 PM CST -----  Results are abnormal. Increase lipitor to 40mg daily at bedtime due to elevated trigs.

## 2022-11-25 DIAGNOSIS — F41.9 ANXIETY AND DEPRESSION: ICD-10-CM

## 2022-11-25 DIAGNOSIS — F32.A ANXIETY AND DEPRESSION: ICD-10-CM

## 2022-11-25 DIAGNOSIS — G89.29 OTHER CHRONIC PAIN: ICD-10-CM

## 2022-11-30 RX ORDER — DULOXETINE 40 MG/1
CAPSULE, DELAYED RELEASE ORAL
Qty: 30 CAPSULE | Refills: 2 | Status: SHIPPED | OUTPATIENT
Start: 2022-11-30

## 2022-11-30 NOTE — TELEPHONE ENCOUNTER
Fernando Wilson called to request a refill on her medication.       Last office visit : 10/14/2022   Next office visit : 12/6/2022     Requested Prescriptions     Pending Prescriptions Disp Refills    DULoxetine HCl 40 MG CPEP [Pharmacy Med Name: DULOXETINE DR 40MG CAPSULES] 30 capsule 2     Sig: TAKE 1 CAPSULE BY MOUTH DAILY            Kelly Ruiz LPN

## 2022-12-06 ENCOUNTER — OFFICE VISIT (OUTPATIENT)
Dept: PRIMARY CARE CLINIC | Age: 52
End: 2022-12-06
Payer: MEDICARE

## 2022-12-06 VITALS
OXYGEN SATURATION: 96 % | DIASTOLIC BLOOD PRESSURE: 90 MMHG | BODY MASS INDEX: 44.41 KG/M2 | WEIGHT: 293 LBS | HEIGHT: 68 IN | TEMPERATURE: 96.8 F | HEART RATE: 93 BPM | SYSTOLIC BLOOD PRESSURE: 142 MMHG

## 2022-12-06 DIAGNOSIS — I10 ESSENTIAL HYPERTENSION: ICD-10-CM

## 2022-12-06 DIAGNOSIS — F41.9 ANXIETY: ICD-10-CM

## 2022-12-06 DIAGNOSIS — Z12.4 PAP SMEAR FOR CERVICAL CANCER SCREENING: Primary | ICD-10-CM

## 2022-12-06 DIAGNOSIS — E66.01 MORBID OBESITY WITH BMI OF 40.0-44.9, ADULT (HCC): ICD-10-CM

## 2022-12-06 PROCEDURE — G8417 CALC BMI ABV UP PARAM F/U: HCPCS | Performed by: NURSE PRACTITIONER

## 2022-12-06 PROCEDURE — 99214 OFFICE O/P EST MOD 30 MIN: CPT | Performed by: NURSE PRACTITIONER

## 2022-12-06 PROCEDURE — G8484 FLU IMMUNIZE NO ADMIN: HCPCS | Performed by: NURSE PRACTITIONER

## 2022-12-06 PROCEDURE — 80305 DRUG TEST PRSMV DIR OPT OBS: CPT | Performed by: NURSE PRACTITIONER

## 2022-12-06 PROCEDURE — 3017F COLORECTAL CA SCREEN DOC REV: CPT | Performed by: NURSE PRACTITIONER

## 2022-12-06 PROCEDURE — 1036F TOBACCO NON-USER: CPT | Performed by: NURSE PRACTITIONER

## 2022-12-06 PROCEDURE — G8427 DOCREV CUR MEDS BY ELIG CLIN: HCPCS | Performed by: NURSE PRACTITIONER

## 2022-12-06 PROCEDURE — 3074F SYST BP LT 130 MM HG: CPT | Performed by: NURSE PRACTITIONER

## 2022-12-06 PROCEDURE — 3078F DIAST BP <80 MM HG: CPT | Performed by: NURSE PRACTITIONER

## 2022-12-06 RX ORDER — ALPRAZOLAM 0.25 MG/1
TABLET ORAL
Qty: 30 TABLET | Refills: 0 | Status: SHIPPED | OUTPATIENT
Start: 2022-12-06 | End: 2023-01-05

## 2022-12-06 ASSESSMENT — ENCOUNTER SYMPTOMS
BACK PAIN: 0
RHINORRHEA: 0
COUGH: 0
PHOTOPHOBIA: 0
VOMITING: 0
VOICE CHANGE: 0
SHORTNESS OF BREATH: 0
COLOR CHANGE: 0
NAUSEA: 0

## 2022-12-06 NOTE — PROGRESS NOTES
[address] [facility]  [address] [address]  [address] [address]  Dept: [phone]  Dept Fax: [phone]  Loc: [phone]    Becky Louise is a 47 y.o. female who presents today for her medical conditions/complaints as noted below. Becky Louise is c/o of [place] (Former [facility] patient)    HPI:     HPI   Chief Complaint   Patient presents with    Establish Care     Former [facility] patient     Patient presents today to establish care with new provider; she was former [facility] patient. She is currently taking xanax once daily for anxiety. She has done well with this. She also sees pain management for back and knee pain; she is taking Norco 10 TID as well as gabapentin. She is due for UDS today. Patient is due for pap; she will schedule with GYN or may decided to have it done here. Labs reviewed today; she recently increased atorvastatin due to elevated lipids. She declines flu vaccine today. She has been advised for knee replacement surgery; she is waiting to be seen by Dr Ariadna Martinez. She has been using crutches for nearly 1 year.        Past Medical History:   Diagnosis Date    Anxiety     Chronic pain     Depression     Diabetes mellitus type 2 in obese [facility])     Hyperlipidemia     Hypertension     Premenopausal patient 2018      Past Surgical History:   Procedure Laterality Date     SECTION      TENDON RELEASE      right finger    TUBAL LIGATION         Vitals 2022 2022 3/24/2022 2022 2022 [id number]   SYSTOLIC 581 270 955 915 278 622   DIASTOLIC 90 70 78 78 78 78   Pulse 93 102 92 93 93 107   Temp 96.8 97.2 97.2 96.2 97.2 98.2   Resp - - - - - 16   SpO2 96 97 99 98 93 95   Weight 316 lb 12.8 oz 308 lb 326 lb 3.2 oz 343 lb 349 lb 3.2 oz 337 lb 6.4 oz   Height 5' 8\" 5' 8\" 5' 8\" 5' 8\" 5' 8\" 5' 8\"   Body mass index 48.16 kg/m2 46.83 kg/m2 49.59 kg/m2 52.15 kg/m2 53.09 kg/m2 51.3 kg/m2   Pain Level - - - - - -   Some recent data might be hidden       Family History   Problem Relation Age of Onset    Diabetes Father     Cancer Father         Bladder and Lung    Breast Cancer Sister         1/2 Sister    Diabetes Maternal Grandfather     Diabetes Paternal Grandmother        Social History     Tobacco Use    Smoking status: Never    Smokeless tobacco: Never   Substance Use Topics    Alcohol use: Yes     Comment: socially      Current Outpatient Medications on File Prior to Visit   Medication Sig Dispense Refill    DULoxetine HCl 40 MG CPEP TAKE 1 CAPSULE BY MOUTH DAILY 30 capsule 2    metFORMIN (GLUCOPHAGE) 500 MG tablet TAKE 2 TABLETS BY MOUTH TWICE DAILY WITH MEALS 120 tablet 2    traZODone (DESYREL) 150 MG tablet TAKE 2 TABLETS BY MOUTH EVERY EVENING 60 tablet 3    lisinopril (PRINIVIL;ZESTRIL) 10 MG tablet TAKE 1 TABLET BY MOUTH TWICE DAILY 180 tablet 1    OZEMPIC, 2 MG/DOSE, 8 MG/3ML SOPN       atorvastatin (LIPITOR) 20 MG tablet TAKE 1 TABLET BY MOUTH DAILY 90 tablet 1    nystatin 099079 UNIT/GM powder APPLY DAILY TO SKIN FOLDS AS NEEDED UP TO 3 TIMES DAILY 30 g 0    ARIPiprazole (ABILIFY) 5 MG tablet TAKE 1 TABLET BY MOUTH EVERY DAY 30 tablet 5    tiZANidine (ZANAFLEX) 4 MG tablet       NARCAN 4 MG/0.1ML LIQD nasal spray USE 1 SPRAY AS NEEDED      OneTouch Delica Lancets 96A MISC USE TO CHECK BLOOD SUGAR TWICE DAILY 100 each 1    HYDROcodone-acetaminophen (NORCO)  MG per tablet Take 1 tablet by mouth every 8 hours as needed for Pain (Dr. Leyda Chan). 0    gabapentin (NEURONTIN) 800 MG tablet Take 800 mg by mouth every 8 hours as needed (Dr. Leyda Chan). 1    ONE TOUCH ULTRA TEST strip USE TO TEST BLOOD SUAGR TWICE DAILY AS NEEDED. 100 strip 0    glucose monitoring kit (FREESTYLE) monitoring kit 1 kit by Does not apply route daily Please fill script with meter covered under patients insurance.  Dx: E11.9 1 kit 0    aspirin 325 MG tablet Take 325 mg by mouth nightly       Multiple Vitamins-Minerals (THERAPEUTIC MULTIVITAMIN-MINERALS) tablet Take 1 tablet by mouth daily (Patient taking differently: Take 1 tablet by mouth nightly) 30 tablet 5    tamsulosin (FLOMAX) 0.4 MG capsule TAKE 1 CAPSULE BY MOUTH DAILY (Patient not taking: No sig reported) 30 capsule 2    diclofenac sodium (VOLTAREN) 1 % GEL Apply topically 2 times daily (Patient not taking: Reported on 10/14/2022) 1 g 2    ondansetron (ZOFRAN ODT) 4 MG disintegrating tablet Take 1 tablet by mouth every 8 hours as needed for Nausea or Vomiting (Patient not taking: No sig reported) 10 tablet 0     No current facility-administered medications on file prior to visit. No Known Allergies    Health Maintenance   Topic Date Due    Pneumococcal 0-64 years Vaccine (1 - PCV) Never done    Hepatitis B vaccine (1 of 3 - Risk 3-dose series) Never done    DTaP/Tdap/Td vaccine (1 - Tdap) Never done    Cervical cancer screen  Never done    Shingles vaccine (1 of 2) Never done    Breast cancer screen  01/07/2021    Flu vaccine (1) Never done    Annual Wellness Visit (AWV)  Never done    COVID-19 Vaccine (1) 01/31/2025 (Originally 4/1/1971)    Colorectal Cancer Screen  01/09/2023    Diabetic retinal exam  07/29/2023    Diabetic foot exam  08/04/2023    Depression Monitoring  08/04/2023    A1C test (Diabetic or Prediabetic)  11/10/2023    Lipids  11/10/2023    Hepatitis C screen  Completed    HIV screen  Completed    Hepatitis A vaccine  Aged Out    Hib vaccine  Aged Out    Meningococcal (ACWY) vaccine  Aged Out       Subjective:      Review of Systems   Constitutional:  Negative for chills and fever. HENT:  Negative for ear pain, hearing loss, rhinorrhea and voice change. Eyes:  Negative for photophobia and visual disturbance. Respiratory:  Negative for cough and shortness of breath. Cardiovascular:  Negative for chest pain and palpitations. Gastrointestinal:  Negative for nausea and vomiting. Endocrine: Negative.   Negative for cold intolerance and heat intolerance. Genitourinary:  Negative for difficulty urinating and flank pain. Musculoskeletal:  Negative for back pain and neck pain. Skin:  Negative for color change and rash. Allergic/Immunologic: Negative for environmental allergies and food allergies. Neurological:  Negative for dizziness, speech difficulty and headaches. Hematological:  Does not bruise/bleed easily. Psychiatric/Behavioral:  Negative for sleep disturbance and suicidal ideas. Objective:     Physical Exam  Vitals and nursing note reviewed. Constitutional:       Appearance: She is well-developed. HENT:      Head: Atraumatic. Right Ear: External ear normal.      Left Ear: External ear normal.      Nose: Nose normal.   Eyes:      Conjunctiva/sclera: Conjunctivae normal.      Pupils: Pupils are equal, round, and reactive to light. Cardiovascular:      Rate and Rhythm: Normal rate and regular rhythm. Heart sounds: Normal heart sounds, S1 normal and S2 normal.   Pulmonary:      Effort: Pulmonary effort is normal.      Breath sounds: Normal breath sounds. Abdominal:      General: Bowel sounds are normal.      Palpations: Abdomen is soft. Musculoskeletal:         General: Normal range of motion. Cervical back: Normal range of motion and neck supple. Skin:     General: Skin is warm and dry. Neurological:      Mental Status: She is alert and oriented to person, place, and time. Psychiatric:         Behavior: Behavior normal.     BP (!) 142/90 Comment: hasn't taken bp meds today or yesterday  Pulse 93   Temp 96.8 °F (36 °C) (Temporal)   Ht 5' 8\" (1.727 m)   Wt (!) 316 lb 12.8 oz (143.7 kg)   SpO2 96%   BMI 48.17 kg/m²     Assessment:       Diagnosis Orders   1. Pap smear for cervical cancer screening  Patricia Delgado NP, Gynecology, [place]      2.  Anxiety  ALPRAZolam (XANAX) 0.25 MG tablet    POCT Rapid Drug Screen            Plan:   More than 50% of the time was spent counseling and coordinating care for a total time of 30 min face to face. Update UDS today; will need med contract next visit. Labs are stable. Referral to GYN for pap. Follow-up every 3 months. PDMP Monitoring:    Last PDMP Timothy Anand as Reviewed:  Review User Review Instant Review Result          Last Controlled Substance Monitoring Documentation      Flowsheet Row Refill from 12/30/2020 in [address] [place] [address] The Prescription Monitoring Report for this patient was reviewed today. filed at 12/30/2020 1248   Periodic Controlled Substance Monitoring No signs of potential drug abuse or diversion identified. filed at 12/30/2020 1248          Urine Drug Screenings (1 yr)       POCT Rapid Drug Screen  Collected: 2/4/2021 (Final result)              POCT Rapid Drug Screen  Collected: 2/1/2019  9:11 AM (Final result)              POCT Rapid Drug Screen  Collected: 7/5/2018 10:56 AM (Edited Result - FINAL)              POCT Rapid Drug Screen  Collected: 1/31/2018 10:08 AM (Final result)              Urine Drug Screen  Collected: 1/30/2017  9:45 AM (Final result)                  Medication Contract and Consent for Opioid Use Documents Filed       Patient Documents       Type of Document Status Date Received Received By Description    Medication Contract Signed 2/17/2017  9:10 AM Giacomo Loyd     Medication Contract Received 3/24/2022  4:36 PM Sam Juan medication contract 3/24/22                     Patient given educational materials -see patient instructions. Discussed use, benefit, and side effects of prescribed medications. All patient questions answered. Pt voiced understanding. Reviewed health maintenance. Instructed to continue currentmedications, diet and exercise. Patient agreed with treatment plan. Follow up as directed.    MEDICATIONS:  Orders Placed This Encounter   Medications    ALPRAZolam (XANAX) 0.25 MG tablet     Sig: Daily as needed for anxiety     Dispense:  30 tablet     Refill:  0 ORDERS:  Orders Placed This Encounter   Procedures    Patricia Alas NP, Gynecology, [place]    POCT Rapid Drug Screen       Follow-up:  Return in about 3 months (around 3/6/2023) for follow-up med contract. PATIENT INSTRUCTIONS:  There are no Patient Instructions on file for this visit. Electronically signed by NATALI Rivera on 12/6/2022 at 10:47 AM    EMR Dragon/transcription disclaimer:  Much of thisencounter note is electronic transcription/translation of spoken language to printed texts. The electronic translation of spoken language may be erroneous, or at times, nonsensical words or phrases may be inadvertentlytranscribed.   Although I have reviewed the note for such errors, some may still exist.

## 2023-01-04 DIAGNOSIS — F41.9 ANXIETY: ICD-10-CM

## 2023-01-06 NOTE — TELEPHONE ENCOUNTER
Jacy Ulloa called to request a refill on her medication. Last office visit : 12/6/2022   Next office visit : 3/6/2023     Last UDS:   Amphetamine Screen, Urine   Date Value Ref Range Status   12/06/2022 -  Final     Barbiturates   Date Value Ref Range Status   01/17/2011 Negative () Final     Barbiturate Screen, Urine   Date Value Ref Range Status   12/06/2022 -  Final     Benzodiazepine Screen, Urine   Date Value Ref Range Status   12/06/2022 +  Final     Buprenorphine Urine   Date Value Ref Range Status   12/06/2022 -  Final     Cocaine Metabolite Screen, Urine   Date Value Ref Range Status   12/06/2022 -  Final     Gabapentin Screen, Urine   Date Value Ref Range Status   12/06/2022 -  Final     MDMA, Urine   Date Value Ref Range Status   12/06/2022 -  Final     Methamphetamine, Urine   Date Value Ref Range Status   12/06/2022 -  Final     Opiate Scrn, Ur   Date Value Ref Range Status   12/06/2022 +  Final     Oxycodone Screen, Ur   Date Value Ref Range Status   12/06/2022 -  Final     PCP Screen, Urine   Date Value Ref Range Status   12/06/2022 -  Final     Propoxyphene Screen, Urine   Date Value Ref Range Status   12/06/2022 -  Final     THC Screen, Urine   Date Value Ref Range Status   12/06/2022 -  Final     Tricyclic Antidepressants, Urine   Date Value Ref Range Status   12/06/2022 -  Final       Last Climmie Amel: 1/6/23  Medication Contract: 3/4/22   Last Fill: 12/11/22    Requested Prescriptions     Pending Prescriptions Disp Refills    ALPRAZolam (XANAX) 0.25 MG tablet [Pharmacy Med Name: ALPRAZOLAM 0.25MG TABLETS] 30 tablet 0     Sig: TAKE ONE TABLET BY MOUTH DAILY AS NEEDED FOR ANXIENTY         Please approve or refuse this medication.    [place], [place]

## 2023-01-09 RX ORDER — ALPRAZOLAM 0.25 MG/1
TABLET ORAL
Qty: 30 TABLET | Refills: 0 | Status: SHIPPED | OUTPATIENT
Start: 2023-01-10 | End: 2023-02-02

## 2023-01-12 RX ORDER — SEMAGLUTIDE 2.68 MG/ML
2 INJECTION, SOLUTION SUBCUTANEOUS WEEKLY
Qty: 3 ML | Refills: 4 | Status: SHIPPED | OUTPATIENT
Start: 2023-01-12

## 2023-02-01 DIAGNOSIS — F41.9 ANXIETY: ICD-10-CM

## 2023-02-01 NOTE — TELEPHONE ENCOUNTER
Sandy Sanchez called to request a refill on her medication. Last office visit : 12/6/2022   Next office visit : 3/6/2023     Last UDS:   Amphetamine Screen, Urine   Date Value Ref Range Status   12/06/2022 -  Final     Barbiturates   Date Value Ref Range Status   01/17/2011 Negative () Final     Barbiturate Screen, Urine   Date Value Ref Range Status   12/06/2022 -  Final     Benzodiazepine Screen, Urine   Date Value Ref Range Status   12/06/2022 +  Final     Buprenorphine Urine   Date Value Ref Range Status   12/06/2022 -  Final     Cocaine Metabolite Screen, Urine   Date Value Ref Range Status   12/06/2022 -  Final     Gabapentin Screen, Urine   Date Value Ref Range Status   12/06/2022 -  Final     MDMA, Urine   Date Value Ref Range Status   12/06/2022 -  Final     Methamphetamine, Urine   Date Value Ref Range Status   12/06/2022 -  Final     Opiate Scrn, Ur   Date Value Ref Range Status   12/06/2022 +  Final     Oxycodone Screen, Ur   Date Value Ref Range Status   12/06/2022 -  Final     PCP Screen, Urine   Date Value Ref Range Status   12/06/2022 -  Final     Propoxyphene Screen, Urine   Date Value Ref Range Status   12/06/2022 -  Final     THC Screen, Urine   Date Value Ref Range Status   12/06/2022 -  Final     Tricyclic Antidepressants, Urine   Date Value Ref Range Status   12/06/2022 -  Final       Last Ezequiel Niece: 1/6/23  Medication Contract: 3/24/22   Last Fill: 1/10/23    Requested Prescriptions     Pending Prescriptions Disp Refills    ALPRAZolam (XANAX) 0.25 MG tablet [Pharmacy Med Name: ALPRAZOLAM 0.25MG TABLETS] 30 tablet      Sig: TAKE 1 TABLET BY MOUTH ONCE PER DAY AS NEEDED FOR ANXIETY                       Please approve or refuse this medication.    [place]evin [place] LPN

## 2023-02-02 DIAGNOSIS — E11.00 TYPE 2 DIABETES MELLITUS WITH HYPEROSMOLARITY WITHOUT COMA, WITHOUT LONG-TERM CURRENT USE OF INSULIN (HCC): ICD-10-CM

## 2023-02-02 RX ORDER — ALPRAZOLAM 0.25 MG/1
TABLET ORAL
Qty: 30 TABLET | Refills: 0 | Status: SHIPPED | OUTPATIENT
Start: 2023-02-09 | End: 2023-03-09 | Stop reason: SDUPTHER

## 2023-02-03 NOTE — TELEPHONE ENCOUNTER
Phuong Frias called to request a refill on her medication.      Last office visit : 12/6/2022   Next office visit : 3/6/2023     Requested Prescriptions     Pending Prescriptions Disp Refills    traZODone (DESYREL) 150 MG tablet [Pharmacy Med Name: TRAZODONE 150MG (HUNDRED-FIFTY) TAB] 60 tablet 3     Sig: TAKE 2 TABLETS BY MOUTH EVERY EVENING    nystatin [id number] UNIT/GM powder [Pharmacy Med Name: NYSTOP TOP PWDR 100,000 30GM] 30 g 0     Sig: APPLY TO SKIN FOLDS UP TO 3 TIMES DIALY AS NEEDED            Marilu Castillo MA

## 2023-02-06 RX ORDER — TRAZODONE HYDROCHLORIDE 150 MG/1
TABLET ORAL
Qty: 60 TABLET | Refills: 3 | Status: SHIPPED | OUTPATIENT
Start: 2023-02-06

## 2023-02-06 NOTE — TELEPHONE ENCOUNTER
Bright Thompson called to request a refill on her medication.       Last office visit : 12/6/2022   Next office visit : 2/9/2023     Requested Prescriptions     Pending Prescriptions Disp Refills    traZODone (DESYREL) 150 MG tablet [Pharmacy Med Name: TRAZODONE 150MG (HUNDRED-FIFTY) TAB] 60 tablet 3     Sig: TAKE 2 TABLETS BY MOUTH EVERY EVENING            Chilo Jamison

## 2023-02-08 RX ORDER — NYSTATIN 100000 [USP'U]/G
POWDER TOPICAL
Qty: 30 G | Refills: 0 | Status: SHIPPED | OUTPATIENT
Start: 2023-02-08

## 2023-02-08 RX ORDER — TRAZODONE HYDROCHLORIDE 150 MG/1
TABLET ORAL
Qty: 60 TABLET | Refills: 3 | Status: SHIPPED | OUTPATIENT
Start: 2023-02-08

## 2023-02-09 ENCOUNTER — TELEPHONE (OUTPATIENT)
Dept: PRIMARY CARE CLINIC | Age: 53
End: 2023-02-09

## 2023-02-10 ENCOUNTER — HOSPITAL ENCOUNTER (OUTPATIENT)
Dept: WOMENS IMAGING | Age: 53
Discharge: HOME | End: 2023-02-10
Payer: MEDICARE

## 2023-02-10 VITALS — WEIGHT: 293 LBS | BODY MASS INDEX: 45.61 KG/M2

## 2023-02-10 DIAGNOSIS — Z12.31 ENCOUNTER FOR SCREENING MAMMOGRAM FOR MALIGNANT NEOPLASM OF BREAST: ICD-10-CM

## 2023-02-10 PROCEDURE — 77063 BREAST TOMOSYNTHESIS BI: CPT

## 2023-02-10 NOTE — TELEPHONE ENCOUNTER
Left message for patient to call office back
Pt left a voicemail stating she had a question regarding her medication and would like a call back.  [phone]
Detail Level: Zone
Initiate Treatment: Sitz bath and preparation H

## 2023-02-21 ENCOUNTER — OFFICE VISIT (OUTPATIENT)
Dept: PRIMARY CARE CLINIC | Age: 53
End: 2023-02-21
Payer: MEDICARE

## 2023-02-21 VITALS
HEIGHT: 68 IN | SYSTOLIC BLOOD PRESSURE: 126 MMHG | HEART RATE: 90 BPM | OXYGEN SATURATION: 94 % | WEIGHT: 293 LBS | DIASTOLIC BLOOD PRESSURE: 68 MMHG | BODY MASS INDEX: 44.41 KG/M2 | TEMPERATURE: 96.9 F

## 2023-02-21 DIAGNOSIS — F41.9 ANXIETY: ICD-10-CM

## 2023-02-21 DIAGNOSIS — E11.69 TYPE 2 DIABETES MELLITUS WITH OTHER SPECIFIED COMPLICATION, WITHOUT LONG-TERM CURRENT USE OF INSULIN (HCC): Primary | ICD-10-CM

## 2023-02-21 DIAGNOSIS — I10 PRIMARY HYPERTENSION: ICD-10-CM

## 2023-02-21 PROCEDURE — 3074F SYST BP LT 130 MM HG: CPT | Performed by: NURSE PRACTITIONER

## 2023-02-21 PROCEDURE — 3078F DIAST BP <80 MM HG: CPT | Performed by: NURSE PRACTITIONER

## 2023-02-21 PROCEDURE — 99214 OFFICE O/P EST MOD 30 MIN: CPT | Performed by: NURSE PRACTITIONER

## 2023-02-21 SDOH — ECONOMIC STABILITY: INCOME INSECURITY: HOW HARD IS IT FOR YOU TO PAY FOR THE VERY BASICS LIKE FOOD, HOUSING, MEDICAL CARE, AND HEATING?: NOT VERY HARD

## 2023-02-21 SDOH — ECONOMIC STABILITY: HOUSING INSECURITY
IN THE LAST 12 MONTHS, WAS THERE A TIME WHEN YOU DID NOT HAVE A STEADY PLACE TO SLEEP OR SLEPT IN A SHELTER (INCLUDING NOW)?: NO

## 2023-02-21 SDOH — ECONOMIC STABILITY: FOOD INSECURITY: WITHIN THE PAST 12 MONTHS, THE FOOD YOU BOUGHT JUST DIDN'T LAST AND YOU DIDN'T HAVE MONEY TO GET MORE.: SOMETIMES TRUE

## 2023-02-21 SDOH — ECONOMIC STABILITY: FOOD INSECURITY: WITHIN THE PAST 12 MONTHS, YOU WORRIED THAT YOUR FOOD WOULD RUN OUT BEFORE YOU GOT MONEY TO BUY MORE.: SOMETIMES TRUE

## 2023-02-21 ASSESSMENT — PATIENT HEALTH QUESTIONNAIRE - PHQ9
6. FEELING BAD ABOUT YOURSELF - OR THAT YOU ARE A FAILURE OR HAVE LET YOURSELF OR YOUR FAMILY DOWN: 0
9. THOUGHTS THAT YOU WOULD BE BETTER OFF DEAD, OR OF HURTING YOURSELF: 0
10. IF YOU CHECKED OFF ANY PROBLEMS, HOW DIFFICULT HAVE THESE PROBLEMS MADE IT FOR YOU TO DO YOUR WORK, TAKE CARE OF THINGS AT HOME, OR GET ALONG WITH OTHER PEOPLE: 0
2. FEELING DOWN, DEPRESSED OR HOPELESS: 0
7. TROUBLE CONCENTRATING ON THINGS, SUCH AS READING THE NEWSPAPER OR WATCHING TELEVISION: 0
SUM OF ALL RESPONSES TO PHQ9 QUESTIONS 1 & 2: 0
8. MOVING OR SPEAKING SO SLOWLY THAT OTHER PEOPLE COULD HAVE NOTICED. OR THE OPPOSITE, BEING SO FIGETY OR RESTLESS THAT YOU HAVE BEEN MOVING AROUND A LOT MORE THAN USUAL: 0
SUM OF ALL RESPONSES TO PHQ QUESTIONS 1-9: 0
SUM OF ALL RESPONSES TO PHQ QUESTIONS 1-9: 0
5. POOR APPETITE OR OVEREATING: 0
4. FEELING TIRED OR HAVING LITTLE ENERGY: 0
1. LITTLE INTEREST OR PLEASURE IN DOING THINGS: 0
3. TROUBLE FALLING OR STAYING ASLEEP: 0
SUM OF ALL RESPONSES TO PHQ QUESTIONS 1-9: 0
SUM OF ALL RESPONSES TO PHQ QUESTIONS 1-9: 0

## 2023-02-21 NOTE — PROGRESS NOTES
[address] [facility]  [address] [address] [address] [place] [address]  Dept: [phone]  Dept Fax: [phone]  Loc: [phone]    Gisela Ricketts is a 47 y.o. female who presents today for her medical conditions/complaints as noted below. Gisela Ricketts is c/o of Follow-up (Needs an online form filled out for utility assistance)        HPI:     HPI   Chief Complaint   Patient presents with    Follow-up     Needs an online form filled out for utility assistance     Patient presents today to discuss assistance for utilities. She has a website where I can submit an attestation stating she has diabetes. We did this while in the patient exam room today. Patient will receive update and verification for this. She is also here for follow-up anxiety, diabetes, hypertension. Blood pressure is well-controlled. She sees pain management for chronic pain. She has an ablation scheduled for tomorrow.        Past Medical History:   Diagnosis Date    Anxiety     Chronic pain     Depression     Diabetes mellitus type 2 in [facility])     Hyperlipidemia     Hypertension     Premenopausal patient 2018      Past Surgical History:   Procedure Laterality Date     SECTION      TENDON RELEASE      right finger    TUBAL LIGATION         Vitals 2023 2/10/2023 2022 2022 3/24/2022 1/15/2960   SYSTOLIC 605 - 826 089 992 220   DIASTOLIC 68 - 90 70 78 78   Pulse 90 - 93 102 92 93   Temp 96.9 - 96.8 97.2 97.2 96.2   Resp - - - - - -   SpO2 94 - 96 97 99 98   Weight 313 lb 9.6 oz 300 lb 316 lb 12.8 oz 308 lb 326 lb 3.2 oz 343 lb   Height 5' 8\" - 5' 8\" 5' 8\" 5' 8\" 5' 8\"   Body mass index 47.68 kg/m2 - 48.16 kg/m2 46.83 kg/m2 49.59 kg/m2 52.15 kg/m2   Pain Level - - - - - -   Some recent data might be hidden       Family History   Problem Relation Age of Onset    Diabetes Father     Cancer Father         Bladder and Lung    Breast Cancer Sister         1/2 Sister    Diabetes Maternal Grandfather     Diabetes Paternal Grandmother        Social History     Tobacco Use    Smoking status: Never    Smokeless tobacco: Never   Substance Use Topics    Alcohol use: Yes     Comment: socially      Current Outpatient Medications on File Prior to Visit   Medication Sig Dispense Refill    traZODone (DESYREL) 150 MG tablet TAKE 2 TABLETS BY MOUTH EVERY EVENING 60 tablet 3    nystatin 624086 UNIT/GM powder APPLY TO SKIN FOLDS UP TO 3 TIMES DIALY AS NEEDED 30 g 0    traZODone (DESYREL) 150 MG tablet TAKE 2 TABLETS BY MOUTH EVERY EVENING 60 tablet 3    ALPRAZolam (XANAX) 0.25 MG tablet TAKE 1 TABLET BY MOUTH ONCE PER DAY AS NEEDED FOR ANXIETY 30 tablet 0    OZEMPIC, 2 MG/DOSE, 8 MG/3ML SOPN Inject 2 mg into the skin once a week 3 mL 4    DULoxetine HCl 40 MG CPEP TAKE 1 CAPSULE BY MOUTH DAILY 30 capsule 2    metFORMIN (GLUCOPHAGE) 500 MG tablet TAKE 2 TABLETS BY MOUTH TWICE DAILY WITH MEALS 120 tablet 2    lisinopril (PRINIVIL;ZESTRIL) 10 MG tablet TAKE 1 TABLET BY MOUTH TWICE DAILY 180 tablet 1    atorvastatin (LIPITOR) 20 MG tablet TAKE 1 TABLET BY MOUTH DAILY 90 tablet 1    ARIPiprazole (ABILIFY) 5 MG tablet TAKE 1 TABLET BY MOUTH EVERY DAY 30 tablet 5    tiZANidine (ZANAFLEX) 4 MG tablet       NARCAN 4 MG/0.1ML LIQD nasal spray USE 1 SPRAY AS NEEDED      OneTouch Delica Lancets 78D MISC USE TO CHECK BLOOD SUGAR TWICE DAILY 100 each 1    HYDROcodone-acetaminophen (NORCO)  MG per tablet Take 1 tablet by mouth every 8 hours as needed for Pain (Dr. Tom Sandifer). 0    gabapentin (NEURONTIN) 800 MG tablet Take 800 mg by mouth every 8 hours as needed (Dr. Tom Sandifer). 1    ONE TOUCH ULTRA TEST strip USE TO TEST BLOOD SUAGR TWICE DAILY AS NEEDED. 100 strip 0    glucose monitoring kit (FREESTYLE) monitoring kit 1 kit by Does not apply route daily Please fill script with meter covered under patients insurance.  Dx: E11.9 1 kit 0    aspirin 325 MG tablet Take 325 mg by mouth nightly        No current facility-administered medications on file prior to visit. No Known Allergies    Health Maintenance   Topic Date Due    Pneumococcal 0-64 years Vaccine (1 - PCV) Never done    Hepatitis B vaccine (1 of 3 - Risk 3-dose series) Never done    DTaP/Tdap/Td vaccine (1 - Tdap) Never done    Cervical cancer screen  Never done    Shingles vaccine (1 of 2) Never done    Flu vaccine (1) Never done    Colorectal Cancer Screen  01/09/2023    Annual Wellness Visit (AWV)  02/10/2023    COVID-19 Vaccine (1) 01/31/2025 (Originally 4/1/1971)    Diabetic retinal exam  07/29/2023    Diabetic foot exam  08/04/2023    A1C test (Diabetic or Prediabetic)  11/10/2023    Diabetic Alb to Cr ratio (uACR) test  11/10/2023    Lipids  11/10/2023    GFR test (Diabetes, CKD 3-4, OR last GFR 15-59)  11/10/2023    Breast cancer screen  02/10/2024    Depression Monitoring  02/21/2024    Hepatitis C screen  Completed    HIV screen  Completed    Hepatitis A vaccine  Aged Out    Hib vaccine  Aged Out    Meningococcal (ACWY) vaccine  Aged Out       Subjective:      Review of Systems   Constitutional:  Negative for chills and fever. HENT:  Negative for ear pain, hearing loss, rhinorrhea and voice change. Eyes:  Negative for photophobia and visual disturbance. Respiratory:  Negative for cough and shortness of breath. Cardiovascular:  Negative for chest pain and palpitations. Gastrointestinal:  Negative for nausea and vomiting. Endocrine: Negative. Negative for cold intolerance and heat intolerance. Genitourinary:  Negative for difficulty urinating and flank pain. Musculoskeletal:  Negative for back pain and neck pain. Skin:  Negative for color change and rash. Allergic/Immunologic: Negative for environmental allergies and food allergies. Neurological:  Negative for dizziness, speech difficulty and headaches. Hematological:  Does not bruise/bleed easily.    Psychiatric/Behavioral:  Negative for sleep disturbance and suicidal ideas.      Objective:     Physical Exam  Vitals and nursing note reviewed. Constitutional:       Appearance: She is well-developed. HENT:      Head: Atraumatic. Right Ear: External ear normal.      Left Ear: External ear normal.      Nose: Nose normal.   Eyes:      Conjunctiva/sclera: Conjunctivae normal.      Pupils: Pupils are equal, round, and reactive to light. Cardiovascular:      Rate and Rhythm: Normal rate and regular rhythm. Heart sounds: Normal heart sounds, S1 normal and S2 normal.   Pulmonary:      Effort: Pulmonary effort is normal.      Breath sounds: Normal breath sounds. Abdominal:      General: Bowel sounds are normal.      Palpations: Abdomen is soft. Musculoskeletal:         General: Normal range of motion. Cervical back: Normal range of motion and neck supple. Skin:     General: Skin is warm and dry. Neurological:      Mental Status: She is alert and oriented to person, place, and time. Psychiatric:         Behavior: Behavior normal.     /68   Pulse 90   Temp 96.9 °F (36.1 °C) (Temporal)   Ht 5' 8\" (1.727 m)   Wt (!) 313 lb 9.6 oz (142.2 kg)   SpO2 94%   BMI 47.68 kg/m²     Assessment:       Diagnosis Orders   1. Type 2 diabetes mellitus with other specified complication, without long-term current use of insulin ([address]        2. Primary hypertension        3. Anxiety              Plan:   More than 50% of the time was spent counseling and coordinating care for a total time of 30 min face to face. Form filled out for patient today. Continue all medications as directed. Follow-up in 3 months or sooner if needed. PDMP Monitoring:    Last PDMP Darrell Mendoza as Reviewed:  Review User Review Instant Review Result          Last Controlled Substance Monitoring Documentation      Flowsheet Row Refill from 12/30/2020 in [address] [place] [address] The Prescription Monitoring Report for this patient was reviewed today.  filed at 12/30/2020 7532   Periodic Controlled Substance Monitoring No signs of potential drug abuse or diversion identified. filed at 12/30/2020 1248          Urine Drug Screenings (1 yr)       POCT Rapid Drug Screen  Collected: 12/6/2022 (Final result)              POCT Rapid Drug Screen  Collected: 2/4/2021 (Final result)              POCT Rapid Drug Screen  Collected: 2/1/2019  9:11 AM (Final result)              POCT Rapid Drug Screen  Collected: 7/5/2018 10:56 AM (Edited Result - FINAL)              POCT Rapid Drug Screen  Collected: 1/31/2018 10:08 AM (Final result)              Urine Drug Screen  Collected: 1/30/2017  9:45 AM (Final result)                  Medication Contract and Consent for Opioid Use Documents Filed       Patient Documents       Type of Document Status Date Received Received By Description    Medication Contract Signed 2/17/2017  9:10 AM Ryan Barron     Medication Contract Received 3/24/2022  4:36 PM Deepti Colunga medication contract 3/24/22                     Patient given educational materials -see patient instructions. Discussed use, benefit, and side effects of prescribed medications. All patient questions answered. Pt voiced understanding. Reviewed health maintenance. Instructed to continue currentmedications, diet and exercise. Patient agreed with treatment plan. Follow up as directed. MEDICATIONS:  No orders of the defined types were placed in this encounter. ORDERS:  No orders of the defined types were placed in this encounter. Follow-up:  Return in about 3 months (around 5/21/2023). PATIENT INSTRUCTIONS:  There are no Patient Instructions on file for this visit. Electronically signed by NATALI Caballero on 2/27/2023 at 7:01 PM    EMR Dragon/transcription disclaimer:  Much of thisencounter note is electronic transcription/translation of spoken language to printed texts.   The electronic translation of spoken language may be erroneous, or at times, nonsensical words or phrases may be inadvertentlytranscribed.   Although I have reviewed the note for such errors, some may still exist.

## 2023-02-27 ASSESSMENT — ENCOUNTER SYMPTOMS
VOMITING: 0
NAUSEA: 0
VOICE CHANGE: 0
SHORTNESS OF BREATH: 0
BACK PAIN: 0
RHINORRHEA: 0
COUGH: 0
COLOR CHANGE: 0
PHOTOPHOBIA: 0

## 2023-03-02 RX ORDER — ARIPIPRAZOLE 5 MG/1
TABLET ORAL
Qty: 30 TABLET | Refills: 5 | Status: SHIPPED | OUTPATIENT
Start: 2023-03-02

## 2023-03-06 DIAGNOSIS — F41.9 ANXIETY: ICD-10-CM

## 2023-03-06 NOTE — TELEPHONE ENCOUNTER
Melany Apley called to request a refill on her medication. Last office visit : 2/21/2023   Next office visit : 5/22/2023     Last UDS:   Amphetamine Screen, Urine   Date Value Ref Range Status   12/06/2022 -  Final     Barbiturates   Date Value Ref Range Status   01/17/2011 Negative () Final     Barbiturate Screen, Urine   Date Value Ref Range Status   12/06/2022 -  Final     Benzodiazepine Screen, Urine   Date Value Ref Range Status   12/06/2022 +  Final     Buprenorphine Urine   Date Value Ref Range Status   12/06/2022 -  Final     Cocaine Metabolite Screen, Urine   Date Value Ref Range Status   12/06/2022 -  Final     Gabapentin Screen, Urine   Date Value Ref Range Status   12/06/2022 -  Final     MDMA, Urine   Date Value Ref Range Status   12/06/2022 -  Final     Methamphetamine, Urine   Date Value Ref Range Status   12/06/2022 -  Final     Opiate Scrn, Ur   Date Value Ref Range Status   12/06/2022 +  Final     Oxycodone Screen, Ur   Date Value Ref Range Status   12/06/2022 -  Final     PCP Screen, Urine   Date Value Ref Range Status   12/06/2022 -  Final     Propoxyphene Screen, Urine   Date Value Ref Range Status   12/06/2022 -  Final     THC Screen, Urine   Date Value Ref Range Status   12/06/2022 -  Final     Tricyclic Antidepressants, Urine   Date Value Ref Range Status   12/06/2022 -  Final       Last Austine Sames: 1/6/23  Medication Contract: 3/24/22   Last Fill: 2/9/23    Requested Prescriptions     Pending Prescriptions Disp Refills    ALPRAZolam (XANAX) 0.25 MG tablet 30 tablet 0     Sig: TAKE 1 TABLET BY MOUTH ONCE PER DAY AS NEEDED FOR ANXIETY         Please approve or refuse this medication.    Julien Coley MA

## 2023-03-09 RX ORDER — ALPRAZOLAM 0.25 MG/1
TABLET ORAL
Qty: 30 TABLET | Refills: 0 | Status: SHIPPED | OUTPATIENT
Start: 2023-03-09 | End: 2023-04-02

## 2023-03-16 DIAGNOSIS — F32.A ANXIETY AND DEPRESSION: ICD-10-CM

## 2023-03-16 DIAGNOSIS — G89.29 OTHER CHRONIC PAIN: ICD-10-CM

## 2023-03-16 DIAGNOSIS — F41.9 ANXIETY AND DEPRESSION: ICD-10-CM

## 2023-03-16 RX ORDER — DULOXETINE 40 MG/1
CAPSULE, DELAYED RELEASE ORAL
Qty: 90 CAPSULE | Refills: 1 | Status: SHIPPED | OUTPATIENT
Start: 2023-03-16

## 2023-03-16 NOTE — TELEPHONE ENCOUNTER
Janee Merna called to request a refill on her medication.       Last office visit : 2/21/2023   Next office visit : 5/22/2023     Requested Prescriptions     Pending Prescriptions Disp Refills    DULoxetine HCl 40 MG CPEP [Pharmacy Med Name: DULOXETINE DR 40MG CAPSULES] 30 capsule 2     Sig: TAKE 1 CAPSULE BY MOUTH DAILY            Gregory Ttae LPN

## 2023-04-05 DIAGNOSIS — F41.9 ANXIETY: ICD-10-CM

## 2023-04-05 NOTE — TELEPHONE ENCOUNTER
Birdie Villar called to request a refill on her medication. Last office visit : 2/21/2023   Next office visit : 5/22/2023     Last UDS:   Amphetamine Screen, Urine   Date Value Ref Range Status   12/06/2022 -  Final     Barbiturates   Date Value Ref Range Status   01/17/2011 Negative () Final     Barbiturate Screen, Urine   Date Value Ref Range Status   12/06/2022 -  Final     Benzodiazepine Screen, Urine   Date Value Ref Range Status   12/06/2022 +  Final     Buprenorphine Urine   Date Value Ref Range Status   12/06/2022 -  Final     Cocaine Metabolite Screen, Urine   Date Value Ref Range Status   12/06/2022 -  Final     Gabapentin Screen, Urine   Date Value Ref Range Status   12/06/2022 -  Final     MDMA, Urine   Date Value Ref Range Status   12/06/2022 -  Final     Methamphetamine, Urine   Date Value Ref Range Status   12/06/2022 -  Final     Opiate Scrn, Ur   Date Value Ref Range Status   12/06/2022 +  Final     Oxycodone Screen, Ur   Date Value Ref Range Status   12/06/2022 -  Final     PCP Screen, Urine   Date Value Ref Range Status   12/06/2022 -  Final     Propoxyphene   Date Value Ref Range Status   01/17/2011 Negative () Final     Propoxyphene Screen, Urine   Date Value Ref Range Status   12/06/2022 -  Final     THC Screen, Urine   Date Value Ref Range Status   12/06/2022 -  Final     Tricyclic Antidepressants, Urine   Date Value Ref Range Status   12/06/2022 -  Final       Last Glenys Shear: 1/6/23  Medication Contract:  3/24/22  Last Fill: 3/9/23    Requested Prescriptions     Pending Prescriptions Disp Refills    ALPRAZolam (XANAX) 0.25 MG tablet [Pharmacy Med Name: ALPRAZOLAM 0.25MG TABLETS] 30 tablet      Sig: TAKE 1 TABLET BY MOUTH EVERY DAY AS NEEDED FOR ANXIETY         Please approve or refuse this medication.    Armando Desai LPN

## 2023-04-06 RX ORDER — ALPRAZOLAM 0.25 MG/1
TABLET ORAL
Qty: 30 TABLET | OUTPATIENT
Start: 2023-04-06

## 2023-04-06 RX ORDER — ALPRAZOLAM 0.25 MG/1
TABLET ORAL
Qty: 30 TABLET | Refills: 0 | Status: SHIPPED | OUTPATIENT
Start: 2023-04-08 | End: 2023-05-08

## 2023-04-06 NOTE — TELEPHONE ENCOUNTER
The current medical regimen is effective. Continue present plan and medications. UDS and controlled substance contract up to date. No unusual filling on current JUANITA report. Tx continues to be medically necessary.     Charmayne Lavender, MD

## 2023-04-13 ENCOUNTER — HOSPITAL ENCOUNTER (OUTPATIENT)
Dept: PREADMISSION TESTING | Age: 53
Discharge: HOME | End: 2023-04-17
Payer: MEDICARE

## 2023-04-13 VITALS — HEIGHT: 68 IN | WEIGHT: 293 LBS | BODY MASS INDEX: 44.41 KG/M2

## 2023-04-13 LAB
ALBUMIN SERPL-MCNC: 3.8 G/DL (ref 3.5–5.2)
ALP SERPL-CCNC: 119 U/L (ref 35–104)
ALT SERPL-CCNC: 30 U/L (ref 5–33)
ANION GAP SERPL CALCULATED.3IONS-SCNC: 12 MMOL/L (ref 7–19)
APTT PPP: 31.8 SEC (ref 26–36.2)
AST SERPL-CCNC: 23 U/L (ref 5–32)
BACTERIA URNS QL MICRO: ABNORMAL /HPF
BASOPHILS # BLD: 0.1 K/UL (ref 0–0.2)
BASOPHILS NFR BLD: 0.6 % (ref 0–1)
BILIRUB SERPL-MCNC: <0.2 MG/DL (ref 0.2–1.2)
BILIRUB UR QL STRIP: NEGATIVE
BUN SERPL-MCNC: 14 MG/DL (ref 6–20)
CALCIUM SERPL-MCNC: 9.1 MG/DL (ref 8.6–10)
CHLORIDE SERPL-SCNC: 101 MMOL/L (ref 98–111)
CLARITY UR: ABNORMAL
CO2 SERPL-SCNC: 26 MMOL/L (ref 22–29)
COLOR UR: ABNORMAL
CREAT SERPL-MCNC: 1.1 MG/DL (ref 0.5–0.9)
CRYSTALS URNS MICRO: ABNORMAL /HPF
EOSINOPHIL # BLD: 0.2 K/UL (ref 0–0.6)
EOSINOPHIL NFR BLD: 1.5 % (ref 0–5)
EPI CELLS #/AREA URNS AUTO: 14 /HPF (ref 0–5)
ERYTHROCYTE [DISTWIDTH] IN BLOOD BY AUTOMATED COUNT: 13.6 % (ref 11.5–14.5)
GLUCOSE SERPL-MCNC: 113 MG/DL (ref 74–109)
GLUCOSE UR STRIP.AUTO-MCNC: NEGATIVE MG/DL
HBA1C MFR BLD: 5.1 % (ref 4–6)
HCT VFR BLD AUTO: 38.5 % (ref 37–47)
HGB BLD-MCNC: 12.2 G/DL (ref 12–16)
HGB UR STRIP.AUTO-MCNC: NEGATIVE MG/L
HYALINE CASTS #/AREA URNS AUTO: 6 /HPF (ref 0–8)
IMM GRANULOCYTES # BLD: 0.1 K/UL
INR PPP: 1.04 (ref 0.88–1.18)
KETONES UR STRIP.AUTO-MCNC: ABNORMAL MG/DL
LEUKOCYTE ESTERASE UR QL STRIP.AUTO: ABNORMAL
LYMPHOCYTES # BLD: 2.7 K/UL (ref 1.1–4.5)
LYMPHOCYTES NFR BLD: 25.2 % (ref 20–40)
MCH RBC QN AUTO: 29.3 PG (ref 27–31)
MCHC RBC AUTO-ENTMCNC: 31.7 G/DL (ref 33–37)
MCV RBC AUTO: 92.5 FL (ref 81–99)
MONOCYTES # BLD: 0.5 K/UL (ref 0–0.9)
MONOCYTES NFR BLD: 4.5 % (ref 0–10)
MRSA DNA SPEC QL NAA+PROBE: NOT DETECTED
NEUTROPHILS # BLD: 7.3 K/UL (ref 1.5–7.5)
NEUTS SEG NFR BLD: 67.7 % (ref 50–65)
NITRITE UR QL STRIP.AUTO: NEGATIVE
PH UR STRIP.AUTO: 5.5 [PH] (ref 5–8)
PLATELET # BLD AUTO: 348 K/UL (ref 130–400)
PMV BLD AUTO: 9.3 FL (ref 9.4–12.3)
POTASSIUM SERPL-SCNC: 4.2 MMOL/L (ref 3.5–5)
PROT SERPL-MCNC: 6.9 G/DL (ref 6.6–8.7)
PROT UR STRIP.AUTO-MCNC: NEGATIVE MG/DL
PROTHROMBIN TIME: 13.5 SEC (ref 12–14.6)
RBC # BLD AUTO: 4.16 M/UL (ref 4.2–5.4)
RBC #/AREA URNS AUTO: 3 /HPF (ref 0–4)
SODIUM SERPL-SCNC: 139 MMOL/L (ref 136–145)
SP GR UR STRIP.AUTO: 1.02 (ref 1–1.03)
UROBILINOGEN UR STRIP.AUTO-MCNC: 0.2 E.U./DL
WBC # BLD AUTO: 10.8 K/UL (ref 4.8–10.8)
WBC #/AREA URNS AUTO: 8 /HPF (ref 0–5)

## 2023-04-13 PROCEDURE — 85730 THROMBOPLASTIN TIME PARTIAL: CPT

## 2023-04-13 PROCEDURE — 87641 MR-STAPH DNA AMP PROBE: CPT

## 2023-04-13 PROCEDURE — 87186 SC STD MICRODIL/AGAR DIL: CPT

## 2023-04-13 PROCEDURE — 81001 URINALYSIS AUTO W/SCOPE: CPT

## 2023-04-13 PROCEDURE — 85610 PROTHROMBIN TIME: CPT

## 2023-04-13 PROCEDURE — 93005 ELECTROCARDIOGRAM TRACING: CPT | Performed by: ORTHOPAEDIC SURGERY

## 2023-04-13 PROCEDURE — 85025 COMPLETE CBC W/AUTO DIFF WBC: CPT

## 2023-04-13 PROCEDURE — 80053 COMPREHEN METABOLIC PANEL: CPT

## 2023-04-13 PROCEDURE — 87086 URINE CULTURE/COLONY COUNT: CPT

## 2023-04-13 PROCEDURE — 83036 HEMOGLOBIN GLYCOSYLATED A1C: CPT

## 2023-04-13 RX ORDER — COVID-19 ANTIGEN TEST
1 KIT MISCELLANEOUS 2 TIMES DAILY PRN
COMMUNITY

## 2023-04-13 RX ORDER — UBIDECARENONE 30 MG
30 CAPSULE ORAL DAILY
COMMUNITY

## 2023-04-14 LAB
EKG P AXIS: 28 DEGREES
EKG P-R INTERVAL: 130 MS
EKG Q-T INTERVAL: 364 MS
EKG QRS DURATION: 92 MS
EKG QTC CALCULATION (BAZETT): 411 MS
EKG T AXIS: 19 DEGREES

## 2023-04-14 RX ORDER — ACETAMINOPHEN 500 MG
1000 TABLET ORAL ONCE
OUTPATIENT
Start: 2023-05-03

## 2023-04-14 RX ORDER — CELECOXIB 200 MG/1
200 CAPSULE ORAL ONCE
OUTPATIENT
Start: 2023-05-03

## 2023-04-14 RX ORDER — DEXAMETHASONE SODIUM PHOSPHATE 10 MG/ML
10 INJECTION, SOLUTION INTRAMUSCULAR; INTRAVENOUS ONCE
OUTPATIENT
Start: 2023-05-03

## 2023-04-14 RX ORDER — PREGABALIN 75 MG/1
75 CAPSULE ORAL ONCE
OUTPATIENT
Start: 2023-05-03

## 2023-04-14 RX ORDER — OXYCODONE HCL 10 MG/1
10 TABLET, FILM COATED, EXTENDED RELEASE ORAL ONCE
OUTPATIENT
Start: 2023-05-03

## 2023-04-16 LAB
BACTERIA UR CULT: ABNORMAL
BACTERIA UR CULT: ABNORMAL
ORGANISM: ABNORMAL

## 2023-04-27 ENCOUNTER — HOSPITAL ENCOUNTER (OUTPATIENT)
Dept: PREADMISSION TESTING | Age: 53
Discharge: HOME | End: 2023-05-01

## 2023-05-01 ENCOUNTER — HOSPITAL ENCOUNTER (OUTPATIENT)
Dept: PREADMISSION TESTING | Age: 53
Discharge: HOME | End: 2023-05-01

## 2023-05-02 DIAGNOSIS — I10 ESSENTIAL HYPERTENSION: ICD-10-CM

## 2023-05-02 DIAGNOSIS — F41.9 ANXIETY: ICD-10-CM

## 2023-05-03 ENCOUNTER — HOSPITAL ENCOUNTER (OUTPATIENT)
Age: 53
Setting detail: OBSERVATION
Discharge: HOME HEALTH | End: 2023-05-05
Attending: ORTHOPAEDIC SURGERY | Admitting: ORTHOPAEDIC SURGERY
Payer: MEDICAID

## 2023-05-03 ENCOUNTER — ANESTHESIA (OUTPATIENT)
Dept: OPERATING ROOM | Age: 53
End: 2023-05-03
Payer: MEDICAID

## 2023-05-03 ENCOUNTER — APPOINTMENT (OUTPATIENT)
Dept: GENERAL RADIOLOGY | Age: 53
End: 2023-05-03
Attending: ORTHOPAEDIC SURGERY
Payer: MEDICAID

## 2023-05-03 ENCOUNTER — ANESTHESIA EVENT (OUTPATIENT)
Dept: OPERATING ROOM | Age: 53
End: 2023-05-03
Payer: MEDICAID

## 2023-05-03 DIAGNOSIS — M17.10 ARTHRITIS OF KNEE: Primary | ICD-10-CM

## 2023-05-03 DIAGNOSIS — M17.11 PRIMARY OSTEOARTHRITIS OF RIGHT KNEE: ICD-10-CM

## 2023-05-03 LAB
BILIRUB UR QL STRIP: NEGATIVE
CLARITY UR: CLEAR
COLOR UR: YELLOW
GLUCOSE BLD-MCNC: 218 MG/DL (ref 70–99)
GLUCOSE BLD-MCNC: 86 MG/DL (ref 70–99)
GLUCOSE UR STRIP.AUTO-MCNC: NEGATIVE MG/DL
HCG UR QL: NEGATIVE
HGB UR STRIP.AUTO-MCNC: NEGATIVE MG/L
KETONES UR STRIP.AUTO-MCNC: NEGATIVE MG/DL
LEUKOCYTE ESTERASE UR QL STRIP.AUTO: NEGATIVE
NITRITE UR QL STRIP.AUTO: NEGATIVE
PERFORMED ON: ABNORMAL
PERFORMED ON: NORMAL
PH UR STRIP.AUTO: 6 [PH] (ref 5–8)
PROT UR STRIP.AUTO-MCNC: NEGATIVE MG/DL
SP GR UR STRIP.AUTO: 1.01 (ref 1–1.03)
UROBILINOGEN UR STRIP.AUTO-MCNC: 0.2 E.U./DL

## 2023-05-03 PROCEDURE — 81003 URINALYSIS AUTO W/O SCOPE: CPT

## 2023-05-03 PROCEDURE — 2500000003 HC RX 250 WO HCPCS

## 2023-05-03 PROCEDURE — 73560 X-RAY EXAM OF KNEE 1 OR 2: CPT

## 2023-05-03 PROCEDURE — 7100000000 HC PACU RECOVERY - FIRST 15 MIN: Performed by: ORTHOPAEDIC SURGERY

## 2023-05-03 PROCEDURE — 6360000002 HC RX W HCPCS: Performed by: ORTHOPAEDIC SURGERY

## 2023-05-03 PROCEDURE — 3600000005 HC SURGERY LEVEL 5 BASE: Performed by: ORTHOPAEDIC SURGERY

## 2023-05-03 PROCEDURE — 2580000003 HC RX 258: Performed by: ORTHOPAEDIC SURGERY

## 2023-05-03 PROCEDURE — A4216 STERILE WATER/SALINE, 10 ML: HCPCS | Performed by: ANESTHESIOLOGY

## 2023-05-03 PROCEDURE — 2580000003 HC RX 258: Performed by: ANESTHESIOLOGY

## 2023-05-03 PROCEDURE — 2709999900 HC NON-CHARGEABLE SUPPLY: Performed by: ORTHOPAEDIC SURGERY

## 2023-05-03 PROCEDURE — 6370000000 HC RX 637 (ALT 250 FOR IP): Performed by: ORTHOPAEDIC SURGERY

## 2023-05-03 PROCEDURE — 7100000001 HC PACU RECOVERY - ADDTL 15 MIN: Performed by: ORTHOPAEDIC SURGERY

## 2023-05-03 PROCEDURE — C1776 JOINT DEVICE (IMPLANTABLE): HCPCS | Performed by: ORTHOPAEDIC SURGERY

## 2023-05-03 PROCEDURE — 64447 NJX AA&/STRD FEMORAL NRV IMG: CPT | Performed by: ANESTHESIOLOGY

## 2023-05-03 PROCEDURE — 3600000015 HC SURGERY LEVEL 5 ADDTL 15MIN: Performed by: ORTHOPAEDIC SURGERY

## 2023-05-03 PROCEDURE — 2720000010 HC SURG SUPPLY STERILE: Performed by: ORTHOPAEDIC SURGERY

## 2023-05-03 PROCEDURE — 2500000003 HC RX 250 WO HCPCS: Performed by: ANESTHESIOLOGY

## 2023-05-03 PROCEDURE — 6360000002 HC RX W HCPCS: Performed by: ANESTHESIOLOGY

## 2023-05-03 PROCEDURE — 94150 VITAL CAPACITY TEST: CPT

## 2023-05-03 PROCEDURE — C9290 INJ, BUPIVACAINE LIPOSOME: HCPCS | Performed by: ORTHOPAEDIC SURGERY

## 2023-05-03 PROCEDURE — G0378 HOSPITAL OBSERVATION PER HR: HCPCS

## 2023-05-03 PROCEDURE — C1713 ANCHOR/SCREW BN/BN,TIS/BN: HCPCS | Performed by: ORTHOPAEDIC SURGERY

## 2023-05-03 PROCEDURE — 2500000003 HC RX 250 WO HCPCS: Performed by: ORTHOPAEDIC SURGERY

## 2023-05-03 PROCEDURE — 3700000000 HC ANESTHESIA ATTENDED CARE: Performed by: ORTHOPAEDIC SURGERY

## 2023-05-03 PROCEDURE — 2700000000 HC OXYGEN THERAPY PER DAY

## 2023-05-03 PROCEDURE — A4217 STERILE WATER/SALINE, 500 ML: HCPCS | Performed by: ORTHOPAEDIC SURGERY

## 2023-05-03 PROCEDURE — 82962 GLUCOSE BLOOD TEST: CPT

## 2023-05-03 PROCEDURE — 6360000002 HC RX W HCPCS

## 2023-05-03 PROCEDURE — 84703 CHORIONIC GONADOTROPIN ASSAY: CPT

## 2023-05-03 PROCEDURE — 3700000001 HC ADD 15 MINUTES (ANESTHESIA): Performed by: ORTHOPAEDIC SURGERY

## 2023-05-03 PROCEDURE — 73560 X-RAY EXAM OF KNEE 1 OR 2: CPT | Performed by: RADIOLOGY

## 2023-05-03 DEVICE — SCREW BNE L25MM DIA2.5MM KNEE FULL THRD HEX FEM PERSONA: Type: IMPLANTABLE DEVICE | Site: KNEE | Status: FUNCTIONAL

## 2023-05-03 DEVICE — PSN TIB POR 2 PEG SZ E R: Type: IMPLANTABLE DEVICE | Site: KNEE | Status: FUNCTIONAL

## 2023-05-03 DEVICE — PSN MC VE ASF R 10MM 8-11 EF: Type: IMPLANTABLE DEVICE | Site: KNEE | Status: FUNCTIONAL

## 2023-05-03 DEVICE — PSN FEM CR POR CCR NRW SZ10 R: Type: IMPLANTABLE DEVICE | Site: KNEE | Status: FUNCTIONAL

## 2023-05-03 RX ORDER — TIZANIDINE 4 MG/1
4 TABLET ORAL EVERY 8 HOURS PRN
Status: DISCONTINUED | OUTPATIENT
Start: 2023-05-03 | End: 2023-05-05 | Stop reason: HOSPADM

## 2023-05-03 RX ORDER — FENTANYL CITRATE 50 UG/ML
50 INJECTION, SOLUTION INTRAMUSCULAR; INTRAVENOUS EVERY 5 MIN PRN
Status: DISCONTINUED | OUTPATIENT
Start: 2023-05-03 | End: 2023-05-03 | Stop reason: HOSPADM

## 2023-05-03 RX ORDER — FENTANYL CITRATE 50 UG/ML
25 INJECTION, SOLUTION INTRAMUSCULAR; INTRAVENOUS
Status: DISCONTINUED | OUTPATIENT
Start: 2023-05-03 | End: 2023-05-03 | Stop reason: HOSPADM

## 2023-05-03 RX ORDER — ROPIVACAINE HYDROCHLORIDE 5 MG/ML
30 INJECTION, SOLUTION EPIDURAL; INFILTRATION; PERINEURAL ONCE
Status: DISCONTINUED | OUTPATIENT
Start: 2023-05-03 | End: 2023-05-03 | Stop reason: HOSPADM

## 2023-05-03 RX ORDER — MIDAZOLAM HYDROCHLORIDE 2 MG/2ML
2 INJECTION, SOLUTION INTRAMUSCULAR; INTRAVENOUS
Status: COMPLETED | OUTPATIENT
Start: 2023-05-03 | End: 2023-05-03

## 2023-05-03 RX ORDER — ACETAMINOPHEN 325 MG/1
650 TABLET ORAL EVERY 6 HOURS
Status: DISCONTINUED | OUTPATIENT
Start: 2023-05-03 | End: 2023-05-05 | Stop reason: HOSPADM

## 2023-05-03 RX ORDER — ROPIVACAINE HYDROCHLORIDE 5 MG/ML
INJECTION, SOLUTION EPIDURAL; INFILTRATION; PERINEURAL
Status: COMPLETED | OUTPATIENT
Start: 2023-05-03 | End: 2023-05-03

## 2023-05-03 RX ORDER — TRAZODONE HYDROCHLORIDE 100 MG/1
300 TABLET ORAL EVERY EVENING
Status: DISCONTINUED | OUTPATIENT
Start: 2023-05-03 | End: 2023-05-05 | Stop reason: HOSPADM

## 2023-05-03 RX ORDER — DIPHENHYDRAMINE HCL 25 MG
25 TABLET ORAL EVERY 6 HOURS PRN
Status: DISCONTINUED | OUTPATIENT
Start: 2023-05-03 | End: 2023-05-05 | Stop reason: HOSPADM

## 2023-05-03 RX ORDER — BUPIVACAINE HYDROCHLORIDE 7.5 MG/ML
INJECTION, SOLUTION INTRASPINAL
Status: COMPLETED | OUTPATIENT
Start: 2023-05-03 | End: 2023-05-03

## 2023-05-03 RX ORDER — FENTANYL CITRATE 50 UG/ML
50 INJECTION, SOLUTION INTRAMUSCULAR; INTRAVENOUS
Status: DISCONTINUED | OUTPATIENT
Start: 2023-05-03 | End: 2023-05-03 | Stop reason: HOSPADM

## 2023-05-03 RX ORDER — SODIUM CHLORIDE 9 MG/ML
INJECTION, SOLUTION INTRAVENOUS PRN
Status: DISCONTINUED | OUTPATIENT
Start: 2023-05-03 | End: 2023-05-05 | Stop reason: HOSPADM

## 2023-05-03 RX ORDER — ONDANSETRON 2 MG/ML
4 INJECTION INTRAMUSCULAR; INTRAVENOUS EVERY 6 HOURS PRN
Status: DISCONTINUED | OUTPATIENT
Start: 2023-05-03 | End: 2023-05-05 | Stop reason: HOSPADM

## 2023-05-03 RX ORDER — ACETAMINOPHEN 500 MG
1000 TABLET ORAL ONCE
Status: COMPLETED | OUTPATIENT
Start: 2023-05-03 | End: 2023-05-03

## 2023-05-03 RX ORDER — DEXAMETHASONE SODIUM PHOSPHATE 10 MG/ML
INJECTION, SOLUTION INTRAMUSCULAR; INTRAVENOUS PRN
Status: DISCONTINUED | OUTPATIENT
Start: 2023-05-03 | End: 2023-05-03 | Stop reason: SDUPTHER

## 2023-05-03 RX ORDER — SODIUM CHLORIDE 0.9 % (FLUSH) 0.9 %
5-40 SYRINGE (ML) INJECTION EVERY 12 HOURS SCHEDULED
Status: DISCONTINUED | OUTPATIENT
Start: 2023-05-03 | End: 2023-05-03 | Stop reason: HOSPADM

## 2023-05-03 RX ORDER — ALPRAZOLAM 0.25 MG/1
TABLET ORAL
Qty: 30 TABLET | Refills: 0 | Status: SHIPPED | OUTPATIENT
Start: 2023-05-07 | End: 2023-06-06

## 2023-05-03 RX ORDER — DEXAMETHASONE SODIUM PHOSPHATE 10 MG/ML
10 INJECTION, SOLUTION INTRAMUSCULAR; INTRAVENOUS ONCE
Status: DISCONTINUED | OUTPATIENT
Start: 2023-05-03 | End: 2023-05-03 | Stop reason: HOSPADM

## 2023-05-03 RX ORDER — GABAPENTIN 400 MG/1
800 CAPSULE ORAL EVERY 8 HOURS
Status: DISCONTINUED | OUTPATIENT
Start: 2023-05-03 | End: 2023-05-05 | Stop reason: HOSPADM

## 2023-05-03 RX ORDER — INSULIN LISPRO 100 [IU]/ML
0-4 INJECTION, SOLUTION INTRAVENOUS; SUBCUTANEOUS
Status: DISCONTINUED | OUTPATIENT
Start: 2023-05-03 | End: 2023-05-05 | Stop reason: HOSPADM

## 2023-05-03 RX ORDER — OXYCODONE HYDROCHLORIDE 5 MG/1
10 TABLET ORAL EVERY 4 HOURS PRN
Status: DISCONTINUED | OUTPATIENT
Start: 2023-05-03 | End: 2023-05-05 | Stop reason: HOSPADM

## 2023-05-03 RX ORDER — ARIPIPRAZOLE 5 MG/1
5 TABLET ORAL DAILY
Status: DISCONTINUED | OUTPATIENT
Start: 2023-05-03 | End: 2023-05-05 | Stop reason: HOSPADM

## 2023-05-03 RX ORDER — TRAZODONE HYDROCHLORIDE 150 MG/1
TABLET ORAL
Qty: 60 TABLET | Refills: 3 | Status: ON HOLD | OUTPATIENT
Start: 2023-05-03 | End: 2023-05-03

## 2023-05-03 RX ORDER — TRAZODONE HYDROCHLORIDE 100 MG/1
300 TABLET ORAL NIGHTLY PRN
Status: DISCONTINUED | OUTPATIENT
Start: 2023-05-03 | End: 2023-05-05 | Stop reason: HOSPADM

## 2023-05-03 RX ORDER — SODIUM CHLORIDE 0.9 % (FLUSH) 0.9 %
5-40 SYRINGE (ML) INJECTION PRN
Status: DISCONTINUED | OUTPATIENT
Start: 2023-05-03 | End: 2023-05-03 | Stop reason: HOSPADM

## 2023-05-03 RX ORDER — FENTANYL CITRATE 50 UG/ML
25 INJECTION, SOLUTION INTRAMUSCULAR; INTRAVENOUS EVERY 5 MIN PRN
Status: DISCONTINUED | OUTPATIENT
Start: 2023-05-03 | End: 2023-05-03 | Stop reason: HOSPADM

## 2023-05-03 RX ORDER — OXYCODONE HYDROCHLORIDE 5 MG/1
15 TABLET ORAL EVERY 4 HOURS PRN
Status: DISCONTINUED | OUTPATIENT
Start: 2023-05-03 | End: 2023-05-05 | Stop reason: HOSPADM

## 2023-05-03 RX ORDER — BISACODYL 5 MG/1
5 TABLET, DELAYED RELEASE ORAL DAILY
Status: DISCONTINUED | OUTPATIENT
Start: 2023-05-03 | End: 2023-05-05 | Stop reason: HOSPADM

## 2023-05-03 RX ORDER — ATORVASTATIN CALCIUM 40 MG/1
40 TABLET, FILM COATED ORAL NIGHTLY
Status: DISCONTINUED | OUTPATIENT
Start: 2023-05-03 | End: 2023-05-05 | Stop reason: HOSPADM

## 2023-05-03 RX ORDER — SODIUM CHLORIDE 0.9 % (FLUSH) 0.9 %
5-40 SYRINGE (ML) INJECTION PRN
Status: DISCONTINUED | OUTPATIENT
Start: 2023-05-03 | End: 2023-05-05 | Stop reason: HOSPADM

## 2023-05-03 RX ORDER — 0.9 % SODIUM CHLORIDE 0.9 %
500 INTRAVENOUS SOLUTION INTRAVENOUS PRN
Status: DISCONTINUED | OUTPATIENT
Start: 2023-05-03 | End: 2023-05-05 | Stop reason: HOSPADM

## 2023-05-03 RX ORDER — PREGABALIN 75 MG/1
75 CAPSULE ORAL ONCE
Status: COMPLETED | OUTPATIENT
Start: 2023-05-03 | End: 2023-05-03

## 2023-05-03 RX ORDER — HYDROMORPHONE HYDROCHLORIDE 1 MG/ML
1 INJECTION, SOLUTION INTRAMUSCULAR; INTRAVENOUS; SUBCUTANEOUS
Status: DISCONTINUED | OUTPATIENT
Start: 2023-05-03 | End: 2023-05-05 | Stop reason: HOSPADM

## 2023-05-03 RX ORDER — LIDOCAINE HYDROCHLORIDE 10 MG/ML
INJECTION, SOLUTION EPIDURAL; INFILTRATION; INTRACAUDAL; PERINEURAL PRN
Status: DISCONTINUED | OUTPATIENT
Start: 2023-05-03 | End: 2023-05-03 | Stop reason: SDUPTHER

## 2023-05-03 RX ORDER — HYDROMORPHONE HYDROCHLORIDE 1 MG/ML
0.25 INJECTION, SOLUTION INTRAMUSCULAR; INTRAVENOUS; SUBCUTANEOUS
Status: DISCONTINUED | OUTPATIENT
Start: 2023-05-03 | End: 2023-05-05 | Stop reason: HOSPADM

## 2023-05-03 RX ORDER — HYDROMORPHONE HYDROCHLORIDE 1 MG/ML
0.25 INJECTION, SOLUTION INTRAMUSCULAR; INTRAVENOUS; SUBCUTANEOUS EVERY 5 MIN PRN
Status: DISCONTINUED | OUTPATIENT
Start: 2023-05-03 | End: 2023-05-03 | Stop reason: HOSPADM

## 2023-05-03 RX ORDER — TRANEXAMIC ACID 100 MG/ML
INJECTION, SOLUTION INTRAVENOUS PRN
Status: DISCONTINUED | OUTPATIENT
Start: 2023-05-03 | End: 2023-05-03 | Stop reason: SDUPTHER

## 2023-05-03 RX ORDER — CELECOXIB 200 MG/1
200 CAPSULE ORAL ONCE
Status: DISCONTINUED | OUTPATIENT
Start: 2023-05-03 | End: 2023-05-03 | Stop reason: HOSPADM

## 2023-05-03 RX ORDER — ONDANSETRON 2 MG/ML
4 INJECTION INTRAMUSCULAR; INTRAVENOUS
Status: DISCONTINUED | OUTPATIENT
Start: 2023-05-03 | End: 2023-05-03 | Stop reason: HOSPADM

## 2023-05-03 RX ORDER — DIPHENHYDRAMINE HYDROCHLORIDE 50 MG/ML
12.5 INJECTION INTRAMUSCULAR; INTRAVENOUS
Status: DISCONTINUED | OUTPATIENT
Start: 2023-05-03 | End: 2023-05-03 | Stop reason: HOSPADM

## 2023-05-03 RX ORDER — SODIUM CHLORIDE 9 MG/ML
INJECTION, SOLUTION INTRAVENOUS PRN
Status: DISCONTINUED | OUTPATIENT
Start: 2023-05-03 | End: 2023-05-03 | Stop reason: HOSPADM

## 2023-05-03 RX ORDER — FENTANYL CITRATE 50 UG/ML
INJECTION, SOLUTION INTRAMUSCULAR; INTRAVENOUS PRN
Status: DISCONTINUED | OUTPATIENT
Start: 2023-05-03 | End: 2023-05-03 | Stop reason: SDUPTHER

## 2023-05-03 RX ORDER — SODIUM CHLORIDE 9 MG/ML
INJECTION, SOLUTION INTRAVENOUS CONTINUOUS
Status: DISCONTINUED | OUTPATIENT
Start: 2023-05-03 | End: 2023-05-04

## 2023-05-03 RX ORDER — UBIDECARENONE 30 MG
30 CAPSULE ORAL DAILY
Status: DISCONTINUED | OUTPATIENT
Start: 2023-05-03 | End: 2023-05-05 | Stop reason: HOSPADM

## 2023-05-03 RX ORDER — SODIUM CHLORIDE, SODIUM LACTATE, POTASSIUM CHLORIDE, CALCIUM CHLORIDE 600; 310; 30; 20 MG/100ML; MG/100ML; MG/100ML; MG/100ML
INJECTION, SOLUTION INTRAVENOUS CONTINUOUS
Status: DISCONTINUED | OUTPATIENT
Start: 2023-05-03 | End: 2023-05-03 | Stop reason: HOSPADM

## 2023-05-03 RX ORDER — DULOXETIN HYDROCHLORIDE 20 MG/1
40 CAPSULE, DELAYED RELEASE ORAL DAILY
Status: DISCONTINUED | OUTPATIENT
Start: 2023-05-03 | End: 2023-05-05 | Stop reason: HOSPADM

## 2023-05-03 RX ORDER — TRAMADOL HYDROCHLORIDE 50 MG/1
100 TABLET ORAL EVERY 6 HOURS
Status: DISCONTINUED | OUTPATIENT
Start: 2023-05-03 | End: 2023-05-05 | Stop reason: HOSPADM

## 2023-05-03 RX ORDER — LISINOPRIL 10 MG/1
TABLET ORAL
Qty: 180 TABLET | Refills: 1 | Status: SHIPPED | OUTPATIENT
Start: 2023-05-03

## 2023-05-03 RX ORDER — LIDOCAINE HYDROCHLORIDE 10 MG/ML
1 INJECTION, SOLUTION EPIDURAL; INFILTRATION; INTRACAUDAL; PERINEURAL
Status: DISCONTINUED | OUTPATIENT
Start: 2023-05-03 | End: 2023-05-03 | Stop reason: HOSPADM

## 2023-05-03 RX ORDER — ONDANSETRON 4 MG/1
4 TABLET, ORALLY DISINTEGRATING ORAL EVERY 8 HOURS PRN
Status: DISCONTINUED | OUTPATIENT
Start: 2023-05-03 | End: 2023-05-05 | Stop reason: HOSPADM

## 2023-05-03 RX ORDER — OXYCODONE HCL 10 MG/1
10 TABLET, FILM COATED, EXTENDED RELEASE ORAL ONCE
Status: COMPLETED | OUTPATIENT
Start: 2023-05-03 | End: 2023-05-03

## 2023-05-03 RX ORDER — PROCHLORPERAZINE EDISYLATE 5 MG/ML
5 INJECTION INTRAMUSCULAR; INTRAVENOUS
Status: DISCONTINUED | OUTPATIENT
Start: 2023-05-03 | End: 2023-05-03 | Stop reason: HOSPADM

## 2023-05-03 RX ORDER — OXYCODONE HYDROCHLORIDE 5 MG/1
5 TABLET ORAL EVERY 4 HOURS PRN
Status: DISCONTINUED | OUTPATIENT
Start: 2023-05-03 | End: 2023-05-05 | Stop reason: HOSPADM

## 2023-05-03 RX ORDER — INSULIN LISPRO 100 [IU]/ML
0-4 INJECTION, SOLUTION INTRAVENOUS; SUBCUTANEOUS NIGHTLY
Status: DISCONTINUED | OUTPATIENT
Start: 2023-05-03 | End: 2023-05-05 | Stop reason: HOSPADM

## 2023-05-03 RX ORDER — DEXTROSE MONOHYDRATE 100 MG/ML
INJECTION, SOLUTION INTRAVENOUS CONTINUOUS PRN
Status: DISCONTINUED | OUTPATIENT
Start: 2023-05-03 | End: 2023-05-05 | Stop reason: HOSPADM

## 2023-05-03 RX ORDER — HYDROMORPHONE HYDROCHLORIDE 1 MG/ML
0.5 INJECTION, SOLUTION INTRAMUSCULAR; INTRAVENOUS; SUBCUTANEOUS
Status: DISCONTINUED | OUTPATIENT
Start: 2023-05-03 | End: 2023-05-05 | Stop reason: HOSPADM

## 2023-05-03 RX ORDER — SODIUM CHLORIDE 0.9 % (FLUSH) 0.9 %
5-40 SYRINGE (ML) INJECTION EVERY 12 HOURS SCHEDULED
Status: DISCONTINUED | OUTPATIENT
Start: 2023-05-03 | End: 2023-05-05 | Stop reason: HOSPADM

## 2023-05-03 RX ORDER — HYDROMORPHONE HYDROCHLORIDE 1 MG/ML
0.5 INJECTION, SOLUTION INTRAMUSCULAR; INTRAVENOUS; SUBCUTANEOUS EVERY 5 MIN PRN
Status: DISCONTINUED | OUTPATIENT
Start: 2023-05-03 | End: 2023-05-03 | Stop reason: HOSPADM

## 2023-05-03 RX ORDER — PROPOFOL 10 MG/ML
INJECTION, EMULSION INTRAVENOUS PRN
Status: DISCONTINUED | OUTPATIENT
Start: 2023-05-03 | End: 2023-05-03 | Stop reason: SDUPTHER

## 2023-05-03 RX ADMIN — FAMOTIDINE 20 MG: 10 INJECTION, SOLUTION INTRAVENOUS at 11:58

## 2023-05-03 RX ADMIN — PROPOFOL 50 MG: 10 INJECTION, EMULSION INTRAVENOUS at 14:57

## 2023-05-03 RX ADMIN — PREGABALIN 75 MG: 75 CAPSULE ORAL at 11:54

## 2023-05-03 RX ADMIN — OXYCODONE HYDROCHLORIDE 10 MG: 10 TABLET, FILM COATED, EXTENDED RELEASE ORAL at 11:55

## 2023-05-03 RX ADMIN — PROPOFOL 140 MCG/KG/MIN: 10 INJECTION, EMULSION INTRAVENOUS at 14:58

## 2023-05-03 RX ADMIN — CEFAZOLIN 3000 MG: 10 INJECTION, POWDER, FOR SOLUTION INTRAVENOUS at 23:58

## 2023-05-03 RX ADMIN — MIDAZOLAM 2 MG: 1 INJECTION INTRAMUSCULAR; INTRAVENOUS at 13:47

## 2023-05-03 RX ADMIN — BUPIVACAINE HYDROCHLORIDE IN DEXTROSE 15 MG: 7.5 INJECTION, SOLUTION SUBARACHNOID at 14:51

## 2023-05-03 RX ADMIN — OXYCODONE 10 MG: 5 TABLET ORAL at 22:11

## 2023-05-03 RX ADMIN — CEFAZOLIN 3000 MG: 2 INJECTION, POWDER, FOR SOLUTION INTRAMUSCULAR; INTRAVENOUS at 15:01

## 2023-05-03 RX ADMIN — TRANEXAMIC ACID 1000 MG: 1 INJECTION, SOLUTION INTRAVENOUS at 15:18

## 2023-05-03 RX ADMIN — ROPIVACAINE HYDROCHLORIDE 20 ML: 5 INJECTION, SOLUTION EPIDURAL; INFILTRATION; PERINEURAL at 14:27

## 2023-05-03 RX ADMIN — SODIUM CHLORIDE, SODIUM LACTATE, POTASSIUM CHLORIDE, AND CALCIUM CHLORIDE: 600; 310; 30; 20 INJECTION, SOLUTION INTRAVENOUS at 11:41

## 2023-05-03 RX ADMIN — ATORVASTATIN CALCIUM 40 MG: 40 TABLET, FILM COATED ORAL at 20:37

## 2023-05-03 RX ADMIN — TRAMADOL HYDROCHLORIDE 100 MG: 50 TABLET, COATED ORAL at 20:36

## 2023-05-03 RX ADMIN — FENTANYL CITRATE 25 MCG: 0.05 INJECTION, SOLUTION INTRAMUSCULAR; INTRAVENOUS at 14:57

## 2023-05-03 RX ADMIN — FENTANYL CITRATE 25 MCG: 0.05 INJECTION, SOLUTION INTRAMUSCULAR; INTRAVENOUS at 15:40

## 2023-05-03 RX ADMIN — SODIUM CHLORIDE, SODIUM LACTATE, POTASSIUM CHLORIDE, AND CALCIUM CHLORIDE: 600; 310; 30; 20 INJECTION, SOLUTION INTRAVENOUS at 15:35

## 2023-05-03 RX ADMIN — LIDOCAINE HYDROCHLORIDE 50 MG: 10 INJECTION, SOLUTION EPIDURAL; INFILTRATION; INTRACAUDAL; PERINEURAL at 14:57

## 2023-05-03 RX ADMIN — ACETAMINOPHEN 1000 MG: 500 TABLET ORAL at 11:54

## 2023-05-03 RX ADMIN — GABAPENTIN 800 MG: 400 CAPSULE ORAL at 20:37

## 2023-05-03 RX ADMIN — FENTANYL CITRATE 25 MCG: 0.05 INJECTION, SOLUTION INTRAMUSCULAR; INTRAVENOUS at 15:20

## 2023-05-03 RX ADMIN — ACETAMINOPHEN 650 MG: 325 TABLET ORAL at 20:37

## 2023-05-03 RX ADMIN — DEXAMETHASONE SODIUM PHOSPHATE 10 MG: 10 INJECTION, SOLUTION INTRAMUSCULAR; INTRAVENOUS at 15:02

## 2023-05-03 RX ADMIN — FENTANYL CITRATE 25 MCG: 0.05 INJECTION, SOLUTION INTRAMUSCULAR; INTRAVENOUS at 15:50

## 2023-05-03 ASSESSMENT — PAIN - FUNCTIONAL ASSESSMENT: PAIN_FUNCTIONAL_ASSESSMENT: 0-10

## 2023-05-03 ASSESSMENT — PAIN DESCRIPTION - LOCATION
LOCATION: KNEE
LOCATION: KNEE

## 2023-05-03 ASSESSMENT — PAIN DESCRIPTION - ORIENTATION
ORIENTATION: LEFT
ORIENTATION: LEFT

## 2023-05-03 ASSESSMENT — ENCOUNTER SYMPTOMS: SHORTNESS OF BREATH: 0

## 2023-05-03 ASSESSMENT — PAIN DESCRIPTION - DESCRIPTORS
DESCRIPTORS: ACHING
DESCRIPTORS: ACHING

## 2023-05-03 ASSESSMENT — PAIN SCALES - GENERAL
PAINLEVEL_OUTOF10: 4
PAINLEVEL_OUTOF10: 5

## 2023-05-03 ASSESSMENT — LIFESTYLE VARIABLES: SMOKING_STATUS: 0

## 2023-05-03 NOTE — ANESTHESIA POSTPROCEDURE EVALUATION
Department of Anesthesiology  Postprocedure Note    Patient: Karen Ko  MRN: [id number]  YOB: 1970  Date of evaluation: 5/3/2023      Procedure Summary     Date: 05/03/23 Room / Location: [address]    Anesthesia Start: 1435 Anesthesia Stop:     Procedure: COMPLEX PRIMARY ROBOTIC RIGHT KNEE TOTAL ARTHROPLASTY (Right: Knee) Diagnosis:       Primary osteoarthritis of right knee      (Primary osteoarthritis of right knee [M17.11])    Surgeons: Shanelle Dutton MD Responsible Provider: Oksana Mohs, DO    Anesthesia Type: spinal, regional ASA Status: 3          Anesthesia Type: No value filed.     Lazaro Phase I: Lazaro Score: 9    Lazaro Phase II:        Anesthesia Post Evaluation    Patient location during evaluation: PACU  Patient participation: complete - patient participated  Level of consciousness: awake  Pain score: 0  Airway patency: patent  Nausea & Vomiting: no nausea and no vomiting  Complications: no  Cardiovascular status: blood pressure returned to baseline and hemodynamically stable  Respiratory status: acceptable  Hydration status: stable

## 2023-05-03 NOTE — ANESTHESIA PROCEDURE NOTES
Spinal Block    Patient location during procedure: OR  End time: 5/3/2023 3:00 PM  Reason for block: primary anesthetic  Staffing  Performed: resident/CRNA   Resident/CRNA: NATALI Sandoval CRNA  Spinal Block  Patient position: sitting  Prep: Betadine  Patient monitoring: cardiac monitor, continuous pulse ox and frequent blood pressure checks  Approach: midline  Location: L4/L5  Provider prep: mask and sterile gloves  Needle  Needle gauge: 24 G  Needle length: 3.5 in  Assessment  Sensory level: T4  Swirl obtained: Yes  CSF: clear  Attempts: 1  Hemodynamics: stable  Additional Notes  H/P and R/B discussed and reviewed with patient. Sitting Position. Iodine applied and allowed to dry. Wiped clean. Sterile drape applied and sterile technique maintained throughout procedure. Lidocaine 1% intradermal at L4-L5. Spinal pencil needle 24 G 3.5 in used. CSF swirl obtained after 1 attempt. Bupivacaine 0.75% with dextrose dose administered after negative heme aspiration (swirl). Patient laid flat. Patient confirms acceptable response to injection of bupivacaine. VSS stable,no s/s of distress. Positive response to T4 level check. Will continue to monitor.     Preanesthetic Checklist  Completed: patient identified, IV checked, site marked, risks and benefits discussed, surgical/procedural consents, equipment checked, pre-op evaluation, timeout performed, anesthesia consent given, oxygen available, monitors applied/VS acknowledged, fire risk safety assessment completed and verbalized and blood product R/B/A discussed and consented

## 2023-05-03 NOTE — BRIEF OP NOTE
Brief Postoperative Note      Patient: Judie Patel  YOB: 1970  MRN: [id number]    Date of Procedure: 5/3/2023    Pre-Op Diagnosis Codes:     * Primary osteoarthritis of right knee [M17.11]    Post-Op Diagnosis: Same       Procedure(s):  COMPLEX PRIMARY ROBOTIC RIGHT KNEE TOTAL ARTHROPLASTY    Surgeon(s):  Nixon Pinedo MD    Assistant:  First Assistant: Kinjal Levine; David Moreno RN    Anesthesia: Spinal    Estimated Blood Loss (mL): 387    Complications: None    Specimens:   ID Type Source Tests Collected by Time Destination   1 : URINE Body Fluid Bladder 3801 Kelly Pollock MD 5/3/2023 1527        Implants:  Implant Name Type Inv. Item Serial No.  Lot No. LRB No. Used Action   SCREW BNE L25MM DIA2. 5MM KNEE FULL THRD HEX FEM PERSONA - [id number]  SCREW BNE L25MM DIA2. 5MM KNEE FULL THRD HEX FEM PERSONA  [facility] [id number] Right 1 Implanted   PSN FEM CR POR CCR NRW SZ10 R - [id number]  PSN FEM CR POR CCR NRW SZ10 R  [employer]- [id number] Right 1 Implanted   PSN TIB POR 2 PEG SZ E R - [id number]  PSN TIB POR 2 PEG SZ E R  [employer]- [id number] Right 1 Implanted   PSN MC VE ASF R 10MM 8-11 EF - [id number]  PSN MC VE ASF R 10MM 8-11 EF  [employer]- [id number] Right 1 Implanted         Drains: * No LDAs found *    Findings: TT:40 minutes      Electronically signed by Nixon Pinedo MD on 5/3/2023 at 5:14 PM

## 2023-05-03 NOTE — TELEPHONE ENCOUNTER
Sunil May called to request a refill on her medication. Last office visit : 2/21/2023   Next office visit : 5/3/2023     Last UDS:   Amphetamine Screen, Urine   Date Value Ref Range Status   12/06/2022 -  Final     Barbiturates   Date Value Ref Range Status   01/17/2011 Negative () Final     Barbiturate Screen, Urine   Date Value Ref Range Status   12/06/2022 -  Final     Benzodiazepine Screen, Urine   Date Value Ref Range Status   12/06/2022 +  Final     Buprenorphine Urine   Date Value Ref Range Status   12/06/2022 -  Final     Cocaine Metabolite Screen, Urine   Date Value Ref Range Status   12/06/2022 -  Final     Gabapentin Screen, Urine   Date Value Ref Range Status   12/06/2022 -  Final     MDMA, Urine   Date Value Ref Range Status   12/06/2022 -  Final     Methamphetamine, Urine   Date Value Ref Range Status   12/06/2022 -  Final     Opiate Scrn, Ur   Date Value Ref Range Status   12/06/2022 +  Final     Oxycodone Screen, Ur   Date Value Ref Range Status   12/06/2022 -  Final     PCP Screen, Urine   Date Value Ref Range Status   12/06/2022 -  Final     Propoxyphene   Date Value Ref Range Status   01/17/2011 Negative () Final     Propoxyphene Screen, Urine   Date Value Ref Range Status   12/06/2022 -  Final     THC Screen, Urine   Date Value Ref Range Status   12/06/2022 -  Final     Tricyclic Antidepressants, Urine   Date Value Ref Range Status   12/06/2022 -  Final       Last Dieter Dark: 1/6/23  Medication Contract: 3/4/22   Last Fill: 4/8/23    Requested Prescriptions     Pending Prescriptions Disp Refills    lisinopril (PRINIVIL;ZESTRIL) 10 MG tablet [Pharmacy Med Name: LISINOPRIL 10MG TABLETS] 180 tablet 1     Sig: TAKE 1 TABLET BY MOUTH TWICE DAILY    ALPRAZolam (XANAX) 0.25 MG tablet [Pharmacy Med Name: ALPRAZOLAM 0.25MG TABLETS] 30 tablet      Sig: TAKE 1 TABLET BY MOUTH EVERY DAY AS NEEDED FOR ANXIETY                       Please approve or refuse this medication.    Jahaira Torres, LPN

## 2023-05-03 NOTE — ANESTHESIA PROCEDURE NOTES
Peripheral Block    Patient location during procedure: holding area  Reason for block: post-op pain management  Start time: 5/3/2023 2:27 PM  End time: 5/3/2023 2:27 PM  Staffing  Performed: anesthesiologist   Anesthesiologist: Altagracia Zacarias DO  Preanesthetic Checklist  Completed: patient identified, IV checked, site marked, risks and benefits discussed, surgical/procedural consents, equipment checked, pre-op evaluation, timeout performed, anesthesia consent given, oxygen available and monitors applied/VS acknowledged  Peripheral Block   Patient position: supine  Prep: ChloraPrep  Provider prep: mask and sterile gloves  Patient monitoring: cardiac monitor, continuous pulse ox, frequent blood pressure checks and IV access  Block type: Femoral  Adductor canal  Laterality: right  Injection technique: single-shot  Guidance: ultrasound guided  Local infiltration: ropivacaine  Local infiltration: ropivacaine    Needle   Needle type: short-bevel   Needle gauge: 20 G  Needle localization: ultrasound guidance  Needle length: 10 cm  Assessment   Injection assessment: negative aspiration for heme, no paresthesia on injection and local visualized surrounding nerve on ultrasound  Paresthesia pain: none  Slow fractionated injection: yes  Hemodynamics: stable  Real-time US image taken/store: yes  Outcomes: uncomplicated and patient tolerated procedure well    Additional Notes  Needle was introduced in proximal third of thigh in an  lacy-medial to posterior direction. The needle was visualized \" in- plane\" under ultrasound guidance to access the adductor canal in a sub-sartorial fashion. The femoral artery was avoided and 20 cc of 0.5% ropivacaine  was injected and surrounded the nerve plexus. The plexus appeared anatomically normal, no obvious pathology was noted.  A permanent image was obtained   Medications Administered  ropivacaine (NAROPIN) injection 0.5% - Perineural   20 mL - 5/3/2023 2:27:00 PM

## 2023-05-03 NOTE — H&P
[facility] ([facility]) Pre-Operative History and Physical    Patient Name: Yash Rivera  : 1970    /86   Pulse (!) 109   Temp 98.4 °F (36.9 °C) (Tympanic)   Resp 18   Ht 5' 8\" (1.727 m)   Wt (!) 315 lb (142.9 kg)   LMP 2023   SpO2 96%   BMI 47.90 kg/m²     Pre-Operative Diagnosis:  Knee arthritits    Proposed Surgical Procedure:   Knee replacement      Past Medical Hisotry:       Diagnosis Date    Anxiety     Arthritis     Chronic pain     Depression     Diabetes mellitus type 2 in obese ([address]     Hyperlipidemia     Hypertension     Knee pain     Premenopausal patient 2018     Past Surgical History:       Procedure Laterality Date     SECTION      x3    TENDON RELEASE      right finger    TONSILLECTOMY      TUBAL LIGATION         Medications:   Prior to Admission medications    Medication Sig Start Date End Date Taking? Authorizing Provider   traZODone (DESYREL) 150 MG tablet TAKE 2 TABLETS BY MOUTH EVERY EVENING 67   NATALI Graham   co-enzyme Q-10 30 MG CAPS capsule Take 1 capsule by mouth daily    Historical Provider, MD   Misc Natural Products (OSTEO BI-FLEX ADV DOUBLE ST PO) Take 2 capsules by mouth daily    Historical Provider, MD   Naproxen Sodium 220 MG CAPS Take 1 capsule by mouth 2 times daily as needed for Pain    Historical Provider, MD   ALPRAZolam (XANAX) 0.25 MG tablet TAKE 1 TABLET BY MOUTH EVERY DAY AS NEEDED FOR ANXIETY  Patient taking differently: Take 1 tablet by mouth 2 times daily as needed for Sleep or Anxiety (usually takes at night).  23  John Conrad MD   DULoxetine HCl 40 MG CPEP TAKE 1 CAPSULE BY MOUTH DAILY  Patient taking differently: Take 40 mg by mouth daily    NATALI Graham   ARIPiprazole (ABILIFY) 5 MG tablet TAKE 1 TABLET BY MOUTH EVERY DAY  Patient taking differently: Take 1 tablet by mouth daily TAKE 1 TABLET BY MOUTH EVERY DAY    NATALI Graham   traZODone (DESYREL) 150 MG tablet TAKE 2

## 2023-05-03 NOTE — ANESTHESIA PRE PROCEDURE
CO2 26 04/13/2023 02:38 PM    BUN 14 04/13/2023 02:38 PM    CREATININE 1.1 04/13/2023 02:38 PM    GFRAA >59 07/29/2022 09:17 AM    LABGLOM 60 04/13/2023 02:38 PM    GLUCOSE 113 04/13/2023 02:38 PM    PROT 6.9 04/13/2023 02:38 PM    CALCIUM 9.1 04/13/2023 02:38 PM    BILITOT <0.2 04/13/2023 02:38 PM    ALKPHOS 119 04/13/2023 02:38 PM    AST 23 04/13/2023 02:38 PM    ALT 30 04/13/2023 02:38 PM       POC Tests: No results for input(s): POCGLU, POCNA, POCK, POCCL, POCBUN, POCHEMO, POCHCT in the last 72 hours.     Coags:   Lab Results   Component Value Date/Time    PROTIME 13.5 04/13/2023 02:38 PM    INR 1.04 04/13/2023 02:38 PM    APTT 31.8 04/13/2023 02:38 PM       HCG (If Applicable):   Lab Results   Component Value Date    PREGTESTUR Negative 01/30/2017    PREGSERUM NEGATIVE 01/15/2011        ABGs:   Lab Results   Component Value Date/Time    PHART 7.410 02/05/2017 05:16 AM    PO2ART 69.0 02/05/2017 05:16 AM    TAR4KCU 45.0 02/05/2017 05:16 AM    ZXL6YLN 28.5 02/05/2017 05:16 AM    BEART 3.4 02/05/2017 05:16 AM    I6TBAZSU 92.7 02/05/2017 05:16 AM        Type & Screen (If Applicable):  No results found for: LABABO, LABRH    Drug/Infectious Status (If Applicable):  No results found for: HIV, HEPCAB    COVID-19 Screening (If Applicable):   Lab Results   Component Value Date/Time    COVID19 DETECTED 08/06/2021 11:29 AM           Anesthesia Evaluation  Patient summary reviewed and Nursing notes reviewed no history of anesthetic complications:   Airway: Mallampati: I  TM distance: >3 FB   Neck ROM: full  Mouth opening: > = 3 FB   Dental: normal exam         Pulmonary:       (-) shortness of breath and not a current smoker                           Cardiovascular:  Exercise tolerance: good (>4 METS),   (+) hypertension:, hyperlipidemia    (-) pacemaker, past MI, CAD, CABG/stent, dysrhythmias and  angina    ECG reviewed               Beta Blocker:  Not on Beta Blocker         Neuro/Psych:   (+) psychiatric history:   (-)

## 2023-05-04 PROBLEM — M17.31 POST-TRAUMATIC OSTEOARTHRITIS OF RIGHT KNEE: Status: ACTIVE | Noted: 2023-05-04

## 2023-05-04 PROBLEM — M17.11 PRIMARY OSTEOARTHRITIS OF RIGHT KNEE: Status: ACTIVE | Noted: 2023-05-04

## 2023-05-04 LAB
ANION GAP SERPL CALCULATED.3IONS-SCNC: 14 MMOL/L (ref 7–19)
BUN SERPL-MCNC: 12 MG/DL (ref 6–20)
CALCIUM SERPL-MCNC: 8.8 MG/DL (ref 8.6–10)
CHLORIDE SERPL-SCNC: 102 MMOL/L (ref 98–111)
CO2 SERPL-SCNC: 24 MMOL/L (ref 22–29)
CREAT SERPL-MCNC: 1.1 MG/DL (ref 0.5–0.9)
FERRITIN SERPL-MCNC: 203.3 NG/ML (ref 13–150)
GLUCOSE BLD-MCNC: 118 MG/DL (ref 70–99)
GLUCOSE BLD-MCNC: 137 MG/DL (ref 70–99)
GLUCOSE BLD-MCNC: 149 MG/DL (ref 70–99)
GLUCOSE BLD-MCNC: 99 MG/DL (ref 70–99)
GLUCOSE SERPL-MCNC: 156 MG/DL (ref 74–109)
HBA1C MFR BLD: 4.8 % (ref 4–6)
HCT VFR BLD AUTO: 37.7 % (ref 37–47)
HGB BLD-MCNC: 11.9 G/DL (ref 12–16)
PERFORMED ON: ABNORMAL
PERFORMED ON: NORMAL
POTASSIUM SERPL-SCNC: 4.8 MMOL/L (ref 3.5–5)
SODIUM SERPL-SCNC: 140 MMOL/L (ref 136–145)

## 2023-05-04 PROCEDURE — G0378 HOSPITAL OBSERVATION PER HR: HCPCS

## 2023-05-04 PROCEDURE — 82962 GLUCOSE BLOOD TEST: CPT

## 2023-05-04 PROCEDURE — 83036 HEMOGLOBIN GLYCOSYLATED A1C: CPT

## 2023-05-04 PROCEDURE — 82728 ASSAY OF FERRITIN: CPT

## 2023-05-04 PROCEDURE — 6360000002 HC RX W HCPCS: Performed by: ORTHOPAEDIC SURGERY

## 2023-05-04 PROCEDURE — 97166 OT EVAL MOD COMPLEX 45 MIN: CPT

## 2023-05-04 PROCEDURE — 97530 THERAPEUTIC ACTIVITIES: CPT

## 2023-05-04 PROCEDURE — 85014 HEMATOCRIT: CPT

## 2023-05-04 PROCEDURE — 80048 BASIC METABOLIC PNL TOTAL CA: CPT

## 2023-05-04 PROCEDURE — 6370000000 HC RX 637 (ALT 250 FOR IP): Performed by: ORTHOPAEDIC SURGERY

## 2023-05-04 PROCEDURE — 94760 N-INVAS EAR/PLS OXIMETRY 1: CPT

## 2023-05-04 PROCEDURE — 96374 THER/PROPH/DIAG INJ IV PUSH: CPT

## 2023-05-04 PROCEDURE — 85018 HEMOGLOBIN: CPT

## 2023-05-04 PROCEDURE — 2580000003 HC RX 258: Performed by: ORTHOPAEDIC SURGERY

## 2023-05-04 PROCEDURE — 97116 GAIT TRAINING THERAPY: CPT

## 2023-05-04 PROCEDURE — 97162 PT EVAL MOD COMPLEX 30 MIN: CPT

## 2023-05-04 PROCEDURE — 97535 SELF CARE MNGMENT TRAINING: CPT

## 2023-05-04 PROCEDURE — 36415 COLL VENOUS BLD VENIPUNCTURE: CPT

## 2023-05-04 PROCEDURE — 6370000000 HC RX 637 (ALT 250 FOR IP): Performed by: PHYSICIAN ASSISTANT

## 2023-05-04 RX ORDER — DOXYCYCLINE HYCLATE 100 MG
100 TABLET ORAL 2 TIMES DAILY
Qty: 10 TABLET | Refills: 0 | Status: SHIPPED | OUTPATIENT
Start: 2023-05-04 | End: 2023-05-09

## 2023-05-04 RX ORDER — OXYCODONE HYDROCHLORIDE 5 MG/1
5 TABLET ORAL SEE ADMIN INSTRUCTIONS
Qty: 90 TABLET | Refills: 0 | Status: SHIPPED | OUTPATIENT
Start: 2023-05-04 | End: 2023-06-22

## 2023-05-04 RX ORDER — LISINOPRIL 10 MG/1
10 TABLET ORAL 2 TIMES DAILY
Status: DISCONTINUED | OUTPATIENT
Start: 2023-05-04 | End: 2023-05-05 | Stop reason: HOSPADM

## 2023-05-04 RX ORDER — RIVAROXABAN 10 MG/1
TABLET, FILM COATED ORAL
Qty: 21 TABLET | Refills: 0 | Status: SHIPPED | OUTPATIENT
Start: 2023-05-04

## 2023-05-04 RX ORDER — ONDANSETRON 4 MG/1
4 TABLET, FILM COATED ORAL EVERY 8 HOURS PRN
Qty: 30 TABLET | Refills: 0 | Status: SHIPPED | OUTPATIENT
Start: 2023-05-04

## 2023-05-04 RX ORDER — DOCUSATE SODIUM 100 MG/1
100 CAPSULE, LIQUID FILLED ORAL 3 TIMES DAILY
Status: DISCONTINUED | OUTPATIENT
Start: 2023-05-04 | End: 2023-05-05 | Stop reason: HOSPADM

## 2023-05-04 RX ORDER — POLYETHYLENE GLYCOL 3350 17 G/17G
17 POWDER, FOR SOLUTION ORAL 2 TIMES DAILY
Status: DISCONTINUED | OUTPATIENT
Start: 2023-05-04 | End: 2023-05-05 | Stop reason: HOSPADM

## 2023-05-04 RX ADMIN — RIVAROXABAN 10 MG: 10 TABLET, FILM COATED ORAL at 02:52

## 2023-05-04 RX ADMIN — ARIPIPRAZOLE 5 MG: 5 TABLET ORAL at 07:52

## 2023-05-04 RX ADMIN — OXYCODONE 15 MG: 5 TABLET ORAL at 17:43

## 2023-05-04 RX ADMIN — LISINOPRIL 10 MG: 10 TABLET ORAL at 14:10

## 2023-05-04 RX ADMIN — HYDROMORPHONE HYDROCHLORIDE 0.25 MG: 1 INJECTION, SOLUTION INTRAMUSCULAR; INTRAVENOUS; SUBCUTANEOUS at 19:31

## 2023-05-04 RX ADMIN — OXYCODONE 15 MG: 5 TABLET ORAL at 13:19

## 2023-05-04 RX ADMIN — METFORMIN HYDROCHLORIDE 1000 MG: 500 TABLET, FILM COATED ORAL at 07:48

## 2023-05-04 RX ADMIN — DOCUSATE SODIUM 100 MG: 100 CAPSULE, LIQUID FILLED ORAL at 08:09

## 2023-05-04 RX ADMIN — HYDROMORPHONE HYDROCHLORIDE 1 MG: 1 INJECTION, SOLUTION INTRAMUSCULAR; INTRAVENOUS; SUBCUTANEOUS at 00:36

## 2023-05-04 RX ADMIN — TRAMADOL HYDROCHLORIDE 100 MG: 50 TABLET, COATED ORAL at 02:52

## 2023-05-04 RX ADMIN — GABAPENTIN 800 MG: 400 CAPSULE ORAL at 11:16

## 2023-05-04 RX ADMIN — POLYETHYLENE GLYCOL 3350 17 G: 17 POWDER, FOR SOLUTION ORAL at 21:40

## 2023-05-04 RX ADMIN — LISINOPRIL 10 MG: 10 TABLET ORAL at 11:16

## 2023-05-04 RX ADMIN — DULOXETINE 40 MG: 20 CAPSULE, DELAYED RELEASE ORAL at 07:52

## 2023-05-04 RX ADMIN — CEFAZOLIN 3000 MG: 10 INJECTION, POWDER, FOR SOLUTION INTRAVENOUS at 15:15

## 2023-05-04 RX ADMIN — SODIUM CHLORIDE, PRESERVATIVE FREE 10 ML: 5 INJECTION INTRAVENOUS at 19:35

## 2023-05-04 RX ADMIN — OXYCODONE 15 MG: 5 TABLET ORAL at 21:40

## 2023-05-04 RX ADMIN — GABAPENTIN 800 MG: 400 CAPSULE ORAL at 19:31

## 2023-05-04 RX ADMIN — TRAMADOL HYDROCHLORIDE 100 MG: 50 TABLET, COATED ORAL at 19:31

## 2023-05-04 RX ADMIN — DOCUSATE SODIUM 100 MG: 100 CAPSULE, LIQUID FILLED ORAL at 19:32

## 2023-05-04 RX ADMIN — TRAMADOL HYDROCHLORIDE 100 MG: 50 TABLET, COATED ORAL at 06:11

## 2023-05-04 RX ADMIN — HYDROMORPHONE HYDROCHLORIDE 0.5 MG: 1 INJECTION, SOLUTION INTRAMUSCULAR; INTRAVENOUS; SUBCUTANEOUS at 11:16

## 2023-05-04 RX ADMIN — ACETAMINOPHEN 650 MG: 325 TABLET ORAL at 06:10

## 2023-05-04 RX ADMIN — BISACODYL 5 MG: 5 TABLET, COATED ORAL at 07:48

## 2023-05-04 RX ADMIN — ATORVASTATIN CALCIUM 40 MG: 40 TABLET, FILM COATED ORAL at 19:32

## 2023-05-04 RX ADMIN — TRAMADOL HYDROCHLORIDE 100 MG: 50 TABLET, COATED ORAL at 14:10

## 2023-05-04 RX ADMIN — HYDROMORPHONE HYDROCHLORIDE 1 MG: 1 INJECTION, SOLUTION INTRAMUSCULAR; INTRAVENOUS; SUBCUTANEOUS at 06:09

## 2023-05-04 RX ADMIN — ACETAMINOPHEN 650 MG: 325 TABLET ORAL at 14:10

## 2023-05-04 RX ADMIN — HYDROMORPHONE HYDROCHLORIDE 0.5 MG: 1 INJECTION, SOLUTION INTRAMUSCULAR; INTRAVENOUS; SUBCUTANEOUS at 15:11

## 2023-05-04 RX ADMIN — DOCUSATE SODIUM 100 MG: 100 CAPSULE, LIQUID FILLED ORAL at 14:10

## 2023-05-04 RX ADMIN — OXYCODONE 15 MG: 5 TABLET ORAL at 07:48

## 2023-05-04 RX ADMIN — GABAPENTIN 800 MG: 400 CAPSULE ORAL at 02:50

## 2023-05-04 RX ADMIN — OXYCODONE 15 MG: 5 TABLET ORAL at 02:52

## 2023-05-04 RX ADMIN — RIVAROXABAN 10 MG: 10 TABLET, FILM COATED ORAL at 17:44

## 2023-05-04 RX ADMIN — POLYETHYLENE GLYCOL 3350 17 G: 17 POWDER, FOR SOLUTION ORAL at 08:09

## 2023-05-04 RX ADMIN — CEFAZOLIN 3000 MG: 10 INJECTION, POWDER, FOR SOLUTION INTRAVENOUS at 06:10

## 2023-05-04 RX ADMIN — ACETAMINOPHEN 650 MG: 325 TABLET ORAL at 02:52

## 2023-05-04 RX ADMIN — ACETAMINOPHEN 650 MG: 325 TABLET ORAL at 19:31

## 2023-05-04 SDOH — ECONOMIC STABILITY: INCOME INSECURITY: IN THE LAST 12 MONTHS, WAS THERE A TIME WHEN YOU WERE NOT ABLE TO PAY THE MORTGAGE OR RENT ON TIME?: NO

## 2023-05-04 SDOH — ECONOMIC STABILITY: FOOD INSECURITY: WITHIN THE PAST 12 MONTHS, YOU WORRIED THAT YOUR FOOD WOULD RUN OUT BEFORE YOU GOT MONEY TO BUY MORE.: OFTEN TRUE

## 2023-05-04 SDOH — HEALTH STABILITY: PHYSICAL HEALTH: ON AVERAGE, HOW MANY MINUTES DO YOU ENGAGE IN EXERCISE AT THIS LEVEL?: 20 MIN

## 2023-05-04 SDOH — HEALTH STABILITY: PHYSICAL HEALTH: ON AVERAGE, HOW MANY DAYS PER WEEK DO YOU ENGAGE IN MODERATE TO STRENUOUS EXERCISE (LIKE A BRISK WALK)?: 3 DAYS

## 2023-05-04 SDOH — ECONOMIC STABILITY: HOUSING INSECURITY: IN THE LAST 12 MONTHS, HOW MANY PLACES HAVE YOU LIVED?: 1

## 2023-05-04 SDOH — ECONOMIC STABILITY: TRANSPORTATION INSECURITY
IN THE PAST 12 MONTHS, HAS LACK OF TRANSPORTATION KEPT YOU FROM MEETINGS, WORK, OR FROM GETTING THINGS NEEDED FOR DAILY LIVING?: NO

## 2023-05-04 SDOH — ECONOMIC STABILITY: FOOD INSECURITY: WITHIN THE PAST 12 MONTHS, THE FOOD YOU BOUGHT JUST DIDN'T LAST AND YOU DIDN'T HAVE MONEY TO GET MORE.: OFTEN TRUE

## 2023-05-04 SDOH — ECONOMIC STABILITY: TRANSPORTATION INSECURITY
IN THE PAST 12 MONTHS, HAS THE LACK OF TRANSPORTATION KEPT YOU FROM MEDICAL APPOINTMENTS OR FROM GETTING MEDICATIONS?: NO

## 2023-05-04 ASSESSMENT — SOCIAL DETERMINANTS OF HEALTH (SDOH)
HOW HARD IS IT FOR YOU TO PAY FOR THE VERY BASICS LIKE FOOD, HOUSING, MEDICAL CARE, AND HEATING?: VERY HARD
WITHIN THE LAST YEAR, HAVE YOU BEEN AFRAID OF YOUR PARTNER OR EX-PARTNER?: NO
WITHIN THE LAST YEAR, HAVE YOU BEEN HUMILIATED OR EMOTIONALLY ABUSED IN OTHER WAYS BY YOUR PARTNER OR EX-PARTNER?: NO
HOW OFTEN DO YOU ATTEND CHURCH OR RELIGIOUS SERVICES?: 1 TO 4 TIMES PER YEAR
IN A TYPICAL WEEK, HOW MANY TIMES DO YOU TALK ON THE PHONE WITH FAMILY, FRIENDS, OR NEIGHBORS?: MORE THAN THREE TIMES A WEEK
HOW OFTEN DO YOU ATTENT MEETINGS OF THE CLUB OR ORGANIZATION YOU BELONG TO?: NEVER
WITHIN THE LAST YEAR, HAVE TO BEEN RAPED OR FORCED TO HAVE ANY KIND OF SEXUAL ACTIVITY BY YOUR PARTNER OR EX-PARTNER?: NO
DO YOU BELONG TO ANY CLUBS OR ORGANIZATIONS SUCH AS CHURCH GROUPS UNIONS, FRATERNAL OR ATHLETIC GROUPS, OR SCHOOL GROUPS?: NO
HOW OFTEN DO YOU GET TOGETHER WITH FRIENDS OR RELATIVES?: ONCE A WEEK
WITHIN THE LAST YEAR, HAVE YOU BEEN KICKED, HIT, SLAPPED, OR OTHERWISE PHYSICALLY HURT BY YOUR PARTNER OR EX-PARTNER?: NO

## 2023-05-04 ASSESSMENT — PAIN - FUNCTIONAL ASSESSMENT
PAIN_FUNCTIONAL_ASSESSMENT: PREVENTS OR INTERFERES SOME ACTIVE ACTIVITIES AND ADLS
PAIN_FUNCTIONAL_ASSESSMENT: ACTIVITIES ARE NOT PREVENTED
PAIN_FUNCTIONAL_ASSESSMENT: PREVENTS OR INTERFERES SOME ACTIVE ACTIVITIES AND ADLS

## 2023-05-04 ASSESSMENT — PAIN DESCRIPTION - ORIENTATION
ORIENTATION: RIGHT

## 2023-05-04 ASSESSMENT — PAIN DESCRIPTION - LOCATION
LOCATION: KNEE

## 2023-05-04 ASSESSMENT — PAIN DESCRIPTION - DESCRIPTORS
DESCRIPTORS: ACHING
DESCRIPTORS: ACHING;PRESSURE
DESCRIPTORS: ACHING
DESCRIPTORS: ACHING;SORE
DESCRIPTORS: ACHING;PRESSURE
DESCRIPTORS: ACHING;PRESSURE
DESCRIPTORS: ACHING
DESCRIPTORS: ACHING
DESCRIPTORS: NAGGING
DESCRIPTORS: STABBING;ACHING

## 2023-05-04 ASSESSMENT — PATIENT HEALTH QUESTIONNAIRE - PHQ9
2. FEELING DOWN, DEPRESSED OR HOPELESS: SEVERAL DAYS
10. IF YOU CHECKED OFF ANY PROBLEMS, HOW DIFFICULT HAVE THESE PROBLEMS MADE IT FOR YOU TO DO YOUR WORK, TAKE CARE OF THINGS AT HOME, OR GET ALONG WITH OTHER PEOPLE: SOMEWHAT DIFFICULT
SUM OF ALL RESPONSES TO PHQ9 QUESTIONS 1 & 2: 2
1. LITTLE INTEREST OR PLEASURE IN DOING THINGS: SEVERAL DAYS

## 2023-05-04 ASSESSMENT — LIFESTYLE VARIABLES
HOW MANY STANDARD DRINKS CONTAINING ALCOHOL DO YOU HAVE ON A TYPICAL DAY: 1 OR 2
HOW OFTEN DO YOU HAVE A DRINK CONTAINING ALCOHOL: MONTHLY OR LESS

## 2023-05-04 ASSESSMENT — PAIN SCALES - GENERAL
PAINLEVEL_OUTOF10: 10
PAINLEVEL_OUTOF10: 7
PAINLEVEL_OUTOF10: 10
PAINLEVEL_OUTOF10: 8
PAINLEVEL_OUTOF10: 7
PAINLEVEL_OUTOF10: 7
PAINLEVEL_OUTOF10: 6
PAINLEVEL_OUTOF10: 6
PAINLEVEL_OUTOF10: 7
PAINLEVEL_OUTOF10: 7
PAINLEVEL_OUTOF10: 10
PAINLEVEL_OUTOF10: 9
PAINLEVEL_OUTOF10: 8
PAINLEVEL_OUTOF10: 7

## 2023-05-04 ASSESSMENT — PAIN DESCRIPTION - PAIN TYPE: TYPE: ACUTE PAIN;SURGICAL PAIN

## 2023-05-04 NOTE — CONSULTS
Referring Provider: Dr. Cecilia Farr  Reason for Consultation: Medical management    Patient Care Team:  NATALI Rollins as PCP - General (Nurse Practitioner Family)  NATALI Rollins as PCP - Empaneled Provider    Chief complaint right knee pain    Subjective . History of present illness: The patient presents to the orthopedic service for TKA. They have failed outpatient conservative treatment of NSAIDS, muscle relaxer, physical therapy and opioid pain meds. The pain is affecting their activities of daily living and they have chosen to undergo surgical correction. We have been asked to provide medical consultation by the attending physician since their primary care provider does not attend here at [address] [place] [address] in their healthcare during the perioperative phase was discussed with the patient. All questions were encouraged and answered to the best of our ability. The postoperative pain is as expected. There are no other participating or relieving factors noted.        REVIEW OF SYSTEMS:    CONSTITUTIONAL:  Negative for anorexia, chills, fevers, night sweats and weight loss  EYES:  negative for eye dryness, icterus and redness  HEENT:   negative for dental problems, epistaxis, facial trauma and thrush  RESPIRATORY:  negative for chest tightness, cough, dyspnea on exertion, pneumonia and sputum  CARDIOVASCULAR: negative for chest pain, dyspnea, exertional chest pressure/discomfort, irregular heart beat, palpitations, paroxysmal nocturnal dyspnea and syncope  GASTROINTESTINAL:  negative for abdominal pain, hematemesis, jaundice, melena and rectal bleeding  MUSCULOSKELETAL:  negative for muscle weakness, myalgias and neck pain, chronic joint pain noted  NEUROLOGICAL:   negative for dizziness, headaches, seizures, speech problems, tremors and vertigo  INTEGUMENT: negative for pruritus, rash, skin color change and skin lesion(s)   A Full 14 point review of systems is negative outside those

## 2023-05-04 NOTE — OP NOTE
NEHEMIAS BasharJobs OF [facility]IS[facility] [address], [facility]                                OPERATIVE REPORT    PATIENT NAME: Christina Mireles                    :        1970  MED REC NO:   [id number]                              ROOM:       [facility]  ACCOUNT NO:   [de-identified]                           ADMIT DATE: 2023  PROVIDER:     Nolan Bynum MD    DATE OF PROCEDURE:  2023    PREOPERATIVE DIAGNOSES:  1. Post-traumatic osteoarthritis, right knee. 2.  Elevated BMI of 45.61.  3.  History of supracondylar femur fracture with intra-articular  extension occurring in 2021. POSTOPERATIVE DIAGNOSES:  1. Post-traumatic osteoarthritis, right knee. 2.  Elevated BMI of 45.61.  3.  History of supracondylar femur fracture with intra-articular  extension occurring in 2021. PROCEDURE PERFORMED:  Complex primary robotic-assisted right total knee  replacement. Please note, complexity of the surgery is due to the fact that the  patient not only had an elevated BMI of 45.61 but had an old distal  femur fracture. This added 100% increase in difficulty in exposure and  placement of implants. SURGEON:  Nolan Bynum MD    ANESTHESIA:  Spinal.    EBL:  130 mL. FLUIDS:  1500 mL of crystalloid. URINE OUTPUT:  250 mL. TOURNIQUET TIME:  40 minutes. COMPONENTS USED:  Nick Persona knee system, femur size 10 narrow CR TM  press-fit femur, tibia size E TM press-fit tibia, polyethylene size 10  MC polyethylene. INDICATIONS:  A 59-year-old lady with severe arthritis of the right  knee, failing conservative care. Because of this, she elected for the  above procedure. OPERATIVE PROCEDURE:  After informed consent, she was given 3 gm of  Ancef, 1 gm of tranexamic acid, underwent adductor canal block and  spinal anesthetic. Tourniquet was placed around the right upper thigh. Left leg was placed in SCD. Hebert catheter was inserted.   Right leg

## 2023-05-04 NOTE — CARE COORDINATION
Marleny Rios RN Ortho Navigator  [phone]     Patient would like dme item to be delivered to Room #540. Please call if you have any questions. Electronically signed by Rupal Higgins RN on 5/4/2023 at 9:38 AM  Eliazar Rocio  5/3/2023 10:36 AM   Admission  Description: 47year old female Department: [facility] 5 Surg Services     Patient Demographics    Name Patient ID SSN Gender Identity Birth Date   Altagracia Castro [id number] Female [birth year] (52 yrs)     Address Phone Email     [address] [phone] (M)   [phone] (H) [email or web address]       Reg Status PCP Date Last Verified Next Review Date    Verified NATALI Barry  [phone] 04/13/23 05/13/23      Insurance Payors as of 5/4/2023    [facility] MEDICAID    Plan: Merlin Pete Member: [id number] Effective from: 1/1/2020   Subscriber: Toya Ureña ID: [id number] Guarantor: [facility]     Patient Contacts    Name Relation Home Work Mobile   Carmen Steele [phone]   [facility] [phone]     Electronically signed by Rupal Higgins RN on 5/4/2023 at 9:48 AM

## 2023-05-04 NOTE — CARE COORDINATION
Spoke with patient regarding MD orders for [facility] services. Patient agreeable and has chosen [facility]. Referral Faxed. [address] [place] [address] [phone]. [phone]. Please notify [facility] when patient discharges and fax DC Summary,  DC med list and any new [facility] orders. The Patient and/or patient representative was provided with a choice of provider and agrees   with the discharge plan. [x] Yes [] No    Freedom of choice list was provided with basic dialogue that supports the patient's individualized plan of care/goals, treatment preferences and shares the quality data associated with the providers.  [x] Yes [] No  Electronically signed by Fransisco Morales on 5/4/2023 at 10:21 AM

## 2023-05-05 VITALS
WEIGHT: 293 LBS | HEIGHT: 68 IN | TEMPERATURE: 98.1 F | OXYGEN SATURATION: 95 % | SYSTOLIC BLOOD PRESSURE: 91 MMHG | DIASTOLIC BLOOD PRESSURE: 63 MMHG | BODY MASS INDEX: 44.41 KG/M2 | HEART RATE: 101 BPM | RESPIRATION RATE: 16 BRPM

## 2023-05-05 LAB
ANION GAP SERPL CALCULATED.3IONS-SCNC: 13 MMOL/L (ref 7–19)
BUN SERPL-MCNC: 15 MG/DL (ref 6–20)
CALCIUM SERPL-MCNC: 8.4 MG/DL (ref 8.6–10)
CHLORIDE SERPL-SCNC: 100 MMOL/L (ref 98–111)
CO2 SERPL-SCNC: 25 MMOL/L (ref 22–29)
CREAT SERPL-MCNC: 1.2 MG/DL (ref 0.5–0.9)
GLUCOSE BLD-MCNC: 105 MG/DL (ref 70–99)
GLUCOSE SERPL-MCNC: 138 MG/DL (ref 74–109)
HCT VFR BLD AUTO: 32.9 % (ref 37–47)
HGB BLD-MCNC: 10.4 G/DL (ref 12–16)
IRON SATN MFR SERPL: 7 % (ref 14–50)
IRON SERPL-MCNC: 20 UG/DL (ref 37–145)
PERFORMED ON: ABNORMAL
POTASSIUM SERPL-SCNC: 4.5 MMOL/L (ref 3.5–5)
SODIUM SERPL-SCNC: 138 MMOL/L (ref 136–145)
TIBC SERPL-MCNC: 272 UG/DL (ref 250–400)
VIT B12 SERPL-MCNC: 555 PG/ML (ref 211–946)

## 2023-05-05 PROCEDURE — 85018 HEMOGLOBIN: CPT

## 2023-05-05 PROCEDURE — 96376 TX/PRO/DX INJ SAME DRUG ADON: CPT

## 2023-05-05 PROCEDURE — 85014 HEMATOCRIT: CPT

## 2023-05-05 PROCEDURE — 6360000002 HC RX W HCPCS: Performed by: ORTHOPAEDIC SURGERY

## 2023-05-05 PROCEDURE — 94760 N-INVAS EAR/PLS OXIMETRY 1: CPT

## 2023-05-05 PROCEDURE — 80048 BASIC METABOLIC PNL TOTAL CA: CPT

## 2023-05-05 PROCEDURE — G0378 HOSPITAL OBSERVATION PER HR: HCPCS

## 2023-05-05 PROCEDURE — 6370000000 HC RX 637 (ALT 250 FOR IP): Performed by: ORTHOPAEDIC SURGERY

## 2023-05-05 PROCEDURE — 6370000000 HC RX 637 (ALT 250 FOR IP): Performed by: PHYSICIAN ASSISTANT

## 2023-05-05 PROCEDURE — 83540 ASSAY OF IRON: CPT

## 2023-05-05 PROCEDURE — 2580000003 HC RX 258: Performed by: ORTHOPAEDIC SURGERY

## 2023-05-05 PROCEDURE — 97116 GAIT TRAINING THERAPY: CPT

## 2023-05-05 PROCEDURE — 83550 IRON BINDING TEST: CPT

## 2023-05-05 PROCEDURE — 82962 GLUCOSE BLOOD TEST: CPT

## 2023-05-05 PROCEDURE — 97530 THERAPEUTIC ACTIVITIES: CPT

## 2023-05-05 PROCEDURE — 82607 VITAMIN B-12: CPT

## 2023-05-05 PROCEDURE — 36415 COLL VENOUS BLD VENIPUNCTURE: CPT

## 2023-05-05 RX ORDER — FERROUS SULFATE 325(65) MG
325 TABLET ORAL 2 TIMES DAILY WITH MEALS
Status: DISCONTINUED | OUTPATIENT
Start: 2023-05-05 | End: 2023-05-05 | Stop reason: HOSPADM

## 2023-05-05 RX ADMIN — POLYETHYLENE GLYCOL 3350 17 G: 17 POWDER, FOR SOLUTION ORAL at 08:53

## 2023-05-05 RX ADMIN — CEFAZOLIN 3000 MG: 10 INJECTION, POWDER, FOR SOLUTION INTRAVENOUS at 05:57

## 2023-05-05 RX ADMIN — DOCUSATE SODIUM 100 MG: 100 CAPSULE, LIQUID FILLED ORAL at 08:52

## 2023-05-05 RX ADMIN — ACETAMINOPHEN 650 MG: 325 TABLET ORAL at 05:57

## 2023-05-05 RX ADMIN — OXYCODONE 15 MG: 5 TABLET ORAL at 01:19

## 2023-05-05 RX ADMIN — BISACODYL 5 MG: 5 TABLET, COATED ORAL at 08:52

## 2023-05-05 RX ADMIN — FERROUS SULFATE TAB 325 MG (65 MG ELEMENTAL FE) 325 MG: 325 (65 FE) TAB at 08:52

## 2023-05-05 RX ADMIN — HYDROMORPHONE HYDROCHLORIDE 0.25 MG: 1 INJECTION, SOLUTION INTRAMUSCULAR; INTRAVENOUS; SUBCUTANEOUS at 03:24

## 2023-05-05 RX ADMIN — CEFAZOLIN 3000 MG: 10 INJECTION, POWDER, FOR SOLUTION INTRAVENOUS at 00:23

## 2023-05-05 RX ADMIN — GABAPENTIN 800 MG: 400 CAPSULE ORAL at 03:25

## 2023-05-05 RX ADMIN — SODIUM CHLORIDE, PRESERVATIVE FREE 10 ML: 5 INJECTION INTRAVENOUS at 08:59

## 2023-05-05 RX ADMIN — TRAMADOL HYDROCHLORIDE 100 MG: 50 TABLET, COATED ORAL at 05:57

## 2023-05-05 RX ADMIN — ARIPIPRAZOLE 5 MG: 5 TABLET ORAL at 08:52

## 2023-05-05 RX ADMIN — OXYCODONE 15 MG: 5 TABLET ORAL at 08:52

## 2023-05-05 RX ADMIN — DULOXETINE 40 MG: 20 CAPSULE, DELAYED RELEASE ORAL at 08:51

## 2023-05-05 ASSESSMENT — PAIN DESCRIPTION - LOCATION
LOCATION: KNEE

## 2023-05-05 ASSESSMENT — PAIN SCALES - GENERAL
PAINLEVEL_OUTOF10: 5
PAINLEVEL_OUTOF10: 6
PAINLEVEL_OUTOF10: 8
PAINLEVEL_OUTOF10: 3

## 2023-05-05 ASSESSMENT — PAIN DESCRIPTION - DESCRIPTORS
DESCRIPTORS: ACHING

## 2023-05-05 ASSESSMENT — PAIN DESCRIPTION - ORIENTATION
ORIENTATION: RIGHT

## 2023-05-05 ASSESSMENT — PAIN DESCRIPTION - PAIN TYPE: TYPE: SURGICAL PAIN

## 2023-05-05 ASSESSMENT — PAIN - FUNCTIONAL ASSESSMENT: PAIN_FUNCTIONAL_ASSESSMENT: PREVENTS OR INTERFERES SOME ACTIVE ACTIVITIES AND ADLS

## 2023-05-05 NOTE — DISCHARGE SUMMARY
[facility]  Dr. Angeles Alford  Discharge Summary     Eliazar Chan is a 47 y.o. female underwent right total knee replacement procedure without complication. Eliazar Chan was admitted to the floor following her   recovery in the PACU.      Discharge Diagnosis   Right Knee Replacement    Current Inpatient Medications    Current Facility-Administered Medications: ferrous sulfate (IRON 325) tablet 325 mg, 325 mg, Oral, BID WC  docusate sodium (COLACE) capsule 100 mg, 100 mg, Oral, TID  polyethylene glycol (GLYCOLAX) packet 17 g, 17 g, Oral, BID  lisinopril (PRINIVIL;ZESTRIL) tablet 10 mg, 10 mg, Oral, BID  gabapentin (NEURONTIN) capsule 800 mg, 800 mg, Oral, Q8H  tiZANidine (ZANAFLEX) tablet 4 mg, 4 mg, Oral, Q8H PRN  atorvastatin (LIPITOR) tablet 40 mg, 40 mg, Oral, Nightly  Semaglutide (2 MG/DOSE) SOPN 2 mg (Patient Supplied), 2 mg, SubCUTAneous, Weekly  traZODone (DESYREL) tablet 300 mg, 300 mg, Oral, Nightly PRN  ARIPiprazole (ABILIFY) tablet 5 mg, 5 mg, Oral, Daily  DULoxetine (CYMBALTA) extended release capsule 40 mg, 40 mg, Oral, Daily  co-enzyme Q-10 capsule 30 mg (Patient Supplied), 30 mg, Oral, Daily  traZODone (DESYREL) tablet 300 mg, 300 mg, Oral, QPM  glucose chewable tablet 16 g, 4 tablet, Oral, PRN  dextrose bolus 10% 125 mL, 125 mL, IntraVENous, PRN **OR** dextrose bolus 10% 250 mL, 250 mL, IntraVENous, PRN  glucagon (rDNA) injection 1 mg, 1 mg, SubCUTAneous, PRN  dextrose 10 % infusion, , IntraVENous, Continuous PRN  0.9 % sodium chloride bolus, 500 mL, IntraVENous, PRN  sodium chloride flush 0.9 % injection 5-40 mL, 5-40 mL, IntraVENous, 2 times per day  sodium chloride flush 0.9 % injection 5-40 mL, 5-40 mL, IntraVENous, PRN  0.9 % sodium chloride infusion, , IntraVENous, PRN  acetaminophen (TYLENOL) tablet 650 mg, 650 mg, Oral, Q6H  ceFAZolin (ANCEF) 3000 mg in sodium chloride 0.9% 100 mL IVPB, 3,000 mg, IntraVENous, Q8H  bisacodyl (DULCOLAX) EC tablet 5 mg, 5 mg,

## 2023-05-05 NOTE — PROGRESS NOTES
4 Eyes Skin Assessment    Christopher Carranza is being assessed upon: Admission    I agree that I, Joy Gomes RN, along with Jonas Diaz (either 2 RN's or 1 LPN and 1 RN) have performed a thorough Head to Toe Skin Assessment on the patient. ALL assessment sites listed below have been assessed. Areas assessed by both nurses:     [x]   Head, Face, and Ears   [x]   Shoulders, Back, and Chest  [x]   Arms, Elbows, and Hands   [x]   Coccyx, Sacrum, and Ischium  [x]   Legs, Feet, and Heels    Does the Patient have Skin Breakdown?  No     Prevention initiated: No  Wound Care Orders initiated: No    WOC nurse consulted for Pressure Injury (Stage 3,4, Unstageable, DTI, NWPT, and Complex wounds) and New or Established Ostomies: No        Primary Nurse eSignature: Joy Gomes RN on 5/3/2023 at 6:56 PM      Co-Signer eSignature: Electronically signed by Jonas Diaz RN on 5/3/23 at 7:23 PM CDT
CLINICAL PHARMACY NOTE: MEDS TO BEDS    Total # of Prescriptions Filled: 4   The following medications were delivered to the patient:  Discharge Medication List as of 5/5/2023  9:11 AM        START taking these medications    Details   rivaroxaban (XARELTO) 10 MG TABS tablet One tablet PO QD x 21 days. After this is completed: Take ASA 81mg po BID x 3 weeks. (Only if you can tolerate aspirin.) Then resume previous aspirin regimen or discontinue if not previously on aspirin., Disp-21 tablet, R-0Normal      doxycycline hyclate (VIBRA-TABS) 100 MG tablet Take 1 tablet by mouth 2 times daily for 5 days, Disp-10 tablet, R-0Normal      ondansetron (ZOFRAN) 4 MG tablet Take 1 tablet by mouth every 8 hours as needed for Nausea or Vomiting, Disp-30 tablet, R-0Normal      oxyCODONE (ROXICODONE) 5 MG immediate release tablet Take 1 tablet by mouth See Admin Instructions for 49 days. 1 to 3 tablets po q 3 to 4 hours prn, Disp-90 tablet, R-0Normal             Additional Documentation:     Delivered Rx's to patients room. Gave Rx's to patients spouse at bedside. Paid $0.00.
[facility]  Daily Progress Note  June Busch  MRN: [id number] LOS: 0    Admit Date: 5/3/2023   5/5/2023 7:04 AM          Chief Complaint:  Knee pain      Interval History:    Reviewed overnight events and nursing notes. Status:  improved  Pain:  some relief        ROS:  10 point ROS obtained:   Gen: No fevers  HEENT: No migraine or visual change  Lung: No cough, hemopotysis or wheezing  Cv: No chest pain or pressure  Abd: No nausea or vomit, no change in bowel fct, no gi bleeding  Ext: No inc pain or swelling  Neuro: No seizure or syncope  Skin: No new rashes  Endo: No polyuria, polyphagia or poilydypsia  Psyc: No inc anxiety or depression  MS: No increase in joint pain or swelling reported    DIET:  ADULT DIET;  Regular; 4 carb choices (60 gm/meal)    Medications:      dextrose      sodium chloride        docusate sodium  100 mg Oral TID    polyethylene glycol  17 g Oral BID    lisinopril  10 mg Oral BID    gabapentin  800 mg Oral Q8H    atorvastatin  40 mg Oral Nightly    Semaglutide (2 MG/DOSE)  2 mg SubCUTAneous Weekly    ARIPiprazole  5 mg Oral Daily    DULoxetine  40 mg Oral Daily    co-enzyme Q-10  30 mg Oral Daily    traZODone  300 mg Oral QPM    sodium chloride flush  5-40 mL IntraVENous 2 times per day    acetaminophen  650 mg Oral Q6H    ceFAZolin (ANCEF) IVPB  3,000 mg IntraVENous Q8H    bisacodyl  5 mg Oral Daily    insulin lispro  0-4 Units SubCUTAneous TID WC    insulin lispro  0-4 Units SubCUTAneous Nightly    traMADol  100 mg Oral Q6H    rivaroxaban  10 mg Oral Daily       Data:     Code Status: Full Code    Family History   Problem Relation Age of Onset    High Cholesterol Mother     Other Mother         afib    Diabetes Father     Cancer Father         Bladder and Lung    Breast Cancer Sister         1/2 Sister    Diabetes Maternal Grandfather     Diabetes Paternal Grandmother      Social History     Socioeconomic History    Marital status:      Spouse name: Not on file
Occupational Therapy  Facility/Department: [facility]  Daily Treatment Note  NAME: Nory Mckeon  : 1970  MRN: [id number]    Date of Service: 2023    Discharge Recommendations:  Patient would benefit from continued therapy after discharge       Assessment   Assessment: Pt tolerated tx well. Pt continues benefit from skilled OT services. Prognosis: Good  Activity Tolerance  Activity Tolerance: Patient Tolerated treatment well         Patient Diagnosis(es): The primary encounter diagnosis was Arthritis of knee. A diagnosis of Primary osteoarthritis of right knee was also pertinent to this visit. has a past medical history of Anxiety, Arthritis, Chronic pain, Depression, Diabetes mellitus type 2 in obese ([address] [address] Hyperlipidemia, Hypertension, Knee pain, and Premenopausal patient. has a past surgical history that includes  section; Tubal ligation; tendon release; Tonsillectomy; and Total knee arthroplasty (Right, 5/3/2023). Restrictions  Restrictions/Precautions  Restrictions/Precautions: Weight Bearing, Fall Risk  Required Braces or Orthoses?: No  Lower Extremity Weight Bearing Restrictions  Right Lower Extremity Weight Bearing: Weight Bearing As Tolerated  Subjective   General  Chart Reviewed: Yes  Patient assessed for rehabilitation services?: Yes  Response to previous treatment: Patient with no complaints from previous session  Family / Caregiver Present: Yes (spouse)  Pre Treatment Pain Screening  Pain at present: 7  Scale Used: Numeric Score  Intervention List: Patient able to continue with treatment  Comments / Details: Aching/sore.    Orientation     Objective                Bed mobility  Supine to Sit: Stand by assistance  Transfers  Stand Step Transfers: Contact guard assistance  Sit to stand: Contact guard assistance  Stand to sit: Contact guard assistance  Transfer Comments: with pippa ROBLES                                                           Plan   Occupational Therapy
Occupational Therapy Initial Assessment  Date: 2023   Patient Name: Chavez Wilkerson  MRN: [id number]     : 1970    Date of Service: 2023    Discharge Recommendations:  24 hour supervision or assist       Assessment   Assessment: OT evaluation completed and tx initiated. Pt may benefit from continued skilled services to address functional deficits. Pt will likely progress with further tx. OT does not anticipate any environmental barriers to D/C home once medically cleared if she can demo proficient stair training with therapy for DC home. Currently pt is at risk for falls/fall-related injuries. Therefore, OT recommends D/C location to have 24/7 supervision/assist.  REQUIRES OT FOLLOW-UP: Yes  Activity Tolerance  Activity Tolerance: Patient Tolerated treatment well              Patient Diagnosis(es): The primary encounter diagnosis was Arthritis of knee. A diagnosis of Primary osteoarthritis of right knee was also pertinent to this visit.     Past Medical History:   Past Medical History:   Diagnosis Date    Anxiety     Arthritis     Chronic pain     Depression     Diabetes mellitus type 2 in obese [facility])     Hyperlipidemia     Hypertension     Knee pain     Premenopausal patient 2018        Past Surgical History:   Past Surgical History:   Procedure Laterality Date     SECTION      x3    TENDON RELEASE      right finger    TONSILLECTOMY      TOTAL KNEE ARTHROPLASTY Right 5/3/2023    COMPLEX PRIMARY ROBOTIC RIGHT KNEE TOTAL ARTHROPLASTY performed by Reuben Main MD at [address] [place] [address]                Restrictions  Restrictions/Precautions  Restrictions/Precautions: Weight Bearing, Fall Risk  Required Braces or Orthoses?: No  Lower Extremity Weight Bearing Restrictions  Right Lower Extremity Weight Bearing: Weight Bearing As Tolerated    Subjective      Pain Assessment  Pain Level: 7  Patient's Stated Pain Goal: 0 - No pain  Pain Location: Knee  Pain Orientation: Right  Pain
Patient discharged home today with [facility]. Went over all discharge instructions and new medications with patient and provided a copy of all new medications to take home. Patient verbalized understanding. Patient was stable upon discharge.    Electronically signed by Shelly Haskins RN on 5/5/2023 at 11:29 AM
Patient discharged home today with [facility]. Facility notified of patient's discharge and all documents were faxed. P ; F .    Electronically signed by Dayron Laura RN on 5/5/2023 at 11:29 AM
Physical Therapy     05/05/23 9163   Subjective   Subjective Pt wants to work with therapy. Pain Assessment   Pain Assessment 0-10   Pain Level 5   Patient's Stated Pain Goal 0 - No pain   Pain Location Knee   Pain Orientation Right   Pain Descriptors Aching   Functional Pain Assessment Prevents or interferes some active activities and ADLs   Pain Type Surgical pain   Non-Pharmaceutical Pain Intervention(s) Ambulation/Increased Activity   Transfers   Sit to Stand Contact guard assistance   Stand to Sit Contact guard assistance   Ambulation   Surface Level tile   Device Rolling Walker   Assistance Contact guard assistance   Gait Deviations Slow Azul;Decreased step length;Decreased step height   Distance 30', 10'   Stairs/Curb   Stairs? Yes   Stairs   # Steps  5   Stairs Height 6\"   Rails Bilateral   Assistance Contact guard assistance   Short Term Goals   Time Frame for Short Term Goals 2 WKS   Short Term Goal 1  FT WITH RW, SBA   Short Term Goal 2 STEPS, CGA   Conditions Requiring Skilled Therapeutic Intervention   Body Structures, Functions, Activity Limitations Requiring Skilled Therapeutic Intervention Decreased functional mobility ; Decreased ROM; Decreased strength; Increased pain;Decreased endurance   Assessment Pt was able to amb into yuan and go up and down steps using hand rails. Spouse was in attendance and assisting. Therapy Prognosis Good   Activity Tolerance   Activity Tolerance Patient limited by pain; Patient limited by endurance   PT Plan of Care   Friday X   Safety Devices   Type of Devices Left in chair;Call light within reach
Physical Therapy  Name: Khushi Acosta  MRN:  [id number]  Date of service:  5/4/2023 05/04/23 1454   Restrictions/Precautions   Restrictions/Precautions Weight Bearing; Fall Risk   Lower Extremity Weight Bearing Restrictions   Right Lower Extremity Weight Bearing Weight Bearing As Tolerated   Subjective   Subjective Pt in bed, agrees to walk again. Pain Assessment   Pain Assessment 0-10   Pain Level 7   Pain Location Knee   Pain Orientation Right   Pain Descriptors Aching; Sore   Functional Pain Assessment Prevents or interferes some active activities and ADLs   Pain Type Acute pain;Surgical pain   Bed Mobility   Supine to Sit Stand by assistance   Transfers   Sit to Stand Contact guard assistance   Stand to Sit Contact guard assistance   Ambulation   Surface Level tile   Device Rolling Walker   Assistance Contact guard assistance   Gait Deviations Slow Azul;Decreased step length;Decreased step height   Distance 50'   Short Term Goals   Time Frame for Short Term Goals 2 WKS   Short Term Goal 1  FT WITH RW, SBA   Short Term Goal 2 STEPS, CGA   Conditions Requiring Skilled Therapeutic Intervention   Body Structures, Functions, Activity Limitations Requiring Skilled Therapeutic Intervention Decreased functional mobility ; Decreased ROM; Decreased strength; Increased pain;Decreased endurance   Assessment Pt did well with short amb distance with rwx, steady overall with no LOB. Pt up to recliner with all needs in reach post gait. Activity Tolerance   Activity Tolerance Patient tolerated treatment well   PT Plan of Care   Thursday X   Safety Devices   Type of Devices Call light within reach; Chair alarm in place;Gait belt;Left in chair           Electronically signed by Angie Stout PTA on 5/4/2023 at 2:56 PM
Social Determinants of Health     Tobacco Use: Low Risk     Smoking Tobacco Use: Never    Smokeless Tobacco Use: Never    Passive Exposure: Never   Alcohol Use: Not At Risk    Frequency of Alcohol Consumption: Monthly or less    Average Number of Drinks: 1 or 2    Frequency of Binge Drinking: Never   Financial Resource Strain: High Risk    Difficulty of Paying Living Expenses: Very hard   Food Insecurity: Food Insecurity Present    Worried About Running Out of Food in the Last Year: Often true    Ran Out of Food in the Last Year: Often true   Transportation Needs: No Transportation Needs    Lack of Transportation (Medical): No    Lack of Transportation (Non-Medical): No   Physical Activity: Insufficiently Active    Days of Exercise per Week: 3 days    Minutes of Exercise per Session: 20 min   Stress: Stress Concern Present    Feeling of Stress : To some extent   Social Connections: Moderately Isolated    Frequency of Communication with Friends and Family: More than three times a week    Frequency of Social Gatherings with Friends and Family: Once a week    Attends Voodoo Services: 1 to 4 times per year    Active Member of [employer] or Organizations: No    Attends Club or Organization Meetings: Never    Marital Status:    Intimate Partner Violence: Not At Risk    Fear of Current or Ex-Partner: No    Emotionally Abused: No    Physically Abused: No    Sexually Abused: No   Depression: Not at risk    PHQ-2 Score: 0   Housing Stability: [address] to Pay for Housing in the Last Year: No    Number of [place] in the Last Year: 1    Unstable Housing in the Last Year: No     Depression screening from [facility] Marybeth Gaviria interest or pleasure in doing things: Several days  Feeling down, depressed, or hopeless: Several days  Patient Health Questionnaire-2 Score: 2    PHQ-9 Score (if applicable) No data recorded     SDOH: Patient Able to answer Social Determinant of Health screening questions.      History
Subjective:     Post-Operative Day: 2 Status Post right TKA. Pt is awake and alert. Ox3. Doing better this am.  She was able to ambulate 90 ft with PT yesterday. No new issues. Systemic or Specific Complaints: No Complaints  no nausea and no vomiting Pain 3    Objective:     Patient Vitals for the past 24 hrs:   BP Temp Temp src Pulse Resp SpO2   05/05/23 0813 91/63 98.1 °F (36.7 °C) Temporal (!) 101 16 95 %   05/05/23 0444 104/63 98.1 °F (36.7 °C) Temporal (!) 101 20 91 %   05/05/23 0019 (!) 95/58 98.6 °F (37 °C) Temporal (!) 103 16 92 %   05/04/23 2108 103/72 97.3 °F (36.3 °C) Oral (!) 107 20 91 %   05/04/23 1607 113/80 97.7 °F (36.5 °C) Temporal (!) 106 16 92 %   05/04/23 1240 (!) 142/96 98.1 °F (36.7 °C) Temporal (!) 108 16 95 %   05/04/23 1110 -- -- -- -- -- 92 %       General: alert, appears stated age, cooperative, no distress, and morbidly obese   Exam: Fair active motion to right lower extremity   Wound: Wound clean and dry no evidence of infection. , No Drainage and Wound Intact   Neurovascular: Exam normal     DVT Exam: No evidence of DVT seen on physical exam.   Xray:  none       Data Review:  Recent Labs     05/04/23  0303 05/05/23  0300   HGB 11.9* 10.4*     Recent Labs     05/05/23  0300      K 4.5   CREATININE 1.2*     Recent Labs     05/05/23  0300   LABGLOM 54*     No results for input(s): INR in the last 72 hours. Assessment:     Status Post right TKA. Plan:     Pt is awake and alert. Ox3. Doing okay this am. Plan for home today per total knee protocol. She may f/u in office in 6 weeks.       Electronically signed by Janene Randall PA-C on 5/5/2023 at 8:48 AM
Restrictions  Right Lower Extremity Weight Bearing: Weight Bearing As Tolerated     Subjective   General  Patient assessed for rehabilitation services?: Yes  Diagnosis: R TKR  Follows Commands: Within Functional Limits  Subjective  Subjective: pt STATES SHE IS READY TO AMB TO THE BR AND SIT UP IN RECLINER. HAS HAD PAIN MEDS         Social/Functional History  Social/Functional History  Lives With: Son (\"sari\" will be there to help)  Type of Home: House  Home Layout: One level  Home Access: Stairs to enter with rails  Entrance Stairs - Number of Steps: 4  Bathroom Shower/Tub: Walk-in shower  Bathroom Equipment: Shower chair  Home Equipment: Crutches, Rollator  Has the patient had two or more falls in the past year or any fall with injury in the past year?: No  ADL Assistance: [address] [place] [address] Avenue: Independent  Ambulation Assistance: Independent  Transfer Assistance: Independent  Active : Yes  Additional Comments: Reports being independent with ADLs but requiring more time.   Vision/Hearing  Vision  Vision: Within Functional Limits  Hearing  Hearing: Within functional limits    Cognition   Orientation  Overall Orientation Status: Within Functional Limits  Cognition  Overall Cognitive Status: WFL     Objective   Pulse: 96  Heart Rate Source: Monitor  BP: (!) 151/88  BP Location: Right lower arm  Patient Position: Sitting  MAP (Calculated): 109  Respirations: 16  SpO2: 95 %  O2 Device: None (Room air)     Observation/Palpation  Observation: CP REFRESHED AND RETURNED TO KNEE        AROM RLE (degrees)  RLE General AROM: ABLE TO SIT AND FLEX KNEE TO GROSSLY 75-80 DEG  AROM LLE (degrees)  LLE AROM : WFL  Strength RLE  Comment: EXTENDS KNEE AGAINST GRAVITY  Strength LLE  Strength LLE: WFL           Bed mobility  Supine to Sit: Contact guard assistance;Minimal assistance  Transfers  Sit to Stand: Contact guard assistance;Minimal Assistance  Stand to Sit: Contact guard assistance  Ambulation  Surface: Level
evidence of DVT seen on physical exam.   Xray:  right Knee implants in good position        Data Review:  Recent Labs     05/04/23 0303   HGB 11.9*     Recent Labs     05/04/23 0303      K 4.8   CREATININE 1.1*     Recent Labs     05/04/23 0303   LABGLOM 60*     No results for input(s): INR in the last 72 hours. Assessment:     Status Post right TKA. Plan:     Pt is awake and alert. Ox3. Doing okay this am.  Pain control today. Plan for home tomorrow if stable.       Electronically signed by Gomez Santo PA-C on 5/4/2023 at 7:19 AM

## 2023-05-05 NOTE — DISCHARGE INSTRUCTIONS
Dr [facility] [employer] Total Knee Replacement Discharge Instructions     *Elevate legs at all times when not walking     * Use Ice Pack to affected extremity as needed for swelling and pain     * HOMEWORK          Be able to fully straighten leg by post-op appointment. This is the most important thing to work on!!!          Use the ankle pillow or a towel roll under the ankle to work on straightening          You may work on bending the knee also    *Surgical Site Care: The adhesive (glue strip) dressing can be removed in 3 weeks. May shower on Friday and clean wound but do not scrub incision. Pat dry. NO tub bathing or swimming. Wash hands frequently during the day. *Activity:      SAFETY FIRST ! [address] !!!                 Home Health therapy will come to the house three times a week:                    Get in and out of your bed                                                                                           Getting up and down out of the chair                                                                   Bathroom  / bathing activities                                                                                Do NOT walk for exercise ( it will increase pain and swelling )                            Walk several times a day but only for short distances             *Pain Medicines:          Keep a LOG of your medicines to track when pain med is due          Take prescribed medicine as needed for pain          Take Tylenol as your pain decreases          Take a stool softener of your choice while taking pain medications as they are very constipating ( examples:  colace  dulcolax  fiber   prune juice )      *To prevent blood clots: Take prescribed blood thinner as directed.   (Xarelto 10 mg daily for 3 weeks)          After completing, you will take Aspirin 81 mg twice a day for the next three weeks           While taking the blood thinner and Aspirin,

## 2023-05-05 NOTE — DISCHARGE INSTR - DIET
Good nutrition is important when healing from an illness, injury, or surgery. Follow any nutrition recommendations given to you during your hospital stay. If you were given an oral nutrition supplement while in the hospital, continue to take this supplement at home. You can take it with meals, in-between meals, and/or before bedtime. These supplements can be purchased at most local grocery stores, pharmacies, and chain [facility]-stores. If you have any questions about your diet or nutrition, call the hospital and ask for the dietitian.              Low carb / High protein

## 2023-05-08 ENCOUNTER — TELEPHONE (OUTPATIENT)
Dept: INTERNAL MEDICINE | Age: 53
End: 2023-05-08

## 2023-05-08 ENCOUNTER — TELEPHONE (OUTPATIENT)
Dept: INPATIENT UNIT | Age: 53
End: 2023-05-08

## 2023-05-08 RX ORDER — ATORVASTATIN CALCIUM 20 MG/1
TABLET, FILM COATED ORAL
Qty: 90 TABLET | Refills: 1 | Status: SHIPPED | OUTPATIENT
Start: 2023-05-08

## 2023-05-08 NOTE — TELEPHONE ENCOUNTER
Faith Matthew called to request a refill on her medication.       Last office visit : 2/21/2023   Next office visit : 5/22/2023     Requested Prescriptions     Pending Prescriptions Disp Refills    atorvastatin (LIPITOR) 20 MG tablet [Pharmacy Med Name: ATORVASTATIN 20MG TABLETS] 90 tablet 1     Sig: TAKE 1 TABLET BY MOUTH DAILY            Sara Aparicio LPN

## 2023-05-08 NOTE — TELEPHONE ENCOUNTER
[facility] Transitions Initial Follow Up Call    Outreach made within 2 business days of discharge: Yes    Patient: Rosina Simmons   Patient : 1970 MRN: [id number]    Reason for Admission: Right Knee Replacement    Discharge Date: 23      Discharge Diagnosis   Right Knee Replacement     Spoke with: Patient    Discharge department/facility: [place] [address]    TCM Interactive Patient Contact:  Was patient able to fill all prescriptions: Yes  Was patient instructed to bring all medications to the follow-up visit: Yes  Is patient taking all medications as directed in the discharge summary? Yes  Does patient understand their discharge instructions: Yes  Does patient have questions or concerns that need addressed prior to 7-14 day follow up office visit: no    Spoke to the patient she is doing well. She is having  pain in her knee. Physical therapy is there now with her. She is using her pain medication. She is scheduled for this week for TCM. She does not think she needs anything before that apt.     RECORDS IN CHART  PT    Scheduled appointment with PCP within 7-14 days    Follow Up  Future Appointments   Date Time Provider Sherly Patterson    76:81 AM NATALI Schneider [facility]   2023  3:00 PM NATALI Garcia OB/GYN Treasure Chen, MA

## 2023-05-10 ENCOUNTER — OFFICE VISIT (OUTPATIENT)
Dept: PRIMARY CARE CLINIC | Age: 53
End: 2023-05-10

## 2023-05-10 VITALS
HEART RATE: 91 BPM | OXYGEN SATURATION: 92 % | TEMPERATURE: 97.3 F | HEIGHT: 68 IN | WEIGHT: 293 LBS | DIASTOLIC BLOOD PRESSURE: 76 MMHG | BODY MASS INDEX: 44.41 KG/M2 | SYSTOLIC BLOOD PRESSURE: 110 MMHG

## 2023-05-10 DIAGNOSIS — Z09 HOSPITAL DISCHARGE FOLLOW-UP: Primary | ICD-10-CM

## 2023-05-10 DIAGNOSIS — F41.9 ANXIETY: ICD-10-CM

## 2023-05-10 NOTE — PROGRESS NOTES
Post-Discharge Transitional Care  Follow Up      Chelita Box   YOB: 1970    Date of Office Visit:  5/10/2023  Date of Hospital Admission: 5/3/23  Date of Hospital Discharge: 5/5/23  Risk of hospital readmission (high >=14%. Medium >=10%) :No data recorded    Care management risk score Rising risk (score 2-5) and Complex Care (Scores >=6): No Risk Score On File     Non face to face  following discharge, date last encounter closed (first attempt may have been earlier): 05/08/2023    Call initiated 2 business days of discharge: Yes    ASSESSMENT/PLAN:   Hospital discharge follow-up  -     VT DISCHARGE MEDS RECONCILED W/ CURRENT OUTPATIENT MED LIST  Anxiety      Medical Decision Making: moderate complexity  No follow-ups on file. On this date 5/10/2023 I have spent 15 minutes reviewing previous notes, test results and face to face with the patient discussing the diagnosis and importance of compliance with the treatment plan as well as documenting on the day of the visit. Subjective:   HPI:  Follow up of Hospital problems/diagnosis(es):  right TKR per Dr Patel Thurman course: Discharge summary reviewed- see chart. Edward Medina is a 47 y.o. female underwent right total knee replacement procedure without complication. Chelita Box was admitted to the floor following her   recovery in the PACU. Discharge Diagnosis   Right Knee Replacement     Current Inpatient Medications      Current Hospital Medications     Post-operatively the patients diet was advanced as tolerated and their incision was checked on POD #1. The incision was clean, dry and intact with no signs of infection. The patient remained neurovascularly intact in the lower extremity and had intact pulses distally. Patients calf remained soft and showed no evidence of DVT. The patient was able to move her right leg and ankle/foot without any problems post-operatively.   Physical therapy and occupational

## 2023-06-07 ENCOUNTER — OFFICE VISIT (OUTPATIENT)
Dept: PRIMARY CARE CLINIC | Age: 53
End: 2023-06-07
Payer: MEDICARE

## 2023-06-07 VITALS
BODY MASS INDEX: 44.41 KG/M2 | DIASTOLIC BLOOD PRESSURE: 84 MMHG | TEMPERATURE: 96.9 F | HEIGHT: 68 IN | SYSTOLIC BLOOD PRESSURE: 114 MMHG | HEART RATE: 86 BPM | WEIGHT: 293 LBS | OXYGEN SATURATION: 96 %

## 2023-06-07 DIAGNOSIS — Z00.00 INITIAL MEDICARE ANNUAL WELLNESS VISIT: Primary | ICD-10-CM

## 2023-06-07 DIAGNOSIS — F41.9 ANXIETY AND DEPRESSION: ICD-10-CM

## 2023-06-07 DIAGNOSIS — E11.69 TYPE 2 DIABETES MELLITUS WITH OTHER SPECIFIED COMPLICATION, WITHOUT LONG-TERM CURRENT USE OF INSULIN (HCC): ICD-10-CM

## 2023-06-07 DIAGNOSIS — Z12.11 COLON CANCER SCREENING: ICD-10-CM

## 2023-06-07 DIAGNOSIS — I10 ESSENTIAL HYPERTENSION: ICD-10-CM

## 2023-06-07 DIAGNOSIS — F32.A ANXIETY AND DEPRESSION: ICD-10-CM

## 2023-06-07 PROCEDURE — G0438 PPPS, INITIAL VISIT: HCPCS | Performed by: NURSE PRACTITIONER

## 2023-06-07 PROCEDURE — 3044F HG A1C LEVEL LT 7.0%: CPT | Performed by: NURSE PRACTITIONER

## 2023-06-07 PROCEDURE — 3074F SYST BP LT 130 MM HG: CPT | Performed by: NURSE PRACTITIONER

## 2023-06-07 PROCEDURE — 3078F DIAST BP <80 MM HG: CPT | Performed by: NURSE PRACTITIONER

## 2023-06-07 ASSESSMENT — PATIENT HEALTH QUESTIONNAIRE - PHQ9
SUM OF ALL RESPONSES TO PHQ9 QUESTIONS 1 & 2: 0
6. FEELING BAD ABOUT YOURSELF - OR THAT YOU ARE A FAILURE OR HAVE LET YOURSELF OR YOUR FAMILY DOWN: 0
7. TROUBLE CONCENTRATING ON THINGS, SUCH AS READING THE NEWSPAPER OR WATCHING TELEVISION: 0
10. IF YOU CHECKED OFF ANY PROBLEMS, HOW DIFFICULT HAVE THESE PROBLEMS MADE IT FOR YOU TO DO YOUR WORK, TAKE CARE OF THINGS AT HOME, OR GET ALONG WITH OTHER PEOPLE: 0
2. FEELING DOWN, DEPRESSED OR HOPELESS: 0
1. LITTLE INTEREST OR PLEASURE IN DOING THINGS: 0
8. MOVING OR SPEAKING SO SLOWLY THAT OTHER PEOPLE COULD HAVE NOTICED. OR THE OPPOSITE, BEING SO FIGETY OR RESTLESS THAT YOU HAVE BEEN MOVING AROUND A LOT MORE THAN USUAL: 0
SUM OF ALL RESPONSES TO PHQ QUESTIONS 1-9: 0
SUM OF ALL RESPONSES TO PHQ QUESTIONS 1-9: 0
9. THOUGHTS THAT YOU WOULD BE BETTER OFF DEAD, OR OF HURTING YOURSELF: 0
4. FEELING TIRED OR HAVING LITTLE ENERGY: 0
SUM OF ALL RESPONSES TO PHQ QUESTIONS 1-9: 0
5. POOR APPETITE OR OVEREATING: 0
3. TROUBLE FALLING OR STAYING ASLEEP: 0
SUM OF ALL RESPONSES TO PHQ QUESTIONS 1-9: 0

## 2023-06-07 ASSESSMENT — LIFESTYLE VARIABLES
HOW OFTEN DO YOU HAVE A DRINK CONTAINING ALCOHOL: NEVER
HOW MANY STANDARD DRINKS CONTAINING ALCOHOL DO YOU HAVE ON A TYPICAL DAY: PATIENT DOES NOT DRINK

## 2023-06-09 DIAGNOSIS — F41.9 ANXIETY: ICD-10-CM

## 2023-06-09 NOTE — TELEPHONE ENCOUNTER
Adrien Cooper called to request a refill on her medication. Last office visit : 6/7/2023   Next office visit : Visit date not found     Last UDS:   Amphetamine Screen, Urine   Date Value Ref Range Status   12/06/2022 -  Final     Barbiturates   Date Value Ref Range Status   01/17/2011 Negative () Final     Barbiturate Screen, Urine   Date Value Ref Range Status   12/06/2022 -  Final     Benzodiazepine Screen, Urine   Date Value Ref Range Status   12/06/2022 +  Final     Buprenorphine Urine   Date Value Ref Range Status   12/06/2022 -  Final     Cocaine Metabolite Screen, Urine   Date Value Ref Range Status   12/06/2022 -  Final     Gabapentin Screen, Urine   Date Value Ref Range Status   12/06/2022 -  Final     MDMA, Urine   Date Value Ref Range Status   12/06/2022 -  Final     Methamphetamine, Urine   Date Value Ref Range Status   12/06/2022 -  Final     Opiate Scrn, Ur   Date Value Ref Range Status   12/06/2022 +  Final     Oxycodone Screen, Ur   Date Value Ref Range Status   12/06/2022 -  Final     PCP Screen, Urine   Date Value Ref Range Status   12/06/2022 -  Final     Propoxyphene   Date Value Ref Range Status   01/17/2011 Negative () Final     Propoxyphene Screen, Urine   Date Value Ref Range Status   12/06/2022 -  Final     THC Screen, Urine   Date Value Ref Range Status   12/06/2022 -  Final     Tricyclic Antidepressants, Urine   Date Value Ref Range Status   12/06/2022 -  Final       Last Jasmine Hannadb: 6/9/23  Medication Contract: 3/4/22 needs updating   Last Fill: 5/10/23    Requested Prescriptions     Pending Prescriptions Disp Refills    ALPRAZolam (XANAX) 0.25 MG tablet [Pharmacy Med Name: ALPRAZOLAM 0.25MG TABLETS] 30 tablet      Sig: TAKE 1 TABLET BY MOUTH EVERY DAY AS NEEDED FOR ANXIETY         Please approve or refuse this medication.    Nikolai Kinney

## 2023-06-15 RX ORDER — ALPRAZOLAM 0.25 MG/1
TABLET ORAL
Qty: 30 TABLET | Refills: 0 | Status: SHIPPED | OUTPATIENT
Start: 2023-06-15 | End: 2023-07-13

## 2023-07-05 DIAGNOSIS — E11.00 TYPE 2 DIABETES MELLITUS WITH HYPEROSMOLARITY WITHOUT COMA, WITHOUT LONG-TERM CURRENT USE OF INSULIN (HCC): ICD-10-CM

## 2023-07-05 LAB — NONINV COLON CA DNA+OCC BLD SCRN STL QL: NORMAL

## 2023-07-06 RX ORDER — NYSTATIN 100000 [USP'U]/G
POWDER TOPICAL
Qty: 30 G | Refills: 0 | Status: SHIPPED | OUTPATIENT
Start: 2023-07-06

## 2023-07-09 DIAGNOSIS — F41.9 ANXIETY: ICD-10-CM

## 2023-07-10 RX ORDER — ATORVASTATIN CALCIUM 20 MG/1
TABLET, FILM COATED ORAL
Qty: 90 TABLET | Refills: 1 | Status: SHIPPED | OUTPATIENT
Start: 2023-07-10

## 2023-07-10 NOTE — TELEPHONE ENCOUNTER
Allison Moreno called to request a refill on her medication. Last office visit : 6/7/2023   Next office visit : 7/10/2023     Last UDS:   Amphetamine Screen, Urine   Date Value Ref Range Status   12/06/2022 -  Final     Barbiturates   Date Value Ref Range Status   01/17/2011 Negative () Final     Barbiturate Screen, Urine   Date Value Ref Range Status   12/06/2022 -  Final     Benzodiazepine Screen, Urine   Date Value Ref Range Status   12/06/2022 +  Final     Buprenorphine Urine   Date Value Ref Range Status   12/06/2022 -  Final     Cocaine Metabolite Screen, Urine   Date Value Ref Range Status   12/06/2022 -  Final     Gabapentin Screen, Urine   Date Value Ref Range Status   12/06/2022 -  Final     MDMA, Urine   Date Value Ref Range Status   12/06/2022 -  Final     Methamphetamine, Urine   Date Value Ref Range Status   12/06/2022 -  Final     Opiate Scrn, Ur   Date Value Ref Range Status   12/06/2022 +  Final     Oxycodone Screen, Ur   Date Value Ref Range Status   12/06/2022 -  Final     PCP Screen, Urine   Date Value Ref Range Status   12/06/2022 -  Final     Propoxyphene   Date Value Ref Range Status   01/17/2011 Negative () Final     Propoxyphene Screen, Urine   Date Value Ref Range Status   12/06/2022 -  Final     THC Screen, Urine   Date Value Ref Range Status   12/06/2022 -  Final     Tricyclic Antidepressants, Urine   Date Value Ref Range Status   12/06/2022 -  Final       Last Shellia Spore: 6/9/23  Medication Contract: 3/4/22   Last Fill: 6/15/23    Requested Prescriptions     Pending Prescriptions Disp Refills    ALPRAZolam (XANAX) 0.25 MG tablet [Pharmacy Med Name: ALPRAZOLAM 0.25MG TABLETS] 30 tablet      Sig: TAKE 1 TABLET BY MOUTH EVERY DAY AS NEEDED FOR ANXIETY                                   Please approve or refuse this medication.    Patrick Cruz LPN

## 2023-07-13 RX ORDER — ALPRAZOLAM 0.25 MG/1
TABLET ORAL
Qty: 30 TABLET | Refills: 0 | Status: SHIPPED | OUTPATIENT
Start: 2023-07-14 | End: 2023-08-16

## 2023-07-27 RX ORDER — TRAZODONE HYDROCHLORIDE 150 MG/1
TABLET ORAL
Qty: 60 TABLET | Refills: 3 | Status: SHIPPED | OUTPATIENT
Start: 2023-07-27

## 2023-07-31 LAB — NONINV COLON CA DNA+OCC BLD SCRN STL QL: NEGATIVE

## 2023-07-31 RX ORDER — SEMAGLUTIDE 2.68 MG/ML
2 INJECTION, SOLUTION SUBCUTANEOUS WEEKLY
Qty: 3 ML | Refills: 0 | Status: SHIPPED | OUTPATIENT
Start: 2023-07-31

## 2023-08-09 ENCOUNTER — HOSPITAL ENCOUNTER (OUTPATIENT)
Dept: PREADMISSION TESTING | Age: 53
Discharge: HOME | End: 2023-08-13
Payer: MEDICARE

## 2023-08-09 VITALS — HEIGHT: 68 IN | WEIGHT: 293 LBS | BODY MASS INDEX: 44.41 KG/M2

## 2023-08-09 DIAGNOSIS — E11.69 TYPE 2 DIABETES MELLITUS WITH OTHER SPECIFIED COMPLICATION, WITHOUT LONG-TERM CURRENT USE OF INSULIN (HCC): ICD-10-CM

## 2023-08-09 DIAGNOSIS — F32.A ANXIETY AND DEPRESSION: ICD-10-CM

## 2023-08-09 DIAGNOSIS — F41.9 ANXIETY AND DEPRESSION: ICD-10-CM

## 2023-08-09 DIAGNOSIS — I10 ESSENTIAL HYPERTENSION: ICD-10-CM

## 2023-08-09 LAB
ALBUMIN SERPL-MCNC: 4.4 G/DL (ref 3.5–5.2)
ALP SERPL-CCNC: 107 U/L (ref 35–104)
ALT SERPL-CCNC: 27 U/L (ref 5–33)
ANION GAP SERPL CALCULATED.3IONS-SCNC: 11 MMOL/L (ref 7–19)
APTT PPP: 31.4 SEC (ref 26–36.2)
AST SERPL-CCNC: 28 U/L (ref 5–32)
BACTERIA URNS QL MICRO: ABNORMAL /HPF
BASOPHILS # BLD: 0.1 K/UL (ref 0–0.2)
BASOPHILS NFR BLD: 0.8 % (ref 0–1)
BILIRUB SERPL-MCNC: <0.2 MG/DL (ref 0.2–1.2)
BILIRUB UR QL STRIP: NEGATIVE
BUN SERPL-MCNC: 14 MG/DL (ref 6–20)
CALCIUM SERPL-MCNC: 9.5 MG/DL (ref 8.6–10)
CHLORIDE SERPL-SCNC: 101 MMOL/L (ref 98–111)
CHOLEST SERPL-MCNC: 173 MG/DL (ref 160–199)
CLARITY UR: CLEAR
CO2 SERPL-SCNC: 27 MMOL/L (ref 22–29)
COLOR UR: ABNORMAL
CREAT SERPL-MCNC: 0.9 MG/DL (ref 0.5–0.9)
CRYSTALS URNS MICRO: ABNORMAL /HPF
EKG P AXIS: 23 DEGREES
EKG P-R INTERVAL: 132 MS
EKG Q-T INTERVAL: 362 MS
EKG QRS DURATION: 90 MS
EKG QTC CALCULATION (BAZETT): 418 MS
EKG T AXIS: 4 DEGREES
EOSINOPHIL # BLD: 0.2 K/UL (ref 0–0.6)
EOSINOPHIL NFR BLD: 2.4 % (ref 0–5)
EPI CELLS #/AREA URNS AUTO: 1 /HPF (ref 0–5)
ERYTHROCYTE [DISTWIDTH] IN BLOOD BY AUTOMATED COUNT: 14.3 % (ref 11.5–14.5)
GLUCOSE SERPL-MCNC: 91 MG/DL (ref 74–109)
GLUCOSE UR STRIP.AUTO-MCNC: NEGATIVE MG/DL
HBA1C MFR BLD: 5.2 % (ref 4–6)
HCT VFR BLD AUTO: 41 % (ref 37–47)
HDLC SERPL-MCNC: 69 MG/DL (ref 65–121)
HGB BLD-MCNC: 12.7 G/DL (ref 12–16)
HGB UR STRIP.AUTO-MCNC: ABNORMAL MG/L
HYALINE CASTS #/AREA URNS AUTO: 8 /HPF (ref 0–8)
IMM GRANULOCYTES # BLD: 0 K/UL
INR PPP: 1.04 (ref 0.88–1.18)
KETONES UR STRIP.AUTO-MCNC: NEGATIVE MG/DL
LDLC SERPL CALC-MCNC: 74 MG/DL
LEUKOCYTE ESTERASE UR QL STRIP.AUTO: ABNORMAL
LYMPHOCYTES # BLD: 2.3 K/UL (ref 1.1–4.5)
LYMPHOCYTES NFR BLD: 25.6 % (ref 20–40)
MCH RBC QN AUTO: 28.8 PG (ref 27–31)
MCHC RBC AUTO-ENTMCNC: 31 G/DL (ref 33–37)
MCV RBC AUTO: 93 FL (ref 81–99)
MONOCYTES # BLD: 0.6 K/UL (ref 0–0.9)
MONOCYTES NFR BLD: 6.7 % (ref 0–10)
MRSA DNA SPEC QL NAA+PROBE: NOT DETECTED
NEUTROPHILS # BLD: 5.7 K/UL (ref 1.5–7.5)
NEUTS SEG NFR BLD: 64.1 % (ref 50–65)
NITRITE UR QL STRIP.AUTO: NEGATIVE
PH UR STRIP.AUTO: 6 [PH] (ref 5–8)
PLATELET # BLD AUTO: 347 K/UL (ref 130–400)
PMV BLD AUTO: 8.8 FL (ref 9.4–12.3)
POTASSIUM SERPL-SCNC: 4.3 MMOL/L (ref 3.5–5)
PROT SERPL-MCNC: 7.4 G/DL (ref 6.6–8.7)
PROT UR STRIP.AUTO-MCNC: ABNORMAL MG/DL
PROTHROMBIN TIME: 13.3 SEC (ref 12–14.6)
RBC # BLD AUTO: 4.41 M/UL (ref 4.2–5.4)
RBC #/AREA URNS AUTO: 15 /HPF (ref 0–4)
SODIUM SERPL-SCNC: 139 MMOL/L (ref 136–145)
SP GR UR STRIP.AUTO: 1.02 (ref 1–1.03)
TRIGL SERPL-MCNC: 150 MG/DL (ref 0–149)
TSH SERPL DL<=0.005 MIU/L-ACNC: 1.6 UIU/ML (ref 0.27–4.2)
UROBILINOGEN UR STRIP.AUTO-MCNC: 0.2 E.U./DL
WBC # BLD AUTO: 8.9 K/UL (ref 4.8–10.8)
WBC #/AREA URNS AUTO: 23 /HPF (ref 0–5)

## 2023-08-09 PROCEDURE — 93005 ELECTROCARDIOGRAM TRACING: CPT | Performed by: ORTHOPAEDIC SURGERY

## 2023-08-09 PROCEDURE — 85025 COMPLETE CBC W/AUTO DIFF WBC: CPT

## 2023-08-09 PROCEDURE — 83036 HEMOGLOBIN GLYCOSYLATED A1C: CPT

## 2023-08-09 PROCEDURE — 85610 PROTHROMBIN TIME: CPT

## 2023-08-09 PROCEDURE — 87186 SC STD MICRODIL/AGAR DIL: CPT

## 2023-08-09 PROCEDURE — 80061 LIPID PANEL: CPT

## 2023-08-09 PROCEDURE — 87641 MR-STAPH DNA AMP PROBE: CPT

## 2023-08-09 PROCEDURE — 84443 ASSAY THYROID STIM HORMONE: CPT

## 2023-08-09 PROCEDURE — 87086 URINE CULTURE/COLONY COUNT: CPT

## 2023-08-09 PROCEDURE — 80053 COMPREHEN METABOLIC PANEL: CPT

## 2023-08-09 PROCEDURE — 85730 THROMBOPLASTIN TIME PARTIAL: CPT

## 2023-08-09 PROCEDURE — 81001 URINALYSIS AUTO W/SCOPE: CPT

## 2023-08-09 RX ORDER — DEXAMETHASONE SODIUM PHOSPHATE 10 MG/ML
10 INJECTION, SOLUTION INTRAMUSCULAR; INTRAVENOUS ONCE
OUTPATIENT
Start: 2023-08-30

## 2023-08-09 RX ORDER — ACETAMINOPHEN 500 MG
1000 TABLET ORAL ONCE
OUTPATIENT
Start: 2023-08-30

## 2023-08-09 RX ORDER — PREGABALIN 75 MG/1
75 CAPSULE ORAL ONCE
OUTPATIENT
Start: 2023-08-09 | End: 2023-08-09

## 2023-08-09 RX ORDER — CELECOXIB 200 MG/1
200 CAPSULE ORAL ONCE
OUTPATIENT
Start: 2023-08-30

## 2023-08-09 RX ORDER — OXYCODONE HCL 10 MG/1
10 TABLET, FILM COATED, EXTENDED RELEASE ORAL ONCE
OUTPATIENT
Start: 2023-08-30

## 2023-08-09 NOTE — DISCHARGE INSTRUCTIONS
The day before surgery you will receive a phone call from the surgery nurse to let you know what time to arrive on the day of surgery. This call will usually be between 2-4 PM. If you do not receive a phone call by 4 PM the day before your surgery please call [phone] and let them know you have not received an arrival time. If your surgery is on Monday, your call will be on the Friday before your Monday surgery. Fabi Warren for the [facility]    A script for Bactroban ointment has been call to your pharmacy or was given to you in written form by your surgeon. The guidelines for the ointment use are as follows:    1)  Start using the ointment 7 days before your surgery date    2)  Use the ointment two times a day - morning and night    3)  Place the ointment on a Q-tip and swirl up in your nose making sure you cover completely       the skin just inside of each nostril. Use one end of the Q-tip for each nostril. Chlorhexidine Gluconate 4% Solution    Patient should shower with this soap a minimum of 3 consecutive showers (2 nights before surgery, the night before surgery and the morning of surgery) washing from the neck down (avoiding contact with genitalia). DO  [address] YOUR HAIR OR FACE WITH THIS SOAP. When washing with this soap, apply enough to suds up the body thoroughly, turn the water away from your body and allow the soap suds to remain on the body for 2 full minutes, then rinse body completely. After using this soap on the body, please do not apply powders or lotions to your body. After the shower the night before surgery, please dry off with a new towel, sleep in new freshly laundered pj's, and change your bed linen before going to sleep. MEDICATION INSTRUCTIONS PRIOR TO YOUR SURGERY    Night before surgery:      ________Do not take Metformin (you will not be eating or drinking after midnight)      The morning of surgery:     You can take all your usual prescribed medications

## 2023-08-11 LAB
BACTERIA UR CULT: ABNORMAL
BACTERIA UR CULT: ABNORMAL
ORGANISM: ABNORMAL

## 2023-08-15 DIAGNOSIS — F41.9 ANXIETY: ICD-10-CM

## 2023-08-16 RX ORDER — ALPRAZOLAM 0.25 MG/1
TABLET ORAL
Qty: 30 TABLET | Refills: 0 | Status: SHIPPED | OUTPATIENT
Start: 2023-08-16 | End: 2023-09-16

## 2023-08-29 NOTE — DISCHARGE INSTR - DIET
Good nutrition is important when healing from an illness, injury, or surgery. Follow any nutrition recommendations given to you during your hospital stay. If you were given an oral nutrition supplement while in the hospital, continue to take this supplement at home. You can take it with meals, in-between meals, and/or before bedtime. These supplements can be purchased at most local grocery stores, pharmacies, and chain [employer]-stores. If you have any questions about your diet or nutrition, call the hospital and ask for the dietitian.             Low carb / High protein

## 2023-08-29 NOTE — DISCHARGE INSTRUCTIONS
Dr Alexa Little Total Knee Replacement Discharge Instructions     *Elevate legs at all times when not walking  * Use Ice Pack to affected extremity as needed for swelling and pain     * HOMEWORK          Be able to fully straighten leg by post-op appointment. This is the most important thing to work on!!!          Use the ankle pillow or a towel roll under the ankle to work on straightening          You may work on bending the knee also    *Surgical Site Care: The adhesive (glue strip) dressing can be removed in 3 weeks. May shower on Friday and clean wound but do not scrub incision. Pat dry. NO tub bathing or swimming. Wash hands frequently during the day. *Activity:      [facility] ! [address] [facility] !!!                 Home Health therapy will come to the house three times a week:                    Get in and out of your bed                                                                                           Getting up and down out of the chair                                                                   Bathroom  / bathing activities                                                                                Do NOT walk for exercise ( it will increase pain and swelling )                            Walk several times a day but only for short distances             *Pain Medicines:          Keep a LOG of your medicines to track when pain med is due          Take prescribed medicine as needed for pain          Take Tylenol as your pain decreases          Take a stool softener of your choice while taking pain medications as they are very constipating ( examples:  colace  dulcolax  fiber   prune juice )      *To prevent blood clots: Take prescribed blood thinner as directed.   (Xarelto 10 mg daily for 3 weeks)          After completing, you will take Aspirin 81 mg twice a day for the next three weeks           While taking the blood thinner and Aspirin, DO

## 2023-08-30 ENCOUNTER — APPOINTMENT (OUTPATIENT)
Dept: GENERAL RADIOLOGY | Age: 53
End: 2023-08-30
Attending: ORTHOPAEDIC SURGERY
Payer: MEDICARE

## 2023-08-30 ENCOUNTER — ANESTHESIA EVENT (OUTPATIENT)
Dept: OPERATING ROOM | Age: 53
End: 2023-08-30
Payer: MEDICARE

## 2023-08-30 ENCOUNTER — HOSPITAL ENCOUNTER (OUTPATIENT)
Age: 53
Setting detail: OBSERVATION
Discharge: HOME HEALTH | End: 2023-08-31
Attending: ORTHOPAEDIC SURGERY | Admitting: ORTHOPAEDIC SURGERY
Payer: MEDICARE

## 2023-08-30 ENCOUNTER — ANESTHESIA (OUTPATIENT)
Dept: OPERATING ROOM | Age: 53
End: 2023-08-30
Payer: MEDICARE

## 2023-08-30 DIAGNOSIS — M17.31 POST-TRAUMATIC OSTEOARTHRITIS OF RIGHT KNEE: Primary | ICD-10-CM

## 2023-08-30 DIAGNOSIS — M17.12 PRIMARY OSTEOARTHRITIS OF LEFT KNEE: ICD-10-CM

## 2023-08-30 LAB
BACTERIA URNS QL MICRO: NEGATIVE /HPF
BILIRUB UR QL STRIP: NEGATIVE
CLARITY UR: CLEAR
COLOR UR: YELLOW
CRYSTALS URNS MICRO: ABNORMAL /HPF
EPI CELLS #/AREA URNS AUTO: 2 /HPF (ref 0–5)
GLUCOSE BLD-MCNC: 106 MG/DL (ref 70–99)
GLUCOSE BLD-MCNC: 122 MG/DL (ref 70–99)
GLUCOSE BLD-MCNC: 183 MG/DL (ref 70–99)
GLUCOSE UR STRIP.AUTO-MCNC: NEGATIVE MG/DL
HCG UR QL: NEGATIVE
HGB UR STRIP.AUTO-MCNC: NEGATIVE MG/L
HYALINE CASTS #/AREA URNS AUTO: 0 /HPF (ref 0–8)
KETONES UR STRIP.AUTO-MCNC: NEGATIVE MG/DL
LEUKOCYTE ESTERASE UR QL STRIP.AUTO: ABNORMAL
NITRITE UR QL STRIP.AUTO: NEGATIVE
PERFORMED ON: ABNORMAL
PH UR STRIP.AUTO: 5.5 [PH] (ref 5–8)
PROT UR STRIP.AUTO-MCNC: NEGATIVE MG/DL
RBC #/AREA URNS AUTO: 1 /HPF (ref 0–4)
SP GR UR STRIP.AUTO: 1.02 (ref 1–1.03)
UROBILINOGEN UR STRIP.AUTO-MCNC: 0.2 E.U./DL
WBC #/AREA URNS AUTO: 8 /HPF (ref 0–5)

## 2023-08-30 PROCEDURE — 6360000002 HC RX W HCPCS: Performed by: ANESTHESIOLOGY

## 2023-08-30 PROCEDURE — 3600000015 HC SURGERY LEVEL 5 ADDTL 15MIN: Performed by: ORTHOPAEDIC SURGERY

## 2023-08-30 PROCEDURE — G0378 HOSPITAL OBSERVATION PER HR: HCPCS

## 2023-08-30 PROCEDURE — 3700000000 HC ANESTHESIA ATTENDED CARE: Performed by: ORTHOPAEDIC SURGERY

## 2023-08-30 PROCEDURE — A4217 STERILE WATER/SALINE, 500 ML: HCPCS | Performed by: ORTHOPAEDIC SURGERY

## 2023-08-30 PROCEDURE — 6360000002 HC RX W HCPCS: Performed by: ORTHOPAEDIC SURGERY

## 2023-08-30 PROCEDURE — 6370000000 HC RX 637 (ALT 250 FOR IP): Performed by: ORTHOPAEDIC SURGERY

## 2023-08-30 PROCEDURE — 2580000003 HC RX 258: Performed by: ORTHOPAEDIC SURGERY

## 2023-08-30 PROCEDURE — 7100000000 HC PACU RECOVERY - FIRST 15 MIN: Performed by: ORTHOPAEDIC SURGERY

## 2023-08-30 PROCEDURE — 82962 GLUCOSE BLOOD TEST: CPT

## 2023-08-30 PROCEDURE — 84703 CHORIONIC GONADOTROPIN ASSAY: CPT

## 2023-08-30 PROCEDURE — 3600000005 HC SURGERY LEVEL 5 BASE: Performed by: ORTHOPAEDIC SURGERY

## 2023-08-30 PROCEDURE — 64447 NJX AA&/STRD FEMORAL NRV IMG: CPT | Performed by: NURSE ANESTHETIST, CERTIFIED REGISTERED

## 2023-08-30 PROCEDURE — C1776 JOINT DEVICE (IMPLANTABLE): HCPCS | Performed by: ORTHOPAEDIC SURGERY

## 2023-08-30 PROCEDURE — 94150 VITAL CAPACITY TEST: CPT

## 2023-08-30 PROCEDURE — 2580000003 HC RX 258: Performed by: NURSE ANESTHETIST, CERTIFIED REGISTERED

## 2023-08-30 PROCEDURE — 3700000001 HC ADD 15 MINUTES (ANESTHESIA): Performed by: ORTHOPAEDIC SURGERY

## 2023-08-30 PROCEDURE — 2580000003 HC RX 258: Performed by: ANESTHESIOLOGY

## 2023-08-30 PROCEDURE — 2720000010 HC SURG SUPPLY STERILE: Performed by: ORTHOPAEDIC SURGERY

## 2023-08-30 PROCEDURE — 87086 URINE CULTURE/COLONY COUNT: CPT

## 2023-08-30 PROCEDURE — 81001 URINALYSIS AUTO W/SCOPE: CPT

## 2023-08-30 PROCEDURE — C9290 INJ, BUPIVACAINE LIPOSOME: HCPCS | Performed by: ORTHOPAEDIC SURGERY

## 2023-08-30 PROCEDURE — 2709999900 HC NON-CHARGEABLE SUPPLY: Performed by: ORTHOPAEDIC SURGERY

## 2023-08-30 PROCEDURE — 6360000002 HC RX W HCPCS: Performed by: NURSE ANESTHETIST, CERTIFIED REGISTERED

## 2023-08-30 PROCEDURE — 2500000003 HC RX 250 WO HCPCS: Performed by: NURSE ANESTHETIST, CERTIFIED REGISTERED

## 2023-08-30 PROCEDURE — 73560 X-RAY EXAM OF KNEE 1 OR 2: CPT

## 2023-08-30 PROCEDURE — 7100000001 HC PACU RECOVERY - ADDTL 15 MIN: Performed by: ORTHOPAEDIC SURGERY

## 2023-08-30 DEVICE — PSN FEM CR POR CCR STD SZ8 L: Type: IMPLANTABLE DEVICE | Status: FUNCTIONAL

## 2023-08-30 DEVICE — IMPLANTABLE DEVICE
Type: IMPLANTABLE DEVICE | Status: FUNCTIONAL
Brand: PERSONA® VIVACIT-E®

## 2023-08-30 DEVICE — IMPLANTABLE DEVICE
Type: IMPLANTABLE DEVICE | Status: FUNCTIONAL
Brand: PERSONA® NATURAL TIBIA® TRABECULAR METAL®

## 2023-08-30 RX ORDER — 0.9 % SODIUM CHLORIDE 0.9 %
500 INTRAVENOUS SOLUTION INTRAVENOUS PRN
Status: DISCONTINUED | OUTPATIENT
Start: 2023-08-30 | End: 2023-08-31 | Stop reason: HOSPADM

## 2023-08-30 RX ORDER — MEPERIDINE HYDROCHLORIDE 25 MG/ML
12.5 INJECTION INTRAMUSCULAR; INTRAVENOUS; SUBCUTANEOUS EVERY 5 MIN PRN
Status: DISCONTINUED | OUTPATIENT
Start: 2023-08-30 | End: 2023-08-30 | Stop reason: HOSPADM

## 2023-08-30 RX ORDER — ONDANSETRON 4 MG/1
4 TABLET, ORALLY DISINTEGRATING ORAL EVERY 8 HOURS PRN
Status: DISCONTINUED | OUTPATIENT
Start: 2023-08-30 | End: 2023-08-31 | Stop reason: HOSPADM

## 2023-08-30 RX ORDER — MIDAZOLAM HYDROCHLORIDE 2 MG/2ML
2 INJECTION, SOLUTION INTRAMUSCULAR; INTRAVENOUS
Status: DISCONTINUED | OUTPATIENT
Start: 2023-08-30 | End: 2023-08-30 | Stop reason: HOSPADM

## 2023-08-30 RX ORDER — ACETAMINOPHEN 500 MG
1000 TABLET ORAL ONCE
Status: COMPLETED | OUTPATIENT
Start: 2023-08-30 | End: 2023-08-30

## 2023-08-30 RX ORDER — HYDROMORPHONE HYDROCHLORIDE 1 MG/ML
0.5 INJECTION, SOLUTION INTRAMUSCULAR; INTRAVENOUS; SUBCUTANEOUS
Status: DISCONTINUED | OUTPATIENT
Start: 2023-08-30 | End: 2023-08-31 | Stop reason: HOSPADM

## 2023-08-30 RX ORDER — TRAMADOL HYDROCHLORIDE 50 MG/1
100 TABLET ORAL EVERY 6 HOURS
Status: DISCONTINUED | OUTPATIENT
Start: 2023-08-30 | End: 2023-08-31 | Stop reason: HOSPADM

## 2023-08-30 RX ORDER — SODIUM CHLORIDE 0.9 % (FLUSH) 0.9 %
5-40 SYRINGE (ML) INJECTION EVERY 12 HOURS SCHEDULED
Status: DISCONTINUED | OUTPATIENT
Start: 2023-08-30 | End: 2023-08-30 | Stop reason: HOSPADM

## 2023-08-30 RX ORDER — BISACODYL 5 MG/1
5 TABLET, DELAYED RELEASE ORAL DAILY
Status: DISCONTINUED | OUTPATIENT
Start: 2023-08-30 | End: 2023-08-31 | Stop reason: HOSPADM

## 2023-08-30 RX ORDER — ASPIRIN 325 MG
325 TABLET ORAL DAILY
Status: ON HOLD | COMMUNITY
End: 2023-08-31 | Stop reason: HOSPADM

## 2023-08-30 RX ORDER — LIDOCAINE HYDROCHLORIDE 10 MG/ML
1 INJECTION, SOLUTION EPIDURAL; INFILTRATION; INTRACAUDAL; PERINEURAL
Status: DISCONTINUED | OUTPATIENT
Start: 2023-08-30 | End: 2023-08-30 | Stop reason: HOSPADM

## 2023-08-30 RX ORDER — OXYCODONE HYDROCHLORIDE 5 MG/1
5 TABLET ORAL EVERY 4 HOURS PRN
Status: DISCONTINUED | OUTPATIENT
Start: 2023-08-30 | End: 2023-08-31 | Stop reason: HOSPADM

## 2023-08-30 RX ORDER — OXYCODONE HCL 10 MG/1
10 TABLET, FILM COATED, EXTENDED RELEASE ORAL ONCE
Status: COMPLETED | OUTPATIENT
Start: 2023-08-30 | End: 2023-08-30

## 2023-08-30 RX ORDER — OXYCODONE HYDROCHLORIDE 5 MG/1
15 TABLET ORAL EVERY 4 HOURS PRN
Status: DISCONTINUED | OUTPATIENT
Start: 2023-08-30 | End: 2023-08-31 | Stop reason: HOSPADM

## 2023-08-30 RX ORDER — HYDROMORPHONE HYDROCHLORIDE 1 MG/ML
1 INJECTION, SOLUTION INTRAMUSCULAR; INTRAVENOUS; SUBCUTANEOUS
Status: DISCONTINUED | OUTPATIENT
Start: 2023-08-30 | End: 2023-08-31 | Stop reason: HOSPADM

## 2023-08-30 RX ORDER — TIZANIDINE 4 MG/1
4 TABLET ORAL EVERY 8 HOURS PRN
Status: DISCONTINUED | OUTPATIENT
Start: 2023-08-30 | End: 2023-08-31 | Stop reason: HOSPADM

## 2023-08-30 RX ORDER — DEXAMETHASONE SODIUM PHOSPHATE 10 MG/ML
10 INJECTION, SOLUTION INTRAMUSCULAR; INTRAVENOUS ONCE
Status: DISCONTINUED | OUTPATIENT
Start: 2023-08-30 | End: 2023-08-30 | Stop reason: HOSPADM

## 2023-08-30 RX ORDER — GABAPENTIN 400 MG/1
800 CAPSULE ORAL 3 TIMES DAILY
Status: DISCONTINUED | OUTPATIENT
Start: 2023-08-30 | End: 2023-08-31 | Stop reason: HOSPADM

## 2023-08-30 RX ORDER — CELECOXIB 200 MG/1
200 CAPSULE ORAL ONCE
Status: COMPLETED | OUTPATIENT
Start: 2023-08-30 | End: 2023-08-30

## 2023-08-30 RX ORDER — ROPIVACAINE HYDROCHLORIDE 5 MG/ML
30 INJECTION, SOLUTION EPIDURAL; INFILTRATION; PERINEURAL ONCE
Status: DISCONTINUED | OUTPATIENT
Start: 2023-08-30 | End: 2023-08-30 | Stop reason: HOSPADM

## 2023-08-30 RX ORDER — ACETAMINOPHEN 325 MG/1
650 TABLET ORAL EVERY 6 HOURS
Status: DISCONTINUED | OUTPATIENT
Start: 2023-08-30 | End: 2023-08-31 | Stop reason: HOSPADM

## 2023-08-30 RX ORDER — LIDOCAINE HYDROCHLORIDE 10 MG/ML
INJECTION, SOLUTION EPIDURAL; INFILTRATION; INTRACAUDAL; PERINEURAL PRN
Status: DISCONTINUED | OUTPATIENT
Start: 2023-08-30 | End: 2023-08-30 | Stop reason: SDUPTHER

## 2023-08-30 RX ORDER — ARIPIPRAZOLE 5 MG/1
5 TABLET ORAL DAILY
Status: DISCONTINUED | OUTPATIENT
Start: 2023-08-30 | End: 2023-08-31 | Stop reason: HOSPADM

## 2023-08-30 RX ORDER — SODIUM CHLORIDE 9 MG/ML
INJECTION, SOLUTION INTRAVENOUS PRN
Status: DISCONTINUED | OUTPATIENT
Start: 2023-08-30 | End: 2023-08-30 | Stop reason: HOSPADM

## 2023-08-30 RX ORDER — PROPOFOL 10 MG/ML
INJECTION, EMULSION INTRAVENOUS CONTINUOUS PRN
Status: DISCONTINUED | OUTPATIENT
Start: 2023-08-30 | End: 2023-08-30 | Stop reason: SDUPTHER

## 2023-08-30 RX ORDER — ATORVASTATIN CALCIUM 20 MG/1
20 TABLET, FILM COATED ORAL NIGHTLY
Status: DISCONTINUED | OUTPATIENT
Start: 2023-08-30 | End: 2023-08-31 | Stop reason: HOSPADM

## 2023-08-30 RX ORDER — PROPOFOL 10 MG/ML
INJECTION, EMULSION INTRAVENOUS PRN
Status: DISCONTINUED | OUTPATIENT
Start: 2023-08-30 | End: 2023-08-30 | Stop reason: SDUPTHER

## 2023-08-30 RX ORDER — LABETALOL HYDROCHLORIDE 5 MG/ML
10 INJECTION, SOLUTION INTRAVENOUS
Status: DISCONTINUED | OUTPATIENT
Start: 2023-08-30 | End: 2023-08-30 | Stop reason: HOSPADM

## 2023-08-30 RX ORDER — MIDAZOLAM HYDROCHLORIDE 2 MG/2ML
2 INJECTION, SOLUTION INTRAMUSCULAR; INTRAVENOUS ONCE
Status: COMPLETED | OUTPATIENT
Start: 2023-08-30 | End: 2023-08-30

## 2023-08-30 RX ORDER — SODIUM CHLORIDE 9 MG/ML
INJECTION, SOLUTION INTRAVENOUS CONTINUOUS
Status: DISCONTINUED | OUTPATIENT
Start: 2023-08-30 | End: 2023-08-31 | Stop reason: HOSPADM

## 2023-08-30 RX ORDER — SODIUM CHLORIDE, SODIUM LACTATE, POTASSIUM CHLORIDE, CALCIUM CHLORIDE 600; 310; 30; 20 MG/100ML; MG/100ML; MG/100ML; MG/100ML
INJECTION, SOLUTION INTRAVENOUS CONTINUOUS PRN
Status: DISCONTINUED | OUTPATIENT
Start: 2023-08-30 | End: 2023-08-30 | Stop reason: SDUPTHER

## 2023-08-30 RX ORDER — SCOLOPAMINE TRANSDERMAL SYSTEM 1 MG/1
1 PATCH, EXTENDED RELEASE TRANSDERMAL
Status: DISCONTINUED | OUTPATIENT
Start: 2023-08-30 | End: 2023-08-30 | Stop reason: HOSPADM

## 2023-08-30 RX ORDER — LORAZEPAM 2 MG/ML
0.5 INJECTION INTRAMUSCULAR
Status: DISCONTINUED | OUTPATIENT
Start: 2023-08-30 | End: 2023-08-30 | Stop reason: HOSPADM

## 2023-08-30 RX ORDER — DULOXETIN HYDROCHLORIDE 20 MG/1
40 CAPSULE, DELAYED RELEASE ORAL DAILY
Status: DISCONTINUED | OUTPATIENT
Start: 2023-08-30 | End: 2023-08-31 | Stop reason: HOSPADM

## 2023-08-30 RX ORDER — MORPHINE SULFATE 4 MG/ML
4 INJECTION, SOLUTION INTRAMUSCULAR; INTRAVENOUS EVERY 5 MIN PRN
Status: DISCONTINUED | OUTPATIENT
Start: 2023-08-30 | End: 2023-08-30 | Stop reason: HOSPADM

## 2023-08-30 RX ORDER — METOCLOPRAMIDE HYDROCHLORIDE 5 MG/ML
10 INJECTION INTRAMUSCULAR; INTRAVENOUS
Status: DISCONTINUED | OUTPATIENT
Start: 2023-08-30 | End: 2023-08-30 | Stop reason: HOSPADM

## 2023-08-30 RX ORDER — SODIUM CHLORIDE 0.9 % (FLUSH) 0.9 %
5-40 SYRINGE (ML) INJECTION PRN
Status: DISCONTINUED | OUTPATIENT
Start: 2023-08-30 | End: 2023-08-31 | Stop reason: HOSPADM

## 2023-08-30 RX ORDER — BUPIVACAINE HYDROCHLORIDE 7.5 MG/ML
INJECTION, SOLUTION INTRASPINAL
Status: COMPLETED | OUTPATIENT
Start: 2023-08-30 | End: 2023-08-30

## 2023-08-30 RX ORDER — SODIUM CHLORIDE, SODIUM LACTATE, POTASSIUM CHLORIDE, CALCIUM CHLORIDE 600; 310; 30; 20 MG/100ML; MG/100ML; MG/100ML; MG/100ML
INJECTION, SOLUTION INTRAVENOUS CONTINUOUS
Status: DISCONTINUED | OUTPATIENT
Start: 2023-08-30 | End: 2023-08-31 | Stop reason: HOSPADM

## 2023-08-30 RX ORDER — EPHEDRINE SULFATE 50 MG/ML
INJECTION, SOLUTION INTRAVENOUS PRN
Status: DISCONTINUED | OUTPATIENT
Start: 2023-08-30 | End: 2023-08-30 | Stop reason: SDUPTHER

## 2023-08-30 RX ORDER — MORPHINE SULFATE 2 MG/ML
2 INJECTION, SOLUTION INTRAMUSCULAR; INTRAVENOUS EVERY 5 MIN PRN
Status: DISCONTINUED | OUTPATIENT
Start: 2023-08-30 | End: 2023-08-30 | Stop reason: HOSPADM

## 2023-08-30 RX ORDER — DIPHENHYDRAMINE HCL 25 MG
25 TABLET ORAL EVERY 6 HOURS PRN
Status: DISCONTINUED | OUTPATIENT
Start: 2023-08-30 | End: 2023-08-31 | Stop reason: HOSPADM

## 2023-08-30 RX ORDER — PREGABALIN 75 MG/1
75 CAPSULE ORAL ONCE
Status: COMPLETED | OUTPATIENT
Start: 2023-08-30 | End: 2023-08-30

## 2023-08-30 RX ORDER — DIPHENHYDRAMINE HYDROCHLORIDE 50 MG/ML
12.5 INJECTION INTRAMUSCULAR; INTRAVENOUS
Status: DISCONTINUED | OUTPATIENT
Start: 2023-08-30 | End: 2023-08-30 | Stop reason: HOSPADM

## 2023-08-30 RX ORDER — ALPRAZOLAM 0.25 MG/1
0.25 TABLET ORAL NIGHTLY PRN
Status: DISCONTINUED | OUTPATIENT
Start: 2023-08-30 | End: 2023-08-31 | Stop reason: HOSPADM

## 2023-08-30 RX ORDER — OXYCODONE HYDROCHLORIDE 5 MG/1
10 TABLET ORAL EVERY 4 HOURS PRN
Status: DISCONTINUED | OUTPATIENT
Start: 2023-08-30 | End: 2023-08-31 | Stop reason: HOSPADM

## 2023-08-30 RX ORDER — SODIUM CHLORIDE 9 MG/ML
INJECTION, SOLUTION INTRAVENOUS PRN
Status: DISCONTINUED | OUTPATIENT
Start: 2023-08-30 | End: 2023-08-31 | Stop reason: HOSPADM

## 2023-08-30 RX ORDER — SODIUM CHLORIDE 0.9 % (FLUSH) 0.9 %
5-40 SYRINGE (ML) INJECTION PRN
Status: DISCONTINUED | OUTPATIENT
Start: 2023-08-30 | End: 2023-08-30 | Stop reason: HOSPADM

## 2023-08-30 RX ORDER — FAMOTIDINE 20 MG/1
20 TABLET, FILM COATED ORAL ONCE
Status: DISCONTINUED | OUTPATIENT
Start: 2023-08-30 | End: 2023-08-30 | Stop reason: HOSPADM

## 2023-08-30 RX ORDER — ROPIVACAINE HYDROCHLORIDE 5 MG/ML
INJECTION, SOLUTION EPIDURAL; INFILTRATION; PERINEURAL
Status: COMPLETED | OUTPATIENT
Start: 2023-08-30 | End: 2023-08-30

## 2023-08-30 RX ORDER — SODIUM CHLORIDE 0.9 % (FLUSH) 0.9 %
5-40 SYRINGE (ML) INJECTION EVERY 12 HOURS SCHEDULED
Status: DISCONTINUED | OUTPATIENT
Start: 2023-08-30 | End: 2023-08-31 | Stop reason: HOSPADM

## 2023-08-30 RX ORDER — ONDANSETRON 2 MG/ML
4 INJECTION INTRAMUSCULAR; INTRAVENOUS EVERY 6 HOURS PRN
Status: DISCONTINUED | OUTPATIENT
Start: 2023-08-30 | End: 2023-08-31 | Stop reason: HOSPADM

## 2023-08-30 RX ORDER — IPRATROPIUM BROMIDE AND ALBUTEROL SULFATE 2.5; .5 MG/3ML; MG/3ML
1 SOLUTION RESPIRATORY (INHALATION)
Status: DISCONTINUED | OUTPATIENT
Start: 2023-08-30 | End: 2023-08-30 | Stop reason: HOSPADM

## 2023-08-30 RX ORDER — HYDROMORPHONE HYDROCHLORIDE 1 MG/ML
0.25 INJECTION, SOLUTION INTRAMUSCULAR; INTRAVENOUS; SUBCUTANEOUS
Status: DISCONTINUED | OUTPATIENT
Start: 2023-08-30 | End: 2023-08-31 | Stop reason: HOSPADM

## 2023-08-30 RX ORDER — MIDAZOLAM HYDROCHLORIDE 1 MG/ML
INJECTION INTRAMUSCULAR; INTRAVENOUS PRN
Status: DISCONTINUED | OUTPATIENT
Start: 2023-08-30 | End: 2023-08-30 | Stop reason: SDUPTHER

## 2023-08-30 RX ORDER — TRANEXAMIC ACID 100 MG/ML
INJECTION, SOLUTION INTRAVENOUS PRN
Status: DISCONTINUED | OUTPATIENT
Start: 2023-08-30 | End: 2023-08-30 | Stop reason: SDUPTHER

## 2023-08-30 RX ORDER — DROPERIDOL 2.5 MG/ML
0.62 INJECTION, SOLUTION INTRAMUSCULAR; INTRAVENOUS
Status: DISCONTINUED | OUTPATIENT
Start: 2023-08-30 | End: 2023-08-30 | Stop reason: HOSPADM

## 2023-08-30 RX ORDER — DEXAMETHASONE SODIUM PHOSPHATE 10 MG/ML
INJECTION, SOLUTION INTRAMUSCULAR; INTRAVENOUS PRN
Status: DISCONTINUED | OUTPATIENT
Start: 2023-08-30 | End: 2023-08-30 | Stop reason: SDUPTHER

## 2023-08-30 RX ADMIN — PHENYLEPHRINE HYDROCHLORIDE 200 MCG: 10 INJECTION INTRAVENOUS at 12:45

## 2023-08-30 RX ADMIN — TRAMADOL HYDROCHLORIDE 100 MG: 50 TABLET, COATED ORAL at 17:18

## 2023-08-30 RX ADMIN — GABAPENTIN 800 MG: 400 CAPSULE ORAL at 17:17

## 2023-08-30 RX ADMIN — Medication 3000 MG: at 22:05

## 2023-08-30 RX ADMIN — DULOXETINE 40 MG: 20 CAPSULE, DELAYED RELEASE ORAL at 17:18

## 2023-08-30 RX ADMIN — SODIUM CHLORIDE, SODIUM LACTATE, POTASSIUM CHLORIDE, AND CALCIUM CHLORIDE: 600; 310; 30; 20 INJECTION, SOLUTION INTRAVENOUS at 12:17

## 2023-08-30 RX ADMIN — SODIUM CHLORIDE, POTASSIUM CHLORIDE, SODIUM LACTATE AND CALCIUM CHLORIDE: 600; 310; 30; 20 INJECTION, SOLUTION INTRAVENOUS at 10:10

## 2023-08-30 RX ADMIN — OXYCODONE HYDROCHLORIDE 10 MG: 10 TABLET, FILM COATED, EXTENDED RELEASE ORAL at 10:09

## 2023-08-30 RX ADMIN — TRAZODONE HYDROCHLORIDE 150 MG: 50 TABLET ORAL at 20:43

## 2023-08-30 RX ADMIN — BUPIVACAINE HYDROCHLORIDE IN DEXTROSE 15 MG: 7.5 INJECTION, SOLUTION SUBARACHNOID at 12:35

## 2023-08-30 RX ADMIN — METFORMIN HYDROCHLORIDE 1000 MG: 500 TABLET ORAL at 17:18

## 2023-08-30 RX ADMIN — EPHEDRINE SULFATE 10 MG: 50 INJECTION INTRAMUSCULAR; INTRAVENOUS; SUBCUTANEOUS at 13:13

## 2023-08-30 RX ADMIN — SODIUM CHLORIDE, PRESERVATIVE FREE 10 ML: 5 INJECTION INTRAVENOUS at 20:47

## 2023-08-30 RX ADMIN — EPHEDRINE SULFATE 20 MG: 50 INJECTION INTRAMUSCULAR; INTRAVENOUS; SUBCUTANEOUS at 12:52

## 2023-08-30 RX ADMIN — CEFAZOLIN 3000 MG: 2 INJECTION, POWDER, FOR SOLUTION INTRAMUSCULAR; INTRAVENOUS at 12:47

## 2023-08-30 RX ADMIN — BISACODYL 5 MG: 5 TABLET, COATED ORAL at 17:18

## 2023-08-30 RX ADMIN — ACETAMINOPHEN 1000 MG: 500 TABLET ORAL at 10:09

## 2023-08-30 RX ADMIN — RIVAROXABAN 10 MG: 10 TABLET, FILM COATED ORAL at 22:25

## 2023-08-30 RX ADMIN — PROPOFOL 50 MG: 10 INJECTION, EMULSION INTRAVENOUS at 12:36

## 2023-08-30 RX ADMIN — TRANEXAMIC ACID 1000 MG: 1 INJECTION, SOLUTION INTRAVENOUS at 12:45

## 2023-08-30 RX ADMIN — MIDAZOLAM 2 MG: 1 INJECTION INTRAMUSCULAR; INTRAVENOUS at 11:29

## 2023-08-30 RX ADMIN — TIZANIDINE 4 MG: 4 TABLET ORAL at 20:43

## 2023-08-30 RX ADMIN — ARIPIPRAZOLE 5 MG: 5 TABLET ORAL at 17:18

## 2023-08-30 RX ADMIN — TRAMADOL HYDROCHLORIDE 100 MG: 50 TABLET, COATED ORAL at 21:57

## 2023-08-30 RX ADMIN — ACETAMINOPHEN 650 MG: 325 TABLET ORAL at 17:18

## 2023-08-30 RX ADMIN — EPHEDRINE SULFATE 10 MG: 50 INJECTION INTRAMUSCULAR; INTRAVENOUS; SUBCUTANEOUS at 13:51

## 2023-08-30 RX ADMIN — CELECOXIB 200 MG: 200 CAPSULE ORAL at 10:09

## 2023-08-30 RX ADMIN — SODIUM CHLORIDE, SODIUM LACTATE, POTASSIUM CHLORIDE, AND CALCIUM CHLORIDE: 600; 310; 30; 20 INJECTION, SOLUTION INTRAVENOUS at 13:48

## 2023-08-30 RX ADMIN — SODIUM CHLORIDE: 9 INJECTION, SOLUTION INTRAVENOUS at 23:48

## 2023-08-30 RX ADMIN — EPHEDRINE SULFATE 10 MG: 50 INJECTION INTRAMUSCULAR; INTRAVENOUS; SUBCUTANEOUS at 14:13

## 2023-08-30 RX ADMIN — PREGABALIN 75 MG: 75 CAPSULE ORAL at 10:09

## 2023-08-30 RX ADMIN — ROPIVACAINE HYDROCHLORIDE 20 ML: 5 INJECTION, SOLUTION EPIDURAL; INFILTRATION; PERINEURAL at 11:32

## 2023-08-30 RX ADMIN — MIDAZOLAM 2 MG: 1 INJECTION INTRAMUSCULAR; INTRAVENOUS at 12:34

## 2023-08-30 RX ADMIN — LIDOCAINE HYDROCHLORIDE 50 MG: 10 INJECTION, SOLUTION EPIDURAL; INFILTRATION; INTRACAUDAL; PERINEURAL at 12:26

## 2023-08-30 RX ADMIN — ATORVASTATIN CALCIUM 20 MG: 20 TABLET, FILM COATED ORAL at 20:43

## 2023-08-30 RX ADMIN — OXYCODONE HYDROCHLORIDE 15 MG: 5 TABLET ORAL at 23:05

## 2023-08-30 RX ADMIN — SODIUM CHLORIDE: 9 INJECTION, SOLUTION INTRAVENOUS at 16:41

## 2023-08-30 RX ADMIN — GABAPENTIN 800 MG: 400 CAPSULE ORAL at 20:44

## 2023-08-30 RX ADMIN — ACETAMINOPHEN 650 MG: 325 TABLET ORAL at 21:57

## 2023-08-30 RX ADMIN — TRANEXAMIC ACID 1000 MG: 1 INJECTION, SOLUTION INTRAVENOUS at 14:30

## 2023-08-30 RX ADMIN — PROPOFOL 50 MG: 10 INJECTION, EMULSION INTRAVENOUS at 14:37

## 2023-08-30 RX ADMIN — DEXAMETHASONE SODIUM PHOSPHATE 10 MG: 10 INJECTION, SOLUTION INTRAMUSCULAR; INTRAVENOUS at 12:50

## 2023-08-30 RX ADMIN — PHENYLEPHRINE HYDROCHLORIDE 100 MCG: 10 INJECTION INTRAVENOUS at 13:13

## 2023-08-30 RX ADMIN — PROPOFOL 120 MCG/KG/MIN: 10 INJECTION, EMULSION INTRAVENOUS at 12:36

## 2023-08-30 ASSESSMENT — PAIN DESCRIPTION - ORIENTATION
ORIENTATION: LEFT

## 2023-08-30 ASSESSMENT — PAIN - FUNCTIONAL ASSESSMENT
PAIN_FUNCTIONAL_ASSESSMENT: PREVENTS OR INTERFERES WITH ALL ACTIVE AND SOME PASSIVE ACTIVITIES
PAIN_FUNCTIONAL_ASSESSMENT: 0-10
PAIN_FUNCTIONAL_ASSESSMENT: PREVENTS OR INTERFERES WITH ALL ACTIVE AND SOME PASSIVE ACTIVITIES
PAIN_FUNCTIONAL_ASSESSMENT: PREVENTS OR INTERFERES WITH ALL ACTIVE AND SOME PASSIVE ACTIVITIES

## 2023-08-30 ASSESSMENT — PAIN DESCRIPTION - LOCATION
LOCATION: LEG
LOCATION: BACK

## 2023-08-30 ASSESSMENT — PAIN SCALES - GENERAL
PAINLEVEL_OUTOF10: 3
PAINLEVEL_OUTOF10: 5
PAINLEVEL_OUTOF10: 5
PAINLEVEL_OUTOF10: 7
PAINLEVEL_OUTOF10: 4
PAINLEVEL_OUTOF10: 3
PAINLEVEL_OUTOF10: 5

## 2023-08-30 ASSESSMENT — PAIN DESCRIPTION - DESCRIPTORS
DESCRIPTORS: ACHING

## 2023-08-30 ASSESSMENT — ENCOUNTER SYMPTOMS: SHORTNESS OF BREATH: 0

## 2023-08-30 ASSESSMENT — LIFESTYLE VARIABLES: SMOKING_STATUS: 0

## 2023-08-30 NOTE — ANESTHESIA POSTPROCEDURE EVALUATION
Department of Anesthesiology  Postprocedure Note    Patient: Juany Baker  MRN: [id number]  YOB: 1970  Date of evaluation: 8/30/2023      Procedure Summary     Date: 08/30/23 Room / Location: [address]    Anesthesia Start: 1217 Anesthesia Stop: 9582    Procedure: ROBOTIC COMPLEX PRIMARY LEFT KNEE TOTAL ARTHROPLASTY (Left) Diagnosis:       Primary osteoarthritis of left knee      (Primary osteoarthritis of left knee [M17.12])    Surgeons: Júnior Serrano MD Responsible Provider: NATALI Urbano CRNA    Anesthesia Type: regional, spinal ASA Status: 3          Anesthesia Type: No value filed.     Lazaro Phase I: Lazaro Score: 10    Lazaro Phase II:        Anesthesia Post Evaluation    Patient location during evaluation: PACU  Patient participation: complete - patient participated  Level of consciousness: awake and alert  Pain score: 0  Airway patency: patent  Nausea & Vomiting: no nausea and no vomiting  Complications: no  Cardiovascular status: hemodynamically stable  Respiratory status: spontaneous ventilation and nonlabored ventilation  Hydration status: stable  Multimodal analgesia pain management approach

## 2023-08-30 NOTE — ANESTHESIA PROCEDURE NOTES
Spinal Block    Patient location during procedure: OR  End time: 8/30/2023 12:36 PM  Reason for block: primary anesthetic and at surgeon's request  Staffing  Performed: resident/CRNA   Resident/CRNA: NATALI Jurado - CRNA  Spinal Block  Patient position: sitting  Prep: Betadine  Patient monitoring: continuous pulse ox and frequent blood pressure checks  Approach: midline  Location: L3/L4  Provider prep: mask, sterile gloves and sterile gown  Local infiltration: lidocaine  Needle  Needle type: Pencan   Needle gauge: 24 G  Needle length: 4 in  Expiration date: 6/30/2025  Assessment  Sensory level: T6  Swirl obtained: Yes  CSF: clear  Attempts: 2  Hemodynamics: stable  Preanesthetic Checklist  Completed: patient identified, IV checked, site marked, risks and benefits discussed, surgical/procedural consents, equipment checked, pre-op evaluation, timeout performed, anesthesia consent given, oxygen available, monitors applied/VS acknowledged, fire risk safety assessment completed and verbalized and blood product R/B/A discussed and consented

## 2023-08-30 NOTE — BRIEF OP NOTE
Brief Postoperative Note      Patient: Josey Coreas  YOB: 1970  MRN: [id number]    Date of Procedure: 8/30/2023    Pre-Op Diagnosis Codes:     * Primary osteoarthritis of left knee [M17.12]    Post-Op Diagnosis: Same       Procedure(s):  ROBOTIC COMPLEX PRIMARY LEFT KNEE TOTAL ARTHROPLASTY    Surgeon(s):  Josefa Holloway MD    Assistant:  First Assistant: Marianne May    Anesthesia: Choice    Estimated Blood Loss (mL): 774     Complications: None    Specimens:   ID Type Source Tests Collected by Time Destination   1 : Urine Body Fluid Urethra CULTURE, URINE, URINALYSIS Josefa Holloway MD 8/30/2023 1452        Implants:  Implant Name Type Inv.  Item Serial No.  Lot No. LRB No. Used Action   PSN TIB POR 2 PEG SZ E L - [id number]  PSN TIB POR 2 PEG SZ E L  [employer]- [id number] Left 1 Implanted   PSN FEM CR POR CCR STD SZ8 L - [id number]  PSN FEM CR POR CCR STD SZ8 L  [employer]ICS[facility] [id number] Left 1 Implanted   PSN MC VE ASF L 10MM 8-11 EF - [id number]  PSN MC VE ASF L 10MM 8-11 EF  [employer]- [id number] Left 1 Implanted         Drains:   Urinary Catheter 08/30/23 Emilee (Active)       Findings: TT:42 minutes      Electronically signed by Josefa Holloway MD on 8/30/2023 at 3:01 PM

## 2023-08-30 NOTE — ANESTHESIA PROCEDURE NOTES
Peripheral Block    Patient location during procedure: holding area  Reason for block: post-op pain management  Start time: 8/30/2023 11:32 AM  End time: 8/30/2023 11:32 AM  Staffing  Performed: anesthesiologist   Anesthesiologist: Francisca Wetzel MD  Preanesthetic Checklist  Completed: patient identified, IV checked, site marked, risks and benefits discussed, surgical/procedural consents, equipment checked, pre-op evaluation, timeout performed, anesthesia consent given, oxygen available and monitors applied/VS acknowledged  Peripheral Block   Patient position: supine  Prep: ChloraPrep  Provider prep: mask and sterile gloves  Patient monitoring: cardiac monitor, continuous pulse ox, frequent blood pressure checks and IV access  Block type: Femoral  Adductor canal  Laterality: left  Injection technique: single-shot  Guidance: ultrasound guided    Needle   Needle type: short-bevel   Needle gauge: 20 G  Needle localization: ultrasound guidance  Needle length: 10 cm  Assessment   Injection assessment: negative aspiration for heme, no paresthesia on injection and local visualized surrounding nerve on ultrasound  Paresthesia pain: none  Slow fractionated injection: yes  Hemodynamics: stable    Additional Notes  Needle was introduced in proximal third of thigh in an  lacy-medial to posterior direction. The needle was visualized \" in- plane\" under ultrasound guidance to access the adductor canal in a sub-sartorial fashion. The femoral artery was avoided and 20 cc of 0.5% ropivacaine  was injected and surrounded the nerve plexus. The plexus appeared anatomically normal, no obvious pathology was noted.  A permanent image was obtained   Medications Administered  ropivacaine (NAROPIN) injection 0.5% - Perineural   20 mL - 8/30/2023 11:32:00 AM

## 2023-08-31 VITALS
BODY MASS INDEX: 44.41 KG/M2 | TEMPERATURE: 97.9 F | SYSTOLIC BLOOD PRESSURE: 108 MMHG | WEIGHT: 293 LBS | HEIGHT: 68 IN | DIASTOLIC BLOOD PRESSURE: 71 MMHG | RESPIRATION RATE: 18 BRPM | HEART RATE: 95 BPM | OXYGEN SATURATION: 98 %

## 2023-08-31 PROBLEM — M17.12 PRIMARY OSTEOARTHRITIS OF LEFT KNEE: Status: ACTIVE | Noted: 2023-08-31

## 2023-08-31 LAB
ANION GAP SERPL CALCULATED.3IONS-SCNC: 15 MMOL/L (ref 7–19)
BUN SERPL-MCNC: 15 MG/DL (ref 6–20)
CALCIUM SERPL-MCNC: 8.5 MG/DL (ref 8.6–10)
CHLORIDE SERPL-SCNC: 102 MMOL/L (ref 98–111)
CO2 SERPL-SCNC: 22 MMOL/L (ref 22–29)
CREAT SERPL-MCNC: 1.2 MG/DL (ref 0.5–0.9)
GLUCOSE BLD-MCNC: 147 MG/DL (ref 70–99)
GLUCOSE SERPL-MCNC: 163 MG/DL (ref 74–109)
HCT VFR BLD AUTO: 35.1 % (ref 37–47)
HGB BLD-MCNC: 10.9 G/DL (ref 12–16)
PERFORMED ON: ABNORMAL
POTASSIUM SERPL-SCNC: 4.8 MMOL/L (ref 3.5–5)
SODIUM SERPL-SCNC: 139 MMOL/L (ref 136–145)

## 2023-08-31 PROCEDURE — 6370000000 HC RX 637 (ALT 250 FOR IP): Performed by: PHYSICIAN ASSISTANT

## 2023-08-31 PROCEDURE — 97116 GAIT TRAINING THERAPY: CPT

## 2023-08-31 PROCEDURE — 6360000002 HC RX W HCPCS: Performed by: ORTHOPAEDIC SURGERY

## 2023-08-31 PROCEDURE — 97530 THERAPEUTIC ACTIVITIES: CPT

## 2023-08-31 PROCEDURE — 80048 BASIC METABOLIC PNL TOTAL CA: CPT

## 2023-08-31 PROCEDURE — 6370000000 HC RX 637 (ALT 250 FOR IP): Performed by: ORTHOPAEDIC SURGERY

## 2023-08-31 PROCEDURE — G0378 HOSPITAL OBSERVATION PER HR: HCPCS

## 2023-08-31 PROCEDURE — 82962 GLUCOSE BLOOD TEST: CPT

## 2023-08-31 PROCEDURE — 96374 THER/PROPH/DIAG INJ IV PUSH: CPT

## 2023-08-31 PROCEDURE — 97162 PT EVAL MOD COMPLEX 30 MIN: CPT

## 2023-08-31 PROCEDURE — 85014 HEMATOCRIT: CPT

## 2023-08-31 PROCEDURE — 36415 COLL VENOUS BLD VENIPUNCTURE: CPT

## 2023-08-31 PROCEDURE — 97535 SELF CARE MNGMENT TRAINING: CPT

## 2023-08-31 PROCEDURE — 85018 HEMOGLOBIN: CPT

## 2023-08-31 PROCEDURE — 97165 OT EVAL LOW COMPLEX 30 MIN: CPT

## 2023-08-31 PROCEDURE — 96361 HYDRATE IV INFUSION ADD-ON: CPT

## 2023-08-31 RX ORDER — OXYCODONE HYDROCHLORIDE 10 MG/1
10 TABLET ORAL SEE ADMIN INSTRUCTIONS
Qty: 90 TABLET | Refills: 0 | Status: SHIPPED | OUTPATIENT
Start: 2023-08-31 | End: 2023-09-21

## 2023-08-31 RX ORDER — DOXYCYCLINE HYCLATE 100 MG
100 TABLET ORAL 2 TIMES DAILY
Qty: 14 TABLET | Refills: 0 | Status: SHIPPED | OUTPATIENT
Start: 2023-08-31 | End: 2023-09-07

## 2023-08-31 RX ORDER — RIVAROXABAN 10 MG/1
TABLET, FILM COATED ORAL
Qty: 21 TABLET | Refills: 0 | Status: SHIPPED | OUTPATIENT
Start: 2023-08-31

## 2023-08-31 RX ORDER — INSULIN LISPRO 100 [IU]/ML
0-4 INJECTION, SOLUTION INTRAVENOUS; SUBCUTANEOUS NIGHTLY
Status: DISCONTINUED | OUTPATIENT
Start: 2023-08-31 | End: 2023-08-31 | Stop reason: HOSPADM

## 2023-08-31 RX ORDER — INSULIN LISPRO 100 [IU]/ML
0-8 INJECTION, SOLUTION INTRAVENOUS; SUBCUTANEOUS
Status: DISCONTINUED | OUTPATIENT
Start: 2023-08-31 | End: 2023-08-31 | Stop reason: HOSPADM

## 2023-08-31 RX ORDER — GLIPIZIDE 5 MG/1
5 TABLET ORAL
Status: DISCONTINUED | OUTPATIENT
Start: 2023-08-31 | End: 2023-08-31 | Stop reason: HOSPADM

## 2023-08-31 RX ORDER — ONDANSETRON 4 MG/1
4 TABLET, FILM COATED ORAL EVERY 8 HOURS PRN
Qty: 30 TABLET | Refills: 0 | Status: SHIPPED | OUTPATIENT
Start: 2023-08-31

## 2023-08-31 RX ADMIN — BISACODYL 5 MG: 5 TABLET, COATED ORAL at 08:51

## 2023-08-31 RX ADMIN — OXYCODONE HYDROCHLORIDE 10 MG: 5 TABLET ORAL at 10:27

## 2023-08-31 RX ADMIN — ARIPIPRAZOLE 5 MG: 5 TABLET ORAL at 08:51

## 2023-08-31 RX ADMIN — HYDROMORPHONE HYDROCHLORIDE 1 MG: 1 INJECTION, SOLUTION INTRAMUSCULAR; INTRAVENOUS; SUBCUTANEOUS at 08:50

## 2023-08-31 RX ADMIN — ACETAMINOPHEN 650 MG: 325 TABLET ORAL at 05:16

## 2023-08-31 RX ADMIN — DULOXETINE 40 MG: 20 CAPSULE, DELAYED RELEASE ORAL at 08:51

## 2023-08-31 RX ADMIN — GABAPENTIN 800 MG: 400 CAPSULE ORAL at 08:51

## 2023-08-31 RX ADMIN — GLIPIZIDE 5 MG: 5 TABLET ORAL at 08:13

## 2023-08-31 RX ADMIN — TRAMADOL HYDROCHLORIDE 100 MG: 50 TABLET, COATED ORAL at 05:15

## 2023-08-31 RX ADMIN — Medication 3000 MG: at 05:43

## 2023-08-31 RX ADMIN — OXYCODONE HYDROCHLORIDE 15 MG: 5 TABLET ORAL at 03:12

## 2023-08-31 ASSESSMENT — PAIN SCALES - GENERAL
PAINLEVEL_OUTOF10: 6
PAINLEVEL_OUTOF10: 5
PAINLEVEL_OUTOF10: 3
PAINLEVEL_OUTOF10: 7
PAINLEVEL_OUTOF10: 2
PAINLEVEL_OUTOF10: 2
PAINLEVEL_OUTOF10: 7
PAINLEVEL_OUTOF10: 4

## 2023-08-31 ASSESSMENT — PAIN DESCRIPTION - DESCRIPTORS
DESCRIPTORS: ACHING
DESCRIPTORS: ACHING

## 2023-08-31 ASSESSMENT — PAIN DESCRIPTION - LOCATION
LOCATION: LEG
LOCATION: LEG

## 2023-08-31 ASSESSMENT — PAIN DESCRIPTION - ORIENTATION
ORIENTATION: LEFT
ORIENTATION: LEFT

## 2023-08-31 ASSESSMENT — PAIN - FUNCTIONAL ASSESSMENT
PAIN_FUNCTIONAL_ASSESSMENT: PREVENTS OR INTERFERES WITH ALL ACTIVE AND SOME PASSIVE ACTIVITIES
PAIN_FUNCTIONAL_ASSESSMENT: PREVENTS OR INTERFERES WITH ALL ACTIVE AND SOME PASSIVE ACTIVITIES

## 2023-08-31 NOTE — ANESTHESIA POST-OP
Anesthesia Post-op Note    Rosa Mayte    Procedure(s):  ROBOTIC COMPLEX PRIMARY LEFT KNEE TOTAL ARTHROPLASTY    Patient location: [facility] Surgical Floor    Anesthesia type: regional and spinal    Post-op pain: adequate analgesia    Post-op assessment:     Post-op vital signs: stable    Level of consciousness: awake and alert    Complications:  spinal/ block resolved, no paresthesias or motor deficits

## 2023-08-31 NOTE — PROGRESS NOTES
Physical Therapy  Facility/Department: [facility] SURG SERVICES  Physical Therapy Initial Assessment    Name: Virginia Lennon  : 1970  MRN: [id number]  Date of Service: 2023    Discharge Recommendations:  Continue to assess pending progress, 24 hour supervision or assist, Patient would benefit from continued therapy after discharge          Patient Diagnosis(es): The primary encounter diagnosis was Post-traumatic osteoarthritis of right knee. A diagnosis of Primary osteoarthritis of left knee was also pertinent to this visit. Past Medical History:  has a past medical history of Anxiety, Arthritis, Chronic pain, Depression, Diabetes mellitus type 2 in obese ([address]), Hyperlipidemia, Hypertension, Knee pain, and Premenopausal patient. Past Surgical History:  has a past surgical history that includes  section; Tubal ligation; tendon release; Tonsillectomy; Total knee arthroplasty (Right, 5/3/2023); and Total knee arthroplasty (Left, 2023). Assessment   Body Structures, Functions, Activity Limitations Requiring Skilled Therapeutic Intervention: Decreased functional mobility ; Decreased ROM; Decreased strength;Decreased endurance;Decreased posture; Increased pain  Assessment: Pt. will benefit from cont. PT to decrease impairments. Pt a fall risk and should not attempt mobility on her own at this time. Pt. asking when she can do stairs and wants to go home today. Pt. tolerated 1st time up well. Encouraged to sit up in chair and use call light for A. Needs to use RW, gait belt and staff A.   Treatment Diagnosis: impaired gait and mobiltiy  Therapy Prognosis: Good  Decision Making: Medium Complexity  Barriers to Learning: none noted  Requires PT Follow-Up: Yes  Activity Tolerance  Activity Tolerance: Patient tolerated treatment well     Plan   Physcial Therapy Plan  General Plan: 6-7 times per week  Therapy Duration: 2 Weeks  Current Treatment Recommendations: Strengthening, ROM, Balance training, Functional

## 2023-08-31 NOTE — PROGRESS NOTES
CLINICAL PHARMACY NOTE: MEDS TO BEDS    Total # of Prescriptions Filled: 4   The following medications were delivered to the patient:  Current Discharge Medication List        START taking these medications    Details   rivaroxaban (XARELTO) 10 MG TABS tablet One tablet PO QD x 21 days. After this is completed: Take ASA 81mg po BID x 3 weeks. (Only if you can tolerate aspirin.) Then resume previous aspirin regimen or discontinue if not previously on aspirin. Qty: 21 tablet, Refills: 0      ondansetron (ZOFRAN) 4 MG tablet Take 1 tablet by mouth every 8 hours as needed for Nausea or Vomiting  Qty: 30 tablet, Refills: 0      oxyCODONE HCl (OXY-IR) 10 MG immediate release tablet Take 1 tablet by mouth See Admin Instructions for 21 days. 1 to 2 tablets po q 3 to 4 hours prn  Qty: 90 tablet, Refills: 0    Comments: Reduce doses taken as pain becomes manageable  Associated Diagnoses: Post-traumatic osteoarthritis of right knee      doxycycline hyclate (VIBRA-TABS) 100 MG tablet Take 1 tablet by mouth 2 times daily for 7 days  Qty: 14 tablet, Refills: 0             Additional Documentation:     Delivered Rx's to patients room. Gave Rx's to patients family at bedside. Paid $0.00.

## 2023-08-31 NOTE — PROGRESS NOTES
Occupational Therapy  Facility/Department: [facility] SURG SERVICES  Occupational Therapy Initial Assessment    Name: Clover Riddle  : 1970  MRN: [id number]  Date of Service: 2023    Discharge Recommendations:  24 hour supervision or assist, Home with Home health OT        Patient Diagnosis(es): The primary encounter diagnosis was Post-traumatic osteoarthritis of right knee. A diagnosis of Primary osteoarthritis of left knee was also pertinent to this visit. Past Medical History:  has a past medical history of Anxiety, Arthritis, Chronic pain, Depression, Diabetes mellitus type 2 in obese ([address]), Hyperlipidemia, Hypertension, Knee pain, and Premenopausal patient. Past Surgical History:  has a past surgical history that includes  section; Tubal ligation; tendon release; Tonsillectomy; Total knee arthroplasty (Right, 5/3/2023); and Total knee arthroplasty (Left, 2023). Treatment Diagnosis: L TKA    Assessment   Performance deficits / Impairments: Decreased functional mobility ; Decreased ADL status  Assessment: Pt seen this date s/p L TKA with Dr. Dve Pardo;  pt at this time CGA for transfers and functional mobility with use of bariatric RW to/from bathroom and off of bed. Pt CGA to sit on toilet and to complete toileting task. Pt benefits from ongoing OT to address decreased I with ADL tasks, functional mobility, balance, endurance, and ADL tasks.   Treatment Diagnosis: L TKA  Prognosis: Good  Decision Making: Low Complexity  REQUIRES OT FOLLOW-UP: Yes  Activity Tolerance  Activity Tolerance: Patient Tolerated treatment well        Plan   Occupational Therapy Plan  Times Per Week: 3-5x/wk  Current Treatment Recommendations: Strengthening, Balance training, ROM, Endurance training, Self-Care / ADL     Restrictions  Restrictions/Precautions  Restrictions/Precautions: Fall Risk  Position Activity Restriction  Other position/activity restrictions: pt L TKA protocols    Subjective   General  Chart

## 2023-08-31 NOTE — PLAN OF CARE
Problem: Discharge Planning  Goal: Discharge to home or other facility with appropriate resources  8/31/2023 0000 by Violetta Thornton LPN  Outcome: Progressing  8/31/2023 0000 by Violetta Thornton, LPN  Outcome: Progressing     Problem: Pain  Goal: Verbalizes/displays adequate comfort level or baseline comfort level  8/31/2023 0000 by Violetta Thornton LPN  Outcome: Progressing  8/31/2023 0000 by Violetta Thornton, LPN  Outcome: Progressing     Problem: Safety - Adult  Goal: Free from fall injury  8/31/2023 0000 by Violetta Thornton LPN  Outcome: Progressing  8/31/2023 0000 by Violetta Thornton, LPN  Outcome: Progressing     Problem: ABCDS Injury Assessment  Goal: Absence of physical injury  8/31/2023 0000 by Violetta Thornton LPN  Outcome: Progressing  8/31/2023 0000 by Violetta Thornton LPN  Outcome: Progressing     Problem: Chronic Conditions and Co-morbidities  Goal: Patient's chronic conditions and co-morbidity symptoms are monitored and maintained or improved  8/31/2023 0000 by Violetta Thornton LPN  Outcome: Progressing  8/31/2023 0000 by Violetta Thornton LPN  Outcome: Progressing  Flowsheets (Taken 8/30/2023 1645 by Manpreet Adams RN)  Care Plan - Patient's Chronic Conditions and Co-Morbidity Symptoms are Monitored and Maintained or Improved: Monitor and assess patient's chronic conditions and comorbid symptoms for stability, deterioration, or improvement

## 2023-08-31 NOTE — OP NOTE
[facility] [address], [address] [place] [address]                                OPERATIVE REPORT    PATIENT NAME: Miguel Li                    :        1970  MED REC NO:   [id number]                              ROOM:       [facility]  ACCOUNT NO:   [de-identified]                           ADMIT DATE: 2023  PROVIDER:     Dior Wilson MD    DATE OF PROCEDURE:  2023    PREOPERATIVE DIAGNOSES:  1. Primary osteoarthritis, left knee. 2.  Elevated BMI of 48.20. POSTOPERATIVE DIAGNOSES:  1. Primary osteoarthritis, left knee. 2.  Elevated BMI of 48.20. PROCEDURE:  Complex primary robotic-assisted left knee replacement. Please note, complexity of the surgery is due to the fact that the  patient had elevated BMI. This added 100% increase in difficulty in  exposure and placement of implants. SURGEON:  Dior Wilson MD    ANESTHESIA:  Spinal.    EBL:  230 mL. FLUIDS:  2000 mL of crystalloid. TOURNIQUET TIME:  42 minutes. COMPONENTS USED:  Nick Persona knee system, femur size 8 standard CR  TM press-fit femur, tibia size E TM press-fit tibia, polyethylene size  10 MC polyethylene. INDICATIONS:  This is a 50-year-old lady with severe arthritis of the  left knee, failing conservative care. Because of this, she elected for  the above procedure. OPERATIVE PROCEDURE:  After informed consent, she was given 3 gm of  Ancef, 1 gm of tranexamic acid, underwent adductor canal block and  spinal anesthetic. Tourniquet was placed around the left upper thigh. Right leg was placed in SCD. Left leg was prepped and draped in the  usual sterile fashion. Leg was Esmarched. Tourniquet was inflated to  250 mmHg. Midline incision was made. Parapatellar approach was made. The patient had a very difficult exposure. Prepatellar fat pad and  synovium in the distal femur were elevated.   Superomedial femoral array  was placed, proximal tibial

## 2023-08-31 NOTE — CARE COORDINATION
Set up with [facility]  [phone].    Electronically signed by Robert Bowman on 8/31/23 at 10:06 AM CDT

## 2023-08-31 NOTE — CONSULTS
Referring Provider: Dr. Angela Escobar  Reason for Consultation: Medical management    Patient Care Team:  NATALI Rodriguez as PCP - General (Nurse Practitioner Family)  NATALI Rodrigeuz as PCP - Empaneled Provider    Chief complaint knee pain    Subjective . History of present illness: The patient presents to the orthopedic service for TKA. They have failed outpatient conservative treatment of NSAIDS, muscle relaxer, physical therapy and opioid pain meds. The pain is affecting their activities of daily living and they have chosen to undergo surgical correction. We have been asked to provide medical consultation by the attending physician since their primary care provider does not attend here at [address] in their healthcare during the perioperative phase was discussed with the patient. All questions were encouraged and answered to the best of our ability. The postoperative pain is as expected. There are no other participating or relieving factors noted.        REVIEW OF SYSTEMS:    CONSTITUTIONAL:  Negative for anorexia, chills, fevers, night sweats and weight loss  EYES:  negative for eye dryness, icterus and redness  HEENT:   negative for dental problems, epistaxis, facial trauma and thrush  RESPIRATORY:  negative for chest tightness, cough, dyspnea on exertion, pneumonia and sputum  CARDIOVASCULAR: negative for chest pain, dyspnea, exertional chest pressure/discomfort, irregular heart beat, palpitations, paroxysmal nocturnal dyspnea and syncope  GASTROINTESTINAL:  negative for abdominal pain, hematemesis, jaundice, melena and rectal bleeding  MUSCULOSKELETAL:  negative for muscle weakness, myalgias and neck pain, chronic joint pain noted  NEUROLOGICAL:   negative for dizziness, headaches, seizures, speech problems, tremors and vertigo  INTEGUMENT: negative for pruritus, rash, skin color change and skin lesion(s)   A Full 14 point review of systems is negative outside those listed list. Explain to pt need for sugar control to promote healing and prevent post op infection. Need to follow up with PCP after hospitalization to make sure sugar returns to normal/preoperative levels. We were asked to provide medical consultation by the attending physician during the perioperative phase. They will return to their primary care provider and have been instructed to follow up with them concerning abnormal lab/x-rays. Questions were encouraged and answered to the best of our ability. We will be available for 30 days or until they return to their primary care provider, if they return to the emergency room in the next 30 days with a lexi-operative issue we will gladly admit them. Otherwise, they will be admitted to the hospitalist service. All questions and concerns addressed prior to discharge. I have discussed the care of Eli Hedrick, including pertinent history and exam findings with the ARNP/PA. I have seen and examined the patient and the key elements of all parts of the encounter have been performed by me. I agree with the assessment and plan as outlined by the ARNP/PA. Please refer to my separate note for complete documentation.      Electronically signed by Darinel Frances MD on 8/31/2023 at 8:05 AM

## 2023-08-31 NOTE — DISCHARGE SUMMARY
[facility] [place] [facility]  Discharge Summary    The Juany Baker is a 47 y.o. female underwent L TKA procedure without complication. Juany Baker was admitted to the floor following her   recovery in the PACU.      Discharge Diagnosis  left knee djd    Current Inpatient Medications    Current Facility-Administered Medications: glipiZIDE (GLUCOTROL) tablet 5 mg, 5 mg, Oral, QAM AC  insulin lispro (HUMALOG) injection vial 0-8 Units, 0-8 Units, SubCUTAneous, TID WC  insulin lispro (HUMALOG) injection vial 0-4 Units, 0-4 Units, SubCUTAneous, Nightly  gabapentin (NEURONTIN) capsule 800 mg, 800 mg, Oral, TID  tiZANidine (ZANAFLEX) tablet 4 mg, 4 mg, Oral, Q8H PRN  ARIPiprazole (ABILIFY) tablet 5 mg, 5 mg, Oral, Daily  DULoxetine (CYMBALTA) extended release capsule 40 mg, 40 mg, Oral, Daily  atorvastatin (LIPITOR) tablet 20 mg, 20 mg, Oral, Nightly  traZODone (DESYREL) tablet 150 mg, 150 mg, Oral, Nightly  Semaglutide (2 MG/DOSE) SOPN 2 mg (Patient Supplied), 2 mg, SubCUTAneous, Weekly  ALPRAZolam (XANAX) tablet 0.25 mg, 0.25 mg, Oral, Nightly PRN  0.9 % sodium chloride infusion, , IntraVENous, Continuous  sodium chloride 0.9 % bolus 500 mL, 500 mL, IntraVENous, PRN  sodium chloride flush 0.9 % injection 5-40 mL, 5-40 mL, IntraVENous, 2 times per day  sodium chloride flush 0.9 % injection 5-40 mL, 5-40 mL, IntraVENous, PRN  0.9 % sodium chloride infusion, , IntraVENous, PRN  acetaminophen (TYLENOL) tablet 650 mg, 650 mg, Oral, Q6H  ceFAZolin (ANCEF) 3000 mg in sodium chloride 0.9% 100 mL IVPB, 3,000 mg, IntraVENous, Q8H  bisacodyl (DULCOLAX) EC tablet 5 mg, 5 mg, Oral, Daily  ondansetron (ZOFRAN-ODT) disintegrating tablet 4 mg, 4 mg, Oral, Q8H PRN **OR** ondansetron (ZOFRAN) injection 4 mg, 4 mg, IntraVENous, Q6H PRN  oxyCODONE (ROXICODONE) immediate release tablet 5 mg, 5 mg, Oral, Q4H PRN **OR** oxyCODONE (ROXICODONE) immediate release tablet 10 mg, 10 mg, Oral, Q4H PRN **OR** oxyCODONE (ROXICODONE)

## 2023-08-31 NOTE — PROGRESS NOTES
Patient discharged home today with [facility]. Went over all discharge instructions and new medications with patient and provided a copy of all new medications to take home. Patient verbalized understanding. Patient was stable upon discharge.    Electronically signed by Christiana Saldana RN on 8/31/2023 at 10:02 AM

## 2023-09-01 ENCOUNTER — TELEPHONE (OUTPATIENT)
Dept: INTERNAL MEDICINE | Age: 53
End: 2023-09-01

## 2023-09-01 ENCOUNTER — TELEPHONE (OUTPATIENT)
Dept: INPATIENT UNIT | Age: 53
End: 2023-09-01

## 2023-09-01 LAB
BACTERIA UR CULT: ABNORMAL
BACTERIA UR CULT: ABNORMAL
ORGANISM: ABNORMAL

## 2023-09-01 NOTE — TELEPHONE ENCOUNTER
[address] [facility],[address] Floor Transitions Initial Follow Up Call    Outreach made within 2 business days of discharge: Yes    Patient: Pernell Coats   Patient : 1970 MRN: [id number]    Reason for Admission: Left knee djd    Discharge Date: 23      Discharge Diagnosis  left knee djd      Spoke with: Patient    Discharge department/facility: [address] [place] [address] Interactive Patient Contact:  Was patient able to fill all prescriptions: Yes  Was patient instructed to bring all medications to the follow-up visit: Yes  Is patient taking all medications as directed in the discharge summary? Yes  Does patient understand their discharge instructions: Yes  Does patient have questions or concerns that need addressed prior to 7-14 day follow up office visit: no    Spoke the patient she said that the patient her pain is about a 5 at this time. She is having home health come they already left there today and she is waiting on therapy to get there. She has not yet had a BM she is using colace and metamucil. She is scheduled for 23 and did not think she needed anything at this time.     RECORDS IN CHART  PT    Scheduled appointment with PCP within 7-14 days    Follow Up  Future Appointments   Date Time Provider [address] [place] [address]     4:42 PM NATALI Romo [address]

## 2023-09-01 NOTE — TELEPHONE ENCOUNTER
Follow up phone call x 1 attempt. No one answered and no mailbox set up.   Electronically signed by Lolly Cowan RN on 9/1/2023 at 11:15 AM

## 2023-09-05 RX ORDER — ARIPIPRAZOLE 5 MG/1
TABLET ORAL
Qty: 30 TABLET | Refills: 5 | Status: SHIPPED | OUTPATIENT
Start: 2023-09-05

## 2023-09-06 ENCOUNTER — OFFICE VISIT (OUTPATIENT)
Dept: PRIMARY CARE CLINIC | Age: 53
End: 2023-09-06

## 2023-09-06 VITALS
DIASTOLIC BLOOD PRESSURE: 76 MMHG | HEIGHT: 68 IN | OXYGEN SATURATION: 95 % | SYSTOLIC BLOOD PRESSURE: 124 MMHG | BODY MASS INDEX: 44.41 KG/M2 | WEIGHT: 293 LBS | HEART RATE: 94 BPM | TEMPERATURE: 97.6 F

## 2023-09-06 DIAGNOSIS — Z09 HOSPITAL DISCHARGE FOLLOW-UP: Primary | ICD-10-CM

## 2023-09-06 DIAGNOSIS — Z96.651 S/P TKR (TOTAL KNEE REPLACEMENT), RIGHT: ICD-10-CM

## 2023-09-11 RX ORDER — SEMAGLUTIDE 2.68 MG/ML
2 INJECTION, SOLUTION SUBCUTANEOUS WEEKLY
Qty: 3 ML | Refills: 0 | Status: SHIPPED | OUTPATIENT
Start: 2023-09-11

## 2023-09-12 DIAGNOSIS — G89.29 OTHER CHRONIC PAIN: ICD-10-CM

## 2023-09-12 DIAGNOSIS — F32.A ANXIETY AND DEPRESSION: ICD-10-CM

## 2023-09-12 DIAGNOSIS — F41.9 ANXIETY AND DEPRESSION: ICD-10-CM

## 2023-09-12 RX ORDER — DULOXETINE 40 MG/1
CAPSULE, DELAYED RELEASE ORAL
Qty: 90 CAPSULE | Refills: 1 | Status: SHIPPED | OUTPATIENT
Start: 2023-09-12

## 2023-09-14 DIAGNOSIS — F41.9 ANXIETY: ICD-10-CM

## 2023-09-14 RX ORDER — ALPRAZOLAM 0.25 MG/1
TABLET ORAL
Qty: 30 TABLET | Refills: 0 | Status: SHIPPED | OUTPATIENT
Start: 2023-09-14 | End: 2023-10-14

## 2023-09-14 RX ORDER — ALPRAZOLAM 0.25 MG/1
TABLET ORAL
Qty: 30 TABLET | OUTPATIENT
Start: 2023-09-14

## 2023-09-22 ENCOUNTER — APPOINTMENT (OUTPATIENT)
Dept: GENERAL RADIOLOGY | Age: 53
DRG: 488 | End: 2023-09-22
Payer: MEDICARE

## 2023-09-22 ENCOUNTER — HOSPITAL ENCOUNTER (INPATIENT)
Age: 53
LOS: 5 days | Discharge: HOME HEALTH | DRG: 488 | End: 2023-09-27
Attending: EMERGENCY MEDICINE | Admitting: INTERNAL MEDICINE
Payer: MEDICARE

## 2023-09-22 DIAGNOSIS — S89.92XA INJURY OF LEFT KNEE, INITIAL ENCOUNTER: ICD-10-CM

## 2023-09-22 DIAGNOSIS — Z96.652 HISTORY OF TOTAL KNEE ARTHROPLASTY, LEFT: ICD-10-CM

## 2023-09-22 DIAGNOSIS — S89.92XA LEFT KNEE INJURY, INITIAL ENCOUNTER: Primary | ICD-10-CM
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
## 2023-09-22 PROBLEM — M25.469 JOINT EFFUSION OF KNEE: Status: ACTIVE | Noted: 2023-09-22

## 2023-09-22 LAB
ALBUMIN SERPL-MCNC: 3.8 G/DL (ref 3.5–5.2)
ALP SERPL-CCNC: 131 U/L (ref 35–104)
ALT SERPL-CCNC: 23 U/L (ref 5–33)
ANION GAP SERPL CALCULATED.3IONS-SCNC: 12 MMOL/L (ref 7–19)
AST SERPL-CCNC: 23 U/L (ref 5–32)
BASOPHILS # BLD: 0.1 K/UL (ref 0–0.2)
BASOPHILS NFR BLD: 0.5 % (ref 0–1)
BILIRUB SERPL-MCNC: <0.2 MG/DL (ref 0.2–1.2)
BUN SERPL-MCNC: 13 MG/DL (ref 6–20)
CALCIUM SERPL-MCNC: 9.2 MG/DL (ref 8.6–10)
CHLORIDE SERPL-SCNC: 103 MMOL/L (ref 98–111)
CO2 SERPL-SCNC: 26 MMOL/L (ref 22–29)
CREAT SERPL-MCNC: 0.9 MG/DL (ref 0.5–0.9)
CRP SERPL HS-MCNC: <0.3 MG/DL (ref 0–0.5)
EOSINOPHIL # BLD: 0.2 K/UL (ref 0–0.6)
EOSINOPHIL NFR BLD: 1.7 % (ref 0–5)
ERYTHROCYTE [DISTWIDTH] IN BLOOD BY AUTOMATED COUNT: 14.4 % (ref 11.5–14.5)
ERYTHROCYTE [SEDIMENTATION RATE] IN BLOOD BY WESTERGREN METHOD: 43 MM/HR (ref 0–25)
GLUCOSE BLD-MCNC: 182 MG/DL (ref 70–99)
GLUCOSE SERPL-MCNC: 182 MG/DL (ref 74–109)
HCT VFR BLD AUTO: 34.8 % (ref 37–47)
HGB BLD-MCNC: 10.8 G/DL (ref 12–16)
IMM GRANULOCYTES # BLD: 0 K/UL
INR PPP: 1.2 (ref 0.88–1.18)
LYMPHOCYTES # BLD: 1.9 K/UL (ref 1.1–4.5)
LYMPHOCYTES NFR BLD: 19.6 % (ref 20–40)
MCH RBC QN AUTO: 28.9 PG (ref 27–31)
MCHC RBC AUTO-ENTMCNC: 31 G/DL (ref 33–37)
MCV RBC AUTO: 93 FL (ref 81–99)
MONOCYTES # BLD: 0.5 K/UL (ref 0–0.9)
MONOCYTES NFR BLD: 5.3 % (ref 0–10)
NEUTROPHILS # BLD: 6.9 K/UL (ref 1.5–7.5)
NEUTS SEG NFR BLD: 72.5 % (ref 50–65)
PERFORMED ON: ABNORMAL
PLATELET # BLD AUTO: 434 K/UL (ref 130–400)
PMV BLD AUTO: 8.1 FL (ref 9.4–12.3)
POTASSIUM SERPL-SCNC: 4.4 MMOL/L (ref 3.5–5)
PROT SERPL-MCNC: 6.9 G/DL (ref 6.6–8.7)
PROTHROMBIN TIME: 14.9 SEC (ref 12–14.6)
RBC # BLD AUTO: 3.74 M/UL (ref 4.2–5.4)
SODIUM SERPL-SCNC: 141 MMOL/L (ref 136–145)
WBC # BLD AUTO: 9.5 K/UL (ref 4.8–10.8)

## 2023-09-22 PROCEDURE — 73560 X-RAY EXAM OF KNEE 1 OR 2: CPT

## 2023-09-22 PROCEDURE — 36415 COLL VENOUS BLD VENIPUNCTURE: CPT

## 2023-09-22 PROCEDURE — 2580000003 HC RX 258: Performed by: NURSE PRACTITIONER

## 2023-09-22 PROCEDURE — 1210000000 HC MED SURG R&B

## 2023-09-22 PROCEDURE — 6370000000 HC RX 637 (ALT 250 FOR IP): Performed by: NURSE PRACTITIONER

## 2023-09-22 PROCEDURE — 99285 EMERGENCY DEPT VISIT HI MDM: CPT

## 2023-09-22 PROCEDURE — 82962 GLUCOSE BLOOD TEST: CPT

## 2023-09-22 PROCEDURE — 6360000002 HC RX W HCPCS: Performed by: EMERGENCY MEDICINE

## 2023-09-22 PROCEDURE — 85610 PROTHROMBIN TIME: CPT

## 2023-09-22 PROCEDURE — 80053 COMPREHEN METABOLIC PANEL: CPT

## 2023-09-22 PROCEDURE — 6360000002 HC RX W HCPCS: Performed by: ORTHOPAEDIC SURGERY

## 2023-09-22 PROCEDURE — 93005 ELECTROCARDIOGRAM TRACING: CPT | Performed by: EMERGENCY MEDICINE

## 2023-09-22 PROCEDURE — 86140 C-REACTIVE PROTEIN: CPT

## 2023-09-22 PROCEDURE — 85025 COMPLETE CBC W/AUTO DIFF WBC: CPT

## 2023-09-22 PROCEDURE — 85652 RBC SED RATE AUTOMATED: CPT

## 2023-09-22 PROCEDURE — 6370000000 HC RX 637 (ALT 250 FOR IP)

## 2023-09-22 RX ORDER — SODIUM CHLORIDE 0.9 % (FLUSH) 0.9 %
5-40 SYRINGE (ML) INJECTION PRN
Status: DISCONTINUED | OUTPATIENT
Start: 2023-09-22 | End: 2023-09-24 | Stop reason: SDUPTHER

## 2023-09-22 RX ORDER — ACETAMINOPHEN 325 MG/1
650 TABLET ORAL EVERY 6 HOURS PRN
Status: DISCONTINUED | OUTPATIENT
Start: 2023-09-22 | End: 2023-09-27 | Stop reason: HOSPADM

## 2023-09-22 RX ORDER — SODIUM CHLORIDE, SODIUM LACTATE, POTASSIUM CHLORIDE, CALCIUM CHLORIDE 600; 310; 30; 20 MG/100ML; MG/100ML; MG/100ML; MG/100ML
INJECTION, SOLUTION INTRAVENOUS CONTINUOUS
Status: DISCONTINUED | OUTPATIENT
Start: 2023-09-22 | End: 2023-09-23

## 2023-09-22 RX ORDER — INSULIN LISPRO 100 [IU]/ML
0-4 INJECTION, SOLUTION INTRAVENOUS; SUBCUTANEOUS NIGHTLY
Status: DISCONTINUED | OUTPATIENT
Start: 2023-09-22 | End: 2023-09-27 | Stop reason: HOSPADM

## 2023-09-22 RX ORDER — GABAPENTIN 400 MG/1
800 CAPSULE ORAL 3 TIMES DAILY
Status: DISCONTINUED | OUTPATIENT
Start: 2023-09-22 | End: 2023-09-27 | Stop reason: HOSPADM

## 2023-09-22 RX ORDER — DEXTROSE MONOHYDRATE 100 MG/ML
INJECTION, SOLUTION INTRAVENOUS CONTINUOUS PRN
Status: DISCONTINUED | OUTPATIENT
Start: 2023-09-22 | End: 2023-09-27 | Stop reason: HOSPADM

## 2023-09-22 RX ORDER — ONDANSETRON 2 MG/ML
4 INJECTION INTRAMUSCULAR; INTRAVENOUS EVERY 6 HOURS PRN
Status: DISCONTINUED | OUTPATIENT
Start: 2023-09-22 | End: 2023-09-24 | Stop reason: SDUPTHER

## 2023-09-22 RX ORDER — ACETAMINOPHEN 650 MG/1
650 SUPPOSITORY RECTAL EVERY 6 HOURS PRN
Status: DISCONTINUED | OUTPATIENT
Start: 2023-09-22 | End: 2023-09-27 | Stop reason: HOSPADM

## 2023-09-22 RX ORDER — ONDANSETRON 4 MG/1
4 TABLET, ORALLY DISINTEGRATING ORAL EVERY 8 HOURS PRN
Status: DISCONTINUED | OUTPATIENT
Start: 2023-09-22 | End: 2023-09-24 | Stop reason: SDUPTHER

## 2023-09-22 RX ORDER — ATORVASTATIN CALCIUM 20 MG/1
20 TABLET, FILM COATED ORAL NIGHTLY
Status: DISCONTINUED | OUTPATIENT
Start: 2023-09-22 | End: 2023-09-27 | Stop reason: HOSPADM

## 2023-09-22 RX ORDER — INSULIN LISPRO 100 [IU]/ML
0-8 INJECTION, SOLUTION INTRAVENOUS; SUBCUTANEOUS
Status: DISCONTINUED | OUTPATIENT
Start: 2023-09-22 | End: 2023-09-27 | Stop reason: HOSPADM

## 2023-09-22 RX ORDER — SODIUM CHLORIDE 9 MG/ML
INJECTION, SOLUTION INTRAVENOUS PRN
Status: DISCONTINUED | OUTPATIENT
Start: 2023-09-22 | End: 2023-09-24 | Stop reason: SDUPTHER

## 2023-09-22 RX ORDER — SODIUM CHLORIDE 0.9 % (FLUSH) 0.9 %
5-40 SYRINGE (ML) INJECTION EVERY 12 HOURS SCHEDULED
Status: DISCONTINUED | OUTPATIENT
Start: 2023-09-22 | End: 2023-09-22 | Stop reason: SDUPTHER

## 2023-09-22 RX ORDER — HYDROMORPHONE HYDROCHLORIDE 1 MG/ML
1 INJECTION, SOLUTION INTRAMUSCULAR; INTRAVENOUS; SUBCUTANEOUS
Status: DISCONTINUED | OUTPATIENT
Start: 2023-09-22 | End: 2023-09-24 | Stop reason: SDUPTHER

## 2023-09-22 RX ORDER — HYDROMORPHONE HYDROCHLORIDE 1 MG/ML
0.5 INJECTION, SOLUTION INTRAMUSCULAR; INTRAVENOUS; SUBCUTANEOUS
Status: DISCONTINUED | OUTPATIENT
Start: 2023-09-22 | End: 2023-09-24 | Stop reason: SDUPTHER

## 2023-09-22 RX ORDER — DULOXETIN HYDROCHLORIDE 20 MG/1
40 CAPSULE, DELAYED RELEASE ORAL DAILY
Status: DISCONTINUED | OUTPATIENT
Start: 2023-09-22 | End: 2023-09-27 | Stop reason: HOSPADM

## 2023-09-22 RX ORDER — ARIPIPRAZOLE 5 MG/1
5 TABLET ORAL DAILY
Status: DISCONTINUED | OUTPATIENT
Start: 2023-09-22 | End: 2023-09-27 | Stop reason: HOSPADM

## 2023-09-22 RX ORDER — SODIUM CHLORIDE 0.9 % (FLUSH) 0.9 %
5-40 SYRINGE (ML) INJECTION EVERY 12 HOURS SCHEDULED
Status: DISCONTINUED | OUTPATIENT
Start: 2023-09-22 | End: 2023-09-24 | Stop reason: SDUPTHER

## 2023-09-22 RX ORDER — OXYCODONE HYDROCHLORIDE 5 MG/1
10 TABLET ORAL EVERY 4 HOURS PRN
Status: DISCONTINUED | OUTPATIENT
Start: 2023-09-22 | End: 2023-09-24 | Stop reason: SDUPTHER

## 2023-09-22 RX ORDER — ACETAMINOPHEN 325 MG/1
650 TABLET ORAL EVERY 4 HOURS PRN
Status: DISCONTINUED | OUTPATIENT
Start: 2023-09-22 | End: 2023-09-22 | Stop reason: SDUPTHER

## 2023-09-22 RX ORDER — SODIUM CHLORIDE 0.9 % (FLUSH) 0.9 %
5-40 SYRINGE (ML) INJECTION PRN
Status: DISCONTINUED | OUTPATIENT
Start: 2023-09-22 | End: 2023-09-22 | Stop reason: SDUPTHER

## 2023-09-22 RX ORDER — ENOXAPARIN SODIUM 100 MG/ML
40 INJECTION SUBCUTANEOUS 2 TIMES DAILY
Status: DISCONTINUED | OUTPATIENT
Start: 2023-09-22 | End: 2023-09-24

## 2023-09-22 RX ORDER — OXYCODONE HYDROCHLORIDE 5 MG/1
15 TABLET ORAL EVERY 4 HOURS PRN
Status: DISCONTINUED | OUTPATIENT
Start: 2023-09-22 | End: 2023-09-24 | Stop reason: SDUPTHER

## 2023-09-22 RX ORDER — POLYETHYLENE GLYCOL 3350 17 G/17G
17 POWDER, FOR SOLUTION ORAL DAILY PRN
Status: DISCONTINUED | OUTPATIENT
Start: 2023-09-22 | End: 2023-09-27 | Stop reason: HOSPADM

## 2023-09-22 RX ORDER — HYDROCODONE BITARTRATE AND ACETAMINOPHEN 5; 325 MG/1; MG/1
1 TABLET ORAL EVERY 6 HOURS PRN
Status: DISCONTINUED | OUTPATIENT
Start: 2023-09-22 | End: 2023-09-22

## 2023-09-22 RX ADMIN — ATORVASTATIN CALCIUM 20 MG: 20 TABLET, FILM COATED ORAL at 20:41

## 2023-09-22 RX ADMIN — HYDROMORPHONE HYDROCHLORIDE 1 MG: 1 INJECTION, SOLUTION INTRAMUSCULAR; INTRAVENOUS; SUBCUTANEOUS at 18:38

## 2023-09-22 RX ADMIN — HYDROCODONE BITARTRATE AND ACETAMINOPHEN 1 TABLET: 5; 325 TABLET ORAL at 16:35

## 2023-09-22 RX ADMIN — GABAPENTIN 800 MG: 400 CAPSULE ORAL at 20:41

## 2023-09-22 RX ADMIN — SODIUM CHLORIDE, POTASSIUM CHLORIDE, SODIUM LACTATE AND CALCIUM CHLORIDE: 600; 310; 30; 20 INJECTION, SOLUTION INTRAVENOUS at 20:42

## 2023-09-22 RX ADMIN — TRAZODONE HYDROCHLORIDE 150 MG: 100 TABLET ORAL at 22:14

## 2023-09-22 RX ADMIN — HYDROMORPHONE HYDROCHLORIDE 1 MG: 1 INJECTION, SOLUTION INTRAMUSCULAR; INTRAVENOUS; SUBCUTANEOUS at 20:42

## 2023-09-22 RX ADMIN — Medication 1000 MG: at 12:14

## 2023-09-22 RX ADMIN — SODIUM CHLORIDE, PRESERVATIVE FREE 10 ML: 5 INJECTION INTRAVENOUS at 20:47

## 2023-09-22 SDOH — ECONOMIC STABILITY: FOOD INSECURITY: WITHIN THE PAST 12 MONTHS, YOU WORRIED THAT YOUR FOOD WOULD RUN OUT BEFORE YOU GOT MONEY TO BUY MORE.: SOMETIMES TRUE

## 2023-09-22 SDOH — ECONOMIC STABILITY: INCOME INSECURITY: HOW HARD IS IT FOR YOU TO PAY FOR THE VERY BASICS LIKE FOOD, HOUSING, MEDICAL CARE, AND HEATING?: SOMEWHAT HARD

## 2023-09-22 SDOH — ECONOMIC STABILITY: INCOME INSECURITY: IN THE PAST 12 MONTHS, HAS THE ELECTRIC, GAS, OIL, OR WATER COMPANY THREATENED TO SHUT OFF SERVICE IN YOUR HOME?: NO

## 2023-09-22 ASSESSMENT — ENCOUNTER SYMPTOMS
CONSTIPATION: 0
EYES NEGATIVE: 1
NAUSEA: 0
SORE THROAT: 0
GASTROINTESTINAL NEGATIVE: 1
WHEEZING: 0
DIARRHEA: 0
COUGH: 0
SHORTNESS OF BREATH: 0
BLOOD IN STOOL: 0
ABDOMINAL DISTENTION: 0
SINUS PAIN: 0
RESPIRATORY NEGATIVE: 1
TROUBLE SWALLOWING: 0
ABDOMINAL PAIN: 0
VOMITING: 0

## 2023-09-22 ASSESSMENT — PAIN DESCRIPTION - DESCRIPTORS
DESCRIPTORS: ACHING;THROBBING
DESCRIPTORS: ACHING;DISCOMFORT;BURNING
DESCRIPTORS: THROBBING
DESCRIPTORS: THROBBING

## 2023-09-22 ASSESSMENT — PAIN DESCRIPTION - ORIENTATION
ORIENTATION: RIGHT
ORIENTATION: LEFT

## 2023-09-22 ASSESSMENT — PATIENT HEALTH QUESTIONNAIRE - PHQ9
2. FEELING DOWN, DEPRESSED OR HOPELESS: 0
1. LITTLE INTEREST OR PLEASURE IN DOING THINGS: 0
SUM OF ALL RESPONSES TO PHQ QUESTIONS 1-9: 0
SUM OF ALL RESPONSES TO PHQ9 QUESTIONS 1 & 2: 0

## 2023-09-22 ASSESSMENT — PAIN - FUNCTIONAL ASSESSMENT: PAIN_FUNCTIONAL_ASSESSMENT: 0-10

## 2023-09-22 ASSESSMENT — PAIN DESCRIPTION - LOCATION
LOCATION: KNEE
LOCATION: KNEE
LOCATION: KNEE;LEG
LOCATION: KNEE;LEG

## 2023-09-22 ASSESSMENT — PAIN SCALES - GENERAL
PAINLEVEL_OUTOF10: 7
PAINLEVEL_OUTOF10: 8
PAINLEVEL_OUTOF10: 7
PAINLEVEL_OUTOF10: 8

## 2023-09-22 NOTE — H&P
[facility]      Hospitalist - History & Physical      PCP: NATALI Walker    Date of Admission: 2023    Date of Service: 2023    Chief Complaint:  Fall at home, Left knee pain     History Of Present Illness: The patient is a 47 y.o. female with past medical history of Recent Left TKA (23) by Dr. Willie Mackay, right TKA, diabetes, hypertension, hyperlipidemia, and anxiety who presented to [facility] ED for evaluation of left knee pain after having a fall at home. Reported slipping in bathroom while getting out of her tub. Stated her left knee twisted and fell on her bottom, did not land directly on the knee. Reported having increasing pain to left knee and unable to move or bear weight since the fall. Denied head injury or loss of consciousness. Denied lightheadedness, dizziness, shortness of breath or chest pain prior to the fall. Workup in ED revealed  otherwise unremarkable chemistry, Hgb 10.8. Left knee x-ray indicated soft tissue swelling with suggestion of small joint effusion but no visible fracture, intact hardware without evidence of infection or loosening. Patient was admitted to hospital medicine with orthopedic surgery consultation. Orthopedic surgery recommended surgical intervention.            Past Medical History:        Diagnosis Date    Anxiety     Arthritis     Chronic pain     Depression     Diabetes mellitus type 2 in obese [facility])     Hyperlipidemia     Hypertension     Knee pain     Premenopausal patient 2018       Past Surgical History:        Procedure Laterality Date     SECTION      x3    TENDON RELEASE      right finger    TONSILLECTOMY      TOTAL KNEE ARTHROPLASTY Right 5/3/2023    COMPLEX PRIMARY ROBOTIC RIGHT KNEE TOTAL ARTHROPLASTY performed by Júnior Serrano MD at [address] [facility] Left 2023    ROBOTIC COMPLEX PRIMARY LEFT KNEE TOTAL ARTHROPLASTY performed by Júnior Serrano MD at [address]

## 2023-09-22 NOTE — DISCHARGE INSTRUCTIONS
She will be in a long-leg immobilizer x 6 weeks, weightbearing as tolerated    She will be on Xarelto 10 mg a day for 3 weeks then follow with an Aspirin 81 mg two times daily for 3 more weeks. [employer] Sponge baths only or if showering, you must completely cover immobilizer to keep it dry.     If you experience any numbness, tingling or complete loss of feeling in foot or toes; call the office 21 [phone]

## 2023-09-22 NOTE — ED PROVIDER NOTES
[facility] EMERGENCY DEPT  EMERGENCY DEPARTMENT ENCOUNTER      Pt Name: Ella Lucero  MRN: [id number]  [address] 1970  Date of evaluation: 2023  Provider: Adiel Bravo MD    CHIEF COMPLAINT       Chief Complaint   Patient presents with    Fall     Slipped in bathtub, left knee pain after \"pop. \" Pt had LTKR 3 weeks ago         HISTORY OF PRESENT ILLNESS   (Location/Symptom, Timing/Onset,Context/Setting, Quality, Duration, Modifying Factors, Severity)  Note limiting factors. Ella Lucero is a 47 y.o. female who presents to the emergency department for evaluation after having a fall with subsequent left knee pain this morning. Patient had knee replacement done here about 3 weeks ago on her left knee by Dr. Natalee Edwards. Says she had been doing well up until this morning when she fell and her left knee twisted. Did not land directly on the knee. Having pain to the medial left knee and difficulty ambulating because of the pain. HPI    NursingNotes were reviewed. REVIEW OF SYSTEMS    (2-9 systems for level 4, 10 or more for level 5)     Review of Systems   Constitutional: Negative. HENT: Negative. Eyes: Negative. Respiratory: Negative. Cardiovascular: Negative. Gastrointestinal: Negative. Genitourinary: Negative. Musculoskeletal:  Positive for arthralgias. Skin: Negative. Neurological: Negative. Hematological: Negative. Psychiatric/Behavioral: Negative. A complete review of systems was performed and is negative except as noted above in the HPI.        PAST MEDICAL HISTORY     Past Medical History:   Diagnosis Date    Anxiety     Arthritis     Chronic pain     Depression     Diabetes mellitus type 2 in [facility])     Hyperlipidemia     Hypertension     Knee pain     Premenopausal patient 2018         SURGICAL HISTORY       Past Surgical History:   Procedure Laterality Date     SECTION      x3    TENDON RELEASE      right finger    TONSILLECTOMY

## 2023-09-22 NOTE — CARE COORDINATION
Patient is s/p 3 weeks L TKA. Reanna Yen getting out of bath tub. Came to ER     09/22/23 1326   Readmission Assessment   Number of Days since last admission? 8-30 days   Previous Disposition Home with Home Health   Who is being Interviewed Patient   What was the patient's/caregiver's perception as to why they think they needed to return back to the hospital? Other (Comment)  (fell in getting out of bathtub)   Did you visit your Primary Care Physician after you left the hospital, before you returned this time? Yes   Did you see a specialist, such as Cardiac, Pulmonary, Orthopedic Physician, etc. after you left the hospital? Yes   Who advised the patient to return to the hospital? Physician   Does the patient report anything that got in the way of taking their medications? No   In our efforts to provide the best possible care to you and others like you, can you think of anything that we could have done to help you after you left the hospital the first time, so that you might not have needed to return so soon?  Other (Comment)  (patient denies)     Electronically signed by Geovany Quinn RN, BSN on 9/22/2023 at 1:31 PM

## 2023-09-22 NOTE — ED NOTES
Dr Domenico Delatorre called and wants pt to return to ED, labs and vancomycin, NPO. Prep pt for surgery. Pt called to return to ER, pt states she ate after she left. Pt instructed to remain NPO.  Dr. Domenico Delatorre aware     Wero Herrera RN  09/22/23 0834

## 2023-09-22 NOTE — ED NOTES
RN at bedside for IV placement, pt reports someone from Dr. Karrie Jones office called and said they were canceling everything, attempting to verify     Elliot Wild RN  09/22/23 5810

## 2023-09-23 LAB
ANION GAP SERPL CALCULATED.3IONS-SCNC: 12 MMOL/L (ref 7–19)
BASOPHILS # BLD: 0.1 K/UL (ref 0–0.2)
BASOPHILS NFR BLD: 0.9 % (ref 0–1)
BUN SERPL-MCNC: 16 MG/DL (ref 6–20)
CALCIUM SERPL-MCNC: 8.7 MG/DL (ref 8.6–10)
CHLORIDE SERPL-SCNC: 103 MMOL/L (ref 98–111)
CO2 SERPL-SCNC: 28 MMOL/L (ref 22–29)
CREAT SERPL-MCNC: 0.9 MG/DL (ref 0.5–0.9)
EOSINOPHIL # BLD: 0.4 K/UL (ref 0–0.6)
EOSINOPHIL NFR BLD: 5.8 % (ref 0–5)
ERYTHROCYTE [DISTWIDTH] IN BLOOD BY AUTOMATED COUNT: 14.5 % (ref 11.5–14.5)
GLUCOSE BLD-MCNC: 112 MG/DL (ref 70–99)
GLUCOSE BLD-MCNC: 114 MG/DL (ref 70–99)
GLUCOSE BLD-MCNC: 127 MG/DL (ref 70–99)
GLUCOSE BLD-MCNC: 135 MG/DL (ref 70–99)
GLUCOSE SERPL-MCNC: 115 MG/DL (ref 74–109)
HCT VFR BLD AUTO: 32.3 % (ref 37–47)
HGB BLD-MCNC: 9.8 G/DL (ref 12–16)
IMM GRANULOCYTES # BLD: 0 K/UL
LYMPHOCYTES # BLD: 2.8 K/UL (ref 1.1–4.5)
LYMPHOCYTES NFR BLD: 37.1 % (ref 20–40)
MCH RBC QN AUTO: 28.4 PG (ref 27–31)
MCHC RBC AUTO-ENTMCNC: 30.3 G/DL (ref 33–37)
MCV RBC AUTO: 93.6 FL (ref 81–99)
MONOCYTES # BLD: 0.6 K/UL (ref 0–0.9)
MONOCYTES NFR BLD: 7.6 % (ref 0–10)
NEUTROPHILS # BLD: 3.7 K/UL (ref 1.5–7.5)
NEUTS SEG NFR BLD: 48.1 % (ref 50–65)
PERFORMED ON: ABNORMAL
PLATELET # BLD AUTO: 390 K/UL (ref 130–400)
PMV BLD AUTO: 8.1 FL (ref 9.4–12.3)
POTASSIUM SERPL-SCNC: 4.4 MMOL/L (ref 3.5–5)
RBC # BLD AUTO: 3.45 M/UL (ref 4.2–5.4)
SODIUM SERPL-SCNC: 143 MMOL/L (ref 136–145)
WBC # BLD AUTO: 7.6 K/UL (ref 4.8–10.8)

## 2023-09-23 PROCEDURE — 85025 COMPLETE CBC W/AUTO DIFF WBC: CPT

## 2023-09-23 PROCEDURE — 80048 BASIC METABOLIC PNL TOTAL CA: CPT

## 2023-09-23 PROCEDURE — 6360000002 HC RX W HCPCS: Performed by: ORTHOPAEDIC SURGERY

## 2023-09-23 PROCEDURE — 6370000000 HC RX 637 (ALT 250 FOR IP)

## 2023-09-23 PROCEDURE — 94760 N-INVAS EAR/PLS OXIMETRY 1: CPT

## 2023-09-23 PROCEDURE — 2580000003 HC RX 258: Performed by: ORTHOPAEDIC SURGERY

## 2023-09-23 PROCEDURE — 82962 GLUCOSE BLOOD TEST: CPT

## 2023-09-23 PROCEDURE — 6370000000 HC RX 637 (ALT 250 FOR IP): Performed by: ORTHOPAEDIC SURGERY

## 2023-09-23 PROCEDURE — 36415 COLL VENOUS BLD VENIPUNCTURE: CPT

## 2023-09-23 PROCEDURE — 1210000000 HC MED SURG R&B

## 2023-09-23 PROCEDURE — 6370000000 HC RX 637 (ALT 250 FOR IP): Performed by: NURSE PRACTITIONER

## 2023-09-23 RX ADMIN — OXYCODONE HYDROCHLORIDE 10 MG: 5 TABLET ORAL at 21:59

## 2023-09-23 RX ADMIN — OXYCODONE HYDROCHLORIDE 15 MG: 5 TABLET ORAL at 14:49

## 2023-09-23 RX ADMIN — ATORVASTATIN CALCIUM 20 MG: 20 TABLET, FILM COATED ORAL at 21:59

## 2023-09-23 RX ADMIN — HYDROMORPHONE HYDROCHLORIDE 1 MG: 1 INJECTION, SOLUTION INTRAMUSCULAR; INTRAVENOUS; SUBCUTANEOUS at 12:35

## 2023-09-23 RX ADMIN — ONDANSETRON 4 MG: 4 TABLET, ORALLY DISINTEGRATING ORAL at 18:36

## 2023-09-23 RX ADMIN — VANCOMYCIN HYDROCHLORIDE 1000 MG: 10 INJECTION, POWDER, LYOPHILIZED, FOR SOLUTION INTRAVENOUS at 00:38

## 2023-09-23 RX ADMIN — TRAZODONE HYDROCHLORIDE 150 MG: 100 TABLET ORAL at 21:59

## 2023-09-23 RX ADMIN — OXYCODONE HYDROCHLORIDE 10 MG: 5 TABLET ORAL at 05:13

## 2023-09-23 RX ADMIN — OXYCODONE HYDROCHLORIDE 15 MG: 5 TABLET ORAL at 09:39

## 2023-09-23 RX ADMIN — CEFEPIME 2000 MG: 2 INJECTION, POWDER, FOR SOLUTION INTRAVENOUS at 14:47

## 2023-09-23 RX ADMIN — HYDROMORPHONE HYDROCHLORIDE 1 MG: 1 INJECTION, SOLUTION INTRAMUSCULAR; INTRAVENOUS; SUBCUTANEOUS at 06:31

## 2023-09-23 RX ADMIN — GABAPENTIN 800 MG: 400 CAPSULE ORAL at 14:43

## 2023-09-23 RX ADMIN — GABAPENTIN 800 MG: 400 CAPSULE ORAL at 21:59

## 2023-09-23 RX ADMIN — GABAPENTIN 800 MG: 400 CAPSULE ORAL at 08:41

## 2023-09-23 RX ADMIN — OXYCODONE HYDROCHLORIDE 10 MG: 5 TABLET ORAL at 00:39

## 2023-09-23 RX ADMIN — DULOXETINE HYDROCHLORIDE 40 MG: 20 CAPSULE, DELAYED RELEASE ORAL at 08:41

## 2023-09-23 RX ADMIN — CEFEPIME 2000 MG: 2 INJECTION, POWDER, FOR SOLUTION INTRAVENOUS at 02:47

## 2023-09-23 RX ADMIN — VANCOMYCIN HYDROCHLORIDE 1000 MG: 10 INJECTION, POWDER, LYOPHILIZED, FOR SOLUTION INTRAVENOUS at 12:38

## 2023-09-23 RX ADMIN — ARIPIPRAZOLE 5 MG: 5 TABLET ORAL at 08:41

## 2023-09-23 ASSESSMENT — PAIN SCALES - GENERAL
PAINLEVEL_OUTOF10: 8
PAINLEVEL_OUTOF10: 7
PAINLEVEL_OUTOF10: 6
PAINLEVEL_OUTOF10: 8

## 2023-09-23 ASSESSMENT — PAIN DESCRIPTION - ORIENTATION
ORIENTATION: LEFT

## 2023-09-23 ASSESSMENT — ENCOUNTER SYMPTOMS
CONSTIPATION: 0
TROUBLE SWALLOWING: 0
DIARRHEA: 0
SHORTNESS OF BREATH: 0
VOMITING: 0
BLOOD IN STOOL: 0
ABDOMINAL DISTENTION: 0
SINUS PAIN: 0
ABDOMINAL PAIN: 0
SORE THROAT: 0
NAUSEA: 0
COUGH: 0
WHEEZING: 0

## 2023-09-23 ASSESSMENT — PAIN DESCRIPTION - LOCATION
LOCATION: LEG;KNEE

## 2023-09-23 ASSESSMENT — PAIN DESCRIPTION - DESCRIPTORS
DESCRIPTORS: ACHING;DISCOMFORT;NAGGING
DESCRIPTORS: ACHING;DISCOMFORT
DESCRIPTORS: ACHING;DISCOMFORT;BURNING
DESCRIPTORS: ACHING;DISCOMFORT

## 2023-09-24 ENCOUNTER — ANESTHESIA EVENT (OUTPATIENT)
Dept: OPERATING ROOM | Age: 53
End: 2023-09-24
Payer: MEDICARE

## 2023-09-24 ENCOUNTER — APPOINTMENT (OUTPATIENT)
Dept: GENERAL RADIOLOGY | Age: 53
DRG: 488 | End: 2023-09-24
Payer: MEDICARE

## 2023-09-24 ENCOUNTER — ANESTHESIA (OUTPATIENT)
Dept: OPERATING ROOM | Age: 53
End: 2023-09-24
Payer: MEDICARE

## 2023-09-24 LAB
ABO/RH: NORMAL
ANION GAP SERPL CALCULATED.3IONS-SCNC: 9 MMOL/L (ref 7–19)
ANTIBODY SCREEN: NORMAL
BASOPHILS # BLD: 0.1 K/UL (ref 0–0.2)
BASOPHILS NFR BLD: 0.5 % (ref 0–1)
BLOOD BANK DISPENSE STATUS: NORMAL
BLOOD BANK PRODUCT CODE: NORMAL
BPU ID: NORMAL
BUN SERPL-MCNC: 16 MG/DL (ref 6–20)
CALCIUM SERPL-MCNC: 8.8 MG/DL (ref 8.6–10)
CHLORIDE SERPL-SCNC: 97 MMOL/L (ref 98–111)
CO2 SERPL-SCNC: 30 MMOL/L (ref 22–29)
CREAT SERPL-MCNC: 1 MG/DL (ref 0.5–0.9)
DESCRIPTION BLOOD BANK: NORMAL
EOSINOPHIL # BLD: 0.4 K/UL (ref 0–0.6)
EOSINOPHIL NFR BLD: 4.3 % (ref 0–5)
ERYTHROCYTE [DISTWIDTH] IN BLOOD BY AUTOMATED COUNT: 14.3 % (ref 11.5–14.5)
GLUCOSE BLD-MCNC: 117 MG/DL (ref 70–99)
GLUCOSE BLD-MCNC: 156 MG/DL (ref 70–99)
GLUCOSE BLD-MCNC: 175 MG/DL (ref 70–99)
GLUCOSE BLD-MCNC: 210 MG/DL (ref 70–99)
GLUCOSE SERPL-MCNC: 110 MG/DL (ref 74–109)
HCT VFR BLD AUTO: 33.7 % (ref 37–47)
HGB BLD-MCNC: 10.2 G/DL (ref 12–16)
IMM GRANULOCYTES # BLD: 0 K/UL
LYMPHOCYTES # BLD: 2.2 K/UL (ref 1.1–4.5)
LYMPHOCYTES NFR BLD: 21.7 % (ref 20–40)
MCH RBC QN AUTO: 28.5 PG (ref 27–31)
MCHC RBC AUTO-ENTMCNC: 30.3 G/DL (ref 33–37)
MCV RBC AUTO: 94.1 FL (ref 81–99)
MONOCYTES # BLD: 0.7 K/UL (ref 0–0.9)
MONOCYTES NFR BLD: 6.7 % (ref 0–10)
NEUTROPHILS # BLD: 6.7 K/UL (ref 1.5–7.5)
NEUTS SEG NFR BLD: 66.4 % (ref 50–65)
PERFORMED ON: ABNORMAL
PLATELET # BLD AUTO: 409 K/UL (ref 130–400)
PMV BLD AUTO: 8.2 FL (ref 9.4–12.3)
POTASSIUM SERPL-SCNC: 4.8 MMOL/L (ref 3.5–5)
RBC # BLD AUTO: 3.58 M/UL (ref 4.2–5.4)
SODIUM SERPL-SCNC: 136 MMOL/L (ref 136–145)
VANCOMYCIN TROUGH SERPL-MCNC: 8.9 UG/ML (ref 10–20)
WBC # BLD AUTO: 10 K/UL (ref 4.8–10.8)

## 2023-09-24 PROCEDURE — C9290 INJ, BUPIVACAINE LIPOSOME: HCPCS | Performed by: ORTHOPAEDIC SURGERY

## 2023-09-24 PROCEDURE — 6360000002 HC RX W HCPCS: Performed by: ANESTHESIOLOGY

## 2023-09-24 PROCEDURE — P9016 RBC LEUKOCYTES REDUCED: HCPCS

## 2023-09-24 PROCEDURE — 73560 X-RAY EXAM OF KNEE 1 OR 2: CPT

## 2023-09-24 PROCEDURE — 0S9D0ZZ DRAINAGE OF LEFT KNEE JOINT, OPEN APPROACH: ICD-10-PCS | Performed by: ORTHOPAEDIC SURGERY

## 2023-09-24 PROCEDURE — 2580000003 HC RX 258: Performed by: ORTHOPAEDIC SURGERY

## 2023-09-24 PROCEDURE — 6370000000 HC RX 637 (ALT 250 FOR IP): Performed by: ORTHOPAEDIC SURGERY

## 2023-09-24 PROCEDURE — 86850 RBC ANTIBODY SCREEN: CPT

## 2023-09-24 PROCEDURE — 36430 TRANSFUSION BLD/BLD COMPNT: CPT

## 2023-09-24 PROCEDURE — A4217 STERILE WATER/SALINE, 500 ML: HCPCS | Performed by: ORTHOPAEDIC SURGERY

## 2023-09-24 PROCEDURE — 3600000004 HC SURGERY LEVEL 4 BASE: Performed by: ORTHOPAEDIC SURGERY

## 2023-09-24 PROCEDURE — 2700000000 HC OXYGEN THERAPY PER DAY

## 2023-09-24 PROCEDURE — 2720000010 HC SURG SUPPLY STERILE: Performed by: ORTHOPAEDIC SURGERY

## 2023-09-24 PROCEDURE — 6360000002 HC RX W HCPCS: Performed by: ORTHOPAEDIC SURGERY

## 2023-09-24 PROCEDURE — 2580000003 HC RX 258: Performed by: ANESTHESIOLOGY

## 2023-09-24 PROCEDURE — 7100000001 HC PACU RECOVERY - ADDTL 15 MIN: Performed by: ORTHOPAEDIC SURGERY

## 2023-09-24 PROCEDURE — 1210000000 HC MED SURG R&B

## 2023-09-24 PROCEDURE — 36415 COLL VENOUS BLD VENIPUNCTURE: CPT

## 2023-09-24 PROCEDURE — 7100000000 HC PACU RECOVERY - FIRST 15 MIN: Performed by: ORTHOPAEDIC SURGERY

## 2023-09-24 PROCEDURE — 2709999900 HC NON-CHARGEABLE SUPPLY: Performed by: ORTHOPAEDIC SURGERY

## 2023-09-24 PROCEDURE — 3600000014 HC SURGERY LEVEL 4 ADDTL 15MIN: Performed by: ORTHOPAEDIC SURGERY

## 2023-09-24 PROCEDURE — 82962 GLUCOSE BLOOD TEST: CPT

## 2023-09-24 PROCEDURE — 0LQR0ZZ REPAIR LEFT KNEE TENDON, OPEN APPROACH: ICD-10-PCS | Performed by: ORTHOPAEDIC SURGERY

## 2023-09-24 PROCEDURE — 86923 COMPATIBILITY TEST ELECTRIC: CPT

## 2023-09-24 PROCEDURE — 2500000003 HC RX 250 WO HCPCS: Performed by: ANESTHESIOLOGY

## 2023-09-24 PROCEDURE — 85025 COMPLETE CBC W/AUTO DIFF WBC: CPT

## 2023-09-24 PROCEDURE — 80048 BASIC METABOLIC PNL TOTAL CA: CPT

## 2023-09-24 PROCEDURE — 6370000000 HC RX 637 (ALT 250 FOR IP): Performed by: ANESTHESIOLOGY

## 2023-09-24 PROCEDURE — 86900 BLOOD TYPING SEROLOGIC ABO: CPT

## 2023-09-24 PROCEDURE — 86901 BLOOD TYPING SEROLOGIC RH(D): CPT

## 2023-09-24 PROCEDURE — 80202 ASSAY OF VANCOMYCIN: CPT

## 2023-09-24 PROCEDURE — 3700000000 HC ANESTHESIA ATTENDED CARE: Performed by: ORTHOPAEDIC SURGERY

## 2023-09-24 PROCEDURE — 3700000001 HC ADD 15 MINUTES (ANESTHESIA): Performed by: ORTHOPAEDIC SURGERY

## 2023-09-24 RX ORDER — ALBUTEROL SULFATE 90 UG/1
AEROSOL, METERED RESPIRATORY (INHALATION) PRN
Status: DISCONTINUED | OUTPATIENT
Start: 2023-09-24 | End: 2023-09-24 | Stop reason: SDUPTHER

## 2023-09-24 RX ORDER — SODIUM CHLORIDE 9 MG/ML
INJECTION, SOLUTION INTRAVENOUS PRN
Status: DISCONTINUED | OUTPATIENT
Start: 2023-09-24 | End: 2023-09-24 | Stop reason: SDUPTHER

## 2023-09-24 RX ORDER — LIDOCAINE HYDROCHLORIDE 10 MG/ML
INJECTION, SOLUTION EPIDURAL; INFILTRATION; INTRACAUDAL; PERINEURAL PRN
Status: DISCONTINUED | OUTPATIENT
Start: 2023-09-24 | End: 2023-09-24 | Stop reason: SDUPTHER

## 2023-09-24 RX ORDER — TIZANIDINE 4 MG/1
4 TABLET ORAL EVERY 8 HOURS PRN
Status: DISCONTINUED | OUTPATIENT
Start: 2023-09-24 | End: 2023-09-27 | Stop reason: HOSPADM

## 2023-09-24 RX ORDER — HYDROMORPHONE HYDROCHLORIDE 1 MG/ML
0.5 INJECTION, SOLUTION INTRAMUSCULAR; INTRAVENOUS; SUBCUTANEOUS EVERY 5 MIN PRN
Status: DISCONTINUED | OUTPATIENT
Start: 2023-09-24 | End: 2023-09-24 | Stop reason: SDUPTHER

## 2023-09-24 RX ORDER — HYDROMORPHONE HYDROCHLORIDE 1 MG/ML
0.25 INJECTION, SOLUTION INTRAMUSCULAR; INTRAVENOUS; SUBCUTANEOUS
Status: DISCONTINUED | OUTPATIENT
Start: 2023-09-24 | End: 2023-09-27 | Stop reason: HOSPADM

## 2023-09-24 RX ORDER — ONDANSETRON 2 MG/ML
4 INJECTION INTRAMUSCULAR; INTRAVENOUS EVERY 6 HOURS PRN
Status: DISCONTINUED | OUTPATIENT
Start: 2023-09-24 | End: 2023-09-27 | Stop reason: HOSPADM

## 2023-09-24 RX ORDER — PROCHLORPERAZINE EDISYLATE 5 MG/ML
5 INJECTION INTRAMUSCULAR; INTRAVENOUS
Status: ACTIVE | OUTPATIENT
Start: 2023-09-24 | End: 2023-09-25

## 2023-09-24 RX ORDER — SODIUM CHLORIDE 9 MG/ML
INJECTION, SOLUTION INTRAVENOUS PRN
Status: DISCONTINUED | OUTPATIENT
Start: 2023-09-24 | End: 2023-09-27 | Stop reason: HOSPADM

## 2023-09-24 RX ORDER — DIPHENHYDRAMINE HYDROCHLORIDE 50 MG/ML
12.5 INJECTION INTRAMUSCULAR; INTRAVENOUS
Status: ACTIVE | OUTPATIENT
Start: 2023-09-24 | End: 2023-09-25

## 2023-09-24 RX ORDER — TRANEXAMIC ACID 100 MG/ML
INJECTION, SOLUTION INTRAVENOUS PRN
Status: DISCONTINUED | OUTPATIENT
Start: 2023-09-24 | End: 2023-09-24 | Stop reason: SDUPTHER

## 2023-09-24 RX ORDER — HYDRALAZINE HYDROCHLORIDE 20 MG/ML
INJECTION INTRAMUSCULAR; INTRAVENOUS PRN
Status: DISCONTINUED | OUTPATIENT
Start: 2023-09-24 | End: 2023-09-24 | Stop reason: SDUPTHER

## 2023-09-24 RX ORDER — SODIUM CHLORIDE 9 MG/ML
INJECTION, SOLUTION INTRAVENOUS CONTINUOUS
Status: DISCONTINUED | OUTPATIENT
Start: 2023-09-24 | End: 2023-09-24

## 2023-09-24 RX ORDER — OXYCODONE HYDROCHLORIDE 5 MG/1
5 TABLET ORAL EVERY 4 HOURS PRN
Status: DISCONTINUED | OUTPATIENT
Start: 2023-09-24 | End: 2023-09-27 | Stop reason: HOSPADM

## 2023-09-24 RX ORDER — CEFAZOLIN SODIUM 1 G/3ML
INJECTION, POWDER, FOR SOLUTION INTRAMUSCULAR; INTRAVENOUS PRN
Status: DISCONTINUED | OUTPATIENT
Start: 2023-09-24 | End: 2023-09-24 | Stop reason: SDUPTHER

## 2023-09-24 RX ORDER — DIPHENHYDRAMINE HCL 25 MG
25 TABLET ORAL EVERY 6 HOURS PRN
Status: DISCONTINUED | OUTPATIENT
Start: 2023-09-24 | End: 2023-09-27 | Stop reason: HOSPADM

## 2023-09-24 RX ORDER — HYDROMORPHONE HYDROCHLORIDE 1 MG/ML
0.25 INJECTION, SOLUTION INTRAMUSCULAR; INTRAVENOUS; SUBCUTANEOUS EVERY 5 MIN PRN
Status: DISCONTINUED | OUTPATIENT
Start: 2023-09-24 | End: 2023-09-24 | Stop reason: SDUPTHER

## 2023-09-24 RX ORDER — BISACODYL 5 MG/1
5 TABLET, DELAYED RELEASE ORAL DAILY
Status: DISCONTINUED | OUTPATIENT
Start: 2023-09-24 | End: 2023-09-27 | Stop reason: HOSPADM

## 2023-09-24 RX ORDER — SODIUM CHLORIDE 9 MG/ML
INJECTION, SOLUTION INTRAVENOUS CONTINUOUS PRN
Status: DISCONTINUED | OUTPATIENT
Start: 2023-09-24 | End: 2023-09-24 | Stop reason: SDUPTHER

## 2023-09-24 RX ORDER — LABETALOL 20 MG/4 ML (5 MG/ML) INTRAVENOUS SYRINGE
PRN
Status: DISCONTINUED | OUTPATIENT
Start: 2023-09-24 | End: 2023-09-24 | Stop reason: SDUPTHER

## 2023-09-24 RX ORDER — SODIUM CHLORIDE 0.9 % (FLUSH) 0.9 %
5-40 SYRINGE (ML) INJECTION EVERY 12 HOURS SCHEDULED
Status: DISCONTINUED | OUTPATIENT
Start: 2023-09-24 | End: 2023-09-24 | Stop reason: SDUPTHER

## 2023-09-24 RX ORDER — SODIUM CHLORIDE 0.9 % (FLUSH) 0.9 %
5-40 SYRINGE (ML) INJECTION PRN
Status: DISCONTINUED | OUTPATIENT
Start: 2023-09-24 | End: 2023-09-24 | Stop reason: SDUPTHER

## 2023-09-24 RX ORDER — ONDANSETRON 2 MG/ML
INJECTION INTRAMUSCULAR; INTRAVENOUS PRN
Status: DISCONTINUED | OUTPATIENT
Start: 2023-09-24 | End: 2023-09-24 | Stop reason: SDUPTHER

## 2023-09-24 RX ORDER — TRAMADOL HYDROCHLORIDE 50 MG/1
100 TABLET ORAL EVERY 6 HOURS
Status: DISCONTINUED | OUTPATIENT
Start: 2023-09-24 | End: 2023-09-27 | Stop reason: HOSPADM

## 2023-09-24 RX ORDER — LABETALOL HYDROCHLORIDE 5 MG/ML
10 INJECTION, SOLUTION INTRAVENOUS
Status: DISCONTINUED | OUTPATIENT
Start: 2023-09-24 | End: 2023-09-27 | Stop reason: HOSPADM

## 2023-09-24 RX ORDER — SODIUM CHLORIDE 9 MG/ML
INJECTION, SOLUTION INTRAVENOUS CONTINUOUS
Status: DISCONTINUED | OUTPATIENT
Start: 2023-09-24 | End: 2023-09-27 | Stop reason: HOSPADM

## 2023-09-24 RX ORDER — ROCURONIUM BROMIDE 10 MG/ML
INJECTION, SOLUTION INTRAVENOUS PRN
Status: DISCONTINUED | OUTPATIENT
Start: 2023-09-24 | End: 2023-09-24 | Stop reason: SDUPTHER

## 2023-09-24 RX ORDER — OXYCODONE HYDROCHLORIDE 5 MG/1
10 TABLET ORAL EVERY 4 HOURS PRN
Status: DISCONTINUED | OUTPATIENT
Start: 2023-09-24 | End: 2023-09-27 | Stop reason: HOSPADM

## 2023-09-24 RX ORDER — ESMOLOL HYDROCHLORIDE 10 MG/ML
INJECTION INTRAVENOUS PRN
Status: DISCONTINUED | OUTPATIENT
Start: 2023-09-24 | End: 2023-09-24 | Stop reason: SDUPTHER

## 2023-09-24 RX ORDER — SODIUM CHLORIDE, SODIUM LACTATE, POTASSIUM CHLORIDE, CALCIUM CHLORIDE 600; 310; 30; 20 MG/100ML; MG/100ML; MG/100ML; MG/100ML
INJECTION, SOLUTION INTRAVENOUS CONTINUOUS PRN
Status: DISCONTINUED | OUTPATIENT
Start: 2023-09-24 | End: 2023-09-24 | Stop reason: SDUPTHER

## 2023-09-24 RX ORDER — ONDANSETRON 4 MG/1
4 TABLET, FILM COATED ORAL EVERY 8 HOURS PRN
Status: DISCONTINUED | OUTPATIENT
Start: 2023-09-24 | End: 2023-09-24 | Stop reason: SDUPTHER

## 2023-09-24 RX ORDER — BUPIVACAINE HYDROCHLORIDE 2.5 MG/ML
INJECTION, SOLUTION INFILTRATION; PERINEURAL PRN
Status: DISCONTINUED | OUTPATIENT
Start: 2023-09-24 | End: 2023-09-24 | Stop reason: ALTCHOICE

## 2023-09-24 RX ORDER — DEXAMETHASONE SODIUM PHOSPHATE 10 MG/ML
INJECTION, SOLUTION INTRAMUSCULAR; INTRAVENOUS PRN
Status: DISCONTINUED | OUTPATIENT
Start: 2023-09-24 | End: 2023-09-24 | Stop reason: SDUPTHER

## 2023-09-24 RX ORDER — HYDROMORPHONE HYDROCHLORIDE 1 MG/ML
0.5 INJECTION, SOLUTION INTRAMUSCULAR; INTRAVENOUS; SUBCUTANEOUS
Status: DISCONTINUED | OUTPATIENT
Start: 2023-09-24 | End: 2023-09-27 | Stop reason: HOSPADM

## 2023-09-24 RX ORDER — SODIUM CHLORIDE 0.9 % (FLUSH) 0.9 %
5-40 SYRINGE (ML) INJECTION EVERY 12 HOURS SCHEDULED
Status: DISCONTINUED | OUTPATIENT
Start: 2023-09-24 | End: 2023-09-27 | Stop reason: HOSPADM

## 2023-09-24 RX ORDER — OXYCODONE HYDROCHLORIDE 5 MG/1
15 TABLET ORAL EVERY 4 HOURS PRN
Status: DISCONTINUED | OUTPATIENT
Start: 2023-09-24 | End: 2023-09-27 | Stop reason: HOSPADM

## 2023-09-24 RX ORDER — HYDRALAZINE HYDROCHLORIDE 20 MG/ML
10 INJECTION INTRAMUSCULAR; INTRAVENOUS
Status: DISCONTINUED | OUTPATIENT
Start: 2023-09-24 | End: 2023-09-27 | Stop reason: HOSPADM

## 2023-09-24 RX ORDER — FENTANYL CITRATE 50 UG/ML
INJECTION, SOLUTION INTRAMUSCULAR; INTRAVENOUS PRN
Status: DISCONTINUED | OUTPATIENT
Start: 2023-09-24 | End: 2023-09-24 | Stop reason: SDUPTHER

## 2023-09-24 RX ORDER — IPRATROPIUM BROMIDE AND ALBUTEROL SULFATE 2.5; .5 MG/3ML; MG/3ML
1 SOLUTION RESPIRATORY (INHALATION)
Status: ACTIVE | OUTPATIENT
Start: 2023-09-24 | End: 2023-09-25

## 2023-09-24 RX ORDER — ONDANSETRON 4 MG/1
4 TABLET, ORALLY DISINTEGRATING ORAL EVERY 8 HOURS PRN
Status: DISCONTINUED | OUTPATIENT
Start: 2023-09-24 | End: 2023-09-27 | Stop reason: HOSPADM

## 2023-09-24 RX ORDER — SODIUM CHLORIDE 0.9 % (FLUSH) 0.9 %
5-40 SYRINGE (ML) INJECTION PRN
Status: DISCONTINUED | OUTPATIENT
Start: 2023-09-24 | End: 2023-09-27 | Stop reason: HOSPADM

## 2023-09-24 RX ORDER — SODIUM CHLORIDE 9 MG/ML
INJECTION, SOLUTION INTRAVENOUS PRN
Status: CANCELLED | OUTPATIENT
Start: 2023-09-24

## 2023-09-24 RX ORDER — ALPRAZOLAM 0.5 MG/1
0.5 TABLET ORAL 3 TIMES DAILY PRN
Status: DISCONTINUED | OUTPATIENT
Start: 2023-09-24 | End: 2023-09-27 | Stop reason: HOSPADM

## 2023-09-24 RX ORDER — PROPOFOL 10 MG/ML
INJECTION, EMULSION INTRAVENOUS PRN
Status: DISCONTINUED | OUTPATIENT
Start: 2023-09-24 | End: 2023-09-24 | Stop reason: SDUPTHER

## 2023-09-24 RX ORDER — HYDROMORPHONE HYDROCHLORIDE 1 MG/ML
1 INJECTION, SOLUTION INTRAMUSCULAR; INTRAVENOUS; SUBCUTANEOUS
Status: DISCONTINUED | OUTPATIENT
Start: 2023-09-24 | End: 2023-09-27 | Stop reason: HOSPADM

## 2023-09-24 RX ADMIN — SODIUM CHLORIDE, SODIUM LACTATE, POTASSIUM CHLORIDE, AND CALCIUM CHLORIDE: 600; 310; 30; 20 INJECTION, SOLUTION INTRAVENOUS at 08:56

## 2023-09-24 RX ADMIN — FENTANYL CITRATE 50 MCG: 0.05 INJECTION, SOLUTION INTRAMUSCULAR; INTRAVENOUS at 09:00

## 2023-09-24 RX ADMIN — PHENYLEPHRINE HYDROCHLORIDE 100 MCG: 10 INJECTION INTRAVENOUS at 10:00

## 2023-09-24 RX ADMIN — LABETALOL 20 MG/4 ML (5 MG/ML) INTRAVENOUS SYRINGE 5 MG: at 09:45

## 2023-09-24 RX ADMIN — SODIUM CHLORIDE, SODIUM LACTATE, POTASSIUM CHLORIDE, AND CALCIUM CHLORIDE: 600; 310; 30; 20 INJECTION, SOLUTION INTRAVENOUS at 10:15

## 2023-09-24 RX ADMIN — Medication 4 PUFF: at 11:36

## 2023-09-24 RX ADMIN — ONDANSETRON 4 MG: 2 INJECTION INTRAMUSCULAR; INTRAVENOUS at 10:58

## 2023-09-24 RX ADMIN — SODIUM CHLORIDE, PRESERVATIVE FREE 10 ML: 5 INJECTION INTRAVENOUS at 19:38

## 2023-09-24 RX ADMIN — Medication 4 PUFF: at 09:21

## 2023-09-24 RX ADMIN — DEXAMETHASONE SODIUM PHOSPHATE 10 MG: 10 INJECTION, SOLUTION INTRAMUSCULAR; INTRAVENOUS at 09:22

## 2023-09-24 RX ADMIN — Medication 4 PUFF: at 11:04

## 2023-09-24 RX ADMIN — Medication 3000 MG: at 17:59

## 2023-09-24 RX ADMIN — LIDOCAINE HYDROCHLORIDE 100 MG: 10 INJECTION, SOLUTION EPIDURAL; INFILTRATION; INTRACAUDAL; PERINEURAL at 09:06

## 2023-09-24 RX ADMIN — FENTANYL CITRATE 50 MCG: 0.05 INJECTION, SOLUTION INTRAMUSCULAR; INTRAVENOUS at 09:35

## 2023-09-24 RX ADMIN — FENTANYL CITRATE 50 MCG: 0.05 INJECTION, SOLUTION INTRAMUSCULAR; INTRAVENOUS at 11:58

## 2023-09-24 RX ADMIN — HYDROMORPHONE HYDROCHLORIDE 0.5 MG: 1 INJECTION, SOLUTION INTRAMUSCULAR; INTRAVENOUS; SUBCUTANEOUS at 12:39

## 2023-09-24 RX ADMIN — PROPOFOL 200 MG: 10 INJECTION, EMULSION INTRAVENOUS at 09:06

## 2023-09-24 RX ADMIN — LABETALOL 20 MG/4 ML (5 MG/ML) INTRAVENOUS SYRINGE 5 MG: at 09:37

## 2023-09-24 RX ADMIN — FENTANYL CITRATE 50 MCG: 0.05 INJECTION, SOLUTION INTRAMUSCULAR; INTRAVENOUS at 09:24

## 2023-09-24 RX ADMIN — HYDRALAZINE HYDROCHLORIDE 10 MG: 20 INJECTION INTRAMUSCULAR; INTRAVENOUS at 09:51

## 2023-09-24 RX ADMIN — ROCURONIUM BROMIDE 20 MG: 10 INJECTION, SOLUTION INTRAVENOUS at 10:23

## 2023-09-24 RX ADMIN — ESMOLOL HYDROCHLORIDE 20 MG: 10 INJECTION, SOLUTION INTRAVENOUS at 09:13

## 2023-09-24 RX ADMIN — CEFAZOLIN SODIUM 3 G: 1 INJECTION, POWDER, FOR SOLUTION INTRAMUSCULAR; INTRAVENOUS at 09:11

## 2023-09-24 RX ADMIN — FENTANYL CITRATE 50 MCG: 0.05 INJECTION, SOLUTION INTRAMUSCULAR; INTRAVENOUS at 11:49

## 2023-09-24 RX ADMIN — OXYCODONE HYDROCHLORIDE 10 MG: 5 TABLET ORAL at 19:37

## 2023-09-24 RX ADMIN — SUGAMMADEX 300 MG: 100 INJECTION, SOLUTION INTRAVENOUS at 11:35

## 2023-09-24 RX ADMIN — GABAPENTIN 800 MG: 400 CAPSULE ORAL at 15:53

## 2023-09-24 RX ADMIN — VANCOMYCIN HYDROCHLORIDE 1000 MG: 10 INJECTION, POWDER, LYOPHILIZED, FOR SOLUTION INTRAVENOUS at 00:17

## 2023-09-24 RX ADMIN — HYDROMORPHONE HYDROCHLORIDE 0.5 MG: 1 INJECTION, SOLUTION INTRAMUSCULAR; INTRAVENOUS; SUBCUTANEOUS at 12:11

## 2023-09-24 RX ADMIN — TRAZODONE HYDROCHLORIDE 150 MG: 100 TABLET ORAL at 19:37

## 2023-09-24 RX ADMIN — RIVAROXABAN 10 MG: 10 TABLET, FILM COATED ORAL at 19:37

## 2023-09-24 RX ADMIN — HYDROMORPHONE HYDROCHLORIDE 1 MG: 1 INJECTION, SOLUTION INTRAMUSCULAR; INTRAVENOUS; SUBCUTANEOUS at 17:23

## 2023-09-24 RX ADMIN — LABETALOL 20 MG/4 ML (5 MG/ML) INTRAVENOUS SYRINGE 5 MG: at 09:35

## 2023-09-24 RX ADMIN — Medication 4 PUFF: at 09:33

## 2023-09-24 RX ADMIN — SODIUM CHLORIDE: 9 INJECTION, SOLUTION INTRAVENOUS at 13:41

## 2023-09-24 RX ADMIN — VANCOMYCIN HYDROCHLORIDE 1250 MG: 10 INJECTION, POWDER, LYOPHILIZED, FOR SOLUTION INTRAVENOUS at 16:03

## 2023-09-24 RX ADMIN — HYDROMORPHONE HYDROCHLORIDE 0.5 MG: 1 INJECTION, SOLUTION INTRAMUSCULAR; INTRAVENOUS; SUBCUTANEOUS at 12:27

## 2023-09-24 RX ADMIN — HYDROMORPHONE HYDROCHLORIDE 1 MG: 1 INJECTION, SOLUTION INTRAMUSCULAR; INTRAVENOUS; SUBCUTANEOUS at 00:18

## 2023-09-24 RX ADMIN — LABETALOL 20 MG/4 ML (5 MG/ML) INTRAVENOUS SYRINGE 5 MG: at 09:39

## 2023-09-24 RX ADMIN — PHENYLEPHRINE HYDROCHLORIDE 200 MCG: 10 INJECTION INTRAVENOUS at 10:20

## 2023-09-24 RX ADMIN — TRANEXAMIC ACID 1000 MG: 1 INJECTION, SOLUTION INTRAVENOUS at 11:14

## 2023-09-24 RX ADMIN — GABAPENTIN 800 MG: 400 CAPSULE ORAL at 19:37

## 2023-09-24 RX ADMIN — FENTANYL CITRATE 50 MCG: 0.05 INJECTION, SOLUTION INTRAMUSCULAR; INTRAVENOUS at 09:06

## 2023-09-24 RX ADMIN — HYDROMORPHONE HYDROCHLORIDE 1 MG: 1 INJECTION, SOLUTION INTRAMUSCULAR; INTRAVENOUS; SUBCUTANEOUS at 13:39

## 2023-09-24 RX ADMIN — TRANEXAMIC ACID 1000 MG: 1 INJECTION, SOLUTION INTRAVENOUS at 09:13

## 2023-09-24 RX ADMIN — SODIUM CHLORIDE: 9 INJECTION, SOLUTION INTRAVENOUS at 10:49

## 2023-09-24 RX ADMIN — CEFEPIME 2000 MG: 2 INJECTION, POWDER, FOR SOLUTION INTRAVENOUS at 01:57

## 2023-09-24 RX ADMIN — TRAMADOL HYDROCHLORIDE 100 MG: 50 TABLET, COATED ORAL at 19:37

## 2023-09-24 RX ADMIN — ROCURONIUM BROMIDE 80 MG: 10 INJECTION, SOLUTION INTRAVENOUS at 09:06

## 2023-09-24 RX ADMIN — HYDROMORPHONE HYDROCHLORIDE 1 MG: 1 INJECTION, SOLUTION INTRAMUSCULAR; INTRAVENOUS; SUBCUTANEOUS at 04:42

## 2023-09-24 RX ADMIN — PHENYLEPHRINE HYDROCHLORIDE 100 MCG: 10 INJECTION INTRAVENOUS at 10:18

## 2023-09-24 RX ADMIN — ATORVASTATIN CALCIUM 20 MG: 20 TABLET, FILM COATED ORAL at 19:37

## 2023-09-24 RX ADMIN — HYDROMORPHONE HYDROCHLORIDE 1 MG: 1 INJECTION, SOLUTION INTRAMUSCULAR; INTRAVENOUS; SUBCUTANEOUS at 22:40

## 2023-09-24 RX ADMIN — OXYCODONE HYDROCHLORIDE 10 MG: 5 TABLET ORAL at 15:53

## 2023-09-24 ASSESSMENT — PAIN SCALES - GENERAL
PAINLEVEL_OUTOF10: 10
PAINLEVEL_OUTOF10: 8
PAINLEVEL_OUTOF10: 10
PAINLEVEL_OUTOF10: 5
PAINLEVEL_OUTOF10: 8
PAINLEVEL_OUTOF10: 7

## 2023-09-24 ASSESSMENT — ENCOUNTER SYMPTOMS
SINUS PAIN: 0
WHEEZING: 0
SHORTNESS OF BREATH: 0
TROUBLE SWALLOWING: 0
VOMITING: 0
BLOOD IN STOOL: 0
NAUSEA: 0
SORE THROAT: 0
SHORTNESS OF BREATH: 0
ABDOMINAL PAIN: 0
CONSTIPATION: 0
COUGH: 0
DIARRHEA: 0
ABDOMINAL DISTENTION: 0

## 2023-09-24 ASSESSMENT — PAIN DESCRIPTION - ORIENTATION
ORIENTATION: LEFT

## 2023-09-24 ASSESSMENT — PAIN DESCRIPTION - LOCATION
LOCATION: LEG;KNEE
LOCATION: KNEE

## 2023-09-24 ASSESSMENT — LIFESTYLE VARIABLES: SMOKING_STATUS: 0

## 2023-09-24 ASSESSMENT — PAIN DESCRIPTION - DESCRIPTORS
DESCRIPTORS: SHARP
DESCRIPTORS: ACHING;DISCOMFORT;BURNING
DESCRIPTORS: SHARP
DESCRIPTORS: ACHING;DISCOMFORT;BURNING
DESCRIPTORS: SHARP
DESCRIPTORS: ACHING;DISCOMFORT;BURNING
DESCRIPTORS: ACHING;DISCOMFORT

## 2023-09-24 NOTE — BRIEF OP NOTE
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]    Post-Op Diagnosis: Same       Procedure(s):  PATELLA TENDON REPAIR    Surgeon(s):  Oli Barrett MD    Assistant:  Surgical Assistant: Lucero Spann    Anesthesia: General    Estimated Blood Loss (mL): 175    Complications: None    Specimens:   * No specimens in log *    Implants:  * No implants in log *      Drains:   Urinary Catheter 09/24/23 2 [address] (Active)       [REMOVED] Urinary Catheter 08/30/23 [place] (Removed)   $ Urethral catheter insertion Inserted for procedure 08/30/23 2117   Catheter Indications Perioperative use for selected surgical procedures 08/30/23 2117   Site Assessment Warm [place] 08/30/23 2117   Urine Color Yellow 08/31/23 0628   Urine Appearance Clear 08/31/23 0628   Collection Container Standard 08/30/23 2117   Securement Method Securing device (Describe) 08/30/23 2117   Catheter Care  Soap and water 08/30/23 2117   Catheter Best Practices  Drainage tube clipped to bed;Catheter secured to thigh; Tamper seal intact; Bag below bladder;Bag not on floor; Lack of dependent loop in tubing;Drainage bag less than half full 08/30/23 2117   Status Draining;Patent 08/31/23 0628   Output (mL) 500 mL 08/31/23 0628   Discontinuation Reason Per nurse-driven protocol 87/56/88 5296       Findings:       Electronically signed by Oli Barrett MD on 9/24/2023 at 11:20 AM

## 2023-09-25 LAB
ANION GAP SERPL CALCULATED.3IONS-SCNC: 13 MMOL/L (ref 7–19)
BASOPHILS # BLD: 0 K/UL (ref 0–0.2)
BASOPHILS NFR BLD: 0.2 % (ref 0–1)
BUN SERPL-MCNC: 13 MG/DL (ref 6–20)
CALCIUM SERPL-MCNC: 8.5 MG/DL (ref 8.6–10)
CHLORIDE SERPL-SCNC: 100 MMOL/L (ref 98–111)
CO2 SERPL-SCNC: 26 MMOL/L (ref 22–29)
CREAT SERPL-MCNC: 0.9 MG/DL (ref 0.5–0.9)
EKG P AXIS: 62 DEGREES
EKG P-R INTERVAL: 126 MS
EKG Q-T INTERVAL: 344 MS
EKG QRS DURATION: 88 MS
EKG QTC CALCULATION (BAZETT): 426 MS
EKG T AXIS: 54 DEGREES
EOSINOPHIL # BLD: 0 K/UL (ref 0–0.6)
EOSINOPHIL NFR BLD: 0 % (ref 0–5)
ERYTHROCYTE [DISTWIDTH] IN BLOOD BY AUTOMATED COUNT: 14.3 % (ref 11.5–14.5)
GLUCOSE BLD-MCNC: 135 MG/DL (ref 70–99)
GLUCOSE BLD-MCNC: 148 MG/DL (ref 70–99)
GLUCOSE BLD-MCNC: 149 MG/DL (ref 70–99)
GLUCOSE BLD-MCNC: 159 MG/DL (ref 70–99)
GLUCOSE SERPL-MCNC: 156 MG/DL (ref 74–109)
HCT VFR BLD AUTO: 30.4 % (ref 37–47)
HGB BLD-MCNC: 9.3 G/DL (ref 12–16)
IMM GRANULOCYTES # BLD: 0.1 K/UL
LYMPHOCYTES # BLD: 1 K/UL (ref 1.1–4.5)
LYMPHOCYTES NFR BLD: 6.8 % (ref 20–40)
MCH RBC QN AUTO: 28.5 PG (ref 27–31)
MCHC RBC AUTO-ENTMCNC: 30.6 G/DL (ref 33–37)
MCV RBC AUTO: 93.3 FL (ref 81–99)
MONOCYTES # BLD: 1 K/UL (ref 0–0.9)
MONOCYTES NFR BLD: 6.7 % (ref 0–10)
NEUTROPHILS # BLD: 12.5 K/UL (ref 1.5–7.5)
NEUTS SEG NFR BLD: 85.5 % (ref 50–65)
PERFORMED ON: ABNORMAL
PLATELET # BLD AUTO: 368 K/UL (ref 130–400)
PMV BLD AUTO: 8.2 FL (ref 9.4–12.3)
POTASSIUM SERPL-SCNC: 4.5 MMOL/L (ref 3.5–5)
RBC # BLD AUTO: 3.26 M/UL (ref 4.2–5.4)
SODIUM SERPL-SCNC: 139 MMOL/L (ref 136–145)
WBC # BLD AUTO: 14.6 K/UL (ref 4.8–10.8)

## 2023-09-25 PROCEDURE — 97535 SELF CARE MNGMENT TRAINING: CPT

## 2023-09-25 PROCEDURE — 97530 THERAPEUTIC ACTIVITIES: CPT

## 2023-09-25 PROCEDURE — 82962 GLUCOSE BLOOD TEST: CPT

## 2023-09-25 PROCEDURE — 36415 COLL VENOUS BLD VENIPUNCTURE: CPT

## 2023-09-25 PROCEDURE — 94760 N-INVAS EAR/PLS OXIMETRY 1: CPT

## 2023-09-25 PROCEDURE — 6360000002 HC RX W HCPCS: Performed by: ORTHOPAEDIC SURGERY

## 2023-09-25 PROCEDURE — 2700000000 HC OXYGEN THERAPY PER DAY

## 2023-09-25 PROCEDURE — 97165 OT EVAL LOW COMPLEX 30 MIN: CPT

## 2023-09-25 PROCEDURE — 80048 BASIC METABOLIC PNL TOTAL CA: CPT

## 2023-09-25 PROCEDURE — 6370000000 HC RX 637 (ALT 250 FOR IP): Performed by: ORTHOPAEDIC SURGERY

## 2023-09-25 PROCEDURE — 97162 PT EVAL MOD COMPLEX 30 MIN: CPT

## 2023-09-25 PROCEDURE — 85025 COMPLETE CBC W/AUTO DIFF WBC: CPT

## 2023-09-25 PROCEDURE — 1210000000 HC MED SURG R&B

## 2023-09-25 PROCEDURE — 2580000003 HC RX 258: Performed by: ORTHOPAEDIC SURGERY

## 2023-09-25 PROCEDURE — 93010 ELECTROCARDIOGRAM REPORT: CPT | Performed by: INTERNAL MEDICINE

## 2023-09-25 RX ADMIN — Medication 3000 MG: at 08:42

## 2023-09-25 RX ADMIN — OXYCODONE HYDROCHLORIDE 15 MG: 5 TABLET ORAL at 11:30

## 2023-09-25 RX ADMIN — RIVAROXABAN 10 MG: 10 TABLET, FILM COATED ORAL at 17:47

## 2023-09-25 RX ADMIN — SODIUM CHLORIDE: 9 INJECTION, SOLUTION INTRAVENOUS at 15:27

## 2023-09-25 RX ADMIN — GABAPENTIN 800 MG: 400 CAPSULE ORAL at 14:11

## 2023-09-25 RX ADMIN — HYDROMORPHONE HYDROCHLORIDE 1 MG: 1 INJECTION, SOLUTION INTRAMUSCULAR; INTRAVENOUS; SUBCUTANEOUS at 17:52

## 2023-09-25 RX ADMIN — ARIPIPRAZOLE 5 MG: 5 TABLET ORAL at 08:43

## 2023-09-25 RX ADMIN — OXYCODONE HYDROCHLORIDE 10 MG: 5 TABLET ORAL at 05:28

## 2023-09-25 RX ADMIN — OXYCODONE HYDROCHLORIDE 15 MG: 5 TABLET ORAL at 20:46

## 2023-09-25 RX ADMIN — TRAMADOL HYDROCHLORIDE 100 MG: 50 TABLET, COATED ORAL at 20:46

## 2023-09-25 RX ADMIN — HYDROMORPHONE HYDROCHLORIDE 1 MG: 1 INJECTION, SOLUTION INTRAMUSCULAR; INTRAVENOUS; SUBCUTANEOUS at 08:41

## 2023-09-25 RX ADMIN — GABAPENTIN 800 MG: 400 CAPSULE ORAL at 20:47

## 2023-09-25 RX ADMIN — SODIUM CHLORIDE, PRESERVATIVE FREE 10 ML: 5 INJECTION INTRAVENOUS at 09:33

## 2023-09-25 RX ADMIN — TRAMADOL HYDROCHLORIDE 100 MG: 50 TABLET, COATED ORAL at 08:43

## 2023-09-25 RX ADMIN — GABAPENTIN 800 MG: 400 CAPSULE ORAL at 08:43

## 2023-09-25 RX ADMIN — TRAZODONE HYDROCHLORIDE 150 MG: 100 TABLET ORAL at 20:47

## 2023-09-25 RX ADMIN — VANCOMYCIN HYDROCHLORIDE 1250 MG: 10 INJECTION, POWDER, LYOPHILIZED, FOR SOLUTION INTRAVENOUS at 15:28

## 2023-09-25 RX ADMIN — TRAMADOL HYDROCHLORIDE 100 MG: 50 TABLET, COATED ORAL at 03:03

## 2023-09-25 RX ADMIN — Medication 3000 MG: at 17:47

## 2023-09-25 RX ADMIN — DULOXETINE HYDROCHLORIDE 40 MG: 20 CAPSULE, DELAYED RELEASE ORAL at 08:42

## 2023-09-25 RX ADMIN — BISACODYL 5 MG: 5 TABLET, COATED ORAL at 08:43

## 2023-09-25 RX ADMIN — ATORVASTATIN CALCIUM 20 MG: 20 TABLET, FILM COATED ORAL at 20:47

## 2023-09-25 RX ADMIN — HYDROMORPHONE HYDROCHLORIDE 1 MG: 1 INJECTION, SOLUTION INTRAMUSCULAR; INTRAVENOUS; SUBCUTANEOUS at 01:24

## 2023-09-25 RX ADMIN — TRAMADOL HYDROCHLORIDE 100 MG: 50 TABLET, COATED ORAL at 14:19

## 2023-09-25 RX ADMIN — VANCOMYCIN HYDROCHLORIDE 1250 MG: 10 INJECTION, POWDER, LYOPHILIZED, FOR SOLUTION INTRAVENOUS at 03:04

## 2023-09-25 RX ADMIN — Medication 3000 MG: at 01:26

## 2023-09-25 ASSESSMENT — PAIN SCALES - GENERAL
PAINLEVEL_OUTOF10: 6
PAINLEVEL_OUTOF10: 4
PAINLEVEL_OUTOF10: 7
PAINLEVEL_OUTOF10: 5
PAINLEVEL_OUTOF10: 7
PAINLEVEL_OUTOF10: 8
PAINLEVEL_OUTOF10: 3
PAINLEVEL_OUTOF10: 8
PAINLEVEL_OUTOF10: 8
PAINLEVEL_OUTOF10: 9

## 2023-09-25 ASSESSMENT — PAIN DESCRIPTION - LOCATION
LOCATION: KNEE
LOCATION: KNEE
LOCATION: KNEE;LEG
LOCATION: LEG
LOCATION: KNEE
LOCATION: LEG

## 2023-09-25 ASSESSMENT — PAIN DESCRIPTION - ORIENTATION
ORIENTATION: LEFT

## 2023-09-25 ASSESSMENT — PAIN DESCRIPTION - DESCRIPTORS
DESCRIPTORS: ACHING
DESCRIPTORS: SHARP
DESCRIPTORS: ACHING;DISCOMFORT;BURNING
DESCRIPTORS: THROBBING;ACHING
DESCRIPTORS: SHARP;THROBBING
DESCRIPTORS: ACHING;THROBBING
DESCRIPTORS: BURNING;ACHING;DISCOMFORT
DESCRIPTORS: ACHING;DISCOMFORT

## 2023-09-25 ASSESSMENT — ENCOUNTER SYMPTOMS
ALLERGIC/IMMUNOLOGIC NEGATIVE: 1
EYES NEGATIVE: 1
RESPIRATORY NEGATIVE: 1
GASTROINTESTINAL NEGATIVE: 1

## 2023-09-25 NOTE — OP NOTE
[facility] [address], [address] [place] [address]                                OPERATIVE REPORT    PATIENT NAME: Jennifer Wick                    :        1970  MED REC NO:   [id number]                              ROOM:       [facility]  ACCOUNT NO:   [de-identified]                           ADMIT DATE: 2023  PROVIDER:     Nickolas Peguero MD    DATE OF PROCEDURE:  2023    PREOPERATIVE DIAGNOSES:  1. Three weeks status post complex primary robotic-assisted left knee  replacement on 2023, with extensor mechanism disruption. 2.  Morbid obesity with BMI of over 48. POSTOPERATIVE DIAGNOSES:  1. Medial retinaculum disruption. 2.  Tear of quadriceps muscle. PROCEDURES:  1. Left knee I and D.  2.  Left knee medial retinacular repair. 3.  Left knee quadriceps muscle repair. SURGEON:  Nickolas Peguero MD    FIRST ASSISTANT:  Maurice Yip. ANESTHESIA:  General.    EBL:  800 mL. FLUIDS:  2500 mL of crystalloid, 1 unit of packed red blood cells. URINE OUTPUT:  550 mL. FINDINGS:  Complete disruption of the medial retinaculum repair with  tear of the quadriceps muscle itself. The patellar tendon was then  tacked on the inferior pole and the tibial tubercle with no evidence of  any disruption. The quadriceps insertion on the superior pole was  completely intact. We were able to repair the retinaculum, and we were  able to repair this with 1.7 FiberTape. When we were done, we had full extension of the knee, flexion became  tight at about 60 degrees of flexion. INDICATIONS:  This is a 58-year-old lady who is 3 weeks out of her left  knee replacement doing well. She was trying to climb stairs with  physical therapy, felt a pop and immediate pain, now arrives for the  above procedures. OPERATIVE PROCEDURE:  She was admitted to the hospital on Friday and due  to circumstances, surgery could not be done until .   She was  already

## 2023-09-25 NOTE — OP NOTE
[facility] [place] [facility]                 [address] [place] [address], [address] [place] [address]                                OPERATIVE REPORT    PATIENT NAME: Virgen Wilkerson                    :        1970  MED REC NO:   [id number]                              ROOM:       [facility]  ACCOUNT NO:   [de-identified]                           ADMIT DATE: 2023  PROVIDER:     Scott Mora MD    DATE OF PROCEDURE:  2023    ADDENDUM:    POSTOPERATIVE PLANS:  1. She will be weightbearing as tolerated with her leg locked in full  extension for 6 weeks. She can be weightbearing as tolerated for her  ADLs, but no flexion of the knee for 6 weeks. 2.  She will be on Xarelto 10 mg a day for 21 days followed by baby  aspirin 81 mg twice a day for another 3 weeks. 3.  Also note that we will arrange for Social Service to give her a  wheelchair with left leg extension for home as well.         Scott Mora MD    D: 2023 12:29:09      T: 2023 21:29:12     [email or web address]  Job#: [id number]     Doc#: [id number]    CC:

## 2023-09-25 NOTE — OP NOTE
[facility] [address], [address] [place] [address]                                OPERATIVE REPORT    PATIENT NAME: Morelia Wallace                    :        1970  MED REC NO:   [id number]                              ROOM:       [facility]  ACCOUNT NO:   [de-identified]                           ADMIT DATE: 2023  PROVIDER:     Janessa Wick MD    DATE OF PROCEDURE:  2023    ADDENDUM    Please note, she was closed with a combination of vertical mattress,  horizontal mattress sutures with 2-0 nylon and we also used staples for  the skin. Also note, due to her elevated BMI after she completes her IV  antibiotics, we will send her home with 7 days of doxycycline 100 mg  twice a day for 3 weeks. We will leave her staples and sutures in for 3  weeks.         Janessa Wick MD    D: 2023 12:40:15      T: 2023 21:34:27     [email or web address]  Job#: [id number]     Doc#: [id number]    CC:

## 2023-09-25 NOTE — CARE COORDINATION
Nadja Sutherland RN Ortho Navigator  [phone]     Patient would like dme items (wheelchair heavy duty with elevated leg lifts and cushions) to be delivered to [facility] [phone]. Please call if you have any questions. Electronically signed by Alcon Fuentes RN on 9/25/2023 at 8:44 AM  Kolton Modi  9/22/2023  8:47 AM   ED to Hosp-Admission  Description: 47year old female Department: L 5 Surg Services     Patient Demographics    Name Patient ID SSN Gender Identity Birth Date   Kolton Modi [id number] Female [birth year] (52 yrs)     Address Phone Email     OTOY [phone] (I)   [phone] (H) [email or web address]       Reg Status PCP Date Last Verified Next Review Date    Verified NATALI Hernández  [phone] 09/06/23 10/06/23      Insurance Payors as of 9/25/2023    [address] [place] [address] Street: [address] [place] [address] Court Member: [id number] Effective from: 11/1/2022   Subscriber: Raul Del Valle ID: [id number] Guarantor: Carlyn Valente MEDICAID    Plan: Felicita Kaur Member: [id number] Effective from: 8/1/2023   Subscriber: Raul Del Valle ID: [id number] Guarantor: [facility]     Patient Contacts    Name Relation Home Work Mobile   Carmen Steele [phone]   [phone]     Electronically signed by Alcon Fuentes RN on 9/25/2023 at 8:45 AM

## 2023-09-26 PROBLEM — D62 ACUTE BLOOD LOSS AS CAUSE OF POSTOPERATIVE ANEMIA: Status: ACTIVE | Noted: 2023-09-26

## 2023-09-26 LAB
ANION GAP SERPL CALCULATED.3IONS-SCNC: 11 MMOL/L (ref 7–19)
BASOPHILS # BLD: 0.1 K/UL (ref 0–0.2)
BASOPHILS NFR BLD: 0.4 % (ref 0–1)
BUN SERPL-MCNC: 12 MG/DL (ref 6–20)
CALCIUM SERPL-MCNC: 8 MG/DL (ref 8.6–10)
CHLORIDE SERPL-SCNC: 103 MMOL/L (ref 98–111)
CO2 SERPL-SCNC: 27 MMOL/L (ref 22–29)
CREAT SERPL-MCNC: 0.8 MG/DL (ref 0.5–0.9)
EOSINOPHIL # BLD: 0.2 K/UL (ref 0–0.6)
EOSINOPHIL NFR BLD: 1.5 % (ref 0–5)
ERYTHROCYTE [DISTWIDTH] IN BLOOD BY AUTOMATED COUNT: 14.7 % (ref 11.5–14.5)
GLUCOSE BLD-MCNC: 112 MG/DL (ref 70–99)
GLUCOSE BLD-MCNC: 129 MG/DL (ref 70–99)
GLUCOSE BLD-MCNC: 159 MG/DL (ref 70–99)
GLUCOSE BLD-MCNC: 165 MG/DL (ref 70–99)
GLUCOSE SERPL-MCNC: 117 MG/DL (ref 74–109)
HCT VFR BLD AUTO: 26.2 % (ref 37–47)
HCT VFR BLD AUTO: 27.5 % (ref 37–47)
HGB BLD-MCNC: 7.8 G/DL (ref 12–16)
HGB BLD-MCNC: 8.1 G/DL (ref 12–16)
IMM GRANULOCYTES # BLD: 0.2 K/UL
IRON SATN MFR SERPL: 7 % (ref 14–50)
IRON SERPL-MCNC: 17 UG/DL (ref 37–145)
LYMPHOCYTES # BLD: 2.4 K/UL (ref 1.1–4.5)
LYMPHOCYTES NFR BLD: 20.1 % (ref 20–40)
MCH RBC QN AUTO: 28.7 PG (ref 27–31)
MCHC RBC AUTO-ENTMCNC: 29.8 G/DL (ref 33–37)
MCV RBC AUTO: 96.3 FL (ref 81–99)
MONOCYTES # BLD: 1 K/UL (ref 0–0.9)
MONOCYTES NFR BLD: 8.4 % (ref 0–10)
NEUTROPHILS # BLD: 7.9 K/UL (ref 1.5–7.5)
NEUTS SEG NFR BLD: 68.1 % (ref 50–65)
PERFORMED ON: ABNORMAL
PLATELET # BLD AUTO: 309 K/UL (ref 130–400)
PMV BLD AUTO: 8.6 FL (ref 9.4–12.3)
POTASSIUM SERPL-SCNC: 4.2 MMOL/L (ref 3.5–5)
POTASSIUM SERPL-SCNC: 4.2 MMOL/L (ref 3.5–5)
RBC # BLD AUTO: 2.72 M/UL (ref 4.2–5.4)
SODIUM SERPL-SCNC: 141 MMOL/L (ref 136–145)
TIBC SERPL-MCNC: 227 UG/DL (ref 250–400)
WBC # BLD AUTO: 11.7 K/UL (ref 4.8–10.8)

## 2023-09-26 PROCEDURE — 1210000000 HC MED SURG R&B

## 2023-09-26 PROCEDURE — 85014 HEMATOCRIT: CPT

## 2023-09-26 PROCEDURE — 6370000000 HC RX 637 (ALT 250 FOR IP): Performed by: ORTHOPAEDIC SURGERY

## 2023-09-26 PROCEDURE — 83540 ASSAY OF IRON: CPT

## 2023-09-26 PROCEDURE — 97530 THERAPEUTIC ACTIVITIES: CPT

## 2023-09-26 PROCEDURE — 83550 IRON BINDING TEST: CPT

## 2023-09-26 PROCEDURE — 97535 SELF CARE MNGMENT TRAINING: CPT

## 2023-09-26 PROCEDURE — 80048 BASIC METABOLIC PNL TOTAL CA: CPT

## 2023-09-26 PROCEDURE — 85018 HEMOGLOBIN: CPT

## 2023-09-26 PROCEDURE — 85025 COMPLETE CBC W/AUTO DIFF WBC: CPT

## 2023-09-26 PROCEDURE — 36415 COLL VENOUS BLD VENIPUNCTURE: CPT

## 2023-09-26 PROCEDURE — 94760 N-INVAS EAR/PLS OXIMETRY 1: CPT

## 2023-09-26 PROCEDURE — 82962 GLUCOSE BLOOD TEST: CPT

## 2023-09-26 PROCEDURE — 97116 GAIT TRAINING THERAPY: CPT

## 2023-09-26 PROCEDURE — 2580000003 HC RX 258: Performed by: ORTHOPAEDIC SURGERY

## 2023-09-26 PROCEDURE — 6360000002 HC RX W HCPCS: Performed by: ORTHOPAEDIC SURGERY

## 2023-09-26 RX ORDER — ONDANSETRON 4 MG/1
4 TABLET, FILM COATED ORAL EVERY 8 HOURS PRN
Qty: 30 TABLET | Refills: 0 | Status: SHIPPED | OUTPATIENT
Start: 2023-09-26

## 2023-09-26 RX ORDER — DOXYCYCLINE HYCLATE 100 MG
100 TABLET ORAL 2 TIMES DAILY
Qty: 14 TABLET | Refills: 0 | Status: SHIPPED | OUTPATIENT
Start: 2023-09-26 | End: 2023-10-03

## 2023-09-26 RX ORDER — RIVAROXABAN 10 MG/1
TABLET, FILM COATED ORAL
Qty: 21 TABLET | Refills: 0 | Status: SHIPPED | OUTPATIENT
Start: 2023-09-26

## 2023-09-26 RX ORDER — OXYCODONE HYDROCHLORIDE 5 MG/1
5 TABLET ORAL SEE ADMIN INSTRUCTIONS
Qty: 90 TABLET | Refills: 0 | Status: SHIPPED | OUTPATIENT
Start: 2023-09-26 | End: 2023-10-19

## 2023-09-26 RX ADMIN — HYDROMORPHONE HYDROCHLORIDE 1 MG: 1 INJECTION, SOLUTION INTRAMUSCULAR; INTRAVENOUS; SUBCUTANEOUS at 14:52

## 2023-09-26 RX ADMIN — POLYETHYLENE GLYCOL 3350 17 G: 17 POWDER, FOR SOLUTION ORAL at 18:24

## 2023-09-26 RX ADMIN — ARIPIPRAZOLE 5 MG: 5 TABLET ORAL at 08:20

## 2023-09-26 RX ADMIN — DULOXETINE HYDROCHLORIDE 40 MG: 20 CAPSULE, DELAYED RELEASE ORAL at 08:20

## 2023-09-26 RX ADMIN — GABAPENTIN 800 MG: 400 CAPSULE ORAL at 08:20

## 2023-09-26 RX ADMIN — TRAMADOL HYDROCHLORIDE 100 MG: 50 TABLET, COATED ORAL at 14:51

## 2023-09-26 RX ADMIN — VANCOMYCIN HYDROCHLORIDE 1250 MG: 10 INJECTION, POWDER, LYOPHILIZED, FOR SOLUTION INTRAVENOUS at 03:10

## 2023-09-26 RX ADMIN — SODIUM CHLORIDE, PRESERVATIVE FREE 10 ML: 5 INJECTION INTRAVENOUS at 20:53

## 2023-09-26 RX ADMIN — Medication 3000 MG: at 01:21

## 2023-09-26 RX ADMIN — HYDROMORPHONE HYDROCHLORIDE 1 MG: 1 INJECTION, SOLUTION INTRAMUSCULAR; INTRAVENOUS; SUBCUTANEOUS at 08:18

## 2023-09-26 RX ADMIN — BISACODYL 5 MG: 5 TABLET, COATED ORAL at 08:20

## 2023-09-26 RX ADMIN — TRAMADOL HYDROCHLORIDE 100 MG: 50 TABLET, COATED ORAL at 08:20

## 2023-09-26 RX ADMIN — GABAPENTIN 800 MG: 400 CAPSULE ORAL at 14:51

## 2023-09-26 RX ADMIN — HYDROMORPHONE HYDROCHLORIDE 1 MG: 1 INJECTION, SOLUTION INTRAMUSCULAR; INTRAVENOUS; SUBCUTANEOUS at 18:18

## 2023-09-26 RX ADMIN — OXYCODONE HYDROCHLORIDE 15 MG: 5 TABLET ORAL at 19:45

## 2023-09-26 RX ADMIN — OXYCODONE HYDROCHLORIDE 15 MG: 5 TABLET ORAL at 06:20

## 2023-09-26 RX ADMIN — ATORVASTATIN CALCIUM 20 MG: 20 TABLET, FILM COATED ORAL at 20:53

## 2023-09-26 RX ADMIN — Medication 3000 MG: at 16:30

## 2023-09-26 RX ADMIN — RIVAROXABAN 10 MG: 10 TABLET, FILM COATED ORAL at 18:19

## 2023-09-26 RX ADMIN — GABAPENTIN 800 MG: 400 CAPSULE ORAL at 20:52

## 2023-09-26 RX ADMIN — SODIUM CHLORIDE, PRESERVATIVE FREE 10 ML: 5 INJECTION INTRAVENOUS at 08:43

## 2023-09-26 RX ADMIN — TRAMADOL HYDROCHLORIDE 100 MG: 50 TABLET, COATED ORAL at 20:52

## 2023-09-26 RX ADMIN — TRAMADOL HYDROCHLORIDE 100 MG: 50 TABLET, COATED ORAL at 03:10

## 2023-09-26 RX ADMIN — Medication 3000 MG: at 08:19

## 2023-09-26 RX ADMIN — OXYCODONE HYDROCHLORIDE 10 MG: 5 TABLET ORAL at 01:59

## 2023-09-26 RX ADMIN — TRAZODONE HYDROCHLORIDE 150 MG: 100 TABLET ORAL at 20:53

## 2023-09-26 ASSESSMENT — PAIN SCALES - GENERAL
PAINLEVEL_OUTOF10: 8
PAINLEVEL_OUTOF10: 5
PAINLEVEL_OUTOF10: 6
PAINLEVEL_OUTOF10: 8
PAINLEVEL_OUTOF10: 3
PAINLEVEL_OUTOF10: 2
PAINLEVEL_OUTOF10: 4
PAINLEVEL_OUTOF10: 4
PAINLEVEL_OUTOF10: 5
PAINLEVEL_OUTOF10: 5
PAINLEVEL_OUTOF10: 3
PAINLEVEL_OUTOF10: 9
PAINLEVEL_OUTOF10: 9

## 2023-09-26 ASSESSMENT — PAIN DESCRIPTION - DESCRIPTORS
DESCRIPTORS: ACHING
DESCRIPTORS: ACHING
DESCRIPTORS: THROBBING;SHARP
DESCRIPTORS: ACHING
DESCRIPTORS: ACHING
DESCRIPTORS: THROBBING
DESCRIPTORS: THROBBING

## 2023-09-26 ASSESSMENT — PAIN - FUNCTIONAL ASSESSMENT
PAIN_FUNCTIONAL_ASSESSMENT: PREVENTS OR INTERFERES SOME ACTIVE ACTIVITIES AND ADLS
PAIN_FUNCTIONAL_ASSESSMENT: PREVENTS OR INTERFERES SOME ACTIVE ACTIVITIES AND ADLS

## 2023-09-26 ASSESSMENT — PAIN DESCRIPTION - LOCATION
LOCATION: KNEE

## 2023-09-26 ASSESSMENT — PAIN DESCRIPTION - PAIN TYPE
TYPE: ACUTE PAIN
TYPE: ACUTE PAIN

## 2023-09-26 ASSESSMENT — PAIN DESCRIPTION - ORIENTATION
ORIENTATION: LEFT

## 2023-09-26 ASSESSMENT — ENCOUNTER SYMPTOMS
GASTROINTESTINAL NEGATIVE: 1
ALLERGIC/IMMUNOLOGIC NEGATIVE: 1
RESPIRATORY NEGATIVE: 1
EYES NEGATIVE: 1

## 2023-09-26 ASSESSMENT — PAIN DESCRIPTION - ONSET: ONSET: ON-GOING

## 2023-09-26 ASSESSMENT — PAIN DESCRIPTION - FREQUENCY
FREQUENCY: INTERMITTENT
FREQUENCY: CONTINUOUS

## 2023-09-26 NOTE — CARE COORDINATION
[facility]. Patient agreeable and has chosen [facility]. Referral Faxed. [address] [place] [address] [phone]. [phone]. Please notify [address] when patient discharges and fax DC Summary,  DC med list and any new [facility] orders. The Patient and/or patient representative was provided with a choice of provider and agrees   with the discharge plan. [x] Yes [] No    Freedom of choice list was provided with basic dialogue that supports the patient's individualized plan of care/goals, treatment preferences and shares the quality data associated with the providers.  [x] Yes [] No  Electronically signed by Sonali Blum on 9/26/2023 at 10:15 AM

## 2023-09-27 VITALS
RESPIRATION RATE: 20 BRPM | DIASTOLIC BLOOD PRESSURE: 88 MMHG | TEMPERATURE: 97.3 F | OXYGEN SATURATION: 92 % | WEIGHT: 293 LBS | HEIGHT: 68 IN | BODY MASS INDEX: 44.41 KG/M2 | HEART RATE: 93 BPM | SYSTOLIC BLOOD PRESSURE: 147 MMHG

## 2023-09-27 LAB
ANION GAP SERPL CALCULATED.3IONS-SCNC: 10 MMOL/L (ref 7–19)
BASOPHILS # BLD: 0.1 K/UL (ref 0–0.2)
BASOPHILS NFR BLD: 0.8 % (ref 0–1)
BUN SERPL-MCNC: 12 MG/DL (ref 6–20)
CALCIUM SERPL-MCNC: 8 MG/DL (ref 8.6–10)
CHLORIDE SERPL-SCNC: 101 MMOL/L (ref 98–111)
CO2 SERPL-SCNC: 25 MMOL/L (ref 22–29)
CREAT SERPL-MCNC: 0.8 MG/DL (ref 0.5–0.9)
EOSINOPHIL # BLD: 0.5 K/UL (ref 0–0.6)
EOSINOPHIL NFR BLD: 4.9 % (ref 0–5)
ERYTHROCYTE [DISTWIDTH] IN BLOOD BY AUTOMATED COUNT: 14.6 % (ref 11.5–14.5)
GLUCOSE BLD-MCNC: 139 MG/DL (ref 70–99)
GLUCOSE BLD-MCNC: 235 MG/DL (ref 70–99)
GLUCOSE SERPL-MCNC: 105 MG/DL (ref 74–109)
HCT VFR BLD AUTO: 26.8 % (ref 37–47)
HGB BLD-MCNC: 8.4 G/DL (ref 12–16)
IMM GRANULOCYTES # BLD: 0.2 K/UL
LYMPHOCYTES # BLD: 2.5 K/UL (ref 1.1–4.5)
LYMPHOCYTES NFR BLD: 23 % (ref 20–40)
MCH RBC QN AUTO: 28.8 PG (ref 27–31)
MCHC RBC AUTO-ENTMCNC: 31.3 G/DL (ref 33–37)
MCV RBC AUTO: 91.8 FL (ref 81–99)
MONOCYTES # BLD: 0.8 K/UL (ref 0–0.9)
MONOCYTES NFR BLD: 7.4 % (ref 0–10)
NEUTROPHILS # BLD: 6.8 K/UL (ref 1.5–7.5)
NEUTS SEG NFR BLD: 62.1 % (ref 50–65)
PERFORMED ON: ABNORMAL
PERFORMED ON: ABNORMAL
PLATELET # BLD AUTO: 313 K/UL (ref 130–400)
PMV BLD AUTO: 8.3 FL (ref 9.4–12.3)
POTASSIUM SERPL-SCNC: 4.7 MMOL/L (ref 3.5–5)
POTASSIUM SERPL-SCNC: 4.7 MMOL/L (ref 3.5–5)
RBC # BLD AUTO: 2.92 M/UL (ref 4.2–5.4)
REASON FOR REJECTION: NORMAL
REJECTED TEST: NORMAL
SODIUM SERPL-SCNC: 136 MMOL/L (ref 136–145)
WBC # BLD AUTO: 11 K/UL (ref 4.8–10.8)

## 2023-09-27 PROCEDURE — 6370000000 HC RX 637 (ALT 250 FOR IP): Performed by: ORTHOPAEDIC SURGERY

## 2023-09-27 PROCEDURE — 80048 BASIC METABOLIC PNL TOTAL CA: CPT

## 2023-09-27 PROCEDURE — 82962 GLUCOSE BLOOD TEST: CPT

## 2023-09-27 PROCEDURE — 6360000002 HC RX W HCPCS: Performed by: ORTHOPAEDIC SURGERY

## 2023-09-27 PROCEDURE — 97116 GAIT TRAINING THERAPY: CPT

## 2023-09-27 PROCEDURE — 2580000003 HC RX 258: Performed by: NURSE PRACTITIONER

## 2023-09-27 PROCEDURE — 6360000002 HC RX W HCPCS: Performed by: NURSE PRACTITIONER

## 2023-09-27 PROCEDURE — 36415 COLL VENOUS BLD VENIPUNCTURE: CPT

## 2023-09-27 PROCEDURE — 97530 THERAPEUTIC ACTIVITIES: CPT

## 2023-09-27 PROCEDURE — 2580000003 HC RX 258: Performed by: ORTHOPAEDIC SURGERY

## 2023-09-27 PROCEDURE — 85025 COMPLETE CBC W/AUTO DIFF WBC: CPT

## 2023-09-27 RX ORDER — FERROUS SULFATE 325(65) MG
325 TABLET ORAL 2 TIMES DAILY
Qty: 60 TABLET | Refills: 0 | Status: SHIPPED | OUTPATIENT
Start: 2023-09-27 | End: 2023-10-27

## 2023-09-27 RX ADMIN — BISACODYL 5 MG: 5 TABLET, COATED ORAL at 07:21

## 2023-09-27 RX ADMIN — DULOXETINE HYDROCHLORIDE 40 MG: 20 CAPSULE, DELAYED RELEASE ORAL at 07:21

## 2023-09-27 RX ADMIN — OXYCODONE HYDROCHLORIDE 15 MG: 5 TABLET ORAL at 06:02

## 2023-09-27 RX ADMIN — Medication 3000 MG: at 07:25

## 2023-09-27 RX ADMIN — IRON SUCROSE 400 MG: 20 INJECTION, SOLUTION INTRAVENOUS at 09:14

## 2023-09-27 RX ADMIN — OXYCODONE HYDROCHLORIDE 15 MG: 5 TABLET ORAL at 09:47

## 2023-09-27 RX ADMIN — TRAMADOL HYDROCHLORIDE 100 MG: 50 TABLET, COATED ORAL at 07:21

## 2023-09-27 RX ADMIN — OXYCODONE HYDROCHLORIDE 15 MG: 5 TABLET ORAL at 13:10

## 2023-09-27 RX ADMIN — OXYCODONE HYDROCHLORIDE 10 MG: 5 TABLET ORAL at 00:54

## 2023-09-27 RX ADMIN — GABAPENTIN 800 MG: 400 CAPSULE ORAL at 13:10

## 2023-09-27 RX ADMIN — GABAPENTIN 800 MG: 400 CAPSULE ORAL at 07:21

## 2023-09-27 RX ADMIN — Medication 3000 MG: at 00:53

## 2023-09-27 RX ADMIN — TRAMADOL HYDROCHLORIDE 100 MG: 50 TABLET, COATED ORAL at 02:18

## 2023-09-27 RX ADMIN — ARIPIPRAZOLE 5 MG: 5 TABLET ORAL at 07:21

## 2023-09-27 ASSESSMENT — PAIN DESCRIPTION - LOCATION
LOCATION: KNEE

## 2023-09-27 ASSESSMENT — PAIN DESCRIPTION - DESCRIPTORS
DESCRIPTORS: ACHING;THROBBING
DESCRIPTORS: ACHING;DISCOMFORT
DESCRIPTORS: ACHING;THROBBING
DESCRIPTORS: ACHING

## 2023-09-27 ASSESSMENT — PAIN DESCRIPTION - ORIENTATION
ORIENTATION: LEFT

## 2023-09-27 ASSESSMENT — PAIN SCALES - GENERAL
PAINLEVEL_OUTOF10: 7
PAINLEVEL_OUTOF10: 7
PAINLEVEL_OUTOF10: 2
PAINLEVEL_OUTOF10: 7
PAINLEVEL_OUTOF10: 6

## 2023-09-27 ASSESSMENT — PAIN - FUNCTIONAL ASSESSMENT: PAIN_FUNCTIONAL_ASSESSMENT: PREVENTS OR INTERFERES SOME ACTIVE ACTIVITIES AND ADLS

## 2023-09-27 ASSESSMENT — PAIN DESCRIPTION - PAIN TYPE: TYPE: ACUTE PAIN

## 2023-09-27 ASSESSMENT — PAIN DESCRIPTION - FREQUENCY: FREQUENCY: INTERMITTENT

## 2023-09-27 NOTE — DISCHARGE SUMMARY
Discharge Summary    NAME: Kolton Modi  :  1970  MRN:  [id number]    Admit date:  2023  Discharge date:      Admitting Physician:  No admitting provider for patient encounter. Advance Directive: Full Code    Consults: orthopedic surgery    Primary Care Physician:  NATALI Hernández    Discharge Diagnoses:  Principal Problem:    Left knee injury, initial encounter  Active Problems:    Type 2 diabetes mellitus    Chronic renal disease, stage III ([address]) [180570]    Hypertension    Joint effusion of knee    Acute blood loss as cause of postoperative anemia  Resolved Problems:    * No resolved hospital problems. *      Significant Diagnostic Studies:   XR KNEE LEFT (1-2 VIEWS)    Result Date: 2023  EXAM: LEFT KNEE RADIOGRAPH. HISTORY: Left knee pain status post fall. TECHNIQUE: Two views. Frontal and lateral.  COMPARISON: 2023  FINDINGS:  Total knee arthroplasty, again noted. The surgical hardware appears to be intact. No evidence of infection or loosening. Mild soft tissue swelling. Suggestion of small joint effusion. No visible fractures or dislocations. 1.  Soft tissue swelling with suggestion of small joint effusion but no visible fracture. 2.  Total knee arthroplasty, again noted. Intact hardware without evidence of infection or loosening.    ______________________________________ Electronically signed by: Ana Luisa Sheridan M.D. Date:     2023 Time:    09:29       Pertinent Labs:   CBC:   Recent Labs     23  0220 23  0254 23  1313 23  0419   WBC 14.6* 11.7*  --  11.0*   HGB 9.3* 7.8* 8.1* 8.4*    309  --  313     BMP:    Recent Labs     23  0220 23  0254 23  0218    141 136   K 4.5 4.2  4.2 4.7  4.7    103 101   CO2 26 27 25   BUN 13 12 12   CREATININE 0.9 0.8 0.8   GLUCOSE 156* 117* 105     INR: No results for input(s): \"INR\" in the last 72 hours.   Lipids: No results for input(s): \"CHOL\", \"HDL\" in the last

## 2023-09-27 NOTE — CARE COORDINATION
2nd IMM Letter given to patient. Reviewed, discussed, answered questions, and signed by patient. Declined to stay 4 hours prior to discharge. Signed copy placed in patient's chart.      09/27/23 1337   IMM Letter   IMM Letter given to Patient/Family/Significant other/Guardian/POA/by: given to patient by Agustin Brock RN BSN    IMM Letter date given: 09/27/23   IMM Letter time given: 1315     Electronically signed by Agustin Brock RN, BSN on 9/27/2023 at 1:38 PM

## 2023-09-27 NOTE — DISCHARGE INSTR - DIET
Good nutrition is important when healing from an illness, injury, or surgery. Follow any nutrition recommendations given to you during your hospital stay. If you were given an oral nutrition supplement while in the hospital, continue to take this supplement at home. You can take it with meals, in-between meals, and/or before bedtime. These supplements can be purchased at most local grocery stores, pharmacies, and chain [employer]-stores. If you have any questions about your diet or nutrition, call the hospital and ask for the dietitian.     Resume home diet

## 2023-09-28 ENCOUNTER — TELEPHONE (OUTPATIENT)
Dept: INTERNAL MEDICINE | Age: 53
End: 2023-09-28

## 2023-09-28 NOTE — TELEPHONE ENCOUNTER
[address] [facility],[address] Transitions Initial Follow Up Call    Outreach made within 2 business days of discharge: Yes    Patient: Anthony Robins   Patient : 1970 MRN: [id number]    Reason for Admission: Fall    Discharge Date: 23      Discharge Diagnoses:  Principal Problem:    Left knee injury, initial encounter  Active Problems:    Type 2 diabetes mellitus    Chronic renal disease, stage III ([address]) [291458]    Hypertension    Joint effusion of knee    Acute blood loss as cause of postoperative anemia  Resolved Problems:    * No resolved hospital problems. *      Spoke with: Patient    Discharge department/facility: [facility]    TCM Interactive Patient Contact:  Was patient able to fill all prescriptions: Yes  Was patient instructed to bring all medications to the follow-up visit: Yes  Is patient taking all medications as directed in the discharge summary? Yes  Does patient understand their discharge instructions: Yes  Does patient have questions or concerns that need addressed prior to 7-14 day follow up office visit: no    Spoke to the patient she is doing well. She is having drainage from her incision area and she is supposed to go in and see Dr Chadd Chavez about it today. She is having home health. She is eating and drinking fine and did not need anything from us at this time. She is not having any constipation at this time.     RECORDS IN CHART  PT    Scheduled appointment with PCP within 7-14 days    Follow Up  Future Appointments   Date Time Provider [address] [place] [address]     8:67 AM NATALI Nguyen [facility]     1:86 PM NATALI Nguyen [place], [place]

## 2023-10-01 DIAGNOSIS — I10 ESSENTIAL HYPERTENSION: ICD-10-CM

## 2023-10-02 RX ORDER — LISINOPRIL 10 MG/1
TABLET ORAL
Qty: 180 TABLET | Refills: 1 | OUTPATIENT
Start: 2023-10-02

## 2023-10-05 ENCOUNTER — TELEMEDICINE (OUTPATIENT)
Dept: PRIMARY CARE CLINIC | Age: 53
End: 2023-10-05

## 2023-10-05 DIAGNOSIS — D50.9 IRON DEFICIENCY ANEMIA, UNSPECIFIED IRON DEFICIENCY ANEMIA TYPE: ICD-10-CM

## 2023-10-05 DIAGNOSIS — Z09 HOSPITAL DISCHARGE FOLLOW-UP: Primary | ICD-10-CM

## 2023-10-05 DIAGNOSIS — I10 ESSENTIAL HYPERTENSION: ICD-10-CM

## 2023-10-05 RX ORDER — LISINOPRIL 10 MG/1
10 TABLET ORAL 2 TIMES DAILY
Qty: 180 TABLET | Refills: 1 | Status: SHIPPED | OUTPATIENT
Start: 2023-10-05

## 2023-10-05 NOTE — PROGRESS NOTES
Post-Discharge Transitional Care  Follow Up      Kolton Modi   YOB: 1970    Date of Office Visit:  10/5/2023  Date of Hospital Admission: 9/22/23  Date of Hospital Discharge: 9/27/23  Risk of hospital readmission (high >=14%. Medium >=10%) :Readmission Risk Score: 13.8    Care management risk score Rising risk (score 2-5) and Complex Care (Scores >=6): No Risk Score On File     Non face to face  following discharge, date last encounter closed (first attempt may have been earlier): 09/28/2023    Call initiated 2 business days of discharge: Yes    ASSESSMENT/PLAN:   Below is the assessment and plan developed based on review of pertinent history, physical exam, labs, studies, and medications. Hospital discharge follow-up  -     TN DISCHARGE MEDS RECONCILED W/ CURRENT OUTPATIENT MED LIST      Medical Decision Making: high complexity  No follow-ups on file. On this date 10/5/2023 I have spent 20 minutes reviewing previous notes, test results and face to face with the patient discussing the diagnosis and importance of compliance with the treatment plan as well as documenting on the day of the visit. Subjective:   HPI:  Follow up of Hospital problems/diagnosis(es):     Inpatient course: Discharge summary reviewed- see chart. \"Hospital Course:   Patient is a 50-year-old female with left TKA on 831/23 by Dr. Cece Babb. She also has a history of diabetes, hypertension, hyperlipidemia and anxiety. Patient reported falling after getting out of her tub she twisted her left knee and fell on her butt she complained of increasing pain to the left knee and unable to bear weight since the fall. She did not hit her head or lose consciousness. Patient presented to the ED left knee indicated soft tissue swelling with suggestion of small joint effusion but no visible fracture and her hardware was intact. Ortho was consulted and evaluated and patient was taken to the OR for left patella tendon repair.

## 2023-10-09 RX ORDER — SEMAGLUTIDE 2.68 MG/ML
2 INJECTION, SOLUTION SUBCUTANEOUS WEEKLY
Qty: 3 ML | Refills: 1 | Status: SHIPPED | OUTPATIENT
Start: 2023-10-09

## 2023-10-10 DIAGNOSIS — D50.9 IRON DEFICIENCY ANEMIA, UNSPECIFIED IRON DEFICIENCY ANEMIA TYPE: ICD-10-CM

## 2023-10-10 LAB
BASOPHILS # BLD: 0.1 K/UL (ref 0–0.2)
BASOPHILS NFR BLD: 0.7 % (ref 0–1)
EOSINOPHIL # BLD: 0.2 K/UL (ref 0–0.6)
EOSINOPHIL NFR BLD: 2.1 % (ref 0–5)
ERYTHROCYTE [DISTWIDTH] IN BLOOD BY AUTOMATED COUNT: 16.5 % (ref 11.5–14.5)
HCT VFR BLD AUTO: 36.3 % (ref 37–47)
HGB BLD-MCNC: 10.8 G/DL (ref 12–16)
IMM GRANULOCYTES # BLD: 0.1 K/UL
LYMPHOCYTES # BLD: 2.2 K/UL (ref 1.1–4.5)
LYMPHOCYTES NFR BLD: 22.9 % (ref 20–40)
MCH RBC QN AUTO: 29.1 PG (ref 27–31)
MCHC RBC AUTO-ENTMCNC: 29.8 G/DL (ref 33–37)
MCV RBC AUTO: 97.8 FL (ref 81–99)
MONOCYTES # BLD: 0.5 K/UL (ref 0–0.9)
MONOCYTES NFR BLD: 4.9 % (ref 0–10)
NEUTROPHILS # BLD: 6.5 K/UL (ref 1.5–7.5)
NEUTS SEG NFR BLD: 68.8 % (ref 50–65)
PLATELET # BLD AUTO: 549 K/UL (ref 130–400)
PMV BLD AUTO: 8.6 FL (ref 9.4–12.3)
RBC # BLD AUTO: 3.71 M/UL (ref 4.2–5.4)
WBC # BLD AUTO: 9.5 K/UL (ref 4.8–10.8)

## 2023-11-21 ENCOUNTER — TELEPHONE (OUTPATIENT)
Dept: PRIMARY CARE CLINIC | Age: 53
End: 2023-11-21

## 2023-11-27 DIAGNOSIS — E11.00 TYPE 2 DIABETES MELLITUS WITH HYPEROSMOLARITY WITHOUT COMA, WITHOUT LONG-TERM CURRENT USE OF INSULIN (HCC): Primary | ICD-10-CM

## 2023-11-27 DIAGNOSIS — E11.69 TYPE 2 DIABETES MELLITUS WITH OTHER SPECIFIED COMPLICATION, WITHOUT LONG-TERM CURRENT USE OF INSULIN (HCC): ICD-10-CM

## 2023-11-27 RX ORDER — ORAL SEMAGLUTIDE 7 MG/1
7 TABLET ORAL DAILY
Qty: 30 TABLET | Refills: 3 | Status: SHIPPED | OUTPATIENT
Start: 2023-11-27

## 2023-11-27 NOTE — TELEPHONE ENCOUNTER
Kandi Herrera called requesting a refill of the below medication which has been pended for you:   Per Juan Fatima  Requested Prescriptions     Pending Prescriptions Disp Refills    Semaglutide (RYBELSUS) 7 MG TABS 30 tablet 3     Sig: Take 7 mg by mouth daily       Last Appointment Date: 10/5/2023  Next Appointment Date: Visit date not found    No Known Allergies

## 2023-11-29 DIAGNOSIS — F41.9 ANXIETY: ICD-10-CM

## 2023-11-29 RX ORDER — ALPRAZOLAM 0.25 MG/1
0.25 TABLET ORAL DAILY PRN
Qty: 30 TABLET | Refills: 0 | Status: SHIPPED | OUTPATIENT
Start: 2023-11-29 | End: 2023-12-29

## 2023-11-29 RX ORDER — TRAZODONE HYDROCHLORIDE 150 MG/1
TABLET ORAL
Qty: 60 TABLET | Refills: 3 | Status: SHIPPED | OUTPATIENT
Start: 2023-11-29

## 2023-11-29 NOTE — TELEPHONE ENCOUNTER
Vinod Hernandez called to request a refill on her medication. Last office visit : 10/5/2023   Next office visit : Visit date not found     Last UDS:   Amphetamine Screen, Urine   Date Value Ref Range Status   12/06/2022 -  Final     Barbiturates   Date Value Ref Range Status   01/17/2011 Negative () Final     Barbiturate Screen, Urine   Date Value Ref Range Status   12/06/2022 -  Final     Benzodiazepine Screen, Urine   Date Value Ref Range Status   12/06/2022 +  Final     Buprenorphine Urine   Date Value Ref Range Status   12/06/2022 -  Final     Cocaine Metabolite Screen, Urine   Date Value Ref Range Status   12/06/2022 -  Final     Gabapentin Screen, Urine   Date Value Ref Range Status   12/06/2022 -  Final     MDMA, Urine   Date Value Ref Range Status   12/06/2022 -  Final     Methamphetamine, Urine   Date Value Ref Range Status   12/06/2022 -  Final     Opiate Scrn, Ur   Date Value Ref Range Status   12/06/2022 +  Final     Oxycodone Screen, Ur   Date Value Ref Range Status   12/06/2022 -  Final     PCP Screen, Urine   Date Value Ref Range Status   12/06/2022 -  Final     Propoxyphene   Date Value Ref Range Status   01/17/2011 Negative () Final     Propoxyphene Screen, Urine   Date Value Ref Range Status   12/06/2022 -  Final     THC Screen, Urine   Date Value Ref Range Status   12/06/2022 -  Final     Tricyclic Antidepressants, Urine   Date Value Ref Range Status   12/06/2022 -  Final       Last Rafiq Boss: 11/29/23  Medication Contract: 3/24/22   Last Fill: 9/14/23    Requested Prescriptions     Pending Prescriptions Disp Refills    ALPRAZolam (XANAX) 0.25 MG tablet 30 tablet 0     Sig: Take 1 tablet by mouth daily as needed for Anxiety for up to 30 days. Please approve or refuse this medication.    Reji Crabtree LPN

## 2023-12-07 DIAGNOSIS — F41.9 ANXIETY AND DEPRESSION: ICD-10-CM

## 2023-12-07 DIAGNOSIS — G89.29 OTHER CHRONIC PAIN: ICD-10-CM

## 2023-12-07 DIAGNOSIS — F32.A ANXIETY AND DEPRESSION: ICD-10-CM

## 2023-12-07 RX ORDER — DULOXETINE 40 MG/1
CAPSULE, DELAYED RELEASE ORAL
Qty: 90 CAPSULE | Refills: 1 | OUTPATIENT
Start: 2023-12-07

## 2023-12-26 DIAGNOSIS — F41.9 ANXIETY: ICD-10-CM

## 2023-12-26 RX ORDER — ALPRAZOLAM 0.25 MG/1
0.25 TABLET ORAL DAILY PRN
Qty: 30 TABLET | Refills: 0 | Status: SHIPPED | OUTPATIENT
Start: 2023-12-26 | End: 2024-01-25

## 2023-12-26 NOTE — TELEPHONE ENCOUNTER
Juany Baker called to request a refill on her medication. Last office visit : 10/5/2023   Next office visit : Visit date not found     Last UDS:   Amphetamine Screen, Urine   Date Value Ref Range Status   12/06/2022 -  Final     Barbiturates   Date Value Ref Range Status   01/17/2011 Negative () Final     Barbiturate Screen, Urine   Date Value Ref Range Status   12/06/2022 -  Final     Benzodiazepine Screen, Urine   Date Value Ref Range Status   12/06/2022 +  Final     Buprenorphine Urine   Date Value Ref Range Status   12/06/2022 -  Final     Cocaine Metabolite Screen, Urine   Date Value Ref Range Status   12/06/2022 -  Final     Gabapentin Screen, Urine   Date Value Ref Range Status   12/06/2022 -  Final     MDMA, Urine   Date Value Ref Range Status   12/06/2022 -  Final     Methamphetamine, Urine   Date Value Ref Range Status   12/06/2022 -  Final     Opiate Scrn, Ur   Date Value Ref Range Status   12/06/2022 +  Final     Oxycodone Screen, Ur   Date Value Ref Range Status   12/06/2022 -  Final     PCP Screen, Urine   Date Value Ref Range Status   12/06/2022 -  Final     Propoxyphene   Date Value Ref Range Status   01/17/2011 Negative () Final     Propoxyphene Screen, Urine   Date Value Ref Range Status   12/06/2022 -  Final     THC Screen, Urine   Date Value Ref Range Status   12/06/2022 -  Final     Tricyclic Antidepressants, Urine   Date Value Ref Range Status   12/06/2022 -  Final       Last Donna Shallow: 11/29/23  Medication Contract: 12/22   Last Fill: 11/29/23    Requested Prescriptions     Pending Prescriptions Disp Refills    ALPRAZolam (XANAX) 0.25 MG tablet [Pharmacy Med Name: ALPRAZOLAM 0.25MG TABLETS] 30 tablet      Sig: Take 1 tablet by mouth daily as needed for Anxiety. Please approve or refuse this medication.    Donya Carpenter MA

## 2023-12-28 DIAGNOSIS — E11.00 TYPE 2 DIABETES MELLITUS WITH HYPEROSMOLARITY WITHOUT COMA, WITHOUT LONG-TERM CURRENT USE OF INSULIN (HCC): ICD-10-CM

## 2024-01-02 DIAGNOSIS — F41.9 ANXIETY: ICD-10-CM

## 2024-01-02 DIAGNOSIS — E11.00 TYPE 2 DIABETES MELLITUS WITH HYPEROSMOLARITY WITHOUT COMA, WITHOUT LONG-TERM CURRENT USE OF INSULIN (HCC): ICD-10-CM

## 2024-01-02 RX ORDER — ALPRAZOLAM 0.25 MG/1
0.25 TABLET ORAL DAILY PRN
Qty: 30 TABLET | Refills: 0 | OUTPATIENT
Start: 2024-01-02 | End: 2024-02-01

## 2024-01-02 RX ORDER — ATORVASTATIN CALCIUM 20 MG/1
20 TABLET, FILM COATED ORAL DAILY
Qty: 90 TABLET | Refills: 1 | Status: CANCELLED | OUTPATIENT
Start: 2024-01-02

## 2024-01-02 RX ORDER — NYSTATIN 100000 [USP'U]/G
POWDER TOPICAL
Qty: 30 G | Refills: 0 | Status: CANCELLED | OUTPATIENT
Start: 2024-01-02

## 2024-01-02 RX ORDER — NYSTATIN 100000 [USP'U]/G
POWDER TOPICAL
Qty: 30 G | Refills: 0 | Status: SHIPPED | OUTPATIENT
Start: 2024-01-02

## 2024-01-02 RX ORDER — ATORVASTATIN CALCIUM 20 MG/1
TABLET, FILM COATED ORAL
Qty: 90 TABLET | Refills: 1 | Status: SHIPPED | OUTPATIENT
Start: 2024-01-02

## 2024-02-07 DIAGNOSIS — F41.9 ANXIETY: ICD-10-CM

## 2024-02-08 ENCOUNTER — TELEPHONE (OUTPATIENT)
Dept: PRIMARY CARE CLINIC | Age: 54
End: 2024-02-08

## 2024-02-08 DIAGNOSIS — E11.69 TYPE 2 DIABETES MELLITUS WITH OTHER SPECIFIED COMPLICATION, WITHOUT LONG-TERM CURRENT USE OF INSULIN (HCC): ICD-10-CM

## 2024-02-08 RX ORDER — ALPRAZOLAM 0.25 MG/1
TABLET ORAL
Qty: 30 TABLET | Refills: 0 | Status: SHIPPED | OUTPATIENT
Start: 2024-02-08 | End: 2024-03-09

## 2024-02-08 NOTE — TELEPHONE ENCOUNTER
Phuong Frias called to request a refill on her medication.      Last office visit : 10/5/2023   Next office visit : 2/14/2024     Requested Prescriptions     Pending Prescriptions Disp Refills    ARIPiprazole (ABILIFY) 5 MG tablet 30 tablet 5     Sig: Take 1 tablet by mouth daily    Semaglutide (RYBELSUS) 7 MG TABS 30 tablet 3     Sig: Take 7 mg by mouth daily            Delmy Sanchez MA

## 2024-02-12 RX ORDER — ORAL SEMAGLUTIDE 7 MG/1
7 TABLET ORAL DAILY
Qty: 30 TABLET | Refills: 3 | Status: SHIPPED | OUTPATIENT
Start: 2024-02-12

## 2024-02-12 RX ORDER — ARIPIPRAZOLE 5 MG/1
5 TABLET ORAL DAILY
Qty: 30 TABLET | Refills: 5 | Status: SHIPPED | OUTPATIENT
Start: 2024-02-12

## 2024-02-16 DIAGNOSIS — I10 ESSENTIAL HYPERTENSION: ICD-10-CM

## 2024-02-20 ENCOUNTER — OFFICE VISIT (OUTPATIENT)
Dept: PRIMARY CARE CLINIC | Age: 54
End: 2024-02-20
Payer: MEDICARE

## 2024-02-20 VITALS
OXYGEN SATURATION: 97 % | HEIGHT: 68 IN | DIASTOLIC BLOOD PRESSURE: 86 MMHG | TEMPERATURE: 98.6 F | WEIGHT: 293 LBS | SYSTOLIC BLOOD PRESSURE: 134 MMHG | BODY MASS INDEX: 44.41 KG/M2 | HEART RATE: 90 BPM

## 2024-02-20 DIAGNOSIS — I10 PRIMARY HYPERTENSION: ICD-10-CM

## 2024-02-20 DIAGNOSIS — E11.69 TYPE 2 DIABETES MELLITUS WITH OTHER SPECIFIED COMPLICATION, WITHOUT LONG-TERM CURRENT USE OF INSULIN (HCC): Primary | ICD-10-CM

## 2024-02-20 DIAGNOSIS — E66.01 MORBID OBESITY WITH BMI OF 40.0-44.9, ADULT (HCC): ICD-10-CM

## 2024-02-20 DIAGNOSIS — F41.9 ANXIETY: ICD-10-CM

## 2024-02-20 DIAGNOSIS — I10 ESSENTIAL HYPERTENSION: ICD-10-CM

## 2024-02-20 DIAGNOSIS — Z12.31 ENCOUNTER FOR SCREENING MAMMOGRAM FOR BREAST CANCER: ICD-10-CM

## 2024-02-20 PROCEDURE — 3075F SYST BP GE 130 - 139MM HG: CPT | Performed by: NURSE PRACTITIONER

## 2024-02-20 PROCEDURE — 99215 OFFICE O/P EST HI 40 MIN: CPT | Performed by: NURSE PRACTITIONER

## 2024-02-20 PROCEDURE — 3079F DIAST BP 80-89 MM HG: CPT | Performed by: NURSE PRACTITIONER

## 2024-02-20 RX ORDER — LANCETS 33 GAUGE
EACH MISCELLANEOUS
Qty: 100 EACH | Refills: 1 | Status: SHIPPED | OUTPATIENT
Start: 2024-02-20

## 2024-02-20 RX ORDER — ORAL SEMAGLUTIDE 14 MG/1
14 TABLET ORAL DAILY
Qty: 90 TABLET | Refills: 1 | Status: SHIPPED | OUTPATIENT
Start: 2024-02-20 | End: 2024-05-20

## 2024-02-20 RX ORDER — LISINOPRIL 10 MG/1
10 TABLET ORAL 2 TIMES DAILY
Qty: 180 TABLET | Refills: 1 | Status: SHIPPED | OUTPATIENT
Start: 2024-02-20

## 2024-02-20 RX ORDER — LISINOPRIL 10 MG/1
10 TABLET ORAL 2 TIMES DAILY
Qty: 180 TABLET | Refills: 1 | OUTPATIENT
Start: 2024-02-20

## 2024-02-20 ASSESSMENT — ENCOUNTER SYMPTOMS
PHOTOPHOBIA: 0
COUGH: 0
BACK PAIN: 0
NAUSEA: 0
VOMITING: 0
COLOR CHANGE: 0
RHINORRHEA: 0
SHORTNESS OF BREATH: 0
VOICE CHANGE: 0

## 2024-02-20 ASSESSMENT — PATIENT HEALTH QUESTIONNAIRE - PHQ9
SUM OF ALL RESPONSES TO PHQ QUESTIONS 1-9: 0
10. IF YOU CHECKED OFF ANY PROBLEMS, HOW DIFFICULT HAVE THESE PROBLEMS MADE IT FOR YOU TO DO YOUR WORK, TAKE CARE OF THINGS AT HOME, OR GET ALONG WITH OTHER PEOPLE: 0
8. MOVING OR SPEAKING SO SLOWLY THAT OTHER PEOPLE COULD HAVE NOTICED. OR THE OPPOSITE, BEING SO FIGETY OR RESTLESS THAT YOU HAVE BEEN MOVING AROUND A LOT MORE THAN USUAL: 0
6. FEELING BAD ABOUT YOURSELF - OR THAT YOU ARE A FAILURE OR HAVE LET YOURSELF OR YOUR FAMILY DOWN: 0
SUM OF ALL RESPONSES TO PHQ QUESTIONS 1-9: 0
SUM OF ALL RESPONSES TO PHQ QUESTIONS 1-9: 0
9. THOUGHTS THAT YOU WOULD BE BETTER OFF DEAD, OR OF HURTING YOURSELF: 0
3. TROUBLE FALLING OR STAYING ASLEEP: 0
SUM OF ALL RESPONSES TO PHQ QUESTIONS 1-9: 0
1. LITTLE INTEREST OR PLEASURE IN DOING THINGS: 0
SUM OF ALL RESPONSES TO PHQ9 QUESTIONS 1 & 2: 0
2. FEELING DOWN, DEPRESSED OR HOPELESS: 0
5. POOR APPETITE OR OVEREATING: 0
4. FEELING TIRED OR HAVING LITTLE ENERGY: 0
7. TROUBLE CONCENTRATING ON THINGS, SUCH AS READING THE NEWSPAPER OR WATCHING TELEVISION: 0

## 2024-02-20 NOTE — PROGRESS NOTES
[facility]  [address]  [address]  [place] [address]  Dept: [phone]  Dept Fax: [phone]  Loc: [phone]    Phuong Frias is a 53 y.o. female who presents today for her medical conditions/complaints as noted below.  Phuong Frias is c/o of Follow-up (Pt in office for follow up - needing new order for test strips. Wanting to discuss increasing rybellsus and referral to neurology due to tingling in fingers and feet)        HPI:     HPI   Chief Complaint   Patient presents with    Follow-up     Pt in office for follow up - needing new order for test strips. Wanting to discuss increasing rybellsus and referral to neurology due to tingling in fingers and feet     Patient presents today for follow-up diabetes, anxiety/depression, hypertension, hyperlipidemia. Blood pressure stable. Mood is stable with abilify and xanax. She will need updated UDS today. She has not been checking glucose due to being out of test strips.     She has gained 35lbs since 2023. She is no longer taking metformin 1000 mg twice daily; she states there was not a reason she discontinued this. She is no longer on Ozempic due to difficulty getting it; she has been off > 5 months.     She is due for mammogram.     She complains of bilateral feet/hand tingling. This has been present x 1 year. She is not currently taking any medication for symptoms. She is going to pain management; she is taking gabapentin 800 mg TID, Norco 10 mg TID and tizanidine.    Past Medical History:   Diagnosis Date    Anxiety     Arthritis     Chronic pain     Depression     Diabetes mellitus type 2 in obese (HCC)     Hyperlipidemia     Hypertension     Knee pain     Premenopausal patient 2018      Past Surgical History:   Procedure Laterality Date     SECTION      x3    PATELLAR TENDON REPAIR Left 2023    LEFT KNEE RETINACULUM REPAIR performed by Julio Valladares MD at [facility] OR

## 2024-02-20 NOTE — PATIENT INSTRUCTIONS
Fasting labs in [address] within 1-2 weeks.   Mammogram- will call for appointment.   Increase Rybelsus to 14 mg daily.   Follow-up in 3 months or sooner if needed.

## 2024-02-21 DIAGNOSIS — E11.69 TYPE 2 DIABETES MELLITUS WITH OTHER SPECIFIED COMPLICATION, WITHOUT LONG-TERM CURRENT USE OF INSULIN (HCC): ICD-10-CM

## 2024-02-21 RX ORDER — GLUCOSAMINE HCL/CHONDROITIN SU 500-400 MG
CAPSULE ORAL
Qty: 100 STRIP | Refills: 11 | Status: SHIPPED | OUTPATIENT
Start: 2024-02-21

## 2024-02-21 NOTE — TELEPHONE ENCOUNTER
Phuong called requesting a refill of the below medication which has been pended for you:     Requested Prescriptions     Pending Prescriptions Disp Refills    metFORMIN (GLUCOPHAGE) 500 MG tablet [Pharmacy Med Name: METFORMIN 500MG TABLETS] 120 tablet 2     Sig: TAKE 2 TABLETS BY MOUTH TWICE DAILY WITH MEALS    blood glucose monitor strips 100 strip 11     Sig: Test 2 times a day       Last Appointment Date: 2/20/2024  Next Appointment Date: 5/20/2024    No Known Allergies

## 2024-02-28 ENCOUNTER — TELEPHONE (OUTPATIENT)
Dept: PRIMARY CARE CLINIC | Age: 54
End: 2024-02-28

## 2024-03-05 ENCOUNTER — TELEMEDICINE (OUTPATIENT)
Dept: PRIMARY CARE CLINIC | Age: 54
End: 2024-03-05
Payer: MEDICARE

## 2024-03-05 DIAGNOSIS — F41.9 ANXIETY: ICD-10-CM

## 2024-03-05 DIAGNOSIS — E66.01 MORBID OBESITY (HCC): Primary | ICD-10-CM

## 2024-03-05 PROCEDURE — 99213 OFFICE O/P EST LOW 20 MIN: CPT | Performed by: NURSE PRACTITIONER

## 2024-03-05 RX ORDER — ALPRAZOLAM 0.25 MG/1
0.25 TABLET ORAL DAILY PRN
Qty: 30 TABLET | Refills: 0 | Status: SHIPPED | OUTPATIENT
Start: 2024-03-05 | End: 2024-04-04

## 2024-03-05 ASSESSMENT — ENCOUNTER SYMPTOMS
VOMITING: 0
COUGH: 0
BACK PAIN: 0
SHORTNESS OF BREATH: 0
PHOTOPHOBIA: 0
NAUSEA: 0
COLOR CHANGE: 0
VOICE CHANGE: 0
RHINORRHEA: 0

## 2024-03-05 NOTE — PROGRESS NOTES
Phuong Frias, was evaluated through a synchronous (real-time) audio-video encounter. The patient (or guardian if applicable) is aware that this is a billable service, which includes applicable co-pays. This Virtual Visit was conducted with patient's (and/or legal guardian's) consent. Patient identification was verified, and a caregiver was present when appropriate.   The patient was located at Home: [address] [facility] [place] [address]  Provider was located at Home (Appt Dept State): JENA Frias (:  1970) is a Established patient, presenting virtually for evaluation of the following:    Assessment & Plan   Below is the assessment and plan developed based on review of pertinent history, physical exam, labs, studies, and medications.  1. Morbid obesity (HCC)  -     External Referral To Bariatrics  2. Anxiety  -     ALPRAZolam (XANAX) 0.25 MG tablet; Take 1 tablet by mouth daily as needed for Anxiety for up to 30 days. Max Daily Amount: 0.25 mg, Disp-30 tablet, R-0Normal    No follow-ups on file.       Subjective   Patient presents today to discuss weight concerns. Her current weight is 368lbs. She is interested bariatric surgery. She has been taking metformin and rybelsus; she has not had weight loss with this. She is mostly wheelchair bound due to size and mobility issues.       Review of Systems   Constitutional:  Negative for chills and fever.   HENT:  Negative for ear pain, hearing loss, rhinorrhea and voice change.    Eyes:  Negative for photophobia and visual disturbance.   Respiratory:  Negative for cough and shortness of breath.    Cardiovascular:  Negative for chest pain and palpitations.   Gastrointestinal:  Negative for nausea and vomiting.   Endocrine: Negative.  Negative for cold intolerance and heat intolerance.   Genitourinary:  Negative for difficulty urinating and flank pain.   Musculoskeletal:  Negative for back pain and neck pain.   Skin:  Negative for color change and rash.

## 2024-03-08 DIAGNOSIS — E66.01 MORBID OBESITY WITH BMI OF 40.0-44.9, ADULT (HCC): ICD-10-CM

## 2024-03-08 DIAGNOSIS — E11.69 TYPE 2 DIABETES MELLITUS WITH OTHER SPECIFIED COMPLICATION, WITHOUT LONG-TERM CURRENT USE OF INSULIN (HCC): ICD-10-CM

## 2024-03-08 DIAGNOSIS — I10 PRIMARY HYPERTENSION: ICD-10-CM

## 2024-03-08 DIAGNOSIS — I10 ESSENTIAL HYPERTENSION: ICD-10-CM

## 2024-03-08 LAB
25(OH)D3 SERPL-MCNC: 27.8 NG/ML
ALBUMIN SERPL-MCNC: 4.3 G/DL (ref 3.5–5.2)
ALP SERPL-CCNC: 118 U/L (ref 35–104)
ALT SERPL-CCNC: 45 U/L (ref 5–33)
ANION GAP SERPL CALCULATED.3IONS-SCNC: 21 MMOL/L (ref 7–19)
AST SERPL-CCNC: 39 U/L (ref 5–32)
BASOPHILS # BLD: 0.1 K/UL (ref 0–0.2)
BASOPHILS NFR BLD: 0.5 % (ref 0–1)
BILIRUB SERPL-MCNC: 0.3 MG/DL (ref 0.2–1.2)
BUN SERPL-MCNC: 15 MG/DL (ref 6–20)
CALCIUM SERPL-MCNC: 9.9 MG/DL (ref 8.6–10)
CHLORIDE SERPL-SCNC: 101 MMOL/L (ref 98–111)
CHOLEST SERPL-MCNC: 140 MG/DL (ref 160–199)
CO2 SERPL-SCNC: 21 MMOL/L (ref 22–29)
CREAT SERPL-MCNC: 1.1 MG/DL (ref 0.5–0.9)
EOSINOPHIL # BLD: 0.1 K/UL (ref 0–0.6)
EOSINOPHIL NFR BLD: 1.4 % (ref 0–5)
ERYTHROCYTE [DISTWIDTH] IN BLOOD BY AUTOMATED COUNT: 15 % (ref 11.5–14.5)
GLUCOSE SERPL-MCNC: 109 MG/DL (ref 74–109)
HBA1C MFR BLD: 5.7 % (ref 4–6)
HCT VFR BLD AUTO: 41.4 % (ref 37–47)
HDLC SERPL-MCNC: 52 MG/DL (ref 65–121)
HGB BLD-MCNC: 12.9 G/DL (ref 12–16)
IMM GRANULOCYTES # BLD: 0 K/UL
LDLC SERPL CALC-MCNC: 43 MG/DL
LYMPHOCYTES # BLD: 2 K/UL (ref 1.1–4.5)
LYMPHOCYTES NFR BLD: 22 % (ref 20–40)
MCH RBC QN AUTO: 29.1 PG (ref 27–31)
MCHC RBC AUTO-ENTMCNC: 31.2 G/DL (ref 33–37)
MCV RBC AUTO: 93.5 FL (ref 81–99)
MONOCYTES # BLD: 0.5 K/UL (ref 0–0.9)
MONOCYTES NFR BLD: 4.9 % (ref 0–10)
NEUTROPHILS # BLD: 6.4 K/UL (ref 1.5–7.5)
NEUTS SEG NFR BLD: 70.8 % (ref 50–65)
PLATELET # BLD AUTO: 324 K/UL (ref 130–400)
PMV BLD AUTO: 8.9 FL (ref 9.4–12.3)
POTASSIUM SERPL-SCNC: 4.6 MMOL/L (ref 3.5–5)
PROT SERPL-MCNC: 7.4 G/DL (ref 6.6–8.7)
RBC # BLD AUTO: 4.43 M/UL (ref 4.2–5.4)
SODIUM SERPL-SCNC: 143 MMOL/L (ref 136–145)
TRIGL SERPL-MCNC: 224 MG/DL (ref 0–149)
TSH SERPL DL<=0.005 MIU/L-ACNC: 1.72 UIU/ML (ref 0.27–4.2)
WBC # BLD AUTO: 9.1 K/UL (ref 4.8–10.8)

## 2024-03-11 LAB
CREAT UR-MCNC: 78.4 MG/DL (ref 28–217)
MICROALBUMIN UR-MCNC: <1.2 MG/DL (ref 0–19)
MICROALBUMIN/CREAT UR-RTO: NORMAL MG/G

## 2024-04-04 NOTE — TELEPHONE ENCOUNTER
Phuong Hummeltner called to request a refill on her medication.      Last office visit : 3/5/2024   Next office visit : 5/20/2024     Requested Prescriptions     Pending Prescriptions Disp Refills    traZODone (DESYREL) 150 MG tablet [Pharmacy Med Name: TRAZODONE 150MG (HUNDRED-FIFTY) TAB] 60 tablet 3     Sig: TAKE 2 TABLETS BY MOUTH EVERY EVENING            Umu Espinoza MA

## 2024-04-06 DIAGNOSIS — F32.A ANXIETY AND DEPRESSION: ICD-10-CM

## 2024-04-06 DIAGNOSIS — F41.9 ANXIETY AND DEPRESSION: ICD-10-CM

## 2024-04-06 DIAGNOSIS — G89.29 OTHER CHRONIC PAIN: ICD-10-CM

## 2024-04-08 RX ORDER — DULOXETINE 40 MG/1
CAPSULE, DELAYED RELEASE ORAL
Qty: 90 CAPSULE | Refills: 1 | Status: SHIPPED | OUTPATIENT
Start: 2024-04-08

## 2024-04-08 RX ORDER — TRAZODONE HYDROCHLORIDE 150 MG/1
TABLET ORAL
Qty: 60 TABLET | Refills: 3 | Status: SHIPPED | OUTPATIENT
Start: 2024-04-08

## 2024-04-10 DIAGNOSIS — F41.9 ANXIETY: ICD-10-CM

## 2024-04-10 DIAGNOSIS — E11.00 TYPE 2 DIABETES MELLITUS WITH HYPEROSMOLARITY WITHOUT COMA, WITHOUT LONG-TERM CURRENT USE OF INSULIN (HCC): ICD-10-CM

## 2024-04-11 NOTE — TELEPHONE ENCOUNTER
Phuong Frias called to request a refill on her medication.      Last office visit : 3/5/2024   Next office visit : 5/20/2024     Last UDS:   Amphetamine Screen, Urine   Date Value Ref Range Status   02/20/2024 -  Final     Barbiturates   Date Value Ref Range Status   01/17/2011 Negative () Final     Barbiturate Screen, Urine   Date Value Ref Range Status   02/20/2024 -  Final     Benzodiazepine Screen, Urine   Date Value Ref Range Status   02/20/2024 -  Final     Buprenorphine Urine   Date Value Ref Range Status   02/20/2024 -  Final     Cocaine Metabolite Screen, Urine   Date Value Ref Range Status   02/20/2024 -  Final     Gabapentin Screen, Urine   Date Value Ref Range Status   02/20/2024 -  Final     MDMA, Urine   Date Value Ref Range Status   02/20/2024 -  Final     Methamphetamine, Urine   Date Value Ref Range Status   02/20/2024 -  Final     Opiate Scrn, Ur   Date Value Ref Range Status   02/20/2024 +  Final     Oxycodone Screen, Ur   Date Value Ref Range Status   02/20/2024 -  Final     PCP Screen, Urine   Date Value Ref Range Status   02/20/2024 -  Final     Propoxyphene   Date Value Ref Range Status   01/17/2011 Negative () Final     Propoxyphene Screen, Urine   Date Value Ref Range Status   02/20/2024 -  Final     THC Screen, Urine   Date Value Ref Range Status   02/20/2024 -  Final     Tricyclic Antidepressants, Urine   Date Value Ref Range Status   02/20/2024 -  Final       Last Sterling: 04.11.2024  Medication Contract: 03.24.2022 - UPDATE AT NEXT APPT   Last Fill: 03.05.2024    Requested Prescriptions     Pending Prescriptions Disp Refills    nystatin 227474 UNIT/GM powder [Pharmacy Med Name: NYSTOP TOP PWDR 100,000 30GM] 30 g 0     Sig: APPLY TO SKIN FOLDS THREE TIMES DAILY    ALPRAZolam (XANAX) 0.25 MG tablet [Pharmacy Med Name: ALPRAZOLAM 0.25MG TABLETS] 30 tablet 0     Sig: TAKE 1 TABLET BY MOUTH DAILY AS NEEDED FOR ANXIETY. MAX DAILY AMOUNT: 0.25 MG         Please approve or refuse this

## 2024-04-12 RX ORDER — ALPRAZOLAM 0.25 MG/1
TABLET ORAL
Qty: 30 TABLET | Refills: 0 | Status: SHIPPED | OUTPATIENT
Start: 2024-04-12 | End: 2024-05-12

## 2024-04-12 RX ORDER — NYSTATIN 100000 [USP'U]/G
POWDER TOPICAL
Qty: 30 G | Refills: 0 | Status: SHIPPED | OUTPATIENT
Start: 2024-04-12

## 2024-04-22 RX ORDER — ATORVASTATIN CALCIUM 20 MG/1
TABLET, FILM COATED ORAL
Qty: 90 TABLET | Refills: 1 | Status: SHIPPED | OUTPATIENT
Start: 2024-04-22

## 2024-05-07 ENCOUNTER — TELEPHONE (OUTPATIENT)
Dept: PRIMARY CARE CLINIC | Age: 54
End: 2024-05-07

## 2024-05-07 NOTE — TELEPHONE ENCOUNTER
Patient called and left  stating that she is currently on Rybellsus and doesn't feel that it is effective. Patient wanting to know if she can go back to [facility]. Please advise

## 2024-05-08 DIAGNOSIS — F41.9 ANXIETY: ICD-10-CM

## 2024-05-08 RX ORDER — ALPRAZOLAM 0.25 MG/1
TABLET ORAL
Qty: 30 TABLET | Refills: 0 | Status: SHIPPED | OUTPATIENT
Start: 2024-05-08 | End: 2024-06-07

## 2024-05-08 NOTE — TELEPHONE ENCOUNTER
Phuong Frias called to request a refill on her medication.      Last office visit : 3/5/2024   Next office visit : 5/20/2024     Last UDS:   Amphetamines   Date Value Ref Range Status   10/05/2014 Negative NEGATIVE Final     Comment:     URINE AMPHETAMINE BEROPM=8438 NG/ML     Amphetamine Screen, Urine   Date Value Ref Range Status   02/20/2024 -  Final     Barbiturate Screen, Urine   Date Value Ref Range Status   02/20/2024 -  Final     Benzodiazepines   Date Value Ref Range Status   10/05/2014 Negative NEGATIVE Final     Comment:     URINE BENZODIAZEPINE URBSSK=138 NG/ML     Benzodiazepine Screen, Urine   Date Value Ref Range Status   02/20/2024 -  Final     Buprenorphine Urine   Date Value Ref Range Status   02/20/2024 -  Final     Cocaine Metabolite Screen, Urine   Date Value Ref Range Status   02/20/2024 -  Final     Gabapentin Screen, Urine   Date Value Ref Range Status   02/20/2024 -  Final     MDMA, Urine   Date Value Ref Range Status   02/20/2024 -  Final     Opiate Scrn, Ur   Date Value Ref Range Status   02/20/2024 +  Final     Oxycodone Screen, Ur   Date Value Ref Range Status   02/20/2024 -  Final     PCP Screen, Urine   Date Value Ref Range Status   02/20/2024 -  Final     Propoxyphene   Date Value Ref Range Status   01/17/2011 Negative () Final     Propoxyphene Screen, Urine   Date Value Ref Range Status   02/20/2024 -  Final     THC Screen, Urine   Date Value Ref Range Status   02/20/2024 -  Final     Tricyclic Antidepressants, Urine   Date Value Ref Range Status   02/20/2024 -  Final       Last Sterling: 4/11/24  Medication Contract: update at next OV   Last Fill: 4/12/24    Requested Prescriptions     Pending Prescriptions Disp Refills    ALPRAZolam (XANAX) 0.25 MG tablet [Pharmacy Med Name: ALPRAZOLAM 0.25MG TABLETS] 30 tablet      Sig: TAKE 1 TABLET BY MOUTH DAILY AS NEEDED FOR ANXIETY. MAX DAILY AMOUNT: 0.25 MG         Please approve or refuse this medication.   Raven Wolf MA

## 2024-05-21 DIAGNOSIS — E11.69 TYPE 2 DIABETES MELLITUS WITH OTHER SPECIFIED COMPLICATION, WITHOUT LONG-TERM CURRENT USE OF INSULIN (HCC): ICD-10-CM

## 2024-06-05 DIAGNOSIS — F41.9 ANXIETY: ICD-10-CM

## 2024-06-05 RX ORDER — ALPRAZOLAM 0.25 MG/1
TABLET ORAL
Qty: 30 TABLET | Refills: 0 | Status: SHIPPED | OUTPATIENT
Start: 2024-06-05 | End: 2024-07-05

## 2024-06-25 RX ORDER — TRAZODONE HYDROCHLORIDE 50 MG/1
50 TABLET ORAL NIGHTLY
Qty: 30 TABLET | Refills: 0 | Status: SHIPPED | OUTPATIENT
Start: 2024-06-25

## 2024-06-25 RX ORDER — TRAZODONE HYDROCHLORIDE 150 MG/1
TABLET ORAL
Qty: 60 TABLET | Refills: 3 | Status: SHIPPED | OUTPATIENT
Start: 2024-06-25

## 2024-07-08 DIAGNOSIS — I10 ESSENTIAL HYPERTENSION: ICD-10-CM

## 2024-07-09 ENCOUNTER — OFFICE VISIT (OUTPATIENT)
Dept: PRIMARY CARE CLINIC | Age: 54
End: 2024-07-09

## 2024-07-09 VITALS
BODY MASS INDEX: 44.41 KG/M2 | HEIGHT: 68 IN | OXYGEN SATURATION: 95 % | SYSTOLIC BLOOD PRESSURE: 132 MMHG | HEART RATE: 102 BPM | WEIGHT: 293 LBS | DIASTOLIC BLOOD PRESSURE: 86 MMHG | TEMPERATURE: 97.2 F

## 2024-07-09 DIAGNOSIS — E66.01 MORBID OBESITY (HCC): ICD-10-CM

## 2024-07-09 DIAGNOSIS — G89.29 OTHER CHRONIC PAIN: ICD-10-CM

## 2024-07-09 DIAGNOSIS — Z00.00 MEDICARE ANNUAL WELLNESS VISIT, SUBSEQUENT: Primary | ICD-10-CM

## 2024-07-09 DIAGNOSIS — F41.9 ANXIETY AND DEPRESSION: ICD-10-CM

## 2024-07-09 DIAGNOSIS — F32.A ANXIETY AND DEPRESSION: ICD-10-CM

## 2024-07-09 DIAGNOSIS — E11.00 TYPE 2 DIABETES MELLITUS WITH HYPEROSMOLARITY WITHOUT COMA, WITHOUT LONG-TERM CURRENT USE OF INSULIN (HCC): ICD-10-CM

## 2024-07-09 RX ORDER — NYSTATIN 100000 [USP'U]/G
POWDER TOPICAL
Qty: 30 G | Refills: 0 | Status: SHIPPED | OUTPATIENT
Start: 2024-07-09

## 2024-07-09 RX ORDER — DULOXETINE 40 MG/1
1 CAPSULE, DELAYED RELEASE ORAL DAILY
Qty: 90 CAPSULE | Refills: 1 | Status: SHIPPED | OUTPATIENT
Start: 2024-07-09

## 2024-07-09 SDOH — ECONOMIC STABILITY: FOOD INSECURITY: WITHIN THE PAST 12 MONTHS, THE FOOD YOU BOUGHT JUST DIDN'T LAST AND YOU DIDN'T HAVE MONEY TO GET MORE.: NEVER TRUE

## 2024-07-09 SDOH — ECONOMIC STABILITY: FOOD INSECURITY: WITHIN THE PAST 12 MONTHS, YOU WORRIED THAT YOUR FOOD WOULD RUN OUT BEFORE YOU GOT MONEY TO BUY MORE.: NEVER TRUE

## 2024-07-09 SDOH — ECONOMIC STABILITY: INCOME INSECURITY: HOW HARD IS IT FOR YOU TO PAY FOR THE VERY BASICS LIKE FOOD, HOUSING, MEDICAL CARE, AND HEATING?: NOT HARD AT ALL

## 2024-07-09 ASSESSMENT — LIFESTYLE VARIABLES
HOW OFTEN DO YOU HAVE A DRINK CONTAINING ALCOHOL: MONTHLY OR LESS
HOW MANY STANDARD DRINKS CONTAINING ALCOHOL DO YOU HAVE ON A TYPICAL DAY: 1 OR 2

## 2024-07-09 ASSESSMENT — PATIENT HEALTH QUESTIONNAIRE - PHQ9
SUM OF ALL RESPONSES TO PHQ9 QUESTIONS 1 & 2: 0
2. FEELING DOWN, DEPRESSED OR HOPELESS: NOT AT ALL
SUM OF ALL RESPONSES TO PHQ QUESTIONS 1-9: 0
8. MOVING OR SPEAKING SO SLOWLY THAT OTHER PEOPLE COULD HAVE NOTICED. OR THE OPPOSITE, BEING SO FIGETY OR RESTLESS THAT YOU HAVE BEEN MOVING AROUND A LOT MORE THAN USUAL: NOT AT ALL
SUM OF ALL RESPONSES TO PHQ QUESTIONS 1-9: 0
6. FEELING BAD ABOUT YOURSELF - OR THAT YOU ARE A FAILURE OR HAVE LET YOURSELF OR YOUR FAMILY DOWN: NOT AT ALL
10. IF YOU CHECKED OFF ANY PROBLEMS, HOW DIFFICULT HAVE THESE PROBLEMS MADE IT FOR YOU TO DO YOUR WORK, TAKE CARE OF THINGS AT HOME, OR GET ALONG WITH OTHER PEOPLE: NOT DIFFICULT AT ALL
9. THOUGHTS THAT YOU WOULD BE BETTER OFF DEAD, OR OF HURTING YOURSELF: NOT AT ALL
7. TROUBLE CONCENTRATING ON THINGS, SUCH AS READING THE NEWSPAPER OR WATCHING TELEVISION: NOT AT ALL
4. FEELING TIRED OR HAVING LITTLE ENERGY: NOT AT ALL
3. TROUBLE FALLING OR STAYING ASLEEP: NOT AT ALL
5. POOR APPETITE OR OVEREATING: NOT AT ALL
1. LITTLE INTEREST OR PLEASURE IN DOING THINGS: NOT AT ALL
SUM OF ALL RESPONSES TO PHQ QUESTIONS 1-9: 0
SUM OF ALL RESPONSES TO PHQ QUESTIONS 1-9: 0

## 2024-07-09 ASSESSMENT — ENCOUNTER SYMPTOMS
COLOR CHANGE: 0
NAUSEA: 0
VOICE CHANGE: 0
PHOTOPHOBIA: 0
VOMITING: 0
BACK PAIN: 0
SHORTNESS OF BREATH: 0
RHINORRHEA: 0
COUGH: 0

## 2024-07-09 NOTE — PROGRESS NOTES
[facility]  [address]  [address]  [place] [address]  Dept: [phone]  Dept Fax: [phone]  Loc: [phone]    Phuong Frias is a 53 y.o. female who presents today for her medical conditions/complaints as noted below.  Phuong Frias is c/o of Follow-up (Pt in office for follow up and refills) and Medicare AWV (/)        HPI:     HPI   Chief Complaint   Patient presents with    Follow-up     Pt in office for follow up and refills    Medicare AWV            Patient presents today for medicare AWV and follow-up diabetes, mood disorder, hyperlipidemia, hypertension     She reports mood is stable with xanax and abilify. UDS is up to date.     She has been referred to bariatrics.   She is currently taking Ozempic. She has lost 23 lbs since March. She reports she feels good on this medication and is wanting to increase to 2 mg weekly.     She is due for mammogram.     Past Medical History:   Diagnosis Date    Anxiety     Arthritis     Chronic pain     Depression     Diabetes mellitus type 2 in obese     Hyperlipidemia     Hypertension     Knee pain     Premenopausal patient 2018      Past Surgical History:   Procedure Laterality Date     SECTION      x3    PATELLAR TENDON REPAIR Left 2023    LEFT KNEE RETINACULUM REPAIR performed by Julio Valladares MD at [facility] OR    TENDON RELEASE      right finger    TONSILLECTOMY      TOTAL KNEE ARTHROPLASTY Right 5/3/2023    COMPLEX PRIMARY ROBOTIC RIGHT KNEE TOTAL ARTHROPLASTY performed by Julio Valladares MD at [facility] OR    TOTAL KNEE ARTHROPLASTY Left 2023    ROBOTIC COMPLEX PRIMARY LEFT KNEE TOTAL ARTHROPLASTY performed by Julio Valladares MD at [facility] OR    TUBAL LIGATION             2024     2:27 PM 2024    11:07 AM 2023    12:00 PM 2023     8:12 AM 2023     7:20 AM 2023     6:02 AM   Vitals   SYSTOLIC 132 134   147    DIASTOLIC 86 86   88    Pulse 102 90   93    Temp 97.2

## 2024-07-09 NOTE — PATIENT INSTRUCTIONS
[employer] disclaims any warranty or liability for your use of this information.           Advance Directives: Care Instructions  Overview  An advance directive is a legal way to state your wishes at the end of your life. It tells your family and your doctor what to do if you can't say what you want.  There are two main types of advance directives. You can change them any time your wishes change.  Living will.  This form tells your family and your doctor your wishes about life support and other treatment. The form is also called a declaration.  Medical power of .  This form lets you name a person to make treatment decisions for you when you can't speak for yourself. This person is called a health care agent (health care proxy, health care surrogate). The form is also called a durable power of  for health care.  If you do not have an advance directive, decisions about your medical care may be made by a family member, or by a doctor or a  who doesn't know you.  It may help to think of an advance directive as a gift to the people who care for you. If you have one, they won't have to make tough decisions by themselves.  For more information, including forms for your state, see the [employer] website (www.caringinfo.org/planning/advance-directives/).  Follow-up care is a key part of your treatment and safety. Be sure to make and go to all appointments, and call your doctor if you are having problems. It's also a good idea to know your test results and keep a list of the medicines you take.  What should you include in an advance directive?  Many states have a unique advance directive form. (It may ask you to address specific issues.) Or you might use a universal form that's approved by many states.  If your form doesn't tell you what to address, it may be hard to know what to include in your advance directive. Use the questions below to help you get started.  Who do you want to make decisions about

## 2024-07-10 DIAGNOSIS — F41.9 ANXIETY: ICD-10-CM

## 2024-07-10 DIAGNOSIS — I10 ESSENTIAL HYPERTENSION: ICD-10-CM

## 2024-07-10 RX ORDER — LISINOPRIL 10 MG/1
10 TABLET ORAL 2 TIMES DAILY
Qty: 180 TABLET | Refills: 1 | Status: SHIPPED | OUTPATIENT
Start: 2024-07-10

## 2024-07-10 RX ORDER — ALPRAZOLAM 0.25 MG/1
TABLET ORAL
Qty: 30 TABLET | Refills: 0 | Status: SHIPPED | OUTPATIENT
Start: 2024-07-10 | End: 2024-08-09

## 2024-07-10 NOTE — TELEPHONE ENCOUNTER
, Phuong Frias called to request a refill on her medication.      Last office visit : 7/9/2024   Next office visit : 10/14/2024     Last UDS:   Amphetamines   Date Value Ref Range Status   10/05/2014 Negative NEGATIVE Final     Comment:     URINE AMPHETAMINE EETQFS=6441 NG/ML     Benzodiazepines   Date Value Ref Range Status   10/05/2014 Negative NEGATIVE Final     Comment:     URINE BENZODIAZEPINE NROEYU=624 NG/ML     Benzodiazepine Screen, Urine   Date Value Ref Range Status   02/20/2024 -  Final     Buprenorphine Urine   Date Value Ref Range Status   02/20/2024 -  Final     Cocaine Metabolite Screen, Urine   Date Value Ref Range Status   02/20/2024 -  Final     Gabapentin Screen, Urine   Date Value Ref Range Status   02/20/2024 -  Final     MDMA, Urine   Date Value Ref Range Status   02/20/2024 -  Final     Oxycodone Screen, Ur   Date Value Ref Range Status   02/20/2024 -  Final     Propoxyphene   Date Value Ref Range Status   01/17/2011 Negative () Final     Propoxyphene Screen, Urine   Date Value Ref Range Status   02/20/2024 -  Final     THC Screen, Urine   Date Value Ref Range Status   02/20/2024 -  Final     Tricyclic Antidepressants, Urine   Date Value Ref Range Status   02/20/2024 -  Final       Last Sterling: 04.11.2024   Medication Contract: 07.10.2024   Last Fill: 05.08.2024     Requested Prescriptions     Pending Prescriptions Disp Refills    ALPRAZolam (XANAX) 0.25 MG tablet [Pharmacy Med Name: ALPRAZOLAM 0.25MG TABLETS] 30 tablet      Sig: TAKE 1 TABLET BY MOUTH DAILY AS NEEDED FOR ANXIETY. MAX DAILY AMOUNT: 0.25 MG    lisinopril (PRINIVIL;ZESTRIL) 10 MG tablet [Pharmacy Med Name: LISINOPRIL 10MG TABLETS] 180 tablet 1     Sig: TAKE 1 TABLET BY MOUTH TWICE DAILY         Please approve or refuse this medication.   Gitfy Valles MA

## 2024-07-11 RX ORDER — LISINOPRIL 10 MG/1
10 TABLET ORAL 2 TIMES DAILY
Qty: 180 TABLET | Refills: 1 | OUTPATIENT
Start: 2024-07-11

## 2024-07-12 RX ORDER — ALPRAZOLAM 0.25 MG/1
TABLET ORAL
Qty: 30 TABLET | OUTPATIENT
Start: 2024-07-12 | End: 2024-08-11

## 2024-07-23 RX ORDER — SEMAGLUTIDE 2.68 MG/ML
2 INJECTION, SOLUTION SUBCUTANEOUS WEEKLY
Refills: 1 | OUTPATIENT
Start: 2024-07-23

## 2024-07-30 DIAGNOSIS — E11.00 TYPE 2 DIABETES MELLITUS WITH HYPEROSMOLARITY WITHOUT COMA, WITHOUT LONG-TERM CURRENT USE OF INSULIN (HCC): Primary | ICD-10-CM

## 2024-07-30 RX ORDER — SEMAGLUTIDE 2.68 MG/ML
2 INJECTION, SOLUTION SUBCUTANEOUS
Qty: 3 ML | Refills: 2 | Status: SHIPPED | OUTPATIENT
Start: 2024-07-30

## 2024-07-30 NOTE — TELEPHONE ENCOUNTER
Phuong called requesting a refill of the below medication which has been pended for you:   Pharmacy didn't receive RX. RX was increase at last office Visit 7/9/24 2 mg   Requested Prescriptions     Pending Prescriptions Disp Refills    semaglutide, 2 MG/DOSE, (OZEMPIC, 2 MG/DOSE,) 8 MG/3ML SOPN sc injection 3 mL 2     Sig: Inject 2 mg into the skin every 7 days       Last Appointment Date: 7/9/2024  Next Appointment Date: 10/14/2024    No Known Allergies

## 2024-08-03 DIAGNOSIS — E11.69 TYPE 2 DIABETES MELLITUS WITH OTHER SPECIFIED COMPLICATION, WITHOUT LONG-TERM CURRENT USE OF INSULIN (HCC): ICD-10-CM

## 2024-08-05 RX ORDER — ARIPIPRAZOLE 5 MG/1
5 TABLET ORAL DAILY
Qty: 30 TABLET | Refills: 5 | Status: SHIPPED | OUTPATIENT
Start: 2024-08-05

## 2024-08-05 RX ORDER — ATORVASTATIN CALCIUM 20 MG/1
TABLET, FILM COATED ORAL
Qty: 90 TABLET | Refills: 1 | Status: SHIPPED | OUTPATIENT
Start: 2024-08-05

## 2024-08-07 DIAGNOSIS — F41.9 ANXIETY: ICD-10-CM

## 2024-08-13 DIAGNOSIS — F41.9 ANXIETY: ICD-10-CM

## 2024-08-13 RX ORDER — ALPRAZOLAM 0.25 MG/1
TABLET ORAL
Qty: 30 TABLET | Refills: 0 | Status: SHIPPED | OUTPATIENT
Start: 2024-08-13 | End: 2024-09-12

## 2024-08-13 NOTE — TELEPHONE ENCOUNTER
Phuong Frias called to request a refill on her medication.      Last office visit : 7/9/2024   Next office visit : 8/13/2024     Last UDS:   Amphetamines   Date Value Ref Range Status   10/05/2014 Negative NEGATIVE Final     Comment:     URINE AMPHETAMINE IHKDYJ=2761 NG/ML     Benzodiazepines   Date Value Ref Range Status   10/05/2014 Negative NEGATIVE Final     Comment:     URINE BENZODIAZEPINE KEJEJT=826 NG/ML     Benzodiazepine Screen, Urine   Date Value Ref Range Status   02/20/2024 -  Final     Buprenorphine Urine   Date Value Ref Range Status   02/20/2024 -  Final     Cocaine Metabolite Screen, Urine   Date Value Ref Range Status   02/20/2024 -  Final     Gabapentin Screen, Urine   Date Value Ref Range Status   02/20/2024 -  Final     Oxycodone Screen, Ur   Date Value Ref Range Status   02/20/2024 -  Final     Propoxyphene   Date Value Ref Range Status   01/17/2011 Negative () Final     Propoxyphene Screen, Urine   Date Value Ref Range Status   02/20/2024 -  Final     THC Screen, Urine   Date Value Ref Range Status   02/20/2024 -  Final     Tricyclic Antidepressants, Urine   Date Value Ref Range Status   02/20/2024 -  Final       Last Sterling: 08.13.2024  Medication Contract: 03.24.2022   Last Fill: 06.05.2024    Requested Prescriptions     Pending Prescriptions Disp Refills    ALPRAZolam (XANAX) 0.25 MG tablet [Pharmacy Med Name: ALPRAZOLAM 0.25MG TABLETS] 30 tablet 0     Sig: TAKE 1 TABLET BY MOUTH DAILY AS NEEDED FOR ANXIETY. MAX DAILY AMOUNT: 0.25 MG         Please approve or refuse this medication.   Gifty Valles MA

## 2024-08-19 RX ORDER — ALPRAZOLAM 0.25 MG
TABLET ORAL
Qty: 30 TABLET | OUTPATIENT
Start: 2024-08-19 | End: 2024-09-18

## 2024-09-09 DIAGNOSIS — F41.9 ANXIETY: ICD-10-CM

## 2024-09-11 RX ORDER — ALPRAZOLAM 0.25 MG
TABLET ORAL
Qty: 30 TABLET | Refills: 0 | Status: SHIPPED | OUTPATIENT
Start: 2024-09-11 | End: 2024-10-11

## 2024-09-30 DIAGNOSIS — M25.561 RIGHT KNEE PAIN, UNSPECIFIED CHRONICITY: ICD-10-CM

## 2024-09-30 DIAGNOSIS — M17.12 PRIMARY OSTEOARTHRITIS OF LEFT KNEE: Primary | ICD-10-CM

## 2024-10-01 ENCOUNTER — OFFICE VISIT (OUTPATIENT)
Age: 54
End: 2024-10-01
Payer: MEDICARE

## 2024-10-01 ENCOUNTER — HOSPITAL ENCOUNTER (OUTPATIENT)
Dept: WOMENS IMAGING | Age: 54
Discharge: HOME | End: 2024-10-01
Payer: MEDICAID

## 2024-10-01 VITALS — BODY MASS INDEX: 44.41 KG/M2 | HEIGHT: 68 IN | WEIGHT: 293 LBS

## 2024-10-01 DIAGNOSIS — M17.10 ARTHRITIS OF KNEE: Primary | ICD-10-CM

## 2024-10-01 DIAGNOSIS — F41.9 ANXIETY AND DEPRESSION: ICD-10-CM

## 2024-10-01 DIAGNOSIS — E11.00 TYPE 2 DIABETES MELLITUS WITH HYPEROSMOLARITY WITHOUT COMA, WITHOUT LONG-TERM CURRENT USE OF INSULIN (HCC): ICD-10-CM

## 2024-10-01 DIAGNOSIS — G89.29 OTHER CHRONIC PAIN: ICD-10-CM

## 2024-10-01 DIAGNOSIS — Z12.31 ENCOUNTER FOR SCREENING MAMMOGRAM FOR BREAST CANCER: ICD-10-CM

## 2024-10-01 DIAGNOSIS — F32.A ANXIETY AND DEPRESSION: ICD-10-CM

## 2024-10-01 LAB
25(OH)D3 SERPL-MCNC: 34.2 NG/ML
ALBUMIN SERPL-MCNC: 3.9 G/DL (ref 3.5–5.2)
ALP SERPL-CCNC: 126 U/L (ref 35–104)
ALT SERPL-CCNC: 21 U/L (ref 5–33)
ANION GAP SERPL CALCULATED.3IONS-SCNC: 13 MMOL/L (ref 7–19)
AST SERPL-CCNC: 19 U/L (ref 5–32)
BASOPHILS # BLD: 0.1 K/UL (ref 0–0.2)
BASOPHILS NFR BLD: 0.7 % (ref 0–1)
BILIRUB SERPL-MCNC: 0.4 MG/DL (ref 0.2–1.2)
BUN SERPL-MCNC: 17 MG/DL (ref 6–20)
CALCIUM SERPL-MCNC: 9.1 MG/DL (ref 8.6–10)
CHLORIDE SERPL-SCNC: 101 MMOL/L (ref 98–111)
CHOLEST SERPL-MCNC: 128 MG/DL (ref 0–199)
CO2 SERPL-SCNC: 24 MMOL/L (ref 22–29)
CREAT SERPL-MCNC: 1.2 MG/DL (ref 0.5–0.9)
EOSINOPHIL # BLD: 0.1 K/UL (ref 0–0.6)
EOSINOPHIL NFR BLD: 1.3 % (ref 0–5)
ERYTHROCYTE [DISTWIDTH] IN BLOOD BY AUTOMATED COUNT: 13.8 % (ref 11.5–14.5)
GLUCOSE SERPL-MCNC: 77 MG/DL (ref 70–99)
HBA1C MFR BLD: 5.2 % (ref 4–5.6)
HCT VFR BLD AUTO: 38.8 % (ref 37–47)
HDLC SERPL-MCNC: 53 MG/DL (ref 40–60)
HGB BLD-MCNC: 12.1 G/DL (ref 12–16)
IMM GRANULOCYTES # BLD: 0.1 K/UL
LDLC SERPL CALC-MCNC: 42 MG/DL
LYMPHOCYTES # BLD: 2.6 K/UL (ref 1.1–4.5)
LYMPHOCYTES NFR BLD: 24.1 % (ref 20–40)
MCH RBC QN AUTO: 28.7 PG (ref 27–31)
MCHC RBC AUTO-ENTMCNC: 31.2 G/DL (ref 33–37)
MCV RBC AUTO: 92.2 FL (ref 81–99)
MONOCYTES # BLD: 0.5 K/UL (ref 0–0.9)
MONOCYTES NFR BLD: 5 % (ref 0–10)
NEUTROPHILS # BLD: 7.3 K/UL (ref 1.5–7.5)
NEUTS SEG NFR BLD: 68.4 % (ref 50–65)
PLATELET # BLD AUTO: 333 K/UL (ref 130–400)
PMV BLD AUTO: 8.5 FL (ref 9.4–12.3)
POTASSIUM SERPL-SCNC: 4.5 MMOL/L (ref 3.5–5)
PROT SERPL-MCNC: 6.9 G/DL (ref 6.4–8.3)
RBC # BLD AUTO: 4.21 M/UL (ref 4.2–5.4)
SODIUM SERPL-SCNC: 138 MMOL/L (ref 136–145)
TRIGL SERPL-MCNC: 166 MG/DL (ref 0–149)
TSH SERPL DL<=0.005 MIU/L-ACNC: 2.87 UIU/ML (ref 0.27–4.2)
WBC # BLD AUTO: 10.7 K/UL (ref 4.8–10.8)

## 2024-10-01 PROCEDURE — 99213 OFFICE O/P EST LOW 20 MIN: CPT | Performed by: ORTHOPAEDIC SURGERY

## 2024-10-01 PROCEDURE — 77067 SCR MAMMO BI INCL CAD: CPT

## 2024-10-01 PROCEDURE — 77063 BREAST TOMOSYNTHESIS BI: CPT

## 2024-10-01 NOTE — PROGRESS NOTES
[facility]  [facility]  [address]  [facility] [id number] [phone]     Patient: Phuong Frias   YOB: 1970   Date: 10/1/2024     Chief Complaint   Patient presents with    Follow-up     Bilateral TKR in         History of Present Illness  This is a 54 y.o. female presents today she comes back in today with her .  Again she is very immobile in a wheelchair.  She states that she has been using a walker to ambulate.  She has bought herself a tricycle which she is going to use to help quad strengthening.  Just to summarize she did with her left knee she fell a month after surgery and dehisced her arthrotomy and we had to redo this about a month after surgery..    Past Medical History:   Diagnosis Date    Anxiety     Arthritis     Chronic pain     Depression     Diabetes mellitus type 2 in obese     Hyperlipidemia     Hypertension     Knee pain     Premenopausal patient 2018      Past Surgical History:   Procedure Laterality Date     SECTION      x3    PATELLAR TENDON REPAIR Left 2023    LEFT KNEE RETINACULUM REPAIR performed by Julio Valladares MD at [facility] OR    TENDON RELEASE      right finger    TONSILLECTOMY      TOTAL KNEE ARTHROPLASTY Right 5/3/2023    COMPLEX PRIMARY ROBOTIC RIGHT KNEE TOTAL ARTHROPLASTY performed by Julio Valladares MD at [facility] OR    TOTAL KNEE ARTHROPLASTY Left 2023    ROBOTIC COMPLEX PRIMARY LEFT KNEE TOTAL ARTHROPLASTY performed by Julio Valladares MD at [facility] OR    TUBAL LIGATION        Social History     Socioeconomic History    Marital status:      Spouse name: None    Number of children: None    Years of education: None    Highest education level: None   Tobacco Use    Smoking status: Never     Passive exposure: Never    Smokeless tobacco: Never   Vaping Use    Vaping status: Never Used   Substance and Sexual Activity    Alcohol use: Yes     Comment: socially

## 2024-10-02 DIAGNOSIS — F41.9 ANXIETY AND DEPRESSION: ICD-10-CM

## 2024-10-02 DIAGNOSIS — F32.A ANXIETY AND DEPRESSION: ICD-10-CM

## 2024-10-02 DIAGNOSIS — I10 ESSENTIAL HYPERTENSION: ICD-10-CM

## 2024-10-02 DIAGNOSIS — G89.29 OTHER CHRONIC PAIN: ICD-10-CM

## 2024-10-02 RX ORDER — LISINOPRIL 10 MG/1
10 TABLET ORAL 2 TIMES DAILY
Qty: 180 TABLET | Refills: 1 | Status: SHIPPED | OUTPATIENT
Start: 2024-10-02

## 2024-10-02 RX ORDER — DULOXETINE 40 MG/1
1 CAPSULE, DELAYED RELEASE ORAL DAILY
Qty: 90 CAPSULE | Refills: 1 | Status: SHIPPED | OUTPATIENT
Start: 2024-10-02

## 2024-10-07 DIAGNOSIS — F41.9 ANXIETY: ICD-10-CM

## 2024-10-07 RX ORDER — ALPRAZOLAM 0.25 MG
TABLET ORAL
Qty: 30 TABLET | Refills: 0 | Status: SHIPPED | OUTPATIENT
Start: 2024-10-07 | End: 2024-11-06

## 2024-10-07 NOTE — TELEPHONE ENCOUNTER
Phuong Frias called to request a refill on her medication.      Last office visit : 7/9/2024   Next office visit : 10/14/2024     Last UDS:   Amphetamines   Date Value Ref Range Status   10/05/2014 Negative NEGATIVE Final     Comment:     URINE AMPHETAMINE HXMKTB=6192 NG/ML     Benzodiazepines   Date Value Ref Range Status   10/05/2014 Negative NEGATIVE Final     Comment:     URINE BENZODIAZEPINE PPMVDE=108 NG/ML     Benzodiazepine Screen, Urine   Date Value Ref Range Status   02/20/2024 -  Final     Buprenorphine Urine   Date Value Ref Range Status   02/20/2024 -  Final     Cocaine Metabolite Screen, Urine   Date Value Ref Range Status   02/20/2024 -  Final     Gabapentin Screen, Urine   Date Value Ref Range Status   02/20/2024 -  Final     Oxycodone Screen, Ur   Date Value Ref Range Status   02/20/2024 -  Final     Propoxyphene   Date Value Ref Range Status   01/17/2011 Negative () Final     Propoxyphene Screen, Urine   Date Value Ref Range Status   02/20/2024 -  Final     THC Screen, Urine   Date Value Ref Range Status   02/20/2024 -  Final     Tricyclic Antidepressants, Urine   Date Value Ref Range Status   02/20/2024 -  Final       Last Sterling: 8/13/24  Medication Contract: 3/24/22   Last Fill: 9/11/24    Requested Prescriptions     Pending Prescriptions Disp Refills    ALPRAZolam (XANAX) 0.25 MG tablet [Pharmacy Med Name: ALPRAZOLAM 0.25MG TABLETS] 30 tablet      Sig: TAKE 1 TABLET BY MOUTH DAILY AS NEEDED FOR ANXIETY. MAX DAILY AMOUNT: 0.25 MG         Please approve or refuse this medication.   Sharon Jaime MA

## 2024-10-08 DIAGNOSIS — G89.29 OTHER CHRONIC PAIN: ICD-10-CM

## 2024-10-08 DIAGNOSIS — F32.A ANXIETY AND DEPRESSION: ICD-10-CM

## 2024-10-08 DIAGNOSIS — F41.9 ANXIETY AND DEPRESSION: ICD-10-CM

## 2024-10-08 RX ORDER — DULOXETINE 40 MG/1
1 CAPSULE, DELAYED RELEASE ORAL DAILY
Qty: 90 CAPSULE | Refills: 1 | OUTPATIENT
Start: 2024-10-08

## 2024-10-09 DIAGNOSIS — F32.A ANXIETY AND DEPRESSION: ICD-10-CM

## 2024-10-09 DIAGNOSIS — F41.9 ANXIETY AND DEPRESSION: ICD-10-CM

## 2024-10-09 DIAGNOSIS — G89.29 OTHER CHRONIC PAIN: ICD-10-CM

## 2024-10-09 RX ORDER — DULOXETINE 40 MG/1
1 CAPSULE, DELAYED RELEASE ORAL DAILY
Qty: 90 CAPSULE | Refills: 1 | Status: SHIPPED | OUTPATIENT
Start: 2024-10-09

## 2024-10-09 NOTE — TELEPHONE ENCOUNTER
Phuong called requesting a refill of the below medication which has been pended for you:   Pharmacy didn't receive RX on 10/2/24  Requested Prescriptions     Pending Prescriptions Disp Refills    DULoxetine HCl 40 MG CPEP 90 capsule 1     Sig: Take 1 capsule by mouth daily       Last Appointment Date: 7/9/2024  Next Appointment Date: 10/14/2024    No Known Allergies

## 2024-10-10 ENCOUNTER — TELEPHONE (OUTPATIENT)
Dept: PRIMARY CARE CLINIC | Age: 54
End: 2024-10-10

## 2024-10-10 DIAGNOSIS — E11.00 TYPE 2 DIABETES MELLITUS WITH HYPEROSMOLARITY WITHOUT COMA, WITHOUT LONG-TERM CURRENT USE OF INSULIN (HCC): ICD-10-CM

## 2024-10-10 DIAGNOSIS — I10 ESSENTIAL HYPERTENSION: ICD-10-CM

## 2024-10-10 RX ORDER — LISINOPRIL 10 MG/1
10 TABLET ORAL 2 TIMES DAILY
Qty: 180 TABLET | Refills: 1 | Status: SHIPPED | OUTPATIENT
Start: 2024-10-10

## 2024-10-10 NOTE — TELEPHONE ENCOUNTER
Phuong Frias called to request a refill on her medication.  Called pharmacy and they didn't receive prescription, resent    Last office visit : 7/9/2024   Next office visit : 10/14/2024     Requested Prescriptions     Pending Prescriptions Disp Refills    lisinopril (PRINIVIL;ZESTRIL) 10 MG tablet 180 tablet 1     Sig: Take 1 tablet by mouth 2 times daily            Merary Wolf LPN

## 2024-10-11 RX ORDER — SEMAGLUTIDE 2.68 MG/ML
2 INJECTION, SOLUTION SUBCUTANEOUS
Qty: 3 ML | Refills: 2 | Status: SHIPPED | OUTPATIENT
Start: 2024-10-11

## 2024-10-11 NOTE — TELEPHONE ENCOUNTER
Phuong Frias called to request a refill on her medication.      Last office visit : 7/9/2024   Next office visit : 10/29/2024     Requested Prescriptions     Pending Prescriptions Disp Refills    semaglutide, 2 MG/DOSE, (OZEMPIC, 2 MG/DOSE,) 8 MG/3ML SOPN sc injection 3 mL 2     Sig: Inject 2 mg into the skin every 7 days            Conor Mckeon MA

## 2024-10-28 RX ORDER — TRAZODONE HYDROCHLORIDE 150 MG/1
TABLET ORAL
Qty: 60 TABLET | Refills: 3 | Status: SHIPPED | OUTPATIENT
Start: 2024-10-28

## 2024-10-28 NOTE — TELEPHONE ENCOUNTER
Phuong Hummeltner called to request a refill on her medication.      Last office visit : 7/9/2024   Next office visit : 10/29/2024     Requested Prescriptions     Pending Prescriptions Disp Refills    traZODone (DESYREL) 150 MG tablet [Pharmacy Med Name: TRAZODONE 150MG (HUNDRED-FIFTY) TAB] 60 tablet 3     Sig: TAKE 2 TABLETS BY MOUTH EVERY EVENING            Merary Wolf LPN

## 2024-10-29 ENCOUNTER — OFFICE VISIT (OUTPATIENT)
Dept: PRIMARY CARE CLINIC | Age: 54
End: 2024-10-29
Payer: MEDICARE

## 2024-10-29 VITALS
DIASTOLIC BLOOD PRESSURE: 84 MMHG | BODY MASS INDEX: 44.41 KG/M2 | HEIGHT: 68 IN | TEMPERATURE: 97 F | OXYGEN SATURATION: 95 % | SYSTOLIC BLOOD PRESSURE: 130 MMHG | HEART RATE: 98 BPM | WEIGHT: 293 LBS

## 2024-10-29 DIAGNOSIS — F41.9 ANXIETY: Primary | ICD-10-CM

## 2024-10-29 DIAGNOSIS — N18.30 STAGE 3 CHRONIC KIDNEY DISEASE, UNSPECIFIED WHETHER STAGE 3A OR 3B CKD (HCC): ICD-10-CM

## 2024-10-29 DIAGNOSIS — Z79.899 DRUG THERAPY: ICD-10-CM

## 2024-10-29 DIAGNOSIS — E11.621 DIABETIC ULCER OF LEFT HEEL ASSOCIATED WITH TYPE 2 DIABETES MELLITUS, UNSPECIFIED ULCER STAGE (HCC): ICD-10-CM

## 2024-10-29 DIAGNOSIS — F33.1 MODERATE EPISODE OF RECURRENT MAJOR DEPRESSIVE DISORDER (HCC): ICD-10-CM

## 2024-10-29 DIAGNOSIS — E66.01 MORBID OBESITY: ICD-10-CM

## 2024-10-29 DIAGNOSIS — L97.429 DIABETIC ULCER OF LEFT HEEL ASSOCIATED WITH TYPE 2 DIABETES MELLITUS, UNSPECIFIED ULCER STAGE (HCC): ICD-10-CM

## 2024-10-29 DIAGNOSIS — F39 MOOD DISORDER (HCC): ICD-10-CM

## 2024-10-29 PROBLEM — J96.01 ACUTE HYPOXEMIC RESPIRATORY FAILURE: Status: RESOLVED | Noted: 2017-01-30 | Resolved: 2024-10-29

## 2024-10-29 PROCEDURE — 80305 DRUG TEST PRSMV DIR OPT OBS: CPT | Performed by: NURSE PRACTITIONER

## 2024-10-29 PROCEDURE — 99214 OFFICE O/P EST MOD 30 MIN: CPT | Performed by: NURSE PRACTITIONER

## 2024-10-29 PROCEDURE — 3075F SYST BP GE 130 - 139MM HG: CPT | Performed by: NURSE PRACTITIONER

## 2024-10-29 PROCEDURE — 3079F DIAST BP 80-89 MM HG: CPT | Performed by: NURSE PRACTITIONER

## 2024-10-29 PROCEDURE — 3044F HG A1C LEVEL LT 7.0%: CPT | Performed by: NURSE PRACTITIONER

## 2024-10-29 ASSESSMENT — ENCOUNTER SYMPTOMS
VOICE CHANGE: 0
COLOR CHANGE: 0
NAUSEA: 0
PHOTOPHOBIA: 0
RHINORRHEA: 0
BACK PAIN: 0
SHORTNESS OF BREATH: 0
COUGH: 0
VOMITING: 0

## 2024-10-29 NOTE — PROGRESS NOTES
[facility]  [address]  [address]  [place] [address]  Dept: [phone]  Dept Fax: [phone]  Loc: [phone]    Phuong Frias is a 54 y.o. female who presents today for her medical conditions/complaints as noted below.  Phuong Frias is c/o of 3 Month Follow-Up (Pt in office for 3 month follow up - )        HPI:     HPI   Chief Complaint   Patient presents with    3 Month Follow-Up     Pt in office for 3 month follow up -      Patient presents today for follow-up anxiety, hypertension, hyperlipidemia, diabetes. Labs reviewed from 10/1/24 and within normal range. Blood pressure is stable with medication.  She is currently taking xanax for anxiety; she is compliant with use. Will update medication contract and UDS today.     She complains of ulcer to left heel that has been present for several weeks. She has had skin come off but no bleeding or drainage. She denies fever. She states that she uses her heel often to push her wheelchair. She is diabetic but glucose is well-controlled.       Past Medical History:   Diagnosis Date    Anxiety     Arthritis     Chronic pain     Depression     Diabetes mellitus type 2 in obese     Hyperlipidemia     Hypertension     Knee pain     Premenopausal patient 2018      Past Surgical History:   Procedure Laterality Date     SECTION      x3    PATELLAR TENDON REPAIR Left 2023    LEFT KNEE RETINACULUM REPAIR performed by Julio Valladares MD at [facility] OR    TENDON RELEASE      right finger    TONSILLECTOMY      TOTAL KNEE ARTHROPLASTY Right 5/3/2023    COMPLEX PRIMARY ROBOTIC RIGHT KNEE TOTAL ARTHROPLASTY performed by Julio Valladares MD at [facility] OR    TOTAL KNEE ARTHROPLASTY Left 2023    ROBOTIC COMPLEX PRIMARY LEFT KNEE TOTAL ARTHROPLASTY performed by Julio Valladares MD at [facility] OR    TUBAL LIGATION             10/29/2024     2:13 PM 10/1/2024     3:23 PM 2024     2:27 PM 2024    11:07 AM 2023

## 2024-10-30 DIAGNOSIS — E11.69 TYPE 2 DIABETES MELLITUS WITH OTHER SPECIFIED COMPLICATION, WITHOUT LONG-TERM CURRENT USE OF INSULIN (HCC): ICD-10-CM

## 2024-11-05 ENCOUNTER — TELEPHONE (OUTPATIENT)
Age: 54
End: 2024-11-05

## 2024-11-05 NOTE — TELEPHONE ENCOUNTER
Pt is wondering if Dr Valladares could send in an antibiotic for her foot.     [facility]    Pt number: [phone]   12

## 2024-11-05 NOTE — TELEPHONE ENCOUNTER
Returned call to patient. Patient has ulcer on foot. I advised patient to contact PCP due to nature of issues as this needs to be monitored. Patient stated they may send her to wound care. Patient verbalized understanding.

## 2024-11-06 ENCOUNTER — HOSPITAL ENCOUNTER (OUTPATIENT)
Dept: WOUND CARE | Age: 54
Discharge: HOME | End: 2024-11-06
Payer: MEDICARE

## 2024-11-06 VITALS
TEMPERATURE: 95.8 F | HEIGHT: 68 IN | DIASTOLIC BLOOD PRESSURE: 66 MMHG | SYSTOLIC BLOOD PRESSURE: 112 MMHG | BODY MASS INDEX: 44.41 KG/M2 | HEART RATE: 117 BPM | RESPIRATION RATE: 18 BRPM | WEIGHT: 293 LBS

## 2024-11-06 DIAGNOSIS — L97.422 DIABETIC ULCER OF LEFT HEEL ASSOCIATED WITH TYPE 2 DIABETES MELLITUS, WITH FAT LAYER EXPOSED (HCC): Primary | ICD-10-CM

## 2024-11-06 DIAGNOSIS — I10 PRIMARY HYPERTENSION: ICD-10-CM

## 2024-11-06 DIAGNOSIS — E11.621 DIABETIC ULCER OF LEFT HEEL ASSOCIATED WITH TYPE 2 DIABETES MELLITUS, WITH FAT LAYER EXPOSED (HCC): Primary | ICD-10-CM

## 2024-11-06 DIAGNOSIS — E11.69 TYPE 2 DIABETES MELLITUS WITH OTHER SPECIFIED COMPLICATION, WITHOUT LONG-TERM CURRENT USE OF INSULIN (HCC): ICD-10-CM

## 2024-11-06 PROCEDURE — 99215 OFFICE O/P EST HI 40 MIN: CPT | Performed by: NURSE PRACTITIONER

## 2024-11-06 PROCEDURE — 97597 DBRDMT OPN WND 1ST 20 CM/<: CPT

## 2024-11-06 PROCEDURE — 99213 OFFICE O/P EST LOW 20 MIN: CPT

## 2024-11-06 PROCEDURE — 99214 OFFICE O/P EST MOD 30 MIN: CPT

## 2024-11-06 PROCEDURE — 97597 DBRDMT OPN WND 1ST 20 CM/<: CPT | Performed by: NURSE PRACTITIONER

## 2024-11-06 RX ORDER — GINSENG 100 MG
CAPSULE ORAL ONCE
OUTPATIENT
Start: 2024-11-06 | End: 2024-11-06

## 2024-11-06 RX ORDER — TRIAMCINOLONE ACETONIDE 1 MG/G
OINTMENT TOPICAL ONCE
OUTPATIENT
Start: 2024-11-06 | End: 2024-11-06

## 2024-11-06 RX ORDER — GENTAMICIN SULFATE 1 MG/G
OINTMENT TOPICAL ONCE
OUTPATIENT
Start: 2024-11-06 | End: 2024-11-06

## 2024-11-06 RX ORDER — BACITRACIN ZINC AND POLYMYXIN B SULFATE 500; 1000 [USP'U]/G; [USP'U]/G
OINTMENT TOPICAL ONCE
OUTPATIENT
Start: 2024-11-06 | End: 2024-11-06

## 2024-11-06 RX ORDER — NEOMYCIN/BACITRACIN/POLYMYXINB 3.5-400-5K
OINTMENT (GRAM) TOPICAL ONCE
OUTPATIENT
Start: 2024-11-06 | End: 2024-11-06

## 2024-11-06 RX ORDER — MUPIROCIN 20 MG/G
OINTMENT TOPICAL ONCE
OUTPATIENT
Start: 2024-11-06 | End: 2024-11-06

## 2024-11-06 RX ORDER — LIDOCAINE HYDROCHLORIDE 40 MG/ML
SOLUTION TOPICAL ONCE
OUTPATIENT
Start: 2024-11-06 | End: 2024-11-06

## 2024-11-06 RX ORDER — SILVER SULFADIAZINE 10 MG/G
CREAM TOPICAL ONCE
OUTPATIENT
Start: 2024-11-06 | End: 2024-11-06

## 2024-11-06 RX ORDER — LIDOCAINE 50 MG/G
OINTMENT TOPICAL ONCE
OUTPATIENT
Start: 2024-11-06 | End: 2024-11-06

## 2024-11-06 RX ORDER — BETAMETHASONE DIPROPIONATE 0.5 MG/G
CREAM TOPICAL ONCE
OUTPATIENT
Start: 2024-11-06 | End: 2024-11-06

## 2024-11-06 RX ORDER — SODIUM HYPOCHLORITE 1.25 MG/ML
SOLUTION TOPICAL
Qty: 1000 ML | Refills: 3 | Status: SHIPPED | OUTPATIENT
Start: 2024-11-06

## 2024-11-06 RX ORDER — LIDOCAINE HYDROCHLORIDE 20 MG/ML
JELLY TOPICAL ONCE
OUTPATIENT
Start: 2024-11-06 | End: 2024-11-06

## 2024-11-06 RX ORDER — SODIUM CHLOR/HYPOCHLOROUS ACID 0.033 %
SOLUTION, IRRIGATION IRRIGATION ONCE
OUTPATIENT
Start: 2024-11-06 | End: 2024-11-06

## 2024-11-06 RX ORDER — CLOBETASOL PROPIONATE 0.5 MG/G
OINTMENT TOPICAL ONCE
OUTPATIENT
Start: 2024-11-06 | End: 2024-11-06

## 2024-11-06 RX ORDER — LIDOCAINE HYDROCHLORIDE 20 MG/ML
JELLY TOPICAL ONCE
Status: DISCONTINUED | OUTPATIENT
Start: 2024-11-06 | End: 2024-11-08 | Stop reason: HOSPADM

## 2024-11-06 RX ORDER — LIDOCAINE 40 MG/G
CREAM TOPICAL ONCE
OUTPATIENT
Start: 2024-11-06 | End: 2024-11-06

## 2024-11-06 ASSESSMENT — PAIN DESCRIPTION - ORIENTATION: ORIENTATION: LEFT

## 2024-11-06 ASSESSMENT — PAIN DESCRIPTION - DESCRIPTORS: DESCRIPTORS: ACHING

## 2024-11-06 ASSESSMENT — VISUAL ACUITY: OU: 1

## 2024-11-06 ASSESSMENT — PAIN DESCRIPTION - LOCATION: LOCATION: FOOT

## 2024-11-06 ASSESSMENT — PAIN SCALES - GENERAL: PAINLEVEL_OUTOF10: 3

## 2024-11-06 NOTE — HOME CARE
(cm) 4.8 cm 11/06/24 1110   Wound Depth (cm) 0.1 cm 11/06/24 1110   Wound Surface Area (cm^2) 19.2 cm^2 11/06/24 1110   Wound Volume (cm^3) 1.92 cm^3 11/06/24 1110   Post-Procedure Length (cm) 4 cm 11/06/24 1120   Post-Procedure Width (cm) 4.8 cm 11/06/24 1120   Post-Procedure Depth (cm) 0.1 cm 11/06/24 1120   Post-Procedure Surface Area (cm^2) 19.2 cm^2 11/06/24 1120   Post-Procedure Volume (cm^3) 1.92 cm^3 11/06/24 1120   Wound Assessment Slough 11/06/24 1110   Drainage Amount Moderate (25-50%) 11/06/24 1110   Drainage Description Serosanguinous 11/06/24 1110   Odor None 11/06/24 1110   Suzanne-wound Assessment Intact;Blisters 11/06/24 1110   Margins Defined edges 11/06/24 1110   Wound Thickness Description not for Pressure Injury Full thickness 11/06/24 1110   Number of days: 0          Supplies Requested :      WOUND #: 1   PRIMARY DRESSING:  Other: 2x2 gauze   Cover and Secure with: ABD pad  Bulky roll gauze     FREQUENCY OF DRESSING CHANGES:  Daily       ADDITIONAL ITEMS:  [] Gloves Small  [x] Gloves Medium [] Gloves Large [] Gloves XLarge  [] Tape 1\" [] Tape 2\" [x] Tape 3\"  [] Medipore Tape  [x] Saline  [] Vashe  [] Skin Prep   [] Adhesive Remover   [] Cotton Tip Applicators   [] Other:    Patient Wound(s) Debrided: [x] Yes if yes please add date 11/6/24  [] No    Debribement Type: Excisional/Sharp and Mechanical     Is the patient currently on an antibiotic for their Wound(s): [] Yes if yes please add name and dose    [x] No    Patient currently being seen by Home Health: [] Yes   [x] No    Duration for needed supplies:  []15  [x]30  []60  []90 Days    Electronically signed by NATALI Hernandez CNP on 11/6/2024 at 12:04 PM     Provider Information:      PROVIDER'S NAME: Luciana HURST    NPI: [id number]  Dispense as written

## 2024-11-06 NOTE — PATIENT INSTRUCTIONS
[facility] and Hyper[facility] Oxygen Therapy   Physician Orders and Discharge Instructions  [address] [place] [address]  [address]  [place] [address]  Telephone: [phone]      FAX [phone]    NAME:  Phuong Frias  YOB: 1970  MEDICAL RECORD NUMBER:  [id number]  DATE:  11/6/2024    Discharge condition: Stable    Discharge to: Home    Left via:Private automobile    Accompanied by:  spouse    [facility]F/[facility]: [facility]    Patient will have XRAY of left foot prior to next visit.  Please go to Room 103 downstairs to register. Procedure will be completed in Room 103. No appointment is required.    2. Please go downstairs in this building to Room 103 to register. The test will be completed in Room 401. Please be aware of appointment time for this test.  Please arrive to room 103 at 2:00 PM on November 19th for test. Please do not smoke prior to test.    Dressing Orders:  Left heel wound: soap and water wash, santyl nickel thick to the wound bed, secure with 1/4 dakins moistened gauze, ABD pad, rolled gauze and tape twice daily.    Treatment Orders:  Protein rich diet  Multivitamin  Offload the wound at all times    Off-load heels by applying heel-lift boots or \"float\" heels by placing pillows under calves so that heels do not touch mattress.      [facility] follow up visit ____2 week follow up with Dr. Pacheco_________________________  (Please note your next appointment above and if you are unable to keep, kindly give a 24 hour notice. Thank you.)          If you experience any of the following, please call the Wound Care Center during business hours:    * Increase in Pain  * Temperature over 101  * Increase in drainage from your wound  * Drainage with a foul odor  * Bleeding  * Increase in swelling  * Need for compression bandage changes due to slippage, breakthrough drainage.    If you need medical attention outside of the business hours of the Wound Care Centers please contact your PCP or go to the
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
## 2024-11-07 ENCOUNTER — TELEPHONE (OUTPATIENT)
Dept: WOUND CARE | Age: 54
End: 2024-11-07

## 2024-11-07 NOTE — TELEPHONE ENCOUNTER
Returned patient call re: Santyl. Stated [facility] had called and stated it would be a few days before this came in, worried about the wound. Discussed to use the 1/4 strength Dakins solution moistened gauze to wound bed and change twice daily, then when Enrique received add this to the wound care. Discussed after visit summary written instructions and to follow steps listed. Patient verbalized understanding.

## 2024-11-11 ENCOUNTER — APPOINTMENT (OUTPATIENT)
Dept: VASCULAR LAB | Age: 54
End: 2024-11-11
Payer: MEDICARE

## 2024-11-11 PROCEDURE — 93923 UPR/LXTR ART STDY 3+ LVLS: CPT

## 2024-11-12 DIAGNOSIS — F41.9 ANXIETY: ICD-10-CM

## 2024-11-12 RX ORDER — ALPRAZOLAM 0.25 MG/1
TABLET ORAL
Qty: 30 TABLET | Refills: 0 | Status: SHIPPED | OUTPATIENT
Start: 2024-11-12 | End: 2024-12-12

## 2024-11-12 NOTE — TELEPHONE ENCOUNTER
Phuong Frias called to request a refill on her medication.      Last office visit : 10/29/2024   Next office visit : 1/29/2025     Last UDS:   Amphetamines   Date Value Ref Range Status   10/05/2014 Negative NEGATIVE Final     Comment:     URINE AMPHETAMINE CKNCIG=5962 NG/ML     Benzodiazepines   Date Value Ref Range Status   10/05/2014 Negative NEGATIVE Final     Comment:     URINE BENZODIAZEPINE IWNRCS=731 NG/ML     Benzodiazepine Screen, Urine   Date Value Ref Range Status   02/20/2024 -  Final     Buprenorphine Urine   Date Value Ref Range Status   02/20/2024 -  Final     Cocaine Metabolite Screen, Urine   Date Value Ref Range Status   02/20/2024 -  Final     Gabapentin Screen, Urine   Date Value Ref Range Status   02/20/2024 -  Final     Oxycodone Screen, Ur   Date Value Ref Range Status   02/20/2024 -  Final     Propoxyphene   Date Value Ref Range Status   01/17/2011 Negative () Final     Propoxyphene Screen, Urine   Date Value Ref Range Status   02/20/2024 -  Final     THC Screen, Urine   Date Value Ref Range Status   02/20/2024 -  Final     Tricyclic Antidepressants, Urine   Date Value Ref Range Status   02/20/2024 -  Final       Last Sterling: 10/29/2024  Medication Contract: 10/29/2024   Last Fill: 10/7/2024    Requested Prescriptions     Pending Prescriptions Disp Refills    ALPRAZolam (XANAX) 0.25 MG tablet [Pharmacy Med Name: ALPRAZOLAM 0.25MG TABLETS] 30 tablet      Sig: TAKE 1 TABLET BY MOUTH DAILY AS NEEDED FOR ANXIETY. MAX DAILY AMOUNT: 0.25 MG                       Please approve or refuse this medication.   Jahaira Elliott LPN

## 2024-11-13 DIAGNOSIS — E11.69 TYPE 2 DIABETES MELLITUS WITH OTHER SPECIFIED COMPLICATION, WITHOUT LONG-TERM CURRENT USE OF INSULIN (HCC): Primary | ICD-10-CM

## 2024-11-13 NOTE — TELEPHONE ENCOUNTER
Phuong Frias called to request a refill on her medication.      Last office visit : 10/29/2024   Next office visit : 1/29/2025     Requested Prescriptions     Pending Prescriptions Disp Refills    Semaglutide, 1 MG/DOSE, 2 MG/1.5ML SOPN 1 Adjustable Dose Pre-filled Pen Syringe 2     Sig: Inject 2 mg into the skin once a week            Merary Wolf, CAROLIN

## 2024-11-15 ENCOUNTER — TELEPHONE (OUTPATIENT)
Dept: WOUND CARE | Age: 54
End: 2024-11-15

## 2024-11-15 DIAGNOSIS — L97.422 DIABETIC ULCER OF LEFT HEEL ASSOCIATED WITH TYPE 2 DIABETES MELLITUS, WITH FAT LAYER EXPOSED (HCC): Primary | ICD-10-CM

## 2024-11-15 DIAGNOSIS — E11.621 DIABETIC ULCER OF LEFT HEEL ASSOCIATED WITH TYPE 2 DIABETES MELLITUS, WITH FAT LAYER EXPOSED (HCC): Primary | ICD-10-CM

## 2024-11-20 ENCOUNTER — HOSPITAL ENCOUNTER (OUTPATIENT)
Dept: GENERAL RADIOLOGY | Age: 54
Discharge: HOME | End: 2024-11-20
Payer: MEDICARE

## 2024-11-20 ENCOUNTER — HOSPITAL ENCOUNTER (OUTPATIENT)
Dept: WOUND CARE | Age: 54
Discharge: HOME | End: 2024-11-20
Payer: MEDICARE

## 2024-11-20 VITALS
BODY MASS INDEX: 44.41 KG/M2 | SYSTOLIC BLOOD PRESSURE: 131 MMHG | DIASTOLIC BLOOD PRESSURE: 77 MMHG | RESPIRATION RATE: 18 BRPM | WEIGHT: 293 LBS | TEMPERATURE: 97.7 F | HEIGHT: 68 IN | HEART RATE: 125 BPM

## 2024-11-20 DIAGNOSIS — L97.422 DIABETIC ULCER OF LEFT HEEL ASSOCIATED WITH TYPE 2 DIABETES MELLITUS, WITH FAT LAYER EXPOSED (HCC): Primary | ICD-10-CM

## 2024-11-20 DIAGNOSIS — E11.621 DIABETIC ULCER OF LEFT HEEL ASSOCIATED WITH TYPE 2 DIABETES MELLITUS, WITH FAT LAYER EXPOSED (HCC): ICD-10-CM

## 2024-11-20 DIAGNOSIS — L97.422 DIABETIC ULCER OF LEFT HEEL ASSOCIATED WITH TYPE 2 DIABETES MELLITUS, WITH FAT LAYER EXPOSED (HCC): ICD-10-CM

## 2024-11-20 DIAGNOSIS — E11.621 DIABETIC ULCER OF LEFT HEEL ASSOCIATED WITH TYPE 2 DIABETES MELLITUS, WITH FAT LAYER EXPOSED (HCC): Primary | ICD-10-CM

## 2024-11-20 PROCEDURE — 97597 DBRDMT OPN WND 1ST 20 CM/<: CPT | Performed by: SURGERY

## 2024-11-20 PROCEDURE — 97597 DBRDMT OPN WND 1ST 20 CM/<: CPT

## 2024-11-20 PROCEDURE — 73620 X-RAY EXAM OF FOOT: CPT

## 2024-11-20 RX ORDER — CLOBETASOL PROPIONATE 0.5 MG/G
OINTMENT TOPICAL ONCE
OUTPATIENT
Start: 2024-11-20 | End: 2024-11-20

## 2024-11-20 RX ORDER — LIDOCAINE HYDROCHLORIDE 20 MG/ML
JELLY TOPICAL ONCE
Status: COMPLETED | OUTPATIENT
Start: 2024-11-20 | End: 2024-11-20

## 2024-11-20 RX ORDER — BACITRACIN ZINC AND POLYMYXIN B SULFATE 500; 1000 [USP'U]/G; [USP'U]/G
OINTMENT TOPICAL ONCE
OUTPATIENT
Start: 2024-11-20 | End: 2024-11-20

## 2024-11-20 RX ORDER — MUPIROCIN 20 MG/G
OINTMENT TOPICAL ONCE
OUTPATIENT
Start: 2024-11-20 | End: 2024-11-20

## 2024-11-20 RX ORDER — BETAMETHASONE DIPROPIONATE 0.5 MG/G
CREAM TOPICAL ONCE
OUTPATIENT
Start: 2024-11-20 | End: 2024-11-20

## 2024-11-20 RX ORDER — GINSENG 100 MG
CAPSULE ORAL ONCE
OUTPATIENT
Start: 2024-11-20 | End: 2024-11-20

## 2024-11-20 RX ORDER — LIDOCAINE HYDROCHLORIDE 40 MG/ML
SOLUTION TOPICAL ONCE
OUTPATIENT
Start: 2024-11-20 | End: 2024-11-20

## 2024-11-20 RX ORDER — LIDOCAINE 50 MG/G
OINTMENT TOPICAL ONCE
OUTPATIENT
Start: 2024-11-20 | End: 2024-11-20

## 2024-11-20 RX ORDER — NEOMYCIN/BACITRACIN/POLYMYXINB 3.5-400-5K
OINTMENT (GRAM) TOPICAL ONCE
OUTPATIENT
Start: 2024-11-20 | End: 2024-11-20

## 2024-11-20 RX ORDER — GENTAMICIN SULFATE 1 MG/G
OINTMENT TOPICAL ONCE
OUTPATIENT
Start: 2024-11-20 | End: 2024-11-20

## 2024-11-20 RX ORDER — LIDOCAINE HYDROCHLORIDE 20 MG/ML
JELLY TOPICAL ONCE
OUTPATIENT
Start: 2024-11-20 | End: 2024-11-20

## 2024-11-20 RX ORDER — TRIAMCINOLONE ACETONIDE 1 MG/G
OINTMENT TOPICAL ONCE
OUTPATIENT
Start: 2024-11-20 | End: 2024-11-20

## 2024-11-20 RX ORDER — LIDOCAINE 40 MG/G
CREAM TOPICAL ONCE
OUTPATIENT
Start: 2024-11-20 | End: 2024-11-20

## 2024-11-20 RX ORDER — SILVER SULFADIAZINE 10 MG/G
CREAM TOPICAL ONCE
OUTPATIENT
Start: 2024-11-20 | End: 2024-11-20

## 2024-11-20 RX ORDER — SODIUM CHLOR/HYPOCHLOROUS ACID 0.033 %
SOLUTION, IRRIGATION IRRIGATION ONCE
OUTPATIENT
Start: 2024-11-20 | End: 2024-11-20

## 2024-11-20 RX ADMIN — LIDOCAINE HYDROCHLORIDE: 20 JELLY TOPICAL at 11:30

## 2024-11-20 ASSESSMENT — PAIN DESCRIPTION - ORIENTATION: ORIENTATION: LEFT

## 2024-11-20 ASSESSMENT — PAIN SCALES - GENERAL: PAINLEVEL_OUTOF10: 3

## 2024-11-20 ASSESSMENT — PAIN DESCRIPTION - FREQUENCY: FREQUENCY: INTERMITTENT

## 2024-11-20 ASSESSMENT — PAIN DESCRIPTION - ONSET: ONSET: ON-GOING

## 2024-11-20 ASSESSMENT — PAIN DESCRIPTION - DESCRIPTORS: DESCRIPTORS: ACHING

## 2024-11-20 ASSESSMENT — PAIN DESCRIPTION - LOCATION: LOCATION: FOOT

## 2024-11-20 ASSESSMENT — PAIN - FUNCTIONAL ASSESSMENT: PAIN_FUNCTIONAL_ASSESSMENT: PREVENTS OR INTERFERES SOME ACTIVE ACTIVITIES AND ADLS

## 2024-11-20 ASSESSMENT — PAIN DESCRIPTION - PAIN TYPE: TYPE: ACUTE PAIN

## 2024-11-20 NOTE — PROGRESS NOTES
[facility]   Progress Note and Procedure Note      Phuong Frias  MEDICAL RECORD NUMBER:  [id number]  AGE: 54 y.o.   GENDER: female  : 1970  EPISODE DATE:  2024    Subjective:     Chief Complaint   Patient presents with    Wound Check     Pt presents for recheck of left heel wound         HISTORY of PRESENT ILLNESS HPI     Phuong Frias is a 54 y.o. female who presents today for wound/ulcer evaluation.   Wound Context: Pt with L heel blister wound here for eval/treat  Wound/Ulcer Pain Timing/Severity: none  Quality of pain: N/A  Severity:  0 / 10   Modifying Factors: None  Associated Signs/Symptoms: none    Ulcer Identification:  Ulcer Type: venous  Contributing Factors: edema    Wound:  venous        PAST MEDICAL HISTORY        Diagnosis Date    Anxiety     Arthritis     Chronic pain     Depression     Diabetes mellitus type 2 in obese     Hyperlipidemia     Hypertension     Knee pain     Premenopausal patient 2018       PAST SURGICAL HISTORY    Past Surgical History:   Procedure Laterality Date     SECTION      x3    PATELLAR TENDON REPAIR Left 2023    LEFT KNEE RETINACULUM REPAIR performed by Julio Valladares MD at [facility] OR    TENDON RELEASE      right finger    TONSILLECTOMY      TOTAL KNEE ARTHROPLASTY Right 5/3/2023    COMPLEX PRIMARY ROBOTIC RIGHT KNEE TOTAL ARTHROPLASTY performed by Julio Valladares MD at [facility] OR    TOTAL KNEE ARTHROPLASTY Left 2023    ROBOTIC COMPLEX PRIMARY LEFT KNEE TOTAL ARTHROPLASTY performed by Julio Valladares MD at [facility] OR    TUBAL LIGATION         FAMILY HISTORY    Family History   Problem Relation Age of Onset    High Cholesterol Mother     Other Mother         afib    Diabetes Father     Cancer Father         Bladder and Lung    Breast Cancer Sister         1/2 Sister    Diabetes Maternal Grandfather     Diabetes Paternal Grandmother        SOCIAL HISTORY    Social History     Tobacco Use    Smoking status: Never

## 2024-11-20 NOTE — PATIENT INSTRUCTIONS
[facility] Wound Care and Hyperbaric Oxygen Therapy   Physician Orders and Discharge Instructions  [address] [place] [address]  [address]  [place] [address]  Telephone: [phone]      FAX [phone]    NAME:  Phuong Frias  YOB: 1970  MEDICAL RECORD NUMBER:  [id number]  DATE:  11/20/2024    Discharge condition: Stable    Discharge to: Home    Left via:Private automobile    Accompanied by: Self    [facility]     Dressing Orders: Left heel wound:   Soap and water wash, 1/4 dakins moistened gauze, ABD pad, rolled gauze and tape twice daily.     Treatment Orders:  Protein rich diet  Multivitamin  Offload the wound at all times  Off-load heels by applying heel-lift boots or \"float\" heels by placing pillows under calves so that heels do not touch mattress.  No shoe, use wheel chair     [facility] follow up visit ___________2 weeks__________________  (Please note your next appointment above and if you are unable to keep, kindly give a 24 hour notice. Thank you.)    If you experience any of the following, please call the Wound Care Center during business hours:    * Increase in Pain  * Temperature over 101  * Increase in drainage from your wound  * Drainage with a foul odor  * Bleeding  * Increase in swelling  * Need for compression bandage changes due to slippage, breakthrough drainage.    If you need medical attention outside of the business hours of the Wound Care Centers please contact your PCP or go to the nearest emergency room.

## 2024-12-09 DIAGNOSIS — F41.9 ANXIETY: ICD-10-CM

## 2024-12-10 ENCOUNTER — HOSPITAL ENCOUNTER (OUTPATIENT)
Dept: WOUND CARE | Age: 54
Discharge: HOME | End: 2024-12-10
Attending: SURGERY
Payer: MEDICARE

## 2024-12-10 VITALS
BODY MASS INDEX: 44.41 KG/M2 | HEART RATE: 114 BPM | WEIGHT: 293 LBS | DIASTOLIC BLOOD PRESSURE: 60 MMHG | RESPIRATION RATE: 18 BRPM | HEIGHT: 68 IN | SYSTOLIC BLOOD PRESSURE: 96 MMHG | TEMPERATURE: 97.4 F

## 2024-12-10 DIAGNOSIS — L97.422 DIABETIC ULCER OF LEFT HEEL ASSOCIATED WITH TYPE 2 DIABETES MELLITUS, WITH FAT LAYER EXPOSED (HCC): Primary | ICD-10-CM

## 2024-12-10 DIAGNOSIS — E11.621 DIABETIC ULCER OF LEFT HEEL ASSOCIATED WITH TYPE 2 DIABETES MELLITUS, WITH FAT LAYER EXPOSED (HCC): Primary | ICD-10-CM

## 2024-12-10 PROCEDURE — 97597 DBRDMT OPN WND 1ST 20 CM/<: CPT

## 2024-12-10 PROCEDURE — 97597 DBRDMT OPN WND 1ST 20 CM/<: CPT | Performed by: SURGERY

## 2024-12-10 RX ORDER — BACITRACIN ZINC AND POLYMYXIN B SULFATE 500; 1000 [USP'U]/G; [USP'U]/G
OINTMENT TOPICAL ONCE
OUTPATIENT
Start: 2024-12-10 | End: 2024-12-10

## 2024-12-10 RX ORDER — LIDOCAINE HYDROCHLORIDE 20 MG/ML
JELLY TOPICAL ONCE
OUTPATIENT
Start: 2024-12-10 | End: 2024-12-10

## 2024-12-10 RX ORDER — GENTAMICIN SULFATE 1 MG/G
OINTMENT TOPICAL ONCE
OUTPATIENT
Start: 2024-12-10 | End: 2024-12-10

## 2024-12-10 RX ORDER — SODIUM CHLOR/HYPOCHLOROUS ACID 0.033 %
SOLUTION, IRRIGATION IRRIGATION ONCE
OUTPATIENT
Start: 2024-12-10 | End: 2024-12-10

## 2024-12-10 RX ORDER — LIDOCAINE 40 MG/G
CREAM TOPICAL ONCE
OUTPATIENT
Start: 2024-12-10 | End: 2024-12-10

## 2024-12-10 RX ORDER — CLOBETASOL PROPIONATE 0.5 MG/G
OINTMENT TOPICAL ONCE
OUTPATIENT
Start: 2024-12-10 | End: 2024-12-10

## 2024-12-10 RX ORDER — LIDOCAINE 50 MG/G
OINTMENT TOPICAL ONCE
OUTPATIENT
Start: 2024-12-10 | End: 2024-12-10

## 2024-12-10 RX ORDER — MUPIROCIN 20 MG/G
OINTMENT TOPICAL ONCE
OUTPATIENT
Start: 2024-12-10 | End: 2024-12-10

## 2024-12-10 RX ORDER — LIDOCAINE HYDROCHLORIDE 20 MG/ML
JELLY TOPICAL ONCE
Status: COMPLETED | OUTPATIENT
Start: 2024-12-10 | End: 2024-12-10

## 2024-12-10 RX ORDER — BETAMETHASONE DIPROPIONATE 0.5 MG/G
CREAM TOPICAL ONCE
OUTPATIENT
Start: 2024-12-10 | End: 2024-12-10

## 2024-12-10 RX ORDER — TRIAMCINOLONE ACETONIDE 1 MG/G
OINTMENT TOPICAL ONCE
OUTPATIENT
Start: 2024-12-10 | End: 2024-12-10

## 2024-12-10 RX ORDER — SILVER SULFADIAZINE 10 MG/G
CREAM TOPICAL ONCE
OUTPATIENT
Start: 2024-12-10 | End: 2024-12-10

## 2024-12-10 RX ORDER — NEOMYCIN/BACITRACIN/POLYMYXINB 3.5-400-5K
OINTMENT (GRAM) TOPICAL ONCE
OUTPATIENT
Start: 2024-12-10 | End: 2024-12-10

## 2024-12-10 RX ORDER — LIDOCAINE HYDROCHLORIDE 40 MG/ML
SOLUTION TOPICAL ONCE
OUTPATIENT
Start: 2024-12-10 | End: 2024-12-10

## 2024-12-10 RX ORDER — GINSENG 100 MG
CAPSULE ORAL ONCE
OUTPATIENT
Start: 2024-12-10 | End: 2024-12-10

## 2024-12-10 RX ADMIN — LIDOCAINE HYDROCHLORIDE: 20 JELLY TOPICAL at 14:34

## 2024-12-10 ASSESSMENT — PAIN DESCRIPTION - FREQUENCY: FREQUENCY: INTERMITTENT

## 2024-12-10 ASSESSMENT — PAIN DESCRIPTION - PAIN TYPE: TYPE: ACUTE PAIN

## 2024-12-10 ASSESSMENT — PAIN - FUNCTIONAL ASSESSMENT: PAIN_FUNCTIONAL_ASSESSMENT: PREVENTS OR INTERFERES SOME ACTIVE ACTIVITIES AND ADLS

## 2024-12-10 ASSESSMENT — PAIN DESCRIPTION - LOCATION: LOCATION: FOOT

## 2024-12-10 ASSESSMENT — PAIN DESCRIPTION - ORIENTATION: ORIENTATION: LEFT

## 2024-12-10 ASSESSMENT — PAIN DESCRIPTION - DESCRIPTORS: DESCRIPTORS: ACHING

## 2024-12-10 ASSESSMENT — PAIN DESCRIPTION - ONSET: ONSET: ON-GOING

## 2024-12-10 ASSESSMENT — PAIN SCALES - GENERAL: PAINLEVEL_OUTOF10: 3

## 2024-12-10 NOTE — WOUND CARE
Wound Care Supplies      Supply Company:     [facility] [employer] [address] [place] [address] f: [phone] f: [phone] p: [phone] [email or web address]      Ordering Center:     [facility]  [address] [place] [address] [address]  [facility] [address] [phone]  WOUND CARE Dept: [phone]   FAX NUMBER [phone]    Patient Information:      Phuong Frias  [address] [facility]  [place] [id number] [phone]   : 1970  AGE: 54 y.o.     GENDER: female   EPISODE DATE: 12/10/2024    Insurance:      PRIMARY INSURANCE:  Plan: WE[employer] MEDICARE  Coverage: WE[employer] MEDICARE  Effective Date: 2024  Group Number: [unfilled]  Subscriber Number: [id number] - (Medicare Managed)    Payer/Plan Subscr  Sex Relation Sub. Ins. ID Effective Group Num   1. MEDICARE - ME* PHUONG FRIAS 1970 Female Self [id number] 10/1/24                                    [address] [address]   2[facility] 1970 Female Self [id number]                                    [address] [address]       Patient Wound Information:      Problem List Items Addressed This Visit          Endocrine    * (Principal) Diabetic ulcer of left heel associated with type 2 diabetes mellitus, with fat layer exposed (HCC) - Primary (Chronic)    Relevant Orders    Initiate Outpatient Wound Care Protocol       WOUNDS REQUIRING DRESSING SUPPLIES:     Wound 24 Heel Left wound 1-left heel wag 1 (Active)   Wound Image   12/10/24 1437   Wound Etiology Diabetic Moeller 1 12/10/24 1437   Dressing Status Old drainage noted 12/10/24 1437   Wound Cleansed Soap and water 12/10/24 1437   Dressing/Treatment Moisten with saline;Gauze dressing/dressing sponge;ABD;Roll gauze;Tape/Soft cloth adhesive tape 24 1201   Offloading for Diabetic Foot Ulcers Offloading ordered 12/10/24 1437   Wound Length (cm) 2.4 cm 12/10/24 1437   Wound Width (cm) 3.3 cm 12/10/24 1437   Wound Depth (cm) 0.1 cm 12/10/24 1432

## 2024-12-10 NOTE — PROGRESS NOTES
[facility]   Progress Note and Procedure Note      Phuong Frias  MEDICAL RECORD NUMBER:  [id number]  AGE: 54 y.o.   GENDER: female  : 1970  EPISODE DATE:  12/10/2024    Subjective:     Chief Complaint   Patient presents with    Wound Check     Patient presents today for recheck left heel wound.         HISTORY of PRESENT ILLNESS HPI     Phuong Frias is a 54 y.o. female who presents today for wound/ulcer evaluation.   Wound Context: Pt with L heel DFU here for eval/treat  Wound/Ulcer Pain Timing/Severity: none  Quality of pain: N/A  Severity:  0 / 10   Modifying Factors: None  Associated Signs/Symptoms: none    Ulcer Identification:  Ulcer Type: diabetic  Contributing Factors: diabetes    Wound:  DFU        PAST MEDICAL HISTORY        Diagnosis Date    Anxiety     Arthritis     Chronic pain     Depression     Diabetes mellitus type 2 in obese     Hyperlipidemia     Hypertension     Knee pain     Premenopausal patient 2018       PAST SURGICAL HISTORY    Past Surgical History:   Procedure Laterality Date     SECTION      x3    PATELLAR TENDON REPAIR Left 2023    LEFT KNEE RETINACULUM REPAIR performed by Julio Valladares MD at [facility] OR    TENDON RELEASE      right finger    TONSILLECTOMY      TOTAL KNEE ARTHROPLASTY Right 5/3/2023    COMPLEX PRIMARY ROBOTIC RIGHT KNEE TOTAL ARTHROPLASTY performed by Julio Valladares MD at [facility] OR    TOTAL KNEE ARTHROPLASTY Left 2023    ROBOTIC COMPLEX PRIMARY LEFT KNEE TOTAL ARTHROPLASTY performed by Julio Valladares MD at [facility] OR    TUBAL LIGATION         FAMILY HISTORY    Family History   Problem Relation Age of Onset    High Cholesterol Mother     Other Mother         afib    Diabetes Father     Cancer Father         Bladder and Lung    Breast Cancer Sister         1/2 Sister    Diabetes Maternal Grandfather     Diabetes Paternal Grandmother        SOCIAL HISTORY    Social History     Tobacco Use    Smoking status: Never

## 2024-12-10 NOTE — PATIENT INSTRUCTIONS
[facility] Wound Care and Hyperbaric Oxygen Therapy   Physician Orders and Discharge Instructions  [address] [place] [address]  [address]  [place] [address]  Telephone: [phone]      FAX [phone]    NAME:  Phuong Frias  YOB: 1970  MEDICAL RECORD NUMBER:  [id number]  DATE:  12/10/2024    Discharge condition: Stable    Discharge to: Home    Left via:Private automobile    Accompanied by: Family        [facility] follow up visit ___________1 week__________________  (Please note your next appointment above and if you are unable to keep, kindly give a 24 hour notice. Thank you.)    If you experience any of the following, please call the Wound Care Center during business hours:    * Increase in Pain  * Temperature over 101  * Increase in drainage from your wound  * Drainage with a foul odor  * Bleeding  * Increase in swelling  * Need for compression bandage changes due to slippage, breakthrough drainage.    If you need medical attention outside of the business hours of the Wound Care Centers please contact your PCP or go to the nearest emergency room.

## 2024-12-10 NOTE — TELEPHONE ENCOUNTER
Phuong Frias called to request a refill on her medication.      Last office visit : 10/29/2024   Next office visit : 1/29/2025     Last UDS:   Amphetamines   Date Value Ref Range Status   10/05/2014 Negative NEGATIVE Final     Comment:     URINE AMPHETAMINE NWMFWP=3200 NG/ML     Benzodiazepines   Date Value Ref Range Status   10/05/2014 Negative NEGATIVE Final     Comment:     URINE BENZODIAZEPINE FWJCDE=882 NG/ML     Benzodiazepine Screen, Urine   Date Value Ref Range Status   02/20/2024 -  Final     Buprenorphine Urine   Date Value Ref Range Status   02/20/2024 -  Final     Cocaine Metabolite Screen, Urine   Date Value Ref Range Status   02/20/2024 -  Final     Gabapentin Screen, Urine   Date Value Ref Range Status   02/20/2024 -  Final     Oxycodone Screen, Ur   Date Value Ref Range Status   02/20/2024 -  Final     Propoxyphene   Date Value Ref Range Status   01/17/2011 Negative () Final     Propoxyphene Screen, Urine   Date Value Ref Range Status   02/20/2024 -  Final     THC Screen, Urine   Date Value Ref Range Status   02/20/2024 -  Final     Tricyclic Antidepressants, Urine   Date Value Ref Range Status   02/20/2024 -  Final       Last Sterling: 10.29.2024  Medication Contract: 10.29.2024   Last Fill: 11.12.2024    Requested Prescriptions     Pending Prescriptions Disp Refills    ALPRAZolam (XANAX) 0.25 MG tablet [Pharmacy Med Name: ALPRAZOLAM 0.25MG TABLETS] 30 tablet 0     Sig: TAKE 1 TABLET BY MOUTH DAILY AS NEEDED FOR ANXIETY. MAX DAILY AMOUNT: 0.25 MG         Please approve or refuse this medication.   Gifty Valles MA

## 2024-12-10 NOTE — PLAN OF CARE
Problem: Pain  Goal: Verbalizes/displays adequate comfort level or baseline comfort level  Outcome: Progressing     Problem: Wound:  Goal: Will show signs of wound healing; wound closure and no evidence of infection  Description: Will show signs of wound healing; wound closure and no evidence of infection  Outcome: Progressing     Problem: Weight control:  Goal: Ability to maintain an optimal weight for height and age will be supported  Description: Ability to maintain an optimal weight for height and age will be supported  Outcome: Progressing     Problem: Falls - Risk of:  Goal: Will remain free from falls  Description: Will remain free from falls  Outcome: Progressing

## 2024-12-12 RX ORDER — ALPRAZOLAM 0.25 MG/1
TABLET ORAL
Qty: 30 TABLET | Refills: 0 | Status: SHIPPED | OUTPATIENT
Start: 2024-12-12 | End: 2025-01-11

## 2024-12-12 RX ORDER — SEMAGLUTIDE 2.68 MG/ML
2 INJECTION, SOLUTION SUBCUTANEOUS WEEKLY
Refills: 1 | OUTPATIENT
Start: 2024-12-12

## 2024-12-16 DIAGNOSIS — E11.00 TYPE 2 DIABETES MELLITUS WITH HYPEROSMOLARITY WITHOUT COMA, WITHOUT LONG-TERM CURRENT USE OF INSULIN (HCC): ICD-10-CM

## 2024-12-16 RX ORDER — NYSTATIN 100000 [USP'U]/G
POWDER TOPICAL
Qty: 30 G | Refills: 0 | Status: SHIPPED | OUTPATIENT
Start: 2024-12-16

## 2024-12-16 NOTE — TELEPHONE ENCOUNTER
Phuong Frias called to request a refill on her medication.      Last office visit : 10/29/2024   Next office visit : 1/29/2025     Requested Prescriptions     Pending Prescriptions Disp Refills    nystatin 713980 UNIT/GM powder [Pharmacy Med Name: NYSTOP TOP PWDR 100,000 30GM] 30 g 0     Sig: APPLY TO SKIN FOLDS THREE TIMES DAILY            Merary Wolf LPN

## 2024-12-31 ENCOUNTER — HOSPITAL ENCOUNTER (OUTPATIENT)
Dept: WOUND CARE | Age: 54
Discharge: HOME | End: 2024-12-31
Payer: MEDICARE

## 2024-12-31 VITALS
RESPIRATION RATE: 18 BRPM | HEART RATE: 125 BPM | DIASTOLIC BLOOD PRESSURE: 82 MMHG | TEMPERATURE: 97.7 F | SYSTOLIC BLOOD PRESSURE: 147 MMHG

## 2024-12-31 DIAGNOSIS — L97.422 DIABETIC ULCER OF LEFT HEEL ASSOCIATED WITH TYPE 2 DIABETES MELLITUS, WITH FAT LAYER EXPOSED (HCC): Primary | Chronic | ICD-10-CM

## 2024-12-31 DIAGNOSIS — E11.621 DIABETIC ULCER OF LEFT HEEL ASSOCIATED WITH TYPE 2 DIABETES MELLITUS, WITH FAT LAYER EXPOSED (HCC): Primary | Chronic | ICD-10-CM

## 2024-12-31 DIAGNOSIS — E11.69 TYPE 2 DIABETES MELLITUS WITH OTHER SPECIFIED COMPLICATION, WITHOUT LONG-TERM CURRENT USE OF INSULIN (HCC): ICD-10-CM

## 2024-12-31 PROCEDURE — 97597 DBRDMT OPN WND 1ST 20 CM/<: CPT | Performed by: NURSE PRACTITIONER

## 2024-12-31 PROCEDURE — 97597 DBRDMT OPN WND 1ST 20 CM/<: CPT

## 2024-12-31 RX ORDER — BETAMETHASONE DIPROPIONATE 0.5 MG/G
CREAM TOPICAL ONCE
OUTPATIENT
Start: 2024-12-31 | End: 2024-12-31

## 2024-12-31 RX ORDER — TRIAMCINOLONE ACETONIDE 1 MG/G
OINTMENT TOPICAL ONCE
OUTPATIENT
Start: 2024-12-31 | End: 2024-12-31

## 2024-12-31 RX ORDER — BACITRACIN ZINC AND POLYMYXIN B SULFATE 500; 1000 [USP'U]/G; [USP'U]/G
OINTMENT TOPICAL ONCE
OUTPATIENT
Start: 2024-12-31 | End: 2024-12-31

## 2024-12-31 RX ORDER — NEOMYCIN/BACITRACIN/POLYMYXINB 3.5-400-5K
OINTMENT (GRAM) TOPICAL ONCE
OUTPATIENT
Start: 2024-12-31 | End: 2024-12-31

## 2024-12-31 RX ORDER — GINSENG 100 MG
CAPSULE ORAL ONCE
OUTPATIENT
Start: 2024-12-31 | End: 2024-12-31

## 2024-12-31 RX ORDER — MUPIROCIN 20 MG/G
OINTMENT TOPICAL ONCE
OUTPATIENT
Start: 2024-12-31 | End: 2024-12-31

## 2024-12-31 RX ORDER — SODIUM CHLOR/HYPOCHLOROUS ACID 0.033 %
SOLUTION, IRRIGATION IRRIGATION ONCE
OUTPATIENT
Start: 2024-12-31 | End: 2024-12-31

## 2024-12-31 RX ORDER — LIDOCAINE 40 MG/G
CREAM TOPICAL ONCE
OUTPATIENT
Start: 2024-12-31 | End: 2024-12-31

## 2024-12-31 RX ORDER — LIDOCAINE HYDROCHLORIDE 20 MG/ML
JELLY TOPICAL ONCE
Status: COMPLETED | OUTPATIENT
Start: 2024-12-31 | End: 2024-12-31

## 2024-12-31 RX ORDER — GENTAMICIN SULFATE 1 MG/G
OINTMENT TOPICAL ONCE
OUTPATIENT
Start: 2024-12-31 | End: 2024-12-31

## 2024-12-31 RX ORDER — LIDOCAINE HYDROCHLORIDE 20 MG/ML
JELLY TOPICAL ONCE
OUTPATIENT
Start: 2024-12-31 | End: 2024-12-31

## 2024-12-31 RX ORDER — CLOBETASOL PROPIONATE 0.5 MG/G
OINTMENT TOPICAL ONCE
OUTPATIENT
Start: 2024-12-31 | End: 2024-12-31

## 2024-12-31 RX ORDER — LIDOCAINE HYDROCHLORIDE 40 MG/ML
SOLUTION TOPICAL ONCE
OUTPATIENT
Start: 2024-12-31 | End: 2024-12-31

## 2024-12-31 RX ORDER — SILVER SULFADIAZINE 10 MG/G
CREAM TOPICAL ONCE
OUTPATIENT
Start: 2024-12-31 | End: 2024-12-31

## 2024-12-31 RX ORDER — LIDOCAINE 50 MG/G
OINTMENT TOPICAL ONCE
OUTPATIENT
Start: 2024-12-31 | End: 2024-12-31

## 2024-12-31 RX ADMIN — LIDOCAINE HYDROCHLORIDE: 20 JELLY TOPICAL at 15:06

## 2024-12-31 ASSESSMENT — PAIN - FUNCTIONAL ASSESSMENT: PAIN_FUNCTIONAL_ASSESSMENT: PREVENTS OR INTERFERES SOME ACTIVE ACTIVITIES AND ADLS

## 2024-12-31 ASSESSMENT — PAIN DESCRIPTION - LOCATION: LOCATION: FOOT

## 2024-12-31 ASSESSMENT — PAIN DESCRIPTION - ORIENTATION: ORIENTATION: LEFT

## 2024-12-31 ASSESSMENT — PAIN DESCRIPTION - FREQUENCY: FREQUENCY: INTERMITTENT

## 2024-12-31 ASSESSMENT — PAIN DESCRIPTION - ONSET: ONSET: ON-GOING

## 2024-12-31 ASSESSMENT — PAIN DESCRIPTION - PAIN TYPE: TYPE: ACUTE PAIN

## 2024-12-31 ASSESSMENT — PAIN SCALES - GENERAL: PAINLEVEL_OUTOF10: 3

## 2024-12-31 ASSESSMENT — PAIN DESCRIPTION - DESCRIPTORS: DESCRIPTORS: SORE

## 2024-12-31 NOTE — PROGRESS NOTES
[facility] Wound Care Center   Progress Note and Procedure Note      Phuong Frias  MEDICAL RECORD NUMBER:  [id number]  AGE: 54 y.o.   GENDER: female  : 1970  EPISODE DATE:  2024    Subjective:     Chief Complaint   Patient presents with    Wound Check     Left heel wound      HISTORY of PRESENT ILLNESS HPI     Phuong Frias is a 54 y.o. female who presents today for wound/ulcer evaluation.   History of Wound Context: left heel wound follow up/eval and treat    Ulcer Identification:  Ulcer Type: diabetic  Contributing Factors: diabetes    Wound: N/A        PAST MEDICAL HISTORY        Diagnosis Date    Anxiety     Arthritis     Chronic pain     Depression     Diabetes mellitus type 2 in obese     Hyperlipidemia     Hypertension     Knee pain     Premenopausal patient 2018       PAST SURGICAL HISTORY    Past Surgical History:   Procedure Laterality Date     SECTION      x3    PATELLAR TENDON REPAIR Left 2023    LEFT KNEE RETINACULUM REPAIR performed by Julio Valladares MD at [facility] OR    TENDON RELEASE      right finger    TONSILLECTOMY      TOTAL KNEE ARTHROPLASTY Right 5/3/2023    COMPLEX PRIMARY ROBOTIC RIGHT KNEE TOTAL ARTHROPLASTY performed by Julio Valladares MD at [facility] OR    TOTAL KNEE ARTHROPLASTY Left 2023    ROBOTIC COMPLEX PRIMARY LEFT KNEE TOTAL ARTHROPLASTY performed by Julio Valladares MD at [facility] OR    TUBAL LIGATION         FAMILY HISTORY    Family History   Problem Relation Age of Onset    High Cholesterol Mother     Other Mother         afib    Diabetes Father     Cancer Father         Bladder and Lung    Breast Cancer Sister         1/2 Sister    Diabetes Maternal Grandfather     Diabetes Paternal Grandmother        SOCIAL HISTORY    Social History     Tobacco Use    Smoking status: Never     Passive exposure: Never    Smokeless tobacco: Never   Vaping Use    Vaping status: Never Used   Substance Use Topics    Alcohol use: Yes     Comment: socially

## 2024-12-31 NOTE — PATIENT INSTRUCTIONS
[facility] Wound Care and Hyperbaric Oxygen Therapy   Physician Orders and Discharge Instructions  [address] [place] [address]  [address]  [place] [address]  Telephone: [phone]      FAX [phone]    NAME:  Phuong Frias  YOB: 1970  MEDICAL RECORD NUMBER:  [id number]  DATE:  12/31/2024    Discharge condition: Stable    Discharge to: Home    Left via:Private automobile    Accompanied by: Family    [employer]    Dressing Orders: Left heel wound:  Soap and water wash, 1/4 dakins moistened gauze, ABD pad, rolled gauze and tape twice daily.    Treatment Orders:  Protein rich diet  Multivitamin  Offload the wound at all times  Off-load heels by applying heel-lift boots or \"float\" heels by placing pillows under calves so that heels do not touch mattress.  No shoe, use wheel chair    [facility] follow up visit ___________2 weeks__________________  (Please note your next appointment above and if you are unable to keep, kindly give a 24 hour notice. Thank you.)    If you experience any of the following, please call the Wound Care Center during business hours:    * Increase in Pain  * Temperature over 101  * Increase in drainage from your wound  * Drainage with a foul odor  * Bleeding  * Increase in swelling  * Need for compression bandage changes due to slippage, breakthrough drainage.    If you need medical attention outside of the business hours of the Wound Care Centers please contact your PCP or go to the nearest emergency room.

## 2025-01-06 DIAGNOSIS — F41.9 ANXIETY: ICD-10-CM

## 2025-01-06 RX ORDER — ALPRAZOLAM 0.25 MG
TABLET ORAL
Qty: 30 TABLET | OUTPATIENT
Start: 2025-01-06 | End: 2025-02-05

## 2025-01-13 DIAGNOSIS — F41.9 ANXIETY: ICD-10-CM

## 2025-01-13 RX ORDER — ALPRAZOLAM 0.25 MG/1
0.25 TABLET ORAL DAILY
Qty: 30 TABLET | Refills: 0 | Status: SHIPPED | OUTPATIENT
Start: 2025-01-13 | End: 2025-02-12

## 2025-01-14 ENCOUNTER — HOSPITAL ENCOUNTER (OUTPATIENT)
Dept: WOUND CARE | Age: 55
Discharge: HOME | End: 2025-01-14
Attending: SURGERY
Payer: MEDICARE

## 2025-01-14 VITALS
TEMPERATURE: 97 F | BODY MASS INDEX: 44.41 KG/M2 | HEART RATE: 113 BPM | WEIGHT: 293 LBS | DIASTOLIC BLOOD PRESSURE: 83 MMHG | SYSTOLIC BLOOD PRESSURE: 139 MMHG | RESPIRATION RATE: 18 BRPM | HEIGHT: 68 IN

## 2025-01-14 DIAGNOSIS — E11.621 DIABETIC ULCER OF LEFT HEEL ASSOCIATED WITH TYPE 2 DIABETES MELLITUS, WITH FAT LAYER EXPOSED (HCC): Primary | ICD-10-CM

## 2025-01-14 DIAGNOSIS — L97.422 DIABETIC ULCER OF LEFT HEEL ASSOCIATED WITH TYPE 2 DIABETES MELLITUS, WITH FAT LAYER EXPOSED (HCC): Primary | ICD-10-CM

## 2025-01-14 PROCEDURE — 97597 DBRDMT OPN WND 1ST 20 CM/<: CPT

## 2025-01-14 PROCEDURE — 97597 DBRDMT OPN WND 1ST 20 CM/<: CPT | Performed by: SURGERY

## 2025-01-14 RX ORDER — MUPIROCIN 20 MG/G
OINTMENT TOPICAL ONCE
OUTPATIENT
Start: 2025-01-14 | End: 2025-01-14

## 2025-01-14 RX ORDER — LIDOCAINE HYDROCHLORIDE 20 MG/ML
JELLY TOPICAL ONCE
OUTPATIENT
Start: 2025-01-14 | End: 2025-01-14

## 2025-01-14 RX ORDER — NEOMYCIN/BACITRACIN/POLYMYXINB 3.5-400-5K
OINTMENT (GRAM) TOPICAL ONCE
OUTPATIENT
Start: 2025-01-14 | End: 2025-01-14

## 2025-01-14 RX ORDER — CLOBETASOL PROPIONATE 0.5 MG/G
OINTMENT TOPICAL ONCE
OUTPATIENT
Start: 2025-01-14 | End: 2025-01-14

## 2025-01-14 RX ORDER — BACITRACIN ZINC AND POLYMYXIN B SULFATE 500; 1000 [USP'U]/G; [USP'U]/G
OINTMENT TOPICAL ONCE
OUTPATIENT
Start: 2025-01-14 | End: 2025-01-14

## 2025-01-14 RX ORDER — SODIUM CHLOR/HYPOCHLOROUS ACID 0.033 %
SOLUTION, IRRIGATION IRRIGATION ONCE
OUTPATIENT
Start: 2025-01-14 | End: 2025-01-14

## 2025-01-14 RX ORDER — GINSENG 100 MG
CAPSULE ORAL ONCE
OUTPATIENT
Start: 2025-01-14 | End: 2025-01-14

## 2025-01-14 RX ORDER — LIDOCAINE HYDROCHLORIDE 20 MG/ML
JELLY TOPICAL ONCE
Status: COMPLETED | OUTPATIENT
Start: 2025-01-14 | End: 2025-01-14

## 2025-01-14 RX ORDER — BETAMETHASONE DIPROPIONATE 0.5 MG/G
CREAM TOPICAL ONCE
OUTPATIENT
Start: 2025-01-14 | End: 2025-01-14

## 2025-01-14 RX ORDER — LIDOCAINE 50 MG/G
OINTMENT TOPICAL ONCE
OUTPATIENT
Start: 2025-01-14 | End: 2025-01-14

## 2025-01-14 RX ORDER — LIDOCAINE HYDROCHLORIDE 40 MG/ML
SOLUTION TOPICAL ONCE
OUTPATIENT
Start: 2025-01-14 | End: 2025-01-14

## 2025-01-14 RX ORDER — LIDOCAINE 40 MG/G
CREAM TOPICAL ONCE
OUTPATIENT
Start: 2025-01-14 | End: 2025-01-14

## 2025-01-14 RX ORDER — TRIAMCINOLONE ACETONIDE 1 MG/G
OINTMENT TOPICAL ONCE
OUTPATIENT
Start: 2025-01-14 | End: 2025-01-14

## 2025-01-14 RX ORDER — SILVER SULFADIAZINE 10 MG/G
CREAM TOPICAL ONCE
OUTPATIENT
Start: 2025-01-14 | End: 2025-01-14

## 2025-01-14 RX ORDER — GENTAMICIN SULFATE 1 MG/G
OINTMENT TOPICAL ONCE
OUTPATIENT
Start: 2025-01-14 | End: 2025-01-14

## 2025-01-14 RX ADMIN — LIDOCAINE HYDROCHLORIDE: 20 JELLY TOPICAL at 13:45

## 2025-01-14 ASSESSMENT — PAIN DESCRIPTION - ORIENTATION: ORIENTATION: LEFT

## 2025-01-14 ASSESSMENT — PAIN DESCRIPTION - FREQUENCY: FREQUENCY: INTERMITTENT

## 2025-01-14 ASSESSMENT — PAIN DESCRIPTION - ONSET: ONSET: ON-GOING

## 2025-01-14 ASSESSMENT — PAIN DESCRIPTION - LOCATION: LOCATION: FOOT

## 2025-01-14 ASSESSMENT — PAIN DESCRIPTION - PAIN TYPE: TYPE: ACUTE PAIN

## 2025-01-14 ASSESSMENT — PAIN DESCRIPTION - DESCRIPTORS: DESCRIPTORS: SORE;TENDER

## 2025-01-14 ASSESSMENT — PAIN SCALES - GENERAL: PAINLEVEL_OUTOF10: 3

## 2025-01-14 ASSESSMENT — PAIN - FUNCTIONAL ASSESSMENT: PAIN_FUNCTIONAL_ASSESSMENT: PREVENTS OR INTERFERES SOME ACTIVE ACTIVITIES AND ADLS

## 2025-01-14 NOTE — PATIENT INSTRUCTIONS
[facility] Wound Care and Hyperbaric Oxygen Therapy   Physician Orders and Discharge Instructions  [address] [place] [address]  [address]  [place] [address]  Telephone: [phone]      FAX [phone]    NAME:  Phuong Frias  YOB: 1970  MEDICAL RECORD NUMBER:  [id number]  DATE:  1/14/2025    Discharge condition: Stable    Discharge to: Home    Left via:Private automobile    Accompanied by: Family    [employer]    Dressing Orders: Left heel wound:  Soap and water wash, 1/4 dakins moistened gauze, ABD pad, rolled gauze and tape twice daily.  Protein rich diet  Multivitamin  Offload the wound at all times  Off-load heels by applying heel-lift boots or \"float\" heels by placing pillows under calves so that heels do not touch mattress.  No shoe, use wheel chair    [facility] follow up visit ____________2 weeks_________________  (Please note your next appointment above and if you are unable to keep, kindly give a 24 hour notice. Thank you.)    If you experience any of the following, please call the Wound Care Center during business hours:    * Increase in Pain  * Temperature over 101  * Increase in drainage from your wound  * Drainage with a foul odor  * Bleeding  * Increase in swelling  * Need for compression bandage changes due to slippage, breakthrough drainage.    If you need medical attention outside of the business hours of the Wound Care Centers please contact your PCP or go to the nearest emergency room.

## 2025-01-14 NOTE — WOUND CARE
Wound Care Supplies      Supply Company:     [employer] [address] [place] [address] f: [phone] f: [phone] p: [phone] [email or web address]      Ordering Center:     [facility]  [address] [place] [address] [address]  [facility] [address] [phone]  WOUND CARE Dept: [phone]   FAX NUMBER [phone]    Patient Information:      Phuong Frias  [address] [facility]  [place] [id number] [phone]   : 1970  AGE: 54 y.o.     GENDER: female   EPISODE DATE: 2025    Insurance:      PRIMARY INSURANCE:  Plan: WELLCARE MEDICARE  Coverage: WELLCARE MEDICARE  Effective Date: 2024  Group Number: [unfilled]  Subscriber Number: [id number] - (Medicare Managed)    Payer/Plan Subscr  Sex Relation Sub. Ins. ID Effective Group Num   1. MEDICARE - ME* PHUONG FRIAS 1970 Female Self [id number] 10/1/24                                    [address] [address]   2. [facility] 1970 Female Self [id number]                                    [address] [address]       Patient Wound Information:      Problem List Items Addressed This Visit          Endocrine    Diabetic ulcer of left heel associated with type 2 diabetes mellitus, with fat layer exposed (HCC) - Primary (Chronic)    Relevant Orders    Initiate Outpatient Wound Care Protocol       WOUNDS REQUIRING DRESSING SUPPLIES:     Wound 24 Heel Left wound 1-left heel wag 1 (Active)   Wound Image   25 1345   Wound Etiology Diabetic Moeller 1 25 1345   Dressing Status Old drainage noted 25 1345   Wound Cleansed Soap and water 25 1345   Dressing/Treatment Moisten with saline;Gauze dressing/dressing sponge;ABD;Roll gauze;Tape/Soft cloth adhesive tape 24 1521   Offloading for Diabetic Foot Ulcers Offloading ordered;Offloading boot 25 1345   Wound Length (cm) 1.4 cm 25 1345   Wound Width (cm) 2.2 cm 25 1345   Wound Depth (cm) 0.1 cm 25 3073

## 2025-01-29 RX ORDER — ARIPIPRAZOLE 5 MG/1
5 TABLET ORAL DAILY
Qty: 30 TABLET | Refills: 5 | Status: SHIPPED | OUTPATIENT
Start: 2025-01-29

## 2025-01-29 RX ORDER — ATORVASTATIN CALCIUM 20 MG/1
TABLET, FILM COATED ORAL
Qty: 90 TABLET | Refills: 1 | Status: SHIPPED | OUTPATIENT
Start: 2025-01-29

## 2025-01-29 NOTE — TELEPHONE ENCOUNTER
Phuong Frias called to request a refill on her medication.      Last office visit : 10/29/2024   Next office visit : Visit date not found     Requested Prescriptions     Pending Prescriptions Disp Refills    atorvastatin (LIPITOR) 20 MG tablet [Pharmacy Med Name: ATORVASTATIN 20MG TABLETS] 90 tablet 1     Sig: TAKE 1 TABLET BY MOUTH DAILY    ARIPiprazole (ABILIFY) 5 MG tablet [Pharmacy Med Name: ARIPIPRAZOLE 5MG TABLETS] 30 tablet 5     Sig: TAKE 1 TABLET BY MOUTH DAILY            Gifty Valles MA

## 2025-02-03 RX ORDER — TRAZODONE HYDROCHLORIDE 150 MG/1
TABLET ORAL
Qty: 60 TABLET | Refills: 3 | Status: SHIPPED | OUTPATIENT
Start: 2025-02-03

## 2025-02-04 ENCOUNTER — HOSPITAL ENCOUNTER (OUTPATIENT)
Dept: WOUND CARE | Age: 55
Discharge: HOME | End: 2025-02-04
Attending: SURGERY
Payer: MEDICARE

## 2025-02-04 VITALS
BODY MASS INDEX: 44.41 KG/M2 | RESPIRATION RATE: 18 BRPM | HEART RATE: 100 BPM | SYSTOLIC BLOOD PRESSURE: 121 MMHG | HEIGHT: 68 IN | TEMPERATURE: 97.1 F | DIASTOLIC BLOOD PRESSURE: 78 MMHG | WEIGHT: 293 LBS

## 2025-02-04 DIAGNOSIS — L97.422 DIABETIC ULCER OF LEFT HEEL ASSOCIATED WITH TYPE 2 DIABETES MELLITUS, WITH FAT LAYER EXPOSED (HCC): Primary | ICD-10-CM

## 2025-02-04 DIAGNOSIS — E11.621 DIABETIC ULCER OF LEFT HEEL ASSOCIATED WITH TYPE 2 DIABETES MELLITUS, WITH FAT LAYER EXPOSED (HCC): Primary | ICD-10-CM

## 2025-02-04 PROCEDURE — 97597 DBRDMT OPN WND 1ST 20 CM/<: CPT | Performed by: SURGERY

## 2025-02-04 PROCEDURE — 97597 DBRDMT OPN WND 1ST 20 CM/<: CPT

## 2025-02-04 RX ORDER — SODIUM CHLOR/HYPOCHLOROUS ACID 0.033 %
SOLUTION, IRRIGATION IRRIGATION ONCE
OUTPATIENT
Start: 2025-02-04 | End: 2025-02-04

## 2025-02-04 RX ORDER — LIDOCAINE HYDROCHLORIDE 20 MG/ML
JELLY TOPICAL ONCE
OUTPATIENT
Start: 2025-02-04 | End: 2025-02-04

## 2025-02-04 RX ORDER — MUPIROCIN 20 MG/G
OINTMENT TOPICAL ONCE
OUTPATIENT
Start: 2025-02-04 | End: 2025-02-04

## 2025-02-04 RX ORDER — GINSENG 100 MG
CAPSULE ORAL ONCE
OUTPATIENT
Start: 2025-02-04 | End: 2025-02-04

## 2025-02-04 RX ORDER — SILVER SULFADIAZINE 10 MG/G
CREAM TOPICAL ONCE
OUTPATIENT
Start: 2025-02-04 | End: 2025-02-04

## 2025-02-04 RX ORDER — TRIAMCINOLONE ACETONIDE 1 MG/G
OINTMENT TOPICAL ONCE
OUTPATIENT
Start: 2025-02-04 | End: 2025-02-04

## 2025-02-04 RX ORDER — LIDOCAINE 50 MG/G
OINTMENT TOPICAL ONCE
OUTPATIENT
Start: 2025-02-04 | End: 2025-02-04

## 2025-02-04 RX ORDER — CLOBETASOL PROPIONATE 0.5 MG/G
OINTMENT TOPICAL ONCE
OUTPATIENT
Start: 2025-02-04 | End: 2025-02-04

## 2025-02-04 RX ORDER — LIDOCAINE 40 MG/G
CREAM TOPICAL ONCE
OUTPATIENT
Start: 2025-02-04 | End: 2025-02-04

## 2025-02-04 RX ORDER — GENTAMICIN SULFATE 1 MG/G
OINTMENT TOPICAL ONCE
OUTPATIENT
Start: 2025-02-04 | End: 2025-02-04

## 2025-02-04 RX ORDER — NEOMYCIN/BACITRACIN/POLYMYXINB 3.5-400-5K
OINTMENT (GRAM) TOPICAL ONCE
OUTPATIENT
Start: 2025-02-04 | End: 2025-02-04

## 2025-02-04 RX ORDER — BETAMETHASONE DIPROPIONATE 0.5 MG/G
CREAM TOPICAL ONCE
OUTPATIENT
Start: 2025-02-04 | End: 2025-02-04

## 2025-02-04 RX ORDER — LIDOCAINE HYDROCHLORIDE 40 MG/ML
SOLUTION TOPICAL ONCE
OUTPATIENT
Start: 2025-02-04 | End: 2025-02-04

## 2025-02-04 RX ORDER — LIDOCAINE HYDROCHLORIDE 20 MG/ML
JELLY TOPICAL ONCE
Status: COMPLETED | OUTPATIENT
Start: 2025-02-04 | End: 2025-02-04

## 2025-02-04 RX ORDER — BACITRACIN ZINC AND POLYMYXIN B SULFATE 500; 1000 [USP'U]/G; [USP'U]/G
OINTMENT TOPICAL ONCE
OUTPATIENT
Start: 2025-02-04 | End: 2025-02-04

## 2025-02-04 RX ADMIN — LIDOCAINE HYDROCHLORIDE: 20 JELLY TOPICAL at 14:33

## 2025-02-04 ASSESSMENT — PAIN SCALES - GENERAL: PAINLEVEL_OUTOF10: 0

## 2025-02-04 ASSESSMENT — PAIN - FUNCTIONAL ASSESSMENT: PAIN_FUNCTIONAL_ASSESSMENT: ACTIVITIES ARE NOT PREVENTED

## 2025-02-04 ASSESSMENT — PAIN DESCRIPTION - ORIENTATION: ORIENTATION: OTHER (COMMENT)

## 2025-02-04 ASSESSMENT — PAIN DESCRIPTION - DESCRIPTORS: DESCRIPTORS: OTHER (COMMENT)

## 2025-02-04 ASSESSMENT — PAIN DESCRIPTION - LOCATION: LOCATION: OTHER (COMMENT)

## 2025-02-04 NOTE — PROGRESS NOTES
[facility] Wound Care Center   Progress Note and Procedure Note      Phuong Frias  MEDICAL RECORD NUMBER:  [id number]  AGE: 54 y.o.   GENDER: female  : 1970  EPISODE DATE:  2025    Subjective:     Chief Complaint   Patient presents with    Wound Check         HISTORY of PRESENT ILLNESS HPI     Phuong Frias is a 54 y.o. female who presents today for wound/ulcer evaluation.   Wound Context: Pt with L heel wound here for eval/treat  Wound/Ulcer Pain Timing/Severity: none  Quality of pain: N/A  Severity:  0 / 10   Modifying Factors: None  Associated Signs/Symptoms: none    Ulcer Identification:  Ulcer Type: diabetic  Contributing Factors: diabetes, chronic pressure, and shear force    Wound:  DFU        PAST MEDICAL HISTORY        Diagnosis Date    Anxiety     Arthritis     Chronic pain     Depression     Diabetes mellitus type 2 in obese     Hyperlipidemia     Hypertension     Knee pain     Premenopausal patient 2018       PAST SURGICAL HISTORY    Past Surgical History:   Procedure Laterality Date     SECTION      x3    PATELLAR TENDON REPAIR Left 2023    LEFT KNEE RETINACULUM REPAIR performed by Julio Valladares MD at [facility] OR    TENDON RELEASE      right finger    TONSILLECTOMY      TOTAL KNEE ARTHROPLASTY Right 5/3/2023    COMPLEX PRIMARY ROBOTIC RIGHT KNEE TOTAL ARTHROPLASTY performed by Julio Valladares MD at [facility] OR    TOTAL KNEE ARTHROPLASTY Left 2023    ROBOTIC COMPLEX PRIMARY LEFT KNEE TOTAL ARTHROPLASTY performed by Julio Valladares MD at [facility] OR    TUBAL LIGATION         FAMILY HISTORY    Family History   Problem Relation Age of Onset    High Cholesterol Mother     Other Mother         afib    Diabetes Father     Cancer Father         Bladder and Lung    Breast Cancer Sister         1/2 Sister    Diabetes Maternal Grandfather     Diabetes Paternal Grandmother        SOCIAL HISTORY    Social History     Tobacco Use    Smoking status: Never     Passive exposure: Never

## 2025-02-04 NOTE — PATIENT INSTRUCTIONS
[facility] Wound Care and Hyperbaric Oxygen Therapy   Physician Orders and Discharge Instructions  [address] [place] [address]  [address]  [place] [address]  Telephone: [phone]      FAX [phone]    NAME:  Phuong Frias  YOB: 1970  MEDICAL RECORD NUMBER:  [id number]  DATE:  2/4/2025    Discharge condition: Stable    Discharge to: Home    Left via:Private automobile    Accompanied by: Self    [employer]    Dressing Orders: Left heel wound:  Soap and water wash, 1/4 dakins moistened gauze, dry gauze ABD pad if needed, rolled gauze and tape twice daily.  Protein rich diet  Multivitamin  Offload the wound at all times  Off-load heels by applying heel-lift boots or \"float\" heels by placing pillows under calves so that heels do not touch mattress.  No shoe, use wheel chair    [facility] follow up visit ___________2 weeks__________________  (Please note your next appointment above and if you are unable to keep, kindly give a 24 hour notice. Thank you.)    If you experience any of the following, please call the Wound Care Center during business hours:    * Increase in Pain  * Temperature over 101  * Increase in drainage from your wound  * Drainage with a foul odor  * Bleeding  * Increase in swelling  * Need for compression bandage changes due to slippage, breakthrough drainage.    If you need medical attention outside of the business hours of the Wound Care Centers please contact your PCP or go to the nearest emergency room.

## 2025-02-07 DIAGNOSIS — F41.9 ANXIETY: ICD-10-CM

## 2025-02-11 RX ORDER — ALPRAZOLAM 0.25 MG
TABLET ORAL
Qty: 30 TABLET | Refills: 0 | OUTPATIENT
Start: 2025-02-11 | End: 2025-03-13

## 2025-02-13 ENCOUNTER — OFFICE VISIT (OUTPATIENT)
Age: 55
End: 2025-02-13
Payer: MEDICARE

## 2025-02-13 ENCOUNTER — TELEPHONE (OUTPATIENT)
Age: 55
End: 2025-02-13

## 2025-02-13 VITALS
DIASTOLIC BLOOD PRESSURE: 76 MMHG | TEMPERATURE: 98.2 F | BODY MASS INDEX: 44.41 KG/M2 | OXYGEN SATURATION: 98 % | HEART RATE: 116 BPM | WEIGHT: 293 LBS | SYSTOLIC BLOOD PRESSURE: 118 MMHG | RESPIRATION RATE: 20 BRPM | HEIGHT: 68 IN

## 2025-02-13 DIAGNOSIS — U07.1 COVID: Primary | ICD-10-CM

## 2025-02-13 DIAGNOSIS — Z11.59 SCREENING FOR VIRAL DISEASE: ICD-10-CM

## 2025-02-13 DIAGNOSIS — R50.9 FEVER, UNSPECIFIED FEVER CAUSE: ICD-10-CM

## 2025-02-13 DIAGNOSIS — J02.9 SORE THROAT: ICD-10-CM

## 2025-02-13 LAB
INFLUENZA A ANTIBODY: NORMAL
INFLUENZA B ANTIBODY: NORMAL
Lab: ABNORMAL
QC PASS/FAIL: ABNORMAL
S PYO AG THROAT QL: NORMAL
SARS-COV-2, POC: DETECTED

## 2025-02-13 PROCEDURE — 87811 SARS-COV-2 COVID19 W/OPTIC: CPT

## 2025-02-13 PROCEDURE — 87804 INFLUENZA ASSAY W/OPTIC: CPT

## 2025-02-13 PROCEDURE — 3074F SYST BP LT 130 MM HG: CPT

## 2025-02-13 PROCEDURE — 3078F DIAST BP <80 MM HG: CPT

## 2025-02-13 PROCEDURE — 99213 OFFICE O/P EST LOW 20 MIN: CPT

## 2025-02-13 PROCEDURE — 87880 STREP A ASSAY W/OPTIC: CPT

## 2025-02-13 ASSESSMENT — ENCOUNTER SYMPTOMS
SHORTNESS OF BREATH: 0
WHEEZING: 0
APNEA: 0
NAUSEA: 0
EYE PAIN: 0
TROUBLE SWALLOWING: 0
COUGH: 0
COLOR CHANGE: 0
VOMITING: 0
EYE DISCHARGE: 0
SINUS PAIN: 0
SINUS PRESSURE: 0
CONSTIPATION: 0
SORE THROAT: 1
RHINORRHEA: 0
CHEST TIGHTNESS: 0
DIARRHEA: 0
EYE ITCHING: 0
ABDOMINAL DISTENTION: 0
ABDOMINAL PAIN: 0

## 2025-02-13 NOTE — TELEPHONE ENCOUNTER
Called pt back, who stated she was seen in urgent care today and is Covid + , pt stated her left knee which has been replaced and last saw Dr Valladares 5/24.  Pt stated she is having a hard time walking on in and urgent care told her the covid may have went into the knee .Pt has been started on a antibiotic from urgent care. Advised pt to rest, ice and elevate the leg and to give us a call Tuesday with a update if needed. Pt agreed and verbalized understanding.

## 2025-02-13 NOTE — PATIENT INSTRUCTIONS
- COVID positive.  - Per CDC guidelines, quarantine x 5 days from symptoms onset, not considered contagious after fever free for 24 hours without any medication.  - Rest.  - Increase fluid intake.  - Alternate Tylenol/Motrin as needed for fever/body aches.  - May take OTC oral antihistamine and nasal decongestant.  - May use saline nasal washes.  - Place a cool mist humidifier next to bed while sleeping.  - Return to the clinic or follow up with PCP if symptoms worsen or fail to improve.

## 2025-02-13 NOTE — PROGRESS NOTES
[facility]  [facility]RGENT [facility]ARE  [address] [place] [address] DRIVE  [facility] [address]  Dept: [phone]  Dept Fax: [phone]  Loc: [phone]    Phuong Frias is a 54 y.o. female who presents today for her medical conditions/complaints as noted below.  Phuong Frias is c/o of Fever, Pharyngitis, and Rash (Left hand)        HPI:     Phuong Frias presents with complaints of fever, sore throat, and rash on her left hand. She states her symptoms started on Monday. She is not taking any OTC medications except tylenol/motrin. She denies any shortness of breath, wheezing, or any other associating symptoms. She states his fever ran 103.5F. She is not currently running fever in office. She is stable.     Denies any recent antibiotic or steroid administration.      Past Medical History:   Diagnosis Date    Anxiety     Arthritis     Chronic pain     Depression     Diabetes mellitus type 2 in obese     Hyperlipidemia     Hypertension     Knee pain     Premenopausal patient 2018     Past Surgical History:   Procedure Laterality Date     SECTION      x3    PATELLAR TENDON REPAIR Left 2023    LEFT KNEE RETINACULUM REPAIR performed by Julio Valladares MD at [facility] OR    TENDON RELEASE      right finger    TONSILLECTOMY      TOTAL KNEE ARTHROPLASTY Right 5/3/2023    COMPLEX PRIMARY ROBOTIC RIGHT KNEE TOTAL ARTHROPLASTY performed by Julio Valladares MD at [facility] OR    TOTAL KNEE ARTHROPLASTY Left 2023    ROBOTIC COMPLEX PRIMARY LEFT KNEE TOTAL ARTHROPLASTY performed by Julio Valladares MD at [facility] OR    TUBAL LIGATION         Family History   Problem Relation Age of Onset    High Cholesterol Mother     Other Mother         afib    Diabetes Father     Cancer Father         Bladder and Lung    Breast Cancer Sister         1/2 Sister    Diabetes Maternal Grandfather     Diabetes Paternal Grandmother        Social History     Tobacco Use    Smoking status: Never     Passive exposure:

## 2025-02-16 DIAGNOSIS — I10 ESSENTIAL HYPERTENSION: ICD-10-CM

## 2025-02-17 ENCOUNTER — TELEPHONE (OUTPATIENT)
Age: 55
End: 2025-02-17

## 2025-02-17 DIAGNOSIS — E11.69 TYPE 2 DIABETES MELLITUS WITH OTHER SPECIFIED COMPLICATION, WITHOUT LONG-TERM CURRENT USE OF INSULIN (HCC): ICD-10-CM

## 2025-02-17 RX ORDER — LISINOPRIL 10 MG/1
10 TABLET ORAL 2 TIMES DAILY
Qty: 180 TABLET | Refills: 1 | OUTPATIENT
Start: 2025-02-17

## 2025-02-17 RX ORDER — SEMAGLUTIDE 2.68 MG/ML
INJECTION, SOLUTION SUBCUTANEOUS
Qty: 3 ML | Refills: 5 | Status: SHIPPED | OUTPATIENT
Start: 2025-02-17

## 2025-02-17 NOTE — TELEPHONE ENCOUNTER
Called patient back her knee has been hurting her the past week or so but she has been sick with Covid. Patient states she can put weight on it, she will just watch this and let us know if it doesn't resolve.

## 2025-02-20 DIAGNOSIS — I10 ESSENTIAL HYPERTENSION: ICD-10-CM

## 2025-02-20 RX ORDER — LISINOPRIL 10 MG/1
10 TABLET ORAL 2 TIMES DAILY
Qty: 180 TABLET | Refills: 1 | Status: SHIPPED | OUTPATIENT
Start: 2025-02-20

## 2025-02-20 NOTE — TELEPHONE ENCOUNTER
Phuong Rodriguez called to request a refill on her medication.      Last office visit : 10/29/2024   Next office visit : 3/4/2025     Requested Prescriptions     Pending Prescriptions Disp Refills    lisinopril (PRINIVIL;ZESTRIL) 10 MG tablet 180 tablet 1     Sig: Take 1 tablet by mouth 2 times daily            Gifty Valles MA

## 2025-02-25 ENCOUNTER — HOSPITAL ENCOUNTER (OUTPATIENT)
Dept: WOUND CARE | Age: 55
Discharge: HOME | End: 2025-02-25
Attending: SURGERY
Payer: MEDICARE

## 2025-02-25 VITALS
DIASTOLIC BLOOD PRESSURE: 80 MMHG | RESPIRATION RATE: 18 BRPM | SYSTOLIC BLOOD PRESSURE: 132 MMHG | HEART RATE: 117 BPM | TEMPERATURE: 97.5 F

## 2025-02-25 DIAGNOSIS — L97.422 DIABETIC ULCER OF LEFT HEEL ASSOCIATED WITH TYPE 2 DIABETES MELLITUS, WITH FAT LAYER EXPOSED (HCC): Primary | ICD-10-CM

## 2025-02-25 DIAGNOSIS — E11.621 DIABETIC ULCER OF LEFT HEEL ASSOCIATED WITH TYPE 2 DIABETES MELLITUS, WITH FAT LAYER EXPOSED (HCC): Primary | ICD-10-CM

## 2025-02-25 PROCEDURE — 97597 DBRDMT OPN WND 1ST 20 CM/<: CPT | Performed by: SURGERY

## 2025-02-25 PROCEDURE — 97597 DBRDMT OPN WND 1ST 20 CM/<: CPT

## 2025-02-25 RX ORDER — BETAMETHASONE DIPROPIONATE 0.5 MG/G
CREAM TOPICAL ONCE
OUTPATIENT
Start: 2025-02-25 | End: 2025-02-25

## 2025-02-25 RX ORDER — GINSENG 100 MG
CAPSULE ORAL ONCE
OUTPATIENT
Start: 2025-02-25 | End: 2025-02-25

## 2025-02-25 RX ORDER — SILVER SULFADIAZINE 10 MG/G
CREAM TOPICAL ONCE
OUTPATIENT
Start: 2025-02-25 | End: 2025-02-25

## 2025-02-25 RX ORDER — LIDOCAINE HYDROCHLORIDE 20 MG/ML
JELLY TOPICAL ONCE
OUTPATIENT
Start: 2025-02-25 | End: 2025-02-25

## 2025-02-25 RX ORDER — LIDOCAINE HYDROCHLORIDE 40 MG/ML
SOLUTION TOPICAL ONCE
OUTPATIENT
Start: 2025-02-25 | End: 2025-02-25

## 2025-02-25 RX ORDER — SODIUM CHLOR/HYPOCHLOROUS ACID 0.033 %
SOLUTION, IRRIGATION IRRIGATION ONCE
OUTPATIENT
Start: 2025-02-25 | End: 2025-02-25

## 2025-02-25 RX ORDER — CLOBETASOL PROPIONATE 0.5 MG/G
OINTMENT TOPICAL ONCE
OUTPATIENT
Start: 2025-02-25 | End: 2025-02-25

## 2025-02-25 RX ORDER — GENTAMICIN SULFATE 1 MG/G
OINTMENT TOPICAL ONCE
OUTPATIENT
Start: 2025-02-25 | End: 2025-02-25

## 2025-02-25 RX ORDER — LIDOCAINE 40 MG/G
CREAM TOPICAL ONCE
OUTPATIENT
Start: 2025-02-25 | End: 2025-02-25

## 2025-02-25 RX ORDER — LIDOCAINE 50 MG/G
OINTMENT TOPICAL ONCE
OUTPATIENT
Start: 2025-02-25 | End: 2025-02-25

## 2025-02-25 RX ORDER — MUPIROCIN 20 MG/G
OINTMENT TOPICAL ONCE
OUTPATIENT
Start: 2025-02-25 | End: 2025-02-25

## 2025-02-25 RX ORDER — BACITRACIN ZINC AND POLYMYXIN B SULFATE 500; 1000 [USP'U]/G; [USP'U]/G
OINTMENT TOPICAL ONCE
OUTPATIENT
Start: 2025-02-25 | End: 2025-02-25

## 2025-02-25 RX ORDER — TRIAMCINOLONE ACETONIDE 1 MG/G
OINTMENT TOPICAL ONCE
OUTPATIENT
Start: 2025-02-25 | End: 2025-02-25

## 2025-02-25 RX ORDER — NEOMYCIN/BACITRACIN/POLYMYXINB 3.5-400-5K
OINTMENT (GRAM) TOPICAL ONCE
OUTPATIENT
Start: 2025-02-25 | End: 2025-02-25

## 2025-02-25 NOTE — WOUND CARE
Wound Care Supplies      Supply Company:     [employer] [address] [place] [address] f: [phone] f: [phone] p: [phone] [email or web address]      Ordering Center:     [facility]  [address] [place] [address] [address]  [facility] [address] [phone]  WOUND CARE Dept: [phone]   FAX NUMBER [phone]    Patient Information:      Alex Frias  [address] [facility]  [place] [id number] [phone]   : 1970  AGE: 54 y.o.     GENDER: female   EPISODE DATE: 2025    Insurance:      PRIMARY INSURANCE:  Plan: [place] WELL[employer] MEDIC[place]  Coverage: WELLCARE MEDICARE  Effective Date: 2024  Group Number: [unfilled]  Subscriber Number: [id number] - (Medicare Managed)    Payer/Plan Subscr  Sex Relation Sub. Ins. ID Effective Group Num   1. MEDICARE - ME* ALEX FRIAS 1970 Female Self [id number] 10/1/24                                    [address] [address]   2. WELLCARE [facility]* ALEX FRIAS 1970 Female Self [id number]                                    [address] [address]       Patient Wound Information:      Problem List Items Addressed This Visit          Endocrine    * (Principal) Diabetic ulcer of left heel associated with type 2 diabetes mellitus, with fat layer exposed (HCC) - Primary (Chronic)    Relevant Orders    Initiate Outpatient Wound Care Protocol       WOUNDS REQUIRING DRESSING SUPPLIES:     Wound 24 Heel Left wound 1-left heel wag 1 (Active)   Wound Image   25 1428   Wound Etiology Diabetic Moeller 1 25 1428   Dressing Status Old drainage noted 25 1428   Wound Cleansed Soap and water 25 1428   Dressing/Treatment Moisten with saline;Honey alginate;Gauze dressing/dressing sponge;ABD;Cast padding;Cutimed/Sorbact;Heat applied;Roll gauze;Tape/Soft cloth adhesive tape 25 1509   Offloading for Diabetic Foot Ulcers Offloading ordered;Offloading boot 25 1428   Wound Length (cm) 1 cm 25 1428   Wound Width

## 2025-02-25 NOTE — PROGRESS NOTES
[facility]   Progress Note and Procedure Note      Phuong Frias  MEDICAL RECORD NUMBER:  [id number]  AGE: 54 y.o.   GENDER: female  : 1970  EPISODE DATE:  2025    Subjective:     Chief Complaint   Patient presents with    Wound Check     Left heel wound         HISTORY of PRESENT ILLNESS HPI     Phuong Frias is a 54 y.o. female who presents today for wound/ulcer evaluation.   Wound Context: Pt with L heel wound here for eval/treat  Wound/Ulcer Pain Timing/Severity: none  Quality of pain: N/A  Severity:  0 / 10   Modifying Factors: None  Associated Signs/Symptoms: none    Ulcer Identification:  Ulcer Type: pressure  Contributing Factors: none    Wound:  pressure        PAST MEDICAL HISTORY        Diagnosis Date    Anxiety     Arthritis     Chronic pain     Depression     Diabetes mellitus type 2 in obese     Hyperlipidemia     Hypertension     Knee pain     Premenopausal patient 2018       PAST SURGICAL HISTORY    Past Surgical History:   Procedure Laterality Date     SECTION      x3    PATELLAR TENDON REPAIR Left 2023    LEFT KNEE RETINACULUM REPAIR performed by Julio Valladares MD at [facility] OR    TENDON RELEASE      right finger    TONSILLECTOMY      TOTAL KNEE ARTHROPLASTY Right 5/3/2023    COMPLEX PRIMARY ROBOTIC RIGHT KNEE TOTAL ARTHROPLASTY performed by Julio Valladares MD at [facility] OR    TOTAL KNEE ARTHROPLASTY Left 2023    ROBOTIC COMPLEX PRIMARY LEFT KNEE TOTAL ARTHROPLASTY performed by Julio Valladares MD at [facility] OR    TUBAL LIGATION         FAMILY HISTORY    Family History   Problem Relation Age of Onset    High Cholesterol Mother     Other Mother         afib    Diabetes Father     Cancer Father         Bladder and Lung    Breast Cancer Sister         1/2 Sister    Diabetes Maternal Grandfather     Diabetes Paternal Grandmother        SOCIAL HISTORY    Social History     Tobacco Use    Smoking status: Never     Passive exposure: Never    Smokeless

## 2025-02-25 NOTE — PATIENT INSTRUCTIONS
[facility] O[facility] Therapy   Physician Orders and Discharge Instructions  [address] [place] [address]  [address]  [place] [address]  Telephone: [phone]      FAX [phone]    NAME:  Phuong Frias  YOB: 1970  MEDICAL RECORD NUMBER:  [id number]  DATE:  2/25/2025    Discharge condition: Stable    Discharge to: Home    Left via:Private automobile    Accompanied by: Spouse     [facility]    Dressing Orders: Left heel wound:  Soap and water wash,   Xeroform cut to fit open area, cover with dry gauze, roll gauze or medipore tape, change daily   Protein rich diet  Multivitamin  Offload the wound at all times  Off-load heels by applying heel-lift boots or \"float\" heels by placing pillows under calves so that heels do not touch mattress.  No shoe, use wheel chair    [facility] follow up visit __________2 weeks___________________  (Please note your next appointment above and if you are unable to keep, kindly give a 24 hour notice. Thank you.)    If you experience any of the following, please call the Wound Care Center during business hours:    * Increase in Pain  * Temperature over 101  * Increase in drainage from your wound  * Drainage with a foul odor  * Bleeding  * Increase in swelling  * Need for compression bandage changes due to slippage, breakthrough drainage.    If you need medical attention outside of the business hours of the Wound Care Centers please contact your PCP or go to the nearest emergency room.

## 2025-03-04 ENCOUNTER — OFFICE VISIT (OUTPATIENT)
Dept: PRIMARY CARE CLINIC | Age: 55
End: 2025-03-04
Payer: MEDICARE

## 2025-03-04 VITALS
HEART RATE: 95 BPM | DIASTOLIC BLOOD PRESSURE: 84 MMHG | WEIGHT: 293 LBS | BODY MASS INDEX: 51.7 KG/M2 | SYSTOLIC BLOOD PRESSURE: 136 MMHG | TEMPERATURE: 97.7 F | OXYGEN SATURATION: 94 %

## 2025-03-04 DIAGNOSIS — N18.30 STAGE 3 CHRONIC KIDNEY DISEASE, UNSPECIFIED WHETHER STAGE 3A OR 3B CKD (HCC): ICD-10-CM

## 2025-03-04 DIAGNOSIS — F33.1 MODERATE EPISODE OF RECURRENT MAJOR DEPRESSIVE DISORDER (HCC): ICD-10-CM

## 2025-03-04 DIAGNOSIS — I10 ESSENTIAL HYPERTENSION: ICD-10-CM

## 2025-03-04 DIAGNOSIS — F41.9 ANXIETY: Primary | ICD-10-CM

## 2025-03-04 DIAGNOSIS — E11.69 TYPE 2 DIABETES MELLITUS WITH OTHER SPECIFIED COMPLICATION, WITHOUT LONG-TERM CURRENT USE OF INSULIN (HCC): ICD-10-CM

## 2025-03-04 PROCEDURE — 99214 OFFICE O/P EST MOD 30 MIN: CPT | Performed by: NURSE PRACTITIONER

## 2025-03-04 PROCEDURE — 3079F DIAST BP 80-89 MM HG: CPT | Performed by: NURSE PRACTITIONER

## 2025-03-04 PROCEDURE — 3075F SYST BP GE 130 - 139MM HG: CPT | Performed by: NURSE PRACTITIONER

## 2025-03-04 RX ORDER — LISINOPRIL 10 MG/1
10 TABLET ORAL 2 TIMES DAILY
Qty: 180 TABLET | Refills: 1 | Status: SHIPPED | OUTPATIENT
Start: 2025-03-04

## 2025-03-04 RX ORDER — ALPRAZOLAM 0.25 MG
0.25 TABLET ORAL 3 TIMES DAILY PRN
Qty: 30 TABLET | Refills: 0 | Status: SHIPPED | OUTPATIENT
Start: 2025-03-04 | End: 2025-04-03

## 2025-03-04 SDOH — ECONOMIC STABILITY: FOOD INSECURITY: WITHIN THE PAST 12 MONTHS, THE FOOD YOU BOUGHT JUST DIDN'T LAST AND YOU DIDN'T HAVE MONEY TO GET MORE.: NEVER TRUE

## 2025-03-04 SDOH — ECONOMIC STABILITY: FOOD INSECURITY: WITHIN THE PAST 12 MONTHS, YOU WORRIED THAT YOUR FOOD WOULD RUN OUT BEFORE YOU GOT MONEY TO BUY MORE.: NEVER TRUE

## 2025-03-04 ASSESSMENT — ENCOUNTER SYMPTOMS
NAUSEA: 0
BACK PAIN: 0
PHOTOPHOBIA: 0
SHORTNESS OF BREATH: 0
COUGH: 0
VOICE CHANGE: 0
COLOR CHANGE: 0
VOMITING: 0
RHINORRHEA: 0

## 2025-03-04 ASSESSMENT — PATIENT HEALTH QUESTIONNAIRE - PHQ9
7. TROUBLE CONCENTRATING ON THINGS, SUCH AS READING THE NEWSPAPER OR WATCHING TELEVISION: NOT AT ALL
SUM OF ALL RESPONSES TO PHQ QUESTIONS 1-9: 0
3. TROUBLE FALLING OR STAYING ASLEEP: NOT AT ALL
SUM OF ALL RESPONSES TO PHQ QUESTIONS 1-9: 0
9. THOUGHTS THAT YOU WOULD BE BETTER OFF DEAD, OR OF HURTING YOURSELF: NOT AT ALL
SUM OF ALL RESPONSES TO PHQ QUESTIONS 1-9: 0
SUM OF ALL RESPONSES TO PHQ QUESTIONS 1-9: 0
8. MOVING OR SPEAKING SO SLOWLY THAT OTHER PEOPLE COULD HAVE NOTICED. OR THE OPPOSITE, BEING SO FIGETY OR RESTLESS THAT YOU HAVE BEEN MOVING AROUND A LOT MORE THAN USUAL: NOT AT ALL
4. FEELING TIRED OR HAVING LITTLE ENERGY: NOT AT ALL
5. POOR APPETITE OR OVEREATING: NOT AT ALL
6. FEELING BAD ABOUT YOURSELF - OR THAT YOU ARE A FAILURE OR HAVE LET YOURSELF OR YOUR FAMILY DOWN: NOT AT ALL
1. LITTLE INTEREST OR PLEASURE IN DOING THINGS: NOT AT ALL
10. IF YOU CHECKED OFF ANY PROBLEMS, HOW DIFFICULT HAVE THESE PROBLEMS MADE IT FOR YOU TO DO YOUR WORK, TAKE CARE OF THINGS AT HOME, OR GET ALONG WITH OTHER PEOPLE: NOT DIFFICULT AT ALL
2. FEELING DOWN, DEPRESSED OR HOPELESS: NOT AT ALL

## 2025-03-04 NOTE — PROGRESS NOTES
Patient given educational materials -see patient instructions.  Discussed use, benefit, and side effects of prescribed medications.  All patient questions answered.  Pt voiced understanding. Reviewed health maintenance.  Instructed to continue currentmedications, diet and exercise.  Patient agreed with treatment plan. Follow up as directed.   MEDICATIONS:  Orders Placed This Encounter   Medications    lisinopril (PRINIVIL;ZESTRIL) 10 MG tablet     Sig: Take 1 tablet by mouth 2 times daily     Dispense:  180 tablet     Refill:  1    ALPRAZolam (XANAX) 0.25 MG tablet     Sig: Take 1 tablet by mouth 3 times daily as needed for Anxiety for up to 30 days. Max Daily Amount: 0.75 mg     Dispense:  30 tablet     Refill:  0         ORDERS:  Orders Placed This Encounter   Procedures    Vitamin D 25 Hydroxy    Lipid Panel    Hemoglobin A1C    Comprehensive Metabolic Panel    TSH    CBC with Auto Differential       Follow-up:  No follow-ups on file.    PATIENT INSTRUCTIONS:  Patient Instructions   Fasting labs in [address] within 1-2 weeks.   Follow-up in 3 months or sooner if needed.   Electronically signed by NATALI Yost on 3/4/2025 at 10:34 AM    EMR Dragon/transcription disclaimer:  Much of thisencounter note is electronic transcription/translation of spoken language to printed texts.  The electronic translation of spoken language may be erroneous, or at times, nonsensical words or phrases may be inadvertentlytranscribed.  Although I have reviewed the note for such errors, some may still exist.

## 2025-03-11 ENCOUNTER — OFFICE VISIT (OUTPATIENT)
Age: 55
End: 2025-03-11
Payer: MEDICARE

## 2025-03-11 ENCOUNTER — APPOINTMENT (OUTPATIENT)
Dept: CT IMAGING | Age: 55
DRG: 871 | End: 2025-03-11
Payer: MEDICARE

## 2025-03-11 ENCOUNTER — APPOINTMENT (OUTPATIENT)
Dept: GENERAL RADIOLOGY | Age: 55
DRG: 871 | End: 2025-03-11
Payer: MEDICARE

## 2025-03-11 ENCOUNTER — RESULTS FOLLOW-UP (OUTPATIENT)
Age: 55
End: 2025-03-11

## 2025-03-11 ENCOUNTER — HOSPITAL ENCOUNTER (INPATIENT)
Age: 55
LOS: 6 days | Discharge: HOME | DRG: 871 | End: 2025-03-17
Attending: HOSPITALIST | Admitting: HOSPITALIST
Payer: MEDICARE

## 2025-03-11 VITALS
WEIGHT: 293 LBS | HEART RATE: 142 BPM | BODY MASS INDEX: 55.19 KG/M2 | SYSTOLIC BLOOD PRESSURE: 102 MMHG | RESPIRATION RATE: 26 BRPM | DIASTOLIC BLOOD PRESSURE: 78 MMHG | OXYGEN SATURATION: 86 % | TEMPERATURE: 101.6 F

## 2025-03-11 DIAGNOSIS — R09.02 HYPOXEMIA: Primary | ICD-10-CM

## 2025-03-11 DIAGNOSIS — R11.2 NAUSEA AND VOMITING, UNSPECIFIED VOMITING TYPE: ICD-10-CM

## 2025-03-11 DIAGNOSIS — J10.1 INFLUENZA A: ICD-10-CM

## 2025-03-11 DIAGNOSIS — J10.1 INFLUENZA A: Primary | ICD-10-CM

## 2025-03-11 DIAGNOSIS — R41.82 ALTERED MENTAL STATUS, UNSPECIFIED ALTERED MENTAL STATUS TYPE: ICD-10-CM

## 2025-03-11 DIAGNOSIS — R00.0 TACHYCARDIA: ICD-10-CM

## 2025-03-11 DIAGNOSIS — R50.9 FEVER, UNSPECIFIED FEVER CAUSE: ICD-10-CM

## 2025-03-11 PROBLEM — A41.9 SEPSIS (HCC): Status: ACTIVE | Noted: 2025-03-11

## 2025-03-11 LAB
ALBUMIN SERPL-MCNC: 3.9 G/DL (ref 3.5–5.2)
ALLENS TEST: ABNORMAL
ALP SERPL-CCNC: 135 U/L (ref 35–104)
ALT SERPL-CCNC: 31 U/L (ref 10–35)
ANION GAP SERPL CALCULATED.3IONS-SCNC: 11 MMOL/L (ref 8–16)
AST SERPL-CCNC: 31 U/L (ref 10–35)
BACTERIA URNS QL MICRO: NORMAL /HPF
BASE EXCESS ARTERIAL: 3.5 MMOL/L (ref -2–2)
BASOPHILS # BLD: 0 K/UL (ref 0–0.2)
BASOPHILS NFR BLD: 0.1 % (ref 0–1)
BILIRUB SERPL-MCNC: 0.2 MG/DL (ref 0.2–1.2)
BILIRUB UR QL STRIP: NEGATIVE
BUN SERPL-MCNC: 12 MG/DL (ref 6–20)
CALCIUM SERPL-MCNC: 8.9 MG/DL (ref 8.6–10)
CARBOXYHEMOGLOBIN ARTERIAL: 1.1 % (ref 0–5)
CHLORIDE SERPL-SCNC: 97 MMOL/L (ref 98–107)
CLARITY UR: CLEAR
CO2 SERPL-SCNC: 27 MMOL/L (ref 22–29)
COLOR UR: YELLOW
CREAT SERPL-MCNC: 1.2 MG/DL (ref 0.5–0.9)
CRYSTALS URNS MICRO: NORMAL /HPF
EOSINOPHIL # BLD: 0 K/UL (ref 0–0.6)
EOSINOPHIL NFR BLD: 0 % (ref 0–5)
EPI CELLS #/AREA URNS AUTO: 2 /HPF (ref 0–5)
ERYTHROCYTE [DISTWIDTH] IN BLOOD BY AUTOMATED COUNT: 14.6 % (ref 11.5–14.5)
GLUCOSE SERPL-MCNC: 126 MG/DL (ref 70–99)
GLUCOSE UR STRIP.AUTO-MCNC: NEGATIVE MG/DL
HCO3 ARTERIAL: 28.5 MMOL/L (ref 22–26)
HCT VFR BLD AUTO: 39.9 % (ref 37–47)
HEMOGLOBIN, ART, EXTENDED: 11.6 G/DL (ref 12–16)
HGB BLD-MCNC: 12.5 G/DL (ref 12–16)
HGB UR STRIP.AUTO-MCNC: NEGATIVE MG/L
HYALINE CASTS #/AREA URNS AUTO: 0 /HPF (ref 0–8)
IMM GRANULOCYTES # BLD: 0 K/UL
INFLUENZA A ANTIBODY: ABNORMAL
INFLUENZA B ANTIBODY: ABNORMAL
KETONES UR STRIP.AUTO-MCNC: NEGATIVE MG/DL
LACTATE BLDV-SCNC: 1.2 MMOL/L (ref 0.5–1.9)
LEUKOCYTE ESTERASE UR QL STRIP.AUTO: ABNORMAL
LYMPHOCYTES # BLD: 0.5 K/UL (ref 1.1–4.5)
LYMPHOCYTES NFR BLD: 7.4 % (ref 20–40)
Lab: NORMAL
MCH RBC QN AUTO: 28.1 PG (ref 27–31)
MCHC RBC AUTO-ENTMCNC: 31.3 G/DL (ref 33–37)
MCV RBC AUTO: 89.7 FL (ref 81–99)
METHEMOGLOBIN ARTERIAL: 0.9 %
MONOCYTES # BLD: 0.4 K/UL (ref 0–0.9)
MONOCYTES NFR BLD: 5.4 % (ref 0–10)
NEUTROPHILS # BLD: 6.1 K/UL (ref 1.5–7.5)
NEUTS SEG NFR BLD: 86.8 % (ref 50–65)
NITRITE UR QL STRIP.AUTO: POSITIVE
O2 CONTENT ARTERIAL: 14.8 ML/DL
O2 DELIVERY DEVICE: ABNORMAL
O2 SAT, ARTERIAL: 90.8 %
O2 THERAPY: ABNORMAL
OXYGEN FLOW: 3
PCO2 ARTERIAL: 44 MMHG (ref 35–45)
PH ARTERIAL: 7.42 (ref 7.35–7.45)
PH UR STRIP.AUTO: 6.5 [PH] (ref 5–8)
PLATELET # BLD AUTO: 241 K/UL (ref 130–400)
PMV BLD AUTO: 8.2 FL (ref 9.4–12.3)
PO2 ARTERIAL: 59 MMHG (ref 80–100)
POTASSIUM BLD-SCNC: 3.9 MMOL/L
POTASSIUM SERPL-SCNC: 4.5 MMOL/L (ref 3.5–5.1)
PROT SERPL-MCNC: 7.1 G/DL (ref 6.4–8.3)
PROT UR STRIP.AUTO-MCNC: NEGATIVE MG/DL
QC PASS/FAIL: NORMAL
RBC # BLD AUTO: 4.45 M/UL (ref 4.2–5.4)
RBC #/AREA URNS AUTO: 1 /HPF (ref 0–4)
SAMPLE SOURCE: ABNORMAL
SARS-COV-2, POC: NORMAL
SODIUM SERPL-SCNC: 135 MMOL/L (ref 136–145)
SP GR UR STRIP.AUTO: 1.01 (ref 1–1.03)
SPONT RATE(BPM): 22
UROBILINOGEN UR STRIP.AUTO-MCNC: 0.2 E.U./DL
WBC # BLD AUTO: 7 K/UL (ref 4.8–10.8)
WBC #/AREA URNS AUTO: 3 /HPF (ref 0–5)

## 2025-03-11 PROCEDURE — 2580000003 HC RX 258: Performed by: NURSE PRACTITIONER

## 2025-03-11 PROCEDURE — 3074F SYST BP LT 130 MM HG: CPT

## 2025-03-11 PROCEDURE — 2700000000 HC OXYGEN THERAPY PER DAY

## 2025-03-11 PROCEDURE — 3078F DIAST BP <80 MM HG: CPT

## 2025-03-11 PROCEDURE — 96374 THER/PROPH/DIAG INJ IV PUSH: CPT

## 2025-03-11 PROCEDURE — 87086 URINE CULTURE/COLONY COUNT: CPT

## 2025-03-11 PROCEDURE — 84145 PROCALCITONIN (PCT): CPT

## 2025-03-11 PROCEDURE — 87040 BLOOD CULTURE FOR BACTERIA: CPT

## 2025-03-11 PROCEDURE — 6370000000 HC RX 637 (ALT 250 FOR IP): Performed by: NURSE PRACTITIONER

## 2025-03-11 PROCEDURE — 36415 COLL VENOUS BLD VENIPUNCTURE: CPT

## 2025-03-11 PROCEDURE — 99213 OFFICE O/P EST LOW 20 MIN: CPT

## 2025-03-11 PROCEDURE — 87150 DNA/RNA AMPLIFIED PROBE: CPT

## 2025-03-11 PROCEDURE — 6360000002 HC RX W HCPCS: Performed by: NURSE PRACTITIONER

## 2025-03-11 PROCEDURE — 94760 N-INVAS EAR/PLS OXIMETRY 1: CPT

## 2025-03-11 PROCEDURE — 83605 ASSAY OF LACTIC ACID: CPT

## 2025-03-11 PROCEDURE — 96375 TX/PRO/DX INJ NEW DRUG ADDON: CPT

## 2025-03-11 PROCEDURE — 99223 1ST HOSP IP/OBS HIGH 75: CPT | Performed by: HOSPITALIST

## 2025-03-11 PROCEDURE — 80053 COMPREHEN METABOLIC PANEL: CPT

## 2025-03-11 PROCEDURE — 96361 HYDRATE IV INFUSION ADD-ON: CPT

## 2025-03-11 PROCEDURE — 85025 COMPLETE CBC W/AUTO DIFF WBC: CPT

## 2025-03-11 PROCEDURE — 2500000003 HC RX 250 WO HCPCS: Performed by: NURSE PRACTITIONER

## 2025-03-11 PROCEDURE — 99285 EMERGENCY DEPT VISIT HI MDM: CPT

## 2025-03-11 PROCEDURE — 87077 CULTURE AEROBIC IDENTIFY: CPT

## 2025-03-11 PROCEDURE — 82803 BLOOD GASES ANY COMBINATION: CPT

## 2025-03-11 PROCEDURE — 70450 CT HEAD/BRAIN W/O DYE: CPT

## 2025-03-11 PROCEDURE — 1200000000 HC SEMI PRIVATE

## 2025-03-11 PROCEDURE — 81003 URINALYSIS AUTO W/O SCOPE: CPT

## 2025-03-11 PROCEDURE — 87811 SARS-COV-2 COVID19 W/OPTIC: CPT

## 2025-03-11 PROCEDURE — 87186 SC STD MICRODIL/AGAR DIL: CPT

## 2025-03-11 PROCEDURE — 36600 WITHDRAWAL OF ARTERIAL BLOOD: CPT

## 2025-03-11 PROCEDURE — 71045 X-RAY EXAM CHEST 1 VIEW: CPT

## 2025-03-11 PROCEDURE — 87804 INFLUENZA ASSAY W/OPTIC: CPT

## 2025-03-11 RX ORDER — ACETAMINOPHEN 500 MG
1000 TABLET ORAL ONCE
Status: COMPLETED | OUTPATIENT
Start: 2025-03-11 | End: 2025-03-11

## 2025-03-11 RX ORDER — 0.9 % SODIUM CHLORIDE 0.9 %
500 INTRAVENOUS SOLUTION INTRAVENOUS ONCE
Status: COMPLETED | OUTPATIENT
Start: 2025-03-11 | End: 2025-03-11

## 2025-03-11 RX ORDER — IBUPROFEN 400 MG/1
800 TABLET, FILM COATED ORAL ONCE
Status: COMPLETED | OUTPATIENT
Start: 2025-03-11 | End: 2025-03-11

## 2025-03-11 RX ORDER — ONDANSETRON 2 MG/ML
4 INJECTION INTRAMUSCULAR; INTRAVENOUS ONCE
Status: COMPLETED | OUTPATIENT
Start: 2025-03-11 | End: 2025-03-11

## 2025-03-11 RX ADMIN — ONDANSETRON 4 MG: 2 INJECTION, SOLUTION INTRAMUSCULAR; INTRAVENOUS at 19:26

## 2025-03-11 RX ADMIN — WATER 1000 MG: 1 INJECTION INTRAMUSCULAR; INTRAVENOUS; SUBCUTANEOUS at 20:58

## 2025-03-11 RX ADMIN — ACETAMINOPHEN 1000 MG: 500 TABLET ORAL at 19:26

## 2025-03-11 RX ADMIN — SODIUM CHLORIDE 500 ML: 0.9 INJECTION, SOLUTION INTRAVENOUS at 18:40

## 2025-03-11 RX ADMIN — IBUPROFEN 800 MG: 400 TABLET, FILM COATED ORAL at 20:58

## 2025-03-11 ASSESSMENT — ENCOUNTER SYMPTOMS
ABDOMINAL PAIN: 0
COLOR CHANGE: 0
SORE THROAT: 0
APNEA: 0
COUGH: 1
NAUSEA: 1
DIARRHEA: 0
SHORTNESS OF BREATH: 0
EYE DISCHARGE: 0
CONSTIPATION: 0
EYE PAIN: 0
CHEST TIGHTNESS: 0
WHEEZING: 0
EYE ITCHING: 0
RHINORRHEA: 0
ABDOMINAL DISTENTION: 0
VOMITING: 1
SINUS PRESSURE: 0
SINUS PAIN: 0
TROUBLE SWALLOWING: 0

## 2025-03-11 ASSESSMENT — PAIN DESCRIPTION - DESCRIPTORS: DESCRIPTORS: ACHING;DISCOMFORT

## 2025-03-11 ASSESSMENT — PAIN - FUNCTIONAL ASSESSMENT: PAIN_FUNCTIONAL_ASSESSMENT: ACTIVITIES ARE NOT PREVENTED

## 2025-03-11 ASSESSMENT — PAIN DESCRIPTION - LOCATION: LOCATION: ABDOMEN

## 2025-03-11 ASSESSMENT — PAIN SCALES - GENERAL: PAINLEVEL_OUTOF10: 6

## 2025-03-11 ASSESSMENT — PAIN DESCRIPTION - ORIENTATION: ORIENTATION: INNER

## 2025-03-11 NOTE — PROGRESS NOTES
Neurological:      General: No focal deficit present.      Mental Status: She is alert and oriented to person, place, and time.      Sensory: Sensation is intact.      Motor: Motor function is intact.      Coordination: Coordination is intact.      Gait: Gait is intact.   Psychiatric:         Attention and Perception: Attention normal.         Mood and Affect: Mood and affect normal.         Speech: Speech normal.         Behavior: Behavior normal. Behavior is cooperative.       /78   Pulse (!) 142   Temp (!) 101.6 °F (38.7 °C) (Oral)   Resp 26   Wt (!) 164.7 kg (363 lb)   SpO2 (!) 86% Comment: room air, NP notified while pt in triage  BMI 55.19 kg/m²     :Assessment   Assessment & Plan    Diagnosis Orders   1. Influenza A        2. Tachycardia        3. Fever, unspecified fever cause  POCT Influenza A/B    POCT COVID-19 Antigen Card          :Plan   - Flu A positive.   - COVID negative.   - Due to the high possibility that the patient might be septic, I have sent patient to ER for higher level of care. Patients  is present with her, he is going to take her to [facility]. I offered EMS, but the patient refused at this time. AMA formed was signed and placed in the patients chart.   - Patient is tachycardic, diaphoretic, extreme high temperatures, borderline low blood pressure, and low oxygen levels.  - Report called to [facility] ER.   - Exam is unremarkable at this time. She is stable.   - Return to the clinic or follow up with PCP if symptoms worsen or fail to improve.     Patient provided educational materials- see patient instructions.  Discussed administration, benefit, and side effects of any prescribed or OTC medications.  All patient questions answered appropriately.  Patient voiced understanding.     Return if symptoms worsen or fail to improve.    Urgent Care evaluation today is not a substitute for PCP visit. Follow up care is the responsibility of the patient to discuss and review this

## 2025-03-11 NOTE — PATIENT INSTRUCTIONS
- Flu A positive.   - COVID negative.  - Due to the high possibility that the patient might be septic, I have sent patient to ER for higher level of care. Patients  is present with her, he is going to take her to [facility]. I offered EMS, but the patient refused at this time. AMA formed was signed and placed in the patients chart.   - Patient is tachycardic, diaphoretic, extreme high temperatures, borderline low blood pressure, and low oxygen levels.  - Report called to [facility].   - Exam is unremarkable at this time. She is stable.

## 2025-03-11 NOTE — ED PROVIDER NOTES
1.2   CREATININE 1.2*   BILITOT 0.2           Infection (sepsis)  related to UTI (fill in source of infection) UTI identified date: 2330 time: 3/11/25    Fluid Resuscitation Rationale: at least 30mL/kg based on ideal body weight due to obesity defined as BMI >30 (patient's BMI is Body mass index is 55.19 kg/m². and IBW is Ideal body weight: 63.9 kg (140 lb 14 oz)  Adjusted ideal body weight: 104.2 kg (229 lb 11.6 oz))    Repeat lactate level: not indicated due to initial lactate < 2    Reassessment Exam: I have reassessed tissue perfusion and hemodynamic status after fluid bolus at this date/time: 0015; patient stable, HR remains 130s.        No notes of EC Admission Criteria type on file.    PATIENT REFERRED TO:  No follow-up provider specified.    DISCHARGE MEDICATIONS:  New Prescriptions    No medications on file          (Please note that portions of this note were completed with a voice recognition program.  Efforts were made to edit the dictations butoccasionally words are mis-transcribed.)    NATALI Miller NP (electronically signed)  AttendingEmergency Physician         Shiva Oh APRN - NP  03/12/25 0040

## 2025-03-12 PROBLEM — E66.01 OBESITY, MORBID (MORE THAN 100 LBS OVER IDEAL WEIGHT OR BMI > 40): Status: ACTIVE | Noted: 2025-03-12

## 2025-03-12 PROBLEM — N39.0 SEPSIS SECONDARY TO UTI (HCC): Status: ACTIVE | Noted: 2025-03-11

## 2025-03-12 PROBLEM — E66.01 CLASS 3 SEVERE OBESITY WITH SERIOUS COMORBIDITY AND BODY MASS INDEX (BMI) OF 50.0 TO 59.9 IN ADULT: Status: ACTIVE | Noted: 2025-03-12

## 2025-03-12 PROBLEM — J10.1 INFLUENZA A WITH RESPIRATORY MANIFESTATIONS: Status: ACTIVE | Noted: 2025-03-12

## 2025-03-12 PROBLEM — E66.813 CLASS 3 SEVERE OBESITY WITH SERIOUS COMORBIDITY AND BODY MASS INDEX (BMI) OF 50.0 TO 59.9 IN ADULT: Status: ACTIVE | Noted: 2025-03-12

## 2025-03-12 LAB
A BAUMANNII DNA BLD POS QL NAA+NON-PROBE: NOT DETECTED
ANION GAP SERPL CALCULATED.3IONS-SCNC: 10 MMOL/L (ref 8–16)
BUN SERPL-MCNC: 11 MG/DL (ref 6–20)
C ALBICANS DNA BLD POS QL NAA+NON-PROBE: NOT DETECTED
C AURIS DNA BLD POS QL NAA+PROBE: NOT DETECTED
C GLABRATA DNA BLD POS QL NAA+NON-PROBE: NOT DETECTED
C KRUSEI DNA BLD POS QL NAA+NON-PROBE: NOT DETECTED
C PARAP DNA BLD POS QL NAA+NON-PROBE: NOT DETECTED
C TROPICLS DNA BLD POS QL NAA+NON-PROBE: NOT DETECTED
CALCIUM SERPL-MCNC: 7.7 MG/DL (ref 8.6–10)
CHLORIDE SERPL-SCNC: 100 MMOL/L (ref 98–107)
CO2 SERPL-SCNC: 27 MMOL/L (ref 22–29)
CREAT SERPL-MCNC: 1.3 MG/DL (ref 0.5–0.9)
CRYPTOCOCCUS NEOFORMANS/GATTII BY PCR: NOT DETECTED
E CLOAC COMP DNA BLD POS NAA+NON-PROBE: NOT DETECTED
E COLI DNA BLD POS QL NAA+NON-PROBE: NOT DETECTED
E FAECALIS DNA BLD POS QL NAA+PROBE: NOT DETECTED
E FAECIUM DNA BLD POS QL NAA+PROBE: NOT DETECTED
ENTEROBACT DNA BLD POS QL NAA+NON-PROBE: NOT DETECTED
ENTEROCOC DNA BLD POS QL NAA+NON-PROBE: NOT DETECTED
GLUCOSE BLD-MCNC: 110 MG/DL (ref 70–99)
GLUCOSE BLD-MCNC: 118 MG/DL (ref 70–99)
GLUCOSE BLD-MCNC: 148 MG/DL (ref 70–99)
GLUCOSE SERPL-MCNC: 119 MG/DL (ref 70–99)
GN BLD CULTURE PNL BLD POS NAA+PROBE: NOT DETECTED
GP B STREP DNA BLD POS QL NAA+NON-PROBE: NOT DETECTED
K OXYTOCA DNA BLD POS QL NAA+NON-PROBE: NOT DETECTED
K PNEUMON DNA SPEC QL NAA+PROBE: NOT DETECTED
K. AEROGENES DNA SPEC QL NAA+PROBE: NOT DETECTED
L MONOCYTOG DNA BLD POS QL NAA+NON-PROBE: NOT DETECTED
LACTATE BLDV-SCNC: 1.5 MG/DL (ref 0.5–1.9)
N MEN DNA BLD POS QL NAA+NON-PROBE: NOT DETECTED
P AERUGINOSA DNA BLD POS NAA+NON-PROBE: NOT DETECTED
PERFORMED ON: ABNORMAL
POTASSIUM SERPL-SCNC: 4.2 MMOL/L (ref 3.5–5)
PROCALCITONIN: 0.14 NG/ML (ref 0–0.09)
PROTEUS SP DNA BLD POS QL NAA+NON-PROBE: NOT DETECTED
S AUREUS DNA BLD POS QL NAA+NON-PROBE: NOT DETECTED
S AUREUS+CONS DNA BLD POS NAA+NON-PROBE: DETECTED
S EPIDERMIDIS DNA BLD POS QL NAA+PROBE: NOT DETECTED
S LUGDUNENSIS DNA BLD POS QL NAA+PROBE: NOT DETECTED
S MALTOPH DNA BLD POS QL NAA+PROBE: NOT DETECTED
S MARCESCENS DNA BLD POS NAA+NON-PROBE: NOT DETECTED
S PNEUM DNA BLD POS QL NAA+NON-PROBE: NOT DETECTED
S PYO DNA BLD POS QL NAA+NON-PROBE: NOT DETECTED
SALMONELLA DNA BLD POS QL NAA+PROBE: NOT DETECTED
SODIUM SERPL-SCNC: 137 MMOL/L (ref 136–145)
STREPTOCOCCUS DNA BLD POS NAA+NON-PROBE: NOT DETECTED

## 2025-03-12 PROCEDURE — 94150 VITAL CAPACITY TEST: CPT

## 2025-03-12 PROCEDURE — 83605 ASSAY OF LACTIC ACID: CPT

## 2025-03-12 PROCEDURE — 6360000002 HC RX W HCPCS: Performed by: STUDENT IN AN ORGANIZED HEALTH CARE EDUCATION/TRAINING PROGRAM

## 2025-03-12 PROCEDURE — 6370000000 HC RX 637 (ALT 250 FOR IP): Performed by: STUDENT IN AN ORGANIZED HEALTH CARE EDUCATION/TRAINING PROGRAM

## 2025-03-12 PROCEDURE — 2500000003 HC RX 250 WO HCPCS: Performed by: HOSPITALIST

## 2025-03-12 PROCEDURE — 2580000003 HC RX 258: Performed by: HOSPITALIST

## 2025-03-12 PROCEDURE — 1200000000 HC SEMI PRIVATE

## 2025-03-12 PROCEDURE — 6360000002 HC RX W HCPCS: Performed by: HOSPITALIST

## 2025-03-12 PROCEDURE — 2500000003 HC RX 250 WO HCPCS: Performed by: STUDENT IN AN ORGANIZED HEALTH CARE EDUCATION/TRAINING PROGRAM

## 2025-03-12 PROCEDURE — 94760 N-INVAS EAR/PLS OXIMETRY 1: CPT

## 2025-03-12 PROCEDURE — 6370000000 HC RX 637 (ALT 250 FOR IP): Performed by: HOSPITALIST

## 2025-03-12 PROCEDURE — 36415 COLL VENOUS BLD VENIPUNCTURE: CPT

## 2025-03-12 PROCEDURE — 2700000000 HC OXYGEN THERAPY PER DAY

## 2025-03-12 PROCEDURE — 94640 AIRWAY INHALATION TREATMENT: CPT

## 2025-03-12 PROCEDURE — 80048 BASIC METABOLIC PNL TOTAL CA: CPT

## 2025-03-12 PROCEDURE — 94669 MECHANICAL CHEST WALL OSCILL: CPT

## 2025-03-12 PROCEDURE — 82962 GLUCOSE BLOOD TEST: CPT

## 2025-03-12 RX ORDER — NALOXONE HYDROCHLORIDE 0.4 MG/ML
0.4 INJECTION, SOLUTION INTRAMUSCULAR; INTRAVENOUS; SUBCUTANEOUS PRN
Status: DISCONTINUED | OUTPATIENT
Start: 2025-03-12 | End: 2025-03-17 | Stop reason: HOSPADM

## 2025-03-12 RX ORDER — DEXTROSE MONOHYDRATE 100 MG/ML
INJECTION, SOLUTION INTRAVENOUS CONTINUOUS PRN
Status: DISCONTINUED | OUTPATIENT
Start: 2025-03-12 | End: 2025-03-17 | Stop reason: HOSPADM

## 2025-03-12 RX ORDER — OSELTAMIVIR PHOSPHATE 75 MG/1
75 CAPSULE ORAL 2 TIMES DAILY
Status: COMPLETED | OUTPATIENT
Start: 2025-03-12 | End: 2025-03-16

## 2025-03-12 RX ORDER — MECOBALAMIN 5000 MCG
10 TABLET,DISINTEGRATING ORAL NIGHTLY PRN
Status: DISCONTINUED | OUTPATIENT
Start: 2025-03-12 | End: 2025-03-17 | Stop reason: HOSPADM

## 2025-03-12 RX ORDER — BENZONATATE 100 MG/1
200 CAPSULE ORAL 3 TIMES DAILY
Status: DISCONTINUED | OUTPATIENT
Start: 2025-03-12 | End: 2025-03-17 | Stop reason: HOSPADM

## 2025-03-12 RX ORDER — GLUCAGON 1 MG/ML
1 KIT INJECTION PRN
Status: DISCONTINUED | OUTPATIENT
Start: 2025-03-12 | End: 2025-03-12 | Stop reason: SDUPTHER

## 2025-03-12 RX ORDER — ONDANSETRON 2 MG/ML
4 INJECTION INTRAMUSCULAR; INTRAVENOUS EVERY 6 HOURS PRN
Status: DISCONTINUED | OUTPATIENT
Start: 2025-03-12 | End: 2025-03-17 | Stop reason: HOSPADM

## 2025-03-12 RX ORDER — DULOXETIN HYDROCHLORIDE 20 MG/1
40 CAPSULE, DELAYED RELEASE ORAL DAILY
Status: DISCONTINUED | OUTPATIENT
Start: 2025-03-12 | End: 2025-03-17 | Stop reason: HOSPADM

## 2025-03-12 RX ORDER — SODIUM CHLORIDE 9 MG/ML
INJECTION, SOLUTION INTRAVENOUS PRN
Status: DISCONTINUED | OUTPATIENT
Start: 2025-03-12 | End: 2025-03-17 | Stop reason: HOSPADM

## 2025-03-12 RX ORDER — HYDROCODONE BITARTRATE AND ACETAMINOPHEN 10; 325 MG/1; MG/1
1 TABLET ORAL EVERY 4 HOURS PRN
Refills: 0 | Status: DISCONTINUED | OUTPATIENT
Start: 2025-03-12 | End: 2025-03-17 | Stop reason: HOSPADM

## 2025-03-12 RX ORDER — GABAPENTIN 400 MG/1
800 CAPSULE ORAL 3 TIMES DAILY
Status: DISCONTINUED | OUTPATIENT
Start: 2025-03-12 | End: 2025-03-13

## 2025-03-12 RX ORDER — LISINOPRIL 10 MG/1
10 TABLET ORAL 2 TIMES DAILY
Status: DISCONTINUED | OUTPATIENT
Start: 2025-03-12 | End: 2025-03-17 | Stop reason: HOSPADM

## 2025-03-12 RX ORDER — SODIUM CHLORIDE 0.9 % (FLUSH) 0.9 %
5-40 SYRINGE (ML) INJECTION EVERY 12 HOURS SCHEDULED
Status: DISCONTINUED | OUTPATIENT
Start: 2025-03-12 | End: 2025-03-17 | Stop reason: HOSPADM

## 2025-03-12 RX ORDER — 0.9 % SODIUM CHLORIDE 0.9 %
30 INTRAVENOUS SOLUTION INTRAVENOUS ONCE
Status: COMPLETED | OUTPATIENT
Start: 2025-03-12 | End: 2025-03-12

## 2025-03-12 RX ORDER — SODIUM CHLORIDE 0.9 % (FLUSH) 0.9 %
5-40 SYRINGE (ML) INJECTION PRN
Status: DISCONTINUED | OUTPATIENT
Start: 2025-03-12 | End: 2025-03-17 | Stop reason: HOSPADM

## 2025-03-12 RX ORDER — INSULIN LISPRO 100 [IU]/ML
0-8 INJECTION, SOLUTION INTRAVENOUS; SUBCUTANEOUS
Status: DISCONTINUED | OUTPATIENT
Start: 2025-03-12 | End: 2025-03-17 | Stop reason: HOSPADM

## 2025-03-12 RX ORDER — ARIPIPRAZOLE 5 MG/1
5 TABLET ORAL DAILY
Status: DISCONTINUED | OUTPATIENT
Start: 2025-03-12 | End: 2025-03-17 | Stop reason: HOSPADM

## 2025-03-12 RX ORDER — ALPRAZOLAM 0.5 MG
0.25 TABLET ORAL 2 TIMES DAILY PRN
Status: DISCONTINUED | OUTPATIENT
Start: 2025-03-12 | End: 2025-03-17 | Stop reason: HOSPADM

## 2025-03-12 RX ORDER — TRAZODONE HYDROCHLORIDE 100 MG/1
300 TABLET ORAL NIGHTLY
Status: DISCONTINUED | OUTPATIENT
Start: 2025-03-12 | End: 2025-03-17 | Stop reason: HOSPADM

## 2025-03-12 RX ORDER — DEXTROSE MONOHYDRATE 100 MG/ML
INJECTION, SOLUTION INTRAVENOUS CONTINUOUS PRN
Status: DISCONTINUED | OUTPATIENT
Start: 2025-03-12 | End: 2025-03-12 | Stop reason: SDUPTHER

## 2025-03-12 RX ORDER — ALPRAZOLAM 0.25 MG
0.25 TABLET ORAL EVERY 8 HOURS PRN
Status: DISCONTINUED | OUTPATIENT
Start: 2025-03-12 | End: 2025-03-12

## 2025-03-12 RX ORDER — PROMETHAZINE HYDROCHLORIDE 25 MG/ML
12.5 INJECTION, SOLUTION INTRAMUSCULAR; INTRAVENOUS EVERY 6 HOURS PRN
Status: DISCONTINUED | OUTPATIENT
Start: 2025-03-12 | End: 2025-03-17 | Stop reason: HOSPADM

## 2025-03-12 RX ORDER — HYDROCODONE BITARTRATE AND ACETAMINOPHEN 10; 325 MG/1; MG/1
1 TABLET ORAL EVERY 8 HOURS PRN
COMMUNITY

## 2025-03-12 RX ORDER — DROPERIDOL 2.5 MG/ML
0.62 INJECTION, SOLUTION INTRAMUSCULAR; INTRAVENOUS EVERY 6 HOURS PRN
Status: DISCONTINUED | OUTPATIENT
Start: 2025-03-12 | End: 2025-03-17 | Stop reason: HOSPADM

## 2025-03-12 RX ORDER — ATORVASTATIN CALCIUM 20 MG/1
20 TABLET, FILM COATED ORAL NIGHTLY
Status: DISCONTINUED | OUTPATIENT
Start: 2025-03-12 | End: 2025-03-17 | Stop reason: HOSPADM

## 2025-03-12 RX ORDER — HYDROCODONE BITARTRATE AND ACETAMINOPHEN 10; 325 MG/1; MG/1
1 TABLET ORAL EVERY 8 HOURS PRN
Refills: 0 | Status: DISCONTINUED | OUTPATIENT
Start: 2025-03-12 | End: 2025-03-12

## 2025-03-12 RX ORDER — POLYETHYLENE GLYCOL 3350 17 G/17G
17 POWDER, FOR SOLUTION ORAL 2 TIMES DAILY PRN
Status: DISCONTINUED | OUTPATIENT
Start: 2025-03-12 | End: 2025-03-17 | Stop reason: HOSPADM

## 2025-03-12 RX ORDER — BISMUTH TRIBROMOPH/PETROLATUM 5"X9"
1 BANDAGE TOPICAL DAILY
Status: DISCONTINUED | OUTPATIENT
Start: 2025-03-12 | End: 2025-03-17 | Stop reason: HOSPADM

## 2025-03-12 RX ORDER — CALCIUM CARBONATE 500 MG/1
500 TABLET, CHEWABLE ORAL 3 TIMES DAILY PRN
Status: DISCONTINUED | OUTPATIENT
Start: 2025-03-12 | End: 2025-03-17 | Stop reason: HOSPADM

## 2025-03-12 RX ORDER — MELOXICAM 15 MG/1
15 TABLET ORAL DAILY
COMMUNITY

## 2025-03-12 RX ORDER — ENOXAPARIN SODIUM 100 MG/ML
40 INJECTION SUBCUTANEOUS 2 TIMES DAILY
Status: DISCONTINUED | OUTPATIENT
Start: 2025-03-12 | End: 2025-03-17 | Stop reason: HOSPADM

## 2025-03-12 RX ORDER — ACETAMINOPHEN 325 MG/1
650 TABLET ORAL EVERY 4 HOURS PRN
Status: DISCONTINUED | OUTPATIENT
Start: 2025-03-12 | End: 2025-03-17 | Stop reason: HOSPADM

## 2025-03-12 RX ORDER — GLUCAGON 1 MG/ML
1 KIT INJECTION PRN
Status: DISCONTINUED | OUTPATIENT
Start: 2025-03-12 | End: 2025-03-17 | Stop reason: HOSPADM

## 2025-03-12 RX ORDER — HYDROCODONE POLISTIREX AND CHLORPHENIRAMINE POLISTIREX 10; 8 MG/5ML; MG/5ML
5 SUSPENSION, EXTENDED RELEASE ORAL EVERY 12 HOURS PRN
Refills: 0 | Status: DISCONTINUED | OUTPATIENT
Start: 2025-03-12 | End: 2025-03-17 | Stop reason: HOSPADM

## 2025-03-12 RX ORDER — ACETAMINOPHEN 650 MG/1
650 SUPPOSITORY RECTAL EVERY 4 HOURS PRN
Status: DISCONTINUED | OUTPATIENT
Start: 2025-03-12 | End: 2025-03-17 | Stop reason: HOSPADM

## 2025-03-12 RX ORDER — CYCLOBENZAPRINE HCL 10 MG
10 TABLET ORAL 3 TIMES DAILY PRN
COMMUNITY

## 2025-03-12 RX ORDER — GUAIFENESIN 600 MG/1
600 TABLET, EXTENDED RELEASE ORAL 2 TIMES DAILY
Status: DISCONTINUED | OUTPATIENT
Start: 2025-03-12 | End: 2025-03-17 | Stop reason: HOSPADM

## 2025-03-12 RX ORDER — SODIUM HYPOCHLORITE 1.25 MG/ML
SOLUTION TOPICAL 2 TIMES DAILY
Status: DISCONTINUED | OUTPATIENT
Start: 2025-03-12 | End: 2025-03-17 | Stop reason: HOSPADM

## 2025-03-12 RX ADMIN — SODIUM CHLORIDE, PRESERVATIVE FREE 10 ML: 5 INJECTION INTRAVENOUS at 20:12

## 2025-03-12 RX ADMIN — ENOXAPARIN SODIUM 40 MG: 100 INJECTION SUBCUTANEOUS at 08:29

## 2025-03-12 RX ADMIN — DULOXETINE HYDROCHLORIDE 40 MG: 20 CAPSULE, DELAYED RELEASE ORAL at 08:29

## 2025-03-12 RX ADMIN — IPRATROPIUM BROMIDE 0.5 MG: 0.5 SOLUTION RESPIRATORY (INHALATION) at 07:42

## 2025-03-12 RX ADMIN — DAKIN'S SOLUTION 0.125% (QUARTER STRENGTH): 0.12 SOLUTION at 08:29

## 2025-03-12 RX ADMIN — IPRATROPIUM BROMIDE 0.5 MG: 0.5 SOLUTION RESPIRATORY (INHALATION) at 19:31

## 2025-03-12 RX ADMIN — SODIUM CHLORIDE 1917 ML: 9 INJECTION, SOLUTION INTRAVENOUS at 02:46

## 2025-03-12 RX ADMIN — Medication 1 EACH: at 17:49

## 2025-03-12 RX ADMIN — ONDANSETRON 4 MG: 2 INJECTION INTRAMUSCULAR; INTRAVENOUS at 06:11

## 2025-03-12 RX ADMIN — OSELTAMIVIR PHOSPHATE 75 MG: 75 CAPSULE ORAL at 20:10

## 2025-03-12 RX ADMIN — BENZONATATE 200 MG: 100 CAPSULE ORAL at 20:10

## 2025-03-12 RX ADMIN — TRAZODONE HYDROCHLORIDE 300 MG: 100 TABLET ORAL at 02:38

## 2025-03-12 RX ADMIN — SODIUM CHLORIDE, PRESERVATIVE FREE 10 ML: 5 INJECTION INTRAVENOUS at 08:55

## 2025-03-12 RX ADMIN — ATORVASTATIN CALCIUM 20 MG: 40 TABLET, FILM COATED ORAL at 02:38

## 2025-03-12 RX ADMIN — ENOXAPARIN SODIUM 40 MG: 100 INJECTION SUBCUTANEOUS at 20:12

## 2025-03-12 RX ADMIN — ACETAMINOPHEN 650 MG: 325 TABLET ORAL at 17:54

## 2025-03-12 RX ADMIN — ACETAMINOPHEN 650 MG: 325 TABLET ORAL at 03:51

## 2025-03-12 RX ADMIN — LISINOPRIL 10 MG: 10 TABLET ORAL at 02:38

## 2025-03-12 RX ADMIN — WATER 1000 MG: 1 INJECTION INTRAMUSCULAR; INTRAVENOUS; SUBCUTANEOUS at 00:37

## 2025-03-12 RX ADMIN — ARIPIPRAZOLE 5 MG: 5 TABLET ORAL at 08:29

## 2025-03-12 RX ADMIN — MICONAZOLE NITRATE: 2 POWDER TOPICAL at 08:29

## 2025-03-12 RX ADMIN — Medication 5 ML: at 23:00

## 2025-03-12 RX ADMIN — WATER 1000 MG: 1 INJECTION INTRAMUSCULAR; INTRAVENOUS; SUBCUTANEOUS at 20:11

## 2025-03-12 RX ADMIN — GUAIFENESIN 600 MG: 600 TABLET ORAL at 20:10

## 2025-03-12 RX ADMIN — ATORVASTATIN CALCIUM 20 MG: 40 TABLET, FILM COATED ORAL at 20:10

## 2025-03-12 RX ADMIN — TRAZODONE HYDROCHLORIDE 300 MG: 100 TABLET ORAL at 20:32

## 2025-03-12 RX ADMIN — TIZANIDINE 4 MG: 4 TABLET ORAL at 20:32

## 2025-03-12 RX ADMIN — ENOXAPARIN SODIUM 40 MG: 100 INJECTION SUBCUTANEOUS at 02:39

## 2025-03-12 RX ADMIN — OSELTAMIVIR PHOSPHATE 75 MG: 75 CAPSULE ORAL at 08:29

## 2025-03-12 RX ADMIN — IPRATROPIUM BROMIDE 0.5 MG: 0.5 SOLUTION RESPIRATORY (INHALATION) at 15:47

## 2025-03-12 RX ADMIN — GUAIFENESIN 600 MG: 600 TABLET ORAL at 08:29

## 2025-03-12 RX ADMIN — BENZONATATE 200 MG: 100 CAPSULE ORAL at 08:29

## 2025-03-12 RX ADMIN — BENZONATATE 200 MG: 100 CAPSULE ORAL at 16:07

## 2025-03-12 SDOH — ECONOMIC STABILITY: FOOD INSECURITY: WITHIN THE PAST 12 MONTHS, YOU WORRIED THAT YOUR FOOD WOULD RUN OUT BEFORE YOU GOT MONEY TO BUY MORE.: NEVER TRUE

## 2025-03-12 SDOH — ECONOMIC STABILITY: INCOME INSECURITY: HOW HARD IS IT FOR YOU TO PAY FOR THE VERY BASICS LIKE FOOD, HOUSING, MEDICAL CARE, AND HEATING?: NOT HARD AT ALL

## 2025-03-12 SDOH — ECONOMIC STABILITY: INCOME INSECURITY: IN THE PAST 12 MONTHS, HAS THE ELECTRIC, GAS, OIL, OR WATER COMPANY THREATENED TO SHUT OFF SERVICE IN YOUR HOME?: NO

## 2025-03-12 ASSESSMENT — PATIENT HEALTH QUESTIONNAIRE - PHQ9
SUM OF ALL RESPONSES TO PHQ QUESTIONS 1-9: 0
SUM OF ALL RESPONSES TO PHQ QUESTIONS 1-9: 0
2. FEELING DOWN, DEPRESSED OR HOPELESS: NOT AT ALL
1. LITTLE INTEREST OR PLEASURE IN DOING THINGS: NOT AT ALL
SUM OF ALL RESPONSES TO PHQ QUESTIONS 1-9: 0
SUM OF ALL RESPONSES TO PHQ QUESTIONS 1-9: 0

## 2025-03-12 ASSESSMENT — PAIN DESCRIPTION - LOCATION
LOCATION: HEAD
LOCATION: HEAD

## 2025-03-12 ASSESSMENT — PAIN DESCRIPTION - ORIENTATION
ORIENTATION: RIGHT;LEFT
ORIENTATION: ANTERIOR

## 2025-03-12 ASSESSMENT — PAIN SCALES - GENERAL
PAINLEVEL_OUTOF10: 5
PAINLEVEL_OUTOF10: 7

## 2025-03-12 ASSESSMENT — PAIN DESCRIPTION - DESCRIPTORS
DESCRIPTORS: ACHING
DESCRIPTORS: ACHING

## 2025-03-12 ASSESSMENT — PAIN - FUNCTIONAL ASSESSMENT
PAIN_FUNCTIONAL_ASSESSMENT: ACTIVITIES ARE NOT PREVENTED
PAIN_FUNCTIONAL_ASSESSMENT: ACTIVITIES ARE NOT PREVENTED

## 2025-03-12 NOTE — ED NOTES
ED TO INPATIENT SBAR HANDOFF    Patient Name: Phuong Frias   : 1970  54 y.o.   Family/Caregiver Present: No  Code Status Order: Full Code    C-SSRS: Risk of Suicide: No Risk  Sitter No  Restraints:         Situation  Chief Complaint:   Chief Complaint   Patient presents with    Vomiting     Flu +sent by . Low oxygen and tachy      Patient Diagnosis: Sepsis (HCC) [A41.9]     Brief Description of Patient's Condition: presents with c/o sob  Mental Status: oriented  Arrived from: home    Imaging:   CT HEAD WO CONTRAST   Final Result   Impression:  Subtle subdural hyperdensity in the bifrontal regions is felt to represent beam hardening artifact from the low dose head CT.  Tiny subdural hematomas are considered much less likely but not excluded.  Consider further evaluation with brain    MRI, especially if symptoms persist.        All CT scans are performed using dose optimization techniques as appropriate to the performed exam and include    at least one of the following: Automated exposure control, adjustment of the mA and/or kV according to size, and the use of iterative reconstruction technique.        ______________________________________    Electronically signed by: SUDEEP HOLGUIN M.D.   Date:     2025   Time:    23:48       XR CHEST PORTABLE   Final Result   Limited image quality secondary to low lung volumes, body habitus and portable technique.  The left base is not adequately seen.  The visualized lungs are clear.  Heart size appears mildly prominent.  There is no visible pleural fluid, pneumothorax or    acute bony finding.                   ______________________________________    Electronically signed by: YENY MARTIN M.D.   Date:     2025   Time:    19:23         COVID-19 Results:   Internal Administration   First Dose      Second Dose           Last COVID Lab SARS-CoV-2, PCR (no units)   Date Value   2021 DETECTED (A)     SARS-COV-2, POC (no units)   Date Value

## 2025-03-12 NOTE — ED NOTES
Provider notified of pt low oxygen saturation that has been ranging in the mid 80s while this RN stayed in room despite being on NC. Per provider to leave pt on 2L NC for now and not to increase it too quickly . Per nurse I received report from pt o2 sat had been dropping intermittently on NC but when nurse went into pt room oxygen Improved.

## 2025-03-12 NOTE — ED NOTES
Incontinence care provided, new linens applied to ED stretcher. Pt boosted up in bed and was turned on her side  with improvement in 02 sat ranging from 88-90 on 3L NC. Provider notified

## 2025-03-12 NOTE — H&P
SubCUTAneous Q7 Days     Supportive and Prophylactic Txx:  DVT: Lovenox SQ 40mg BID   GI (PUD) PPx: not indicated  PT: not indicated  ADULT DIET; Regular; 4 carb choices (60 gm/meal); Low Fat/Low Chol/High Fiber/KATIE; No Added Salt (3-4 gm); Low Potassium (Less than 3000 mg/day); Low Phosphorus (Less than 1000 mg)  ALPRAZolam, melatonin, polyethylene glycol, calcium carbonate, tiZANidine, glucose, dextrose bolus **OR** dextrose bolus, glucagon (rDNA), dextrose, naloxone, HYDROcodone-acetaminophen, sodium chloride flush, sodium chloride, acetaminophen **OR** acetaminophen      Pt seen/examined and admitted to inpatient status.  Inpatient status is used for patients with an expected LOS extending past two midnights due to medical therapy and or critical care needs, otherwise patients are placed to OBServation status.    Signed:  Electronically signed by Wyatt Tim MD on 3/12/25 at 3:28 AM CDT.

## 2025-03-13 LAB
ANION GAP SERPL CALCULATED.3IONS-SCNC: 8 MMOL/L (ref 8–16)
BACTERIA BLD CULT ORG #2: ABNORMAL
BACTERIA BLD CULT ORG #2: ABNORMAL
BASOPHILS # BLD: 0 K/UL (ref 0–0.2)
BASOPHILS NFR BLD: 0.2 % (ref 0–1)
BUN SERPL-MCNC: 11 MG/DL (ref 6–20)
CALCIUM SERPL-MCNC: 8.4 MG/DL (ref 8.6–10)
CHLORIDE SERPL-SCNC: 104 MMOL/L (ref 98–107)
CO2 SERPL-SCNC: 29 MMOL/L (ref 22–29)
CREAT SERPL-MCNC: 1.1 MG/DL (ref 0.5–0.9)
EOSINOPHIL # BLD: 0 K/UL (ref 0–0.6)
EOSINOPHIL NFR BLD: 0 % (ref 0–5)
ERYTHROCYTE [DISTWIDTH] IN BLOOD BY AUTOMATED COUNT: 15.1 % (ref 11.5–14.5)
GLUCOSE BLD-MCNC: 116 MG/DL (ref 70–99)
GLUCOSE BLD-MCNC: 118 MG/DL (ref 70–99)
GLUCOSE BLD-MCNC: 119 MG/DL (ref 70–99)
GLUCOSE BLD-MCNC: 153 MG/DL (ref 70–99)
GLUCOSE SERPL-MCNC: 107 MG/DL (ref 70–99)
HCT VFR BLD AUTO: 35.3 % (ref 37–47)
HGB BLD-MCNC: 10.9 G/DL (ref 12–16)
IMM GRANULOCYTES # BLD: 0.1 K/UL
LYMPHOCYTES # BLD: 1.8 K/UL (ref 1.1–4.5)
LYMPHOCYTES NFR BLD: 12.4 % (ref 20–40)
MCH RBC QN AUTO: 28.2 PG (ref 27–31)
MCHC RBC AUTO-ENTMCNC: 30.9 G/DL (ref 33–37)
MCV RBC AUTO: 91.5 FL (ref 81–99)
MONOCYTES # BLD: 0.5 K/UL (ref 0–0.9)
MONOCYTES NFR BLD: 3.2 % (ref 0–10)
NEUTROPHILS # BLD: 12.4 K/UL (ref 1.5–7.5)
NEUTS SEG NFR BLD: 83.5 % (ref 50–65)
ORGANISM: ABNORMAL
PERFORMED ON: ABNORMAL
PLATELET # BLD AUTO: 185 K/UL (ref 130–400)
PMV BLD AUTO: 8.4 FL (ref 9.4–12.3)
POTASSIUM SERPL-SCNC: 4.3 MMOL/L (ref 3.5–5)
RBC # BLD AUTO: 3.86 M/UL (ref 4.2–5.4)
SODIUM SERPL-SCNC: 141 MMOL/L (ref 136–145)
WBC # BLD AUTO: 14.9 K/UL (ref 4.8–10.8)

## 2025-03-13 PROCEDURE — 6370000000 HC RX 637 (ALT 250 FOR IP): Performed by: STUDENT IN AN ORGANIZED HEALTH CARE EDUCATION/TRAINING PROGRAM

## 2025-03-13 PROCEDURE — 6360000002 HC RX W HCPCS: Performed by: HOSPITALIST

## 2025-03-13 PROCEDURE — 36415 COLL VENOUS BLD VENIPUNCTURE: CPT

## 2025-03-13 PROCEDURE — 94150 VITAL CAPACITY TEST: CPT

## 2025-03-13 PROCEDURE — 94669 MECHANICAL CHEST WALL OSCILL: CPT

## 2025-03-13 PROCEDURE — 6360000002 HC RX W HCPCS: Performed by: STUDENT IN AN ORGANIZED HEALTH CARE EDUCATION/TRAINING PROGRAM

## 2025-03-13 PROCEDURE — 1200000000 HC SEMI PRIVATE

## 2025-03-13 PROCEDURE — 85025 COMPLETE CBC W/AUTO DIFF WBC: CPT

## 2025-03-13 PROCEDURE — 80048 BASIC METABOLIC PNL TOTAL CA: CPT

## 2025-03-13 PROCEDURE — 94760 N-INVAS EAR/PLS OXIMETRY 1: CPT

## 2025-03-13 PROCEDURE — 6370000000 HC RX 637 (ALT 250 FOR IP): Performed by: HOSPITALIST

## 2025-03-13 PROCEDURE — 2700000000 HC OXYGEN THERAPY PER DAY

## 2025-03-13 PROCEDURE — 94640 AIRWAY INHALATION TREATMENT: CPT

## 2025-03-13 PROCEDURE — 82962 GLUCOSE BLOOD TEST: CPT

## 2025-03-13 PROCEDURE — 2500000003 HC RX 250 WO HCPCS: Performed by: HOSPITALIST

## 2025-03-13 PROCEDURE — 2500000003 HC RX 250 WO HCPCS: Performed by: STUDENT IN AN ORGANIZED HEALTH CARE EDUCATION/TRAINING PROGRAM

## 2025-03-13 RX ORDER — GABAPENTIN 400 MG/1
800 CAPSULE ORAL 2 TIMES DAILY
Status: DISCONTINUED | OUTPATIENT
Start: 2025-03-13 | End: 2025-03-17 | Stop reason: HOSPADM

## 2025-03-13 RX ADMIN — TRAZODONE HYDROCHLORIDE 300 MG: 100 TABLET ORAL at 20:22

## 2025-03-13 RX ADMIN — GABAPENTIN 800 MG: 400 CAPSULE ORAL at 20:22

## 2025-03-13 RX ADMIN — GUAIFENESIN 600 MG: 600 TABLET ORAL at 20:22

## 2025-03-13 RX ADMIN — OSELTAMIVIR PHOSPHATE 75 MG: 75 CAPSULE ORAL at 20:22

## 2025-03-13 RX ADMIN — WATER 1000 MG: 1 INJECTION INTRAMUSCULAR; INTRAVENOUS; SUBCUTANEOUS at 20:22

## 2025-03-13 RX ADMIN — HYDROCODONE BITARTRATE AND ACETAMINOPHEN 1 TABLET: 10; 325 TABLET ORAL at 19:51

## 2025-03-13 RX ADMIN — SODIUM CHLORIDE, PRESERVATIVE FREE 10 ML: 5 INJECTION INTRAVENOUS at 20:24

## 2025-03-13 RX ADMIN — GUAIFENESIN 600 MG: 600 TABLET ORAL at 07:11

## 2025-03-13 RX ADMIN — Medication 5 ML: at 23:50

## 2025-03-13 RX ADMIN — SODIUM CHLORIDE, PRESERVATIVE FREE 10 ML: 5 INJECTION INTRAVENOUS at 07:12

## 2025-03-13 RX ADMIN — ARIPIPRAZOLE 5 MG: 5 TABLET ORAL at 07:11

## 2025-03-13 RX ADMIN — ENOXAPARIN SODIUM 40 MG: 100 INJECTION SUBCUTANEOUS at 07:11

## 2025-03-13 RX ADMIN — IPRATROPIUM BROMIDE 0.5 MG: 0.5 SOLUTION RESPIRATORY (INHALATION) at 07:43

## 2025-03-13 RX ADMIN — IPRATROPIUM BROMIDE 0.5 MG: 0.5 SOLUTION RESPIRATORY (INHALATION) at 19:22

## 2025-03-13 RX ADMIN — ATORVASTATIN CALCIUM 20 MG: 40 TABLET, FILM COATED ORAL at 20:22

## 2025-03-13 RX ADMIN — OSELTAMIVIR PHOSPHATE 75 MG: 75 CAPSULE ORAL at 07:11

## 2025-03-13 RX ADMIN — Medication 1 EACH: at 11:26

## 2025-03-13 RX ADMIN — HYDROCODONE BITARTRATE AND ACETAMINOPHEN 1 TABLET: 10; 325 TABLET ORAL at 02:25

## 2025-03-13 RX ADMIN — BENZONATATE 200 MG: 100 CAPSULE ORAL at 14:49

## 2025-03-13 RX ADMIN — Medication 5 ML: at 11:50

## 2025-03-13 RX ADMIN — BENZONATATE 200 MG: 100 CAPSULE ORAL at 07:11

## 2025-03-13 RX ADMIN — BENZONATATE 200 MG: 100 CAPSULE ORAL at 20:22

## 2025-03-13 RX ADMIN — TIZANIDINE 4 MG: 4 TABLET ORAL at 20:22

## 2025-03-13 RX ADMIN — DULOXETINE HYDROCHLORIDE 40 MG: 20 CAPSULE, DELAYED RELEASE ORAL at 07:11

## 2025-03-13 RX ADMIN — ENOXAPARIN SODIUM 40 MG: 100 INJECTION SUBCUTANEOUS at 20:22

## 2025-03-13 RX ADMIN — GABAPENTIN 800 MG: 400 CAPSULE ORAL at 09:12

## 2025-03-13 ASSESSMENT — PAIN - FUNCTIONAL ASSESSMENT
PAIN_FUNCTIONAL_ASSESSMENT: PREVENTS OR INTERFERES SOME ACTIVE ACTIVITIES AND ADLS
PAIN_FUNCTIONAL_ASSESSMENT: ACTIVITIES ARE NOT PREVENTED

## 2025-03-13 ASSESSMENT — PAIN DESCRIPTION - DESCRIPTORS
DESCRIPTORS: STABBING
DESCRIPTORS: SORE

## 2025-03-13 ASSESSMENT — PAIN SCALES - GENERAL
PAINLEVEL_OUTOF10: 4
PAINLEVEL_OUTOF10: 5
PAINLEVEL_OUTOF10: 6

## 2025-03-13 ASSESSMENT — PAIN DESCRIPTION - LOCATION
LOCATION: ABDOMEN
LOCATION: LEG

## 2025-03-13 ASSESSMENT — PAIN DESCRIPTION - ORIENTATION
ORIENTATION: LEFT
ORIENTATION: MID

## 2025-03-13 NOTE — PLAN OF CARE
Problem: Safety - Adult  Goal: Free from fall injury  3/13/2025 0213 by Eric Champion RN  Outcome: Progressing  3/12/2025 1808 by Clare Valencia RN  Outcome: Progressing     Problem: Chronic Conditions and Co-morbidities  Goal: Patient's chronic conditions and co-morbidity symptoms are monitored and maintained or improved  3/13/2025 0213 by Eric Champion RN  Outcome: Progressing  3/12/2025 1808 by Clare Valencia RN  Outcome: Progressing     Problem: Discharge Planning  Goal: Discharge to home or other facility with appropriate resources  3/13/2025 0213 by Eric Champion RN  Outcome: Progressing  3/12/2025 1808 by Clare Valencia RN  Outcome: Progressing

## 2025-03-14 LAB
ANION GAP SERPL CALCULATED.3IONS-SCNC: 7 MMOL/L (ref 8–16)
BACTERIA UR CULT: ABNORMAL
BUN SERPL-MCNC: 11 MG/DL (ref 6–20)
CALCIUM SERPL-MCNC: 8.6 MG/DL (ref 8.6–10)
CHLORIDE SERPL-SCNC: 104 MMOL/L (ref 98–107)
CO2 SERPL-SCNC: 30 MMOL/L (ref 22–29)
CREAT SERPL-MCNC: 1 MG/DL (ref 0.5–0.9)
GLUCOSE BLD-MCNC: 106 MG/DL (ref 70–99)
GLUCOSE BLD-MCNC: 90 MG/DL (ref 70–99)
GLUCOSE BLD-MCNC: 98 MG/DL (ref 70–99)
GLUCOSE BLD-MCNC: 99 MG/DL (ref 70–99)
GLUCOSE BLD-MCNC: 99 MG/DL (ref 70–99)
GLUCOSE SERPL-MCNC: 94 MG/DL (ref 70–99)
ORGANISM: ABNORMAL
ORGANISM: ABNORMAL
PERFORMED ON: ABNORMAL
PERFORMED ON: NORMAL
POTASSIUM SERPL-SCNC: 4.1 MMOL/L (ref 3.5–5)
SODIUM SERPL-SCNC: 141 MMOL/L (ref 136–145)

## 2025-03-14 PROCEDURE — 94760 N-INVAS EAR/PLS OXIMETRY 1: CPT

## 2025-03-14 PROCEDURE — 94150 VITAL CAPACITY TEST: CPT

## 2025-03-14 PROCEDURE — 80048 BASIC METABOLIC PNL TOTAL CA: CPT

## 2025-03-14 PROCEDURE — 2500000003 HC RX 250 WO HCPCS: Performed by: STUDENT IN AN ORGANIZED HEALTH CARE EDUCATION/TRAINING PROGRAM

## 2025-03-14 PROCEDURE — 2700000000 HC OXYGEN THERAPY PER DAY

## 2025-03-14 PROCEDURE — 6370000000 HC RX 637 (ALT 250 FOR IP): Performed by: STUDENT IN AN ORGANIZED HEALTH CARE EDUCATION/TRAINING PROGRAM

## 2025-03-14 PROCEDURE — 1200000000 HC SEMI PRIVATE

## 2025-03-14 PROCEDURE — 2500000003 HC RX 250 WO HCPCS: Performed by: HOSPITALIST

## 2025-03-14 PROCEDURE — 94640 AIRWAY INHALATION TREATMENT: CPT

## 2025-03-14 PROCEDURE — 6370000000 HC RX 637 (ALT 250 FOR IP): Performed by: HOSPITALIST

## 2025-03-14 PROCEDURE — 6360000002 HC RX W HCPCS: Performed by: STUDENT IN AN ORGANIZED HEALTH CARE EDUCATION/TRAINING PROGRAM

## 2025-03-14 PROCEDURE — 6360000002 HC RX W HCPCS: Performed by: HOSPITALIST

## 2025-03-14 PROCEDURE — 36415 COLL VENOUS BLD VENIPUNCTURE: CPT

## 2025-03-14 PROCEDURE — 82962 GLUCOSE BLOOD TEST: CPT

## 2025-03-14 PROCEDURE — 94669 MECHANICAL CHEST WALL OSCILL: CPT

## 2025-03-14 RX ORDER — IPRATROPIUM BROMIDE AND ALBUTEROL SULFATE 2.5; .5 MG/3ML; MG/3ML
1 SOLUTION RESPIRATORY (INHALATION)
Status: DISCONTINUED | OUTPATIENT
Start: 2025-03-14 | End: 2025-03-17 | Stop reason: HOSPADM

## 2025-03-14 RX ADMIN — Medication 1 EACH: at 10:01

## 2025-03-14 RX ADMIN — OSELTAMIVIR PHOSPHATE 75 MG: 75 CAPSULE ORAL at 19:28

## 2025-03-14 RX ADMIN — GUAIFENESIN 600 MG: 600 TABLET ORAL at 19:28

## 2025-03-14 RX ADMIN — ENOXAPARIN SODIUM 40 MG: 100 INJECTION SUBCUTANEOUS at 06:02

## 2025-03-14 RX ADMIN — BENZONATATE 200 MG: 100 CAPSULE ORAL at 15:29

## 2025-03-14 RX ADMIN — IPRATROPIUM BROMIDE 0.5 MG: 0.5 SOLUTION RESPIRATORY (INHALATION) at 06:37

## 2025-03-14 RX ADMIN — IPRATROPIUM BROMIDE AND ALBUTEROL SULFATE 1 DOSE: 2.5; .5 SOLUTION RESPIRATORY (INHALATION) at 18:25

## 2025-03-14 RX ADMIN — TIZANIDINE 4 MG: 4 TABLET ORAL at 19:28

## 2025-03-14 RX ADMIN — OSELTAMIVIR PHOSPHATE 75 MG: 75 CAPSULE ORAL at 06:03

## 2025-03-14 RX ADMIN — GUAIFENESIN 600 MG: 600 TABLET ORAL at 06:02

## 2025-03-14 RX ADMIN — IPRATROPIUM BROMIDE AND ALBUTEROL SULFATE 1 DOSE: 2.5; .5 SOLUTION RESPIRATORY (INHALATION) at 10:07

## 2025-03-14 RX ADMIN — ACETAMINOPHEN 650 MG: 325 TABLET ORAL at 17:45

## 2025-03-14 RX ADMIN — ARIPIPRAZOLE 5 MG: 5 TABLET ORAL at 06:03

## 2025-03-14 RX ADMIN — SODIUM CHLORIDE, PRESERVATIVE FREE 10 ML: 5 INJECTION INTRAVENOUS at 19:30

## 2025-03-14 RX ADMIN — DULOXETINE HYDROCHLORIDE 40 MG: 20 CAPSULE, DELAYED RELEASE ORAL at 06:02

## 2025-03-14 RX ADMIN — GABAPENTIN 800 MG: 400 CAPSULE ORAL at 06:02

## 2025-03-14 RX ADMIN — BENZONATATE 200 MG: 100 CAPSULE ORAL at 19:28

## 2025-03-14 RX ADMIN — POLYETHYLENE GLYCOL 3350 17 G: 17 POWDER, FOR SOLUTION ORAL at 13:06

## 2025-03-14 RX ADMIN — WATER 1000 MG: 1 INJECTION INTRAMUSCULAR; INTRAVENOUS; SUBCUTANEOUS at 19:30

## 2025-03-14 RX ADMIN — ATORVASTATIN CALCIUM 20 MG: 40 TABLET, FILM COATED ORAL at 19:28

## 2025-03-14 RX ADMIN — ENOXAPARIN SODIUM 40 MG: 100 INJECTION SUBCUTANEOUS at 19:29

## 2025-03-14 RX ADMIN — SODIUM CHLORIDE, PRESERVATIVE FREE 10 ML: 5 INJECTION INTRAVENOUS at 06:12

## 2025-03-14 RX ADMIN — BENZONATATE 200 MG: 100 CAPSULE ORAL at 06:02

## 2025-03-14 RX ADMIN — TRAZODONE HYDROCHLORIDE 300 MG: 100 TABLET ORAL at 19:28

## 2025-03-14 RX ADMIN — IPRATROPIUM BROMIDE AND ALBUTEROL SULFATE 1 DOSE: 2.5; .5 SOLUTION RESPIRATORY (INHALATION) at 14:13

## 2025-03-14 RX ADMIN — GABAPENTIN 800 MG: 400 CAPSULE ORAL at 19:28

## 2025-03-14 ASSESSMENT — PAIN DESCRIPTION - DESCRIPTORS: DESCRIPTORS: ACHING

## 2025-03-14 ASSESSMENT — PAIN DESCRIPTION - ORIENTATION: ORIENTATION: MID

## 2025-03-14 ASSESSMENT — PAIN SCALES - GENERAL: PAINLEVEL_OUTOF10: 3

## 2025-03-14 ASSESSMENT — PAIN DESCRIPTION - LOCATION: LOCATION: CHEST

## 2025-03-14 NOTE — PLAN OF CARE
Problem: Safety - Adult  Goal: Free from fall injury  3/14/2025 1052 by Yolis Vega RN  Outcome: Progressing  3/14/2025 0535 by Eric Champion RN  Outcome: Progressing     Problem: Chronic Conditions and Co-morbidities  Goal: Patient's chronic conditions and co-morbidity symptoms are monitored and maintained or improved  3/14/2025 1052 by Yolis Vega RN  Outcome: Progressing  3/14/2025 0535 by Eric Champion RN  Outcome: Progressing     Problem: Discharge Planning  Goal: Discharge to home or other facility with appropriate resources  3/14/2025 1052 by Yolis Vega RN  Outcome: Progressing  3/14/2025 0535 by Eric Champion RN  Outcome: Progressing     Problem: Skin/Tissue Integrity  Goal: Skin integrity remains intact  Description: 1.  Monitor for areas of redness and/or skin breakdown  2.  Assess vascular access sites hourly  3.  Every 4-6 hours minimum:  Change oxygen saturation probe site  4.  Every 4-6 hours:  If on nasal continuous positive airway pressure, respiratory therapy assess nares and determine need for appliance change or resting period  3/14/2025 1052 by Yolis Vega RN  Outcome: Progressing  3/14/2025 0535 by Eric Champion RN  Outcome: Progressing

## 2025-03-14 NOTE — CARE COORDINATION
Case Management Assessment  Initial Evaluation    Date/Time of Evaluation: 3/14/2025 1:14 PM  Assessment Completed by: Alisha Carvajal RN    If patient is discharged prior to next notation, then this note serves as note for discharge by case management.    Patient Name: Phuong Frias                   YOB: 1970  Diagnosis: Hypoxemia [R09.02]  Influenza A [J10.1]  Sepsis (HCC) [A41.9]  Altered mental status, unspecified altered mental status type [R41.82]  Nausea and vomiting, unspecified vomiting type [R11.2]                   Date / Time: 3/11/2025  5:47 PM    Patient Admission Status: Inpatient   Readmission Risk (Low < 19, Mod (19-27), High > 27): Readmission Risk Score: 14.5    Current PCP: Opal Grossman APRN  PCP verified by CM? (P) Yes    Chart Reviewed: Yes      History Provided by: (P) Patient  Patient Orientation: (P) Alert and Oriented, Person, Place    Patient Cognition: (P) Alert    Hospitalization in the last 30 days (Readmission):  No    If yes, Readmission Assessment in CM Navigator will be completed.    Advance Directives:      Code Status: Full Code   Patient's Primary Decision Maker is: (P) Legal Next of Kin      Discharge Planning:    Patient lives with: (P) Children Type of Home: (P) House  Primary Care Giver: (P) Self  Patient Support Systems include: (P) Children   Current Financial resources:    Current community resources:    Current services prior to admission: (P) None            Current DME:              Type of Home Care services:  None    ADLS  Prior functional level: (P) Independent in ADLs/IADLs  Current functional level: (P) Independent in ADLs/IADLs    PT AM-PAC:   /24  OT AM-PAC:   /24    Family can provide assistance at DC: (P) Yes  Would you like Case Management to discuss the discharge plan with any other family members/significant others, and if so, who? (P) No  Plans to Return to Present Housing: (P) Yes  Other Identified Issues/Barriers to RETURNING to

## 2025-03-14 NOTE — PLAN OF CARE
Problem: Safety - Adult  Goal: Free from fall injury  3/14/2025 0535 by Eric Champion RN  Outcome: Progressing  3/13/2025 1621 by Clare Valencia RN  Outcome: Progressing     Problem: Chronic Conditions and Co-morbidities  Goal: Patient's chronic conditions and co-morbidity symptoms are monitored and maintained or improved  3/14/2025 0535 by Eric Champion RN  Outcome: Progressing  3/13/2025 1621 by Clare Valencia RN  Outcome: Progressing     Problem: Discharge Planning  Goal: Discharge to home or other facility with appropriate resources  3/14/2025 0535 by Eric Champion RN  Outcome: Progressing  3/13/2025 1621 by Clare Valencia RN  Outcome: Progressing     Problem: Skin/Tissue Integrity  Goal: Skin integrity remains intact  Description: 1.  Monitor for areas of redness and/or skin breakdown  2.  Assess vascular access sites hourly  3.  Every 4-6 hours minimum:  Change oxygen saturation probe site  4.  Every 4-6 hours:  If on nasal continuous positive airway pressure, respiratory therapy assess nares and determine need for appliance change or resting period  Outcome: Progressing

## 2025-03-15 LAB
ANION GAP SERPL CALCULATED.3IONS-SCNC: 10 MMOL/L (ref 8–16)
BUN SERPL-MCNC: 11 MG/DL (ref 6–20)
CALCIUM SERPL-MCNC: 8.9 MG/DL (ref 8.6–10)
CHLORIDE SERPL-SCNC: 101 MMOL/L (ref 98–107)
CO2 SERPL-SCNC: 29 MMOL/L (ref 22–29)
CREAT SERPL-MCNC: 1 MG/DL (ref 0.5–0.9)
GLUCOSE BLD-MCNC: 101 MG/DL (ref 70–99)
GLUCOSE BLD-MCNC: 113 MG/DL (ref 70–99)
GLUCOSE BLD-MCNC: 125 MG/DL (ref 70–99)
GLUCOSE BLD-MCNC: 90 MG/DL (ref 70–99)
GLUCOSE SERPL-MCNC: 95 MG/DL (ref 70–99)
PERFORMED ON: ABNORMAL
PERFORMED ON: NORMAL
POTASSIUM SERPL-SCNC: 4.2 MMOL/L (ref 3.5–5)
SODIUM SERPL-SCNC: 140 MMOL/L (ref 136–145)

## 2025-03-15 PROCEDURE — 6370000000 HC RX 637 (ALT 250 FOR IP): Performed by: STUDENT IN AN ORGANIZED HEALTH CARE EDUCATION/TRAINING PROGRAM

## 2025-03-15 PROCEDURE — 80048 BASIC METABOLIC PNL TOTAL CA: CPT

## 2025-03-15 PROCEDURE — 82962 GLUCOSE BLOOD TEST: CPT

## 2025-03-15 PROCEDURE — 2700000000 HC OXYGEN THERAPY PER DAY

## 2025-03-15 PROCEDURE — 94150 VITAL CAPACITY TEST: CPT

## 2025-03-15 PROCEDURE — 94760 N-INVAS EAR/PLS OXIMETRY 1: CPT

## 2025-03-15 PROCEDURE — 6370000000 HC RX 637 (ALT 250 FOR IP): Performed by: HOSPITALIST

## 2025-03-15 PROCEDURE — 6360000002 HC RX W HCPCS: Performed by: HOSPITALIST

## 2025-03-15 PROCEDURE — 2500000003 HC RX 250 WO HCPCS: Performed by: STUDENT IN AN ORGANIZED HEALTH CARE EDUCATION/TRAINING PROGRAM

## 2025-03-15 PROCEDURE — 1200000000 HC SEMI PRIVATE

## 2025-03-15 PROCEDURE — 94669 MECHANICAL CHEST WALL OSCILL: CPT

## 2025-03-15 PROCEDURE — 2500000003 HC RX 250 WO HCPCS: Performed by: HOSPITALIST

## 2025-03-15 PROCEDURE — 36415 COLL VENOUS BLD VENIPUNCTURE: CPT

## 2025-03-15 PROCEDURE — 94640 AIRWAY INHALATION TREATMENT: CPT

## 2025-03-15 PROCEDURE — 6360000002 HC RX W HCPCS: Performed by: STUDENT IN AN ORGANIZED HEALTH CARE EDUCATION/TRAINING PROGRAM

## 2025-03-15 RX ADMIN — GABAPENTIN 800 MG: 400 CAPSULE ORAL at 19:57

## 2025-03-15 RX ADMIN — TRAZODONE HYDROCHLORIDE 300 MG: 100 TABLET ORAL at 19:58

## 2025-03-15 RX ADMIN — SODIUM CHLORIDE, PRESERVATIVE FREE 10 ML: 5 INJECTION INTRAVENOUS at 20:29

## 2025-03-15 RX ADMIN — ATORVASTATIN CALCIUM 20 MG: 40 TABLET, FILM COATED ORAL at 19:58

## 2025-03-15 RX ADMIN — IPRATROPIUM BROMIDE AND ALBUTEROL SULFATE 1 DOSE: 2.5; .5 SOLUTION RESPIRATORY (INHALATION) at 06:52

## 2025-03-15 RX ADMIN — ARIPIPRAZOLE 5 MG: 5 TABLET ORAL at 05:47

## 2025-03-15 RX ADMIN — TIZANIDINE 4 MG: 4 TABLET ORAL at 19:58

## 2025-03-15 RX ADMIN — BENZONATATE 200 MG: 100 CAPSULE ORAL at 19:58

## 2025-03-15 RX ADMIN — BENZONATATE 200 MG: 100 CAPSULE ORAL at 05:46

## 2025-03-15 RX ADMIN — OSELTAMIVIR PHOSPHATE 75 MG: 75 CAPSULE ORAL at 05:47

## 2025-03-15 RX ADMIN — BENZONATATE 200 MG: 100 CAPSULE ORAL at 15:47

## 2025-03-15 RX ADMIN — LISINOPRIL 10 MG: 10 TABLET ORAL at 17:46

## 2025-03-15 RX ADMIN — OSELTAMIVIR PHOSPHATE 75 MG: 75 CAPSULE ORAL at 19:58

## 2025-03-15 RX ADMIN — ENOXAPARIN SODIUM 40 MG: 100 INJECTION SUBCUTANEOUS at 19:58

## 2025-03-15 RX ADMIN — IPRATROPIUM BROMIDE AND ALBUTEROL SULFATE 1 DOSE: 2.5; .5 SOLUTION RESPIRATORY (INHALATION) at 18:56

## 2025-03-15 RX ADMIN — GUAIFENESIN 600 MG: 600 TABLET ORAL at 05:46

## 2025-03-15 RX ADMIN — DULOXETINE HYDROCHLORIDE 40 MG: 20 CAPSULE, DELAYED RELEASE ORAL at 05:46

## 2025-03-15 RX ADMIN — GUAIFENESIN 600 MG: 600 TABLET ORAL at 19:58

## 2025-03-15 RX ADMIN — ENOXAPARIN SODIUM 40 MG: 100 INJECTION SUBCUTANEOUS at 05:48

## 2025-03-15 RX ADMIN — WATER 1000 MG: 1 INJECTION INTRAMUSCULAR; INTRAVENOUS; SUBCUTANEOUS at 19:58

## 2025-03-15 RX ADMIN — SODIUM CHLORIDE, PRESERVATIVE FREE 10 ML: 5 INJECTION INTRAVENOUS at 05:48

## 2025-03-15 RX ADMIN — IPRATROPIUM BROMIDE AND ALBUTEROL SULFATE 1 DOSE: 2.5; .5 SOLUTION RESPIRATORY (INHALATION) at 11:10

## 2025-03-15 RX ADMIN — GABAPENTIN 800 MG: 400 CAPSULE ORAL at 05:47

## 2025-03-15 RX ADMIN — ONDANSETRON 4 MG: 2 INJECTION INTRAMUSCULAR; INTRAVENOUS at 20:29

## 2025-03-15 RX ADMIN — IPRATROPIUM BROMIDE AND ALBUTEROL SULFATE 1 DOSE: 2.5; .5 SOLUTION RESPIRATORY (INHALATION) at 15:23

## 2025-03-15 RX ADMIN — Medication 1 EACH: at 11:09

## 2025-03-15 RX ADMIN — Medication 5 ML: at 05:47

## 2025-03-15 NOTE — PLAN OF CARE
Problem: Safety - Adult  Goal: Free from fall injury  3/15/2025 1648 by Clare Valencia RN  Outcome: Progressing  3/15/2025 0645 by Eric Champion RN  Outcome: Progressing     Problem: Chronic Conditions and Co-morbidities  Goal: Patient's chronic conditions and co-morbidity symptoms are monitored and maintained or improved  3/15/2025 1648 by Clare Valencia RN  Outcome: Progressing  3/15/2025 0645 by Eric Champion RN  Outcome: Progressing     Problem: Discharge Planning  Goal: Discharge to home or other facility with appropriate resources  3/15/2025 1648 by Clare Valencia RN  Outcome: Progressing  3/15/2025 0645 by Eric Champion RN  Outcome: Progressing     Problem: Skin/Tissue Integrity  Goal: Skin integrity remains intact  Description: 1.  Monitor for areas of redness and/or skin breakdown  2.  Assess vascular access sites hourly  3.  Every 4-6 hours minimum:  Change oxygen saturation probe site  4.  Every 4-6 hours:  If on nasal continuous positive airway pressure, respiratory therapy assess nares and determine need for appliance change or resting period  3/15/2025 1648 by Clare Valencia RN  Outcome: Progressing  3/15/2025 0645 by Eric Champion RN  Outcome: Progressing

## 2025-03-16 LAB
BACTERIA BLD CULT: NORMAL
GLUCOSE BLD-MCNC: 106 MG/DL (ref 70–99)
GLUCOSE BLD-MCNC: 106 MG/DL (ref 70–99)
GLUCOSE BLD-MCNC: 113 MG/DL (ref 70–99)
GLUCOSE BLD-MCNC: 85 MG/DL (ref 70–99)
PERFORMED ON: ABNORMAL
PERFORMED ON: NORMAL

## 2025-03-16 PROCEDURE — 94150 VITAL CAPACITY TEST: CPT

## 2025-03-16 PROCEDURE — 6360000002 HC RX W HCPCS: Performed by: HOSPITALIST

## 2025-03-16 PROCEDURE — 2500000003 HC RX 250 WO HCPCS: Performed by: HOSPITALIST

## 2025-03-16 PROCEDURE — 94760 N-INVAS EAR/PLS OXIMETRY 1: CPT

## 2025-03-16 PROCEDURE — 6370000000 HC RX 637 (ALT 250 FOR IP): Performed by: HOSPITALIST

## 2025-03-16 PROCEDURE — 94669 MECHANICAL CHEST WALL OSCILL: CPT

## 2025-03-16 PROCEDURE — 94761 N-INVAS EAR/PLS OXIMETRY MLT: CPT

## 2025-03-16 PROCEDURE — 82962 GLUCOSE BLOOD TEST: CPT

## 2025-03-16 PROCEDURE — 2700000000 HC OXYGEN THERAPY PER DAY

## 2025-03-16 PROCEDURE — 6370000000 HC RX 637 (ALT 250 FOR IP): Performed by: STUDENT IN AN ORGANIZED HEALTH CARE EDUCATION/TRAINING PROGRAM

## 2025-03-16 PROCEDURE — 94640 AIRWAY INHALATION TREATMENT: CPT

## 2025-03-16 PROCEDURE — 1200000000 HC SEMI PRIVATE

## 2025-03-16 RX ADMIN — TRAZODONE HYDROCHLORIDE 300 MG: 100 TABLET ORAL at 20:11

## 2025-03-16 RX ADMIN — IPRATROPIUM BROMIDE AND ALBUTEROL SULFATE 1 DOSE: 2.5; .5 SOLUTION RESPIRATORY (INHALATION) at 11:30

## 2025-03-16 RX ADMIN — GABAPENTIN 800 MG: 400 CAPSULE ORAL at 20:11

## 2025-03-16 RX ADMIN — ENOXAPARIN SODIUM 40 MG: 100 INJECTION SUBCUTANEOUS at 07:20

## 2025-03-16 RX ADMIN — BENZONATATE 200 MG: 100 CAPSULE ORAL at 07:18

## 2025-03-16 RX ADMIN — ONDANSETRON 4 MG: 2 INJECTION INTRAMUSCULAR; INTRAVENOUS at 11:51

## 2025-03-16 RX ADMIN — SODIUM CHLORIDE, PRESERVATIVE FREE 10 ML: 5 INJECTION INTRAVENOUS at 22:30

## 2025-03-16 RX ADMIN — IPRATROPIUM BROMIDE AND ALBUTEROL SULFATE 1 DOSE: 2.5; .5 SOLUTION RESPIRATORY (INHALATION) at 15:05

## 2025-03-16 RX ADMIN — BENZONATATE 200 MG: 100 CAPSULE ORAL at 20:11

## 2025-03-16 RX ADMIN — GABAPENTIN 800 MG: 400 CAPSULE ORAL at 07:19

## 2025-03-16 RX ADMIN — ENOXAPARIN SODIUM 40 MG: 100 INJECTION SUBCUTANEOUS at 20:11

## 2025-03-16 RX ADMIN — OSELTAMIVIR PHOSPHATE 75 MG: 75 CAPSULE ORAL at 07:18

## 2025-03-16 RX ADMIN — IPRATROPIUM BROMIDE AND ALBUTEROL SULFATE 1 DOSE: 2.5; .5 SOLUTION RESPIRATORY (INHALATION) at 07:08

## 2025-03-16 RX ADMIN — BENZONATATE 200 MG: 100 CAPSULE ORAL at 15:12

## 2025-03-16 RX ADMIN — IPRATROPIUM BROMIDE AND ALBUTEROL SULFATE 1 DOSE: 2.5; .5 SOLUTION RESPIRATORY (INHALATION) at 18:53

## 2025-03-16 RX ADMIN — Medication 1 EACH: at 14:19

## 2025-03-16 RX ADMIN — ARIPIPRAZOLE 5 MG: 5 TABLET ORAL at 07:19

## 2025-03-16 RX ADMIN — TIZANIDINE 4 MG: 4 TABLET ORAL at 20:11

## 2025-03-16 RX ADMIN — LISINOPRIL 10 MG: 10 TABLET ORAL at 07:19

## 2025-03-16 RX ADMIN — GUAIFENESIN 600 MG: 600 TABLET ORAL at 20:11

## 2025-03-16 RX ADMIN — Medication 5 ML: at 23:30

## 2025-03-16 RX ADMIN — GUAIFENESIN 600 MG: 600 TABLET ORAL at 07:18

## 2025-03-16 RX ADMIN — ACETAMINOPHEN 650 MG: 325 TABLET ORAL at 23:30

## 2025-03-16 RX ADMIN — OSELTAMIVIR PHOSPHATE 75 MG: 75 CAPSULE ORAL at 20:11

## 2025-03-16 RX ADMIN — LISINOPRIL 10 MG: 10 TABLET ORAL at 20:11

## 2025-03-16 RX ADMIN — ATORVASTATIN CALCIUM 20 MG: 40 TABLET, FILM COATED ORAL at 20:11

## 2025-03-16 RX ADMIN — SODIUM CHLORIDE, PRESERVATIVE FREE 10 ML: 5 INJECTION INTRAVENOUS at 08:38

## 2025-03-16 RX ADMIN — DULOXETINE HYDROCHLORIDE 40 MG: 20 CAPSULE, DELAYED RELEASE ORAL at 07:18

## 2025-03-16 ASSESSMENT — PAIN - FUNCTIONAL ASSESSMENT: PAIN_FUNCTIONAL_ASSESSMENT: ACTIVITIES ARE NOT PREVENTED

## 2025-03-16 ASSESSMENT — PAIN SCALES - GENERAL: PAINLEVEL_OUTOF10: 3

## 2025-03-17 ENCOUNTER — READMISSION MANAGEMENT (OUTPATIENT)
Dept: CALL CENTER | Facility: HOSPITAL | Age: 55
End: 2025-03-17
Payer: MEDICARE

## 2025-03-17 VITALS
TEMPERATURE: 98.2 F | WEIGHT: 293 LBS | DIASTOLIC BLOOD PRESSURE: 71 MMHG | HEIGHT: 68 IN | BODY MASS INDEX: 44.41 KG/M2 | OXYGEN SATURATION: 89 % | SYSTOLIC BLOOD PRESSURE: 118 MMHG | RESPIRATION RATE: 18 BRPM | HEART RATE: 97 BPM

## 2025-03-17 LAB
GLUCOSE BLD-MCNC: 133 MG/DL (ref 70–99)
GLUCOSE BLD-MCNC: 156 MG/DL (ref 70–99)
PERFORMED ON: ABNORMAL
PERFORMED ON: ABNORMAL

## 2025-03-17 PROCEDURE — 6360000002 HC RX W HCPCS: Performed by: HOSPITALIST

## 2025-03-17 PROCEDURE — 94760 N-INVAS EAR/PLS OXIMETRY 1: CPT

## 2025-03-17 PROCEDURE — 2700000000 HC OXYGEN THERAPY PER DAY

## 2025-03-17 PROCEDURE — 6370000000 HC RX 637 (ALT 250 FOR IP): Performed by: HOSPITALIST

## 2025-03-17 PROCEDURE — 94150 VITAL CAPACITY TEST: CPT

## 2025-03-17 PROCEDURE — 82962 GLUCOSE BLOOD TEST: CPT

## 2025-03-17 PROCEDURE — 94640 AIRWAY INHALATION TREATMENT: CPT

## 2025-03-17 PROCEDURE — 6370000000 HC RX 637 (ALT 250 FOR IP): Performed by: STUDENT IN AN ORGANIZED HEALTH CARE EDUCATION/TRAINING PROGRAM

## 2025-03-17 PROCEDURE — 2500000003 HC RX 250 WO HCPCS: Performed by: HOSPITALIST

## 2025-03-17 PROCEDURE — 94669 MECHANICAL CHEST WALL OSCILL: CPT

## 2025-03-17 RX ORDER — ONDANSETRON 4 MG/1
4 TABLET, ORALLY DISINTEGRATING ORAL 3 TIMES DAILY PRN
Qty: 21 TABLET | Refills: 0 | Status: SHIPPED | OUTPATIENT
Start: 2025-03-17

## 2025-03-17 RX ORDER — ALBUTEROL SULFATE 90 UG/1
2 INHALANT RESPIRATORY (INHALATION) EVERY 4 HOURS PRN
Qty: 18 G | Refills: 0 | Status: SHIPPED | OUTPATIENT
Start: 2025-03-17

## 2025-03-17 RX ORDER — GUAIFENESIN 600 MG/1
600 TABLET, EXTENDED RELEASE ORAL 2 TIMES DAILY
Qty: 20 TABLET | Refills: 0 | Status: SHIPPED | OUTPATIENT
Start: 2025-03-17

## 2025-03-17 RX ORDER — BENZONATATE 200 MG/1
200 CAPSULE ORAL 3 TIMES DAILY PRN
Qty: 21 CAPSULE | Refills: 0 | Status: SHIPPED | OUTPATIENT
Start: 2025-03-17 | End: 2025-03-24

## 2025-03-17 RX ADMIN — IPRATROPIUM BROMIDE AND ALBUTEROL SULFATE 1 DOSE: 2.5; .5 SOLUTION RESPIRATORY (INHALATION) at 07:39

## 2025-03-17 RX ADMIN — IPRATROPIUM BROMIDE AND ALBUTEROL SULFATE 1 DOSE: 2.5; .5 SOLUTION RESPIRATORY (INHALATION) at 11:31

## 2025-03-17 RX ADMIN — ENOXAPARIN SODIUM 40 MG: 100 INJECTION SUBCUTANEOUS at 09:40

## 2025-03-17 RX ADMIN — GABAPENTIN 800 MG: 400 CAPSULE ORAL at 09:40

## 2025-03-17 RX ADMIN — DULOXETINE HYDROCHLORIDE 40 MG: 20 CAPSULE, DELAYED RELEASE ORAL at 09:40

## 2025-03-17 RX ADMIN — DAKIN'S SOLUTION 0.125% (QUARTER STRENGTH): 0.12 SOLUTION at 09:41

## 2025-03-17 RX ADMIN — ARIPIPRAZOLE 5 MG: 5 TABLET ORAL at 09:40

## 2025-03-17 RX ADMIN — LISINOPRIL 10 MG: 10 TABLET ORAL at 09:45

## 2025-03-17 RX ADMIN — MICONAZOLE NITRATE: 2 POWDER TOPICAL at 09:41

## 2025-03-17 RX ADMIN — SODIUM CHLORIDE, PRESERVATIVE FREE 10 ML: 5 INJECTION INTRAVENOUS at 12:51

## 2025-03-17 RX ADMIN — GUAIFENESIN 600 MG: 600 TABLET ORAL at 09:40

## 2025-03-17 RX ADMIN — Medication 5 ML: at 09:45

## 2025-03-17 RX ADMIN — Medication 1 EACH: at 14:33

## 2025-03-17 NOTE — CARE COORDINATION
Spoke with pt at bedside about home 02 . Pt did not have any preference as to where her 02 comes from. Agreeable to [facility].  All clinical information faxed. Portable tank will be delivered to pts room prior to dc.  Spoke with pt about HH orders. Pt does not want HH at this time.  Order was not sent.  Electronically signed by Alisha Carvajal RN on 3/17/2025 at 11:16 AM

## 2025-03-17 NOTE — CARE COORDINATION
03/17/25 1243   IMM Letter   IMM Letter given to Patient/Family/Significant other/Guardian/POA/by: letter explained to and signed by carla Antunez RNCM   IMM Letter date given: 03/17/25   IMM Letter time given: 0935     Important Message from Medicare letter explained and provided to patient by Alisha Carvajal RN CM. All questions answered. Patient voiced understanding. Copy placed in soft chart.

## 2025-03-17 NOTE — PLAN OF CARE
Problem: Safety - Adult  Goal: Free from fall injury  3/16/2025 2157 by Eric Champion RN  Outcome: Progressing  3/16/2025 1000 by Clare Valencia RN  Outcome: Progressing     Problem: Chronic Conditions and Co-morbidities  Goal: Patient's chronic conditions and co-morbidity symptoms are monitored and maintained or improved  3/16/2025 2157 by Eric Champion RN  Outcome: Progressing  3/16/2025 1000 by Clare Valencia RN  Outcome: Progressing     Problem: Discharge Planning  Goal: Discharge to home or other facility with appropriate resources  3/16/2025 2157 by Eric Champion RN  Outcome: Progressing  3/16/2025 1000 by Clare Valencia RN  Outcome: Progressing     Problem: Skin/Tissue Integrity  Goal: Skin integrity remains intact  Description: 1.  Monitor for areas of redness and/or skin breakdown  2.  Assess vascular access sites hourly  3.  Every 4-6 hours minimum:  Change oxygen saturation probe site  4.  Every 4-6 hours:  If on nasal continuous positive airway pressure, respiratory therapy assess nares and determine need for appliance change or resting period  3/16/2025 2157 by Eric Champion, RN  Outcome: Progressing  3/16/2025 1000 by Clare Valencia RN  Outcome: Progressing

## 2025-03-17 NOTE — PLAN OF CARE
Problem: Safety - Adult  Goal: Free from fall injury  3/17/2025 1145 by Shaista Enrique RN  Outcome: Adequate for Discharge  3/16/2025 2157 by Eric Champion RN  Outcome: Progressing     Problem: Chronic Conditions and Co-morbidities  Goal: Patient's chronic conditions and co-morbidity symptoms are monitored and maintained or improved  3/17/2025 1145 by Shaista Enrique RN  Outcome: Adequate for Discharge  3/16/2025 2157 by Eric Champion RN  Outcome: Progressing     Problem: Discharge Planning  Goal: Discharge to home or other facility with appropriate resources  3/17/2025 1145 by Shaista Enrique RN  Outcome: Adequate for Discharge  3/16/2025 2157 by Eric Champion RN  Outcome: Progressing     Problem: Skin/Tissue Integrity  Goal: Skin integrity remains intact  Description: 1.  Monitor for areas of redness and/or skin breakdown  2.  Assess vascular access sites hourly  3.  Every 4-6 hours minimum:  Change oxygen saturation probe site  4.  Every 4-6 hours:  If on nasal continuous positive airway pressure, respiratory therapy assess nares and determine need for appliance change or resting period  3/17/2025 1145 by Shaista Enrique RN  Outcome: Adequate for Discharge  3/16/2025 2157 by Eric Champion RN  Outcome: Progressing

## 2025-03-17 NOTE — PROGRESS NOTES
03/16/25 1508   Resting (Room Air)   SpO2 87   Resting (On O2)   SpO2 92   O2 Device Nasal cannula   O2 Flow Rate (l/min) 2 l/min   During Walk (Room Air)   Comments pt unable to ambulate at this time   After Walk   SpO2 92   O2 Device Nasal cannula   O2 Flow Rate (l/min) 2 l/min   Does the Patient Qualify for Home O2 Yes   Does the Patient Need Portable Oxygen Tanks Yes       
 Daily Progress Note    Date:3/14/2025  Patient: Phuong Frias  : 1970  MRN:[id number]  CODE:Full Code No additional code details  PCP:Opal Grossman APRN    Admit Date: 3/11/2025  5:47 PM   LOS: 3 days       Subjective:     She still feels ill, generally weak, has not been out of bed, dyspneic with cough and some wheezing but no distress.  No further emesis      Summary: 54-year-old female with diabetes, diabetic left heel wound, hypertension, dyslipidemia, arthritis with chronic pain syndrome, obesity admitted with concern for sepsis with influenza A infection and possible UTI after presenting with 2 weeks of intermittently productive cough, congestion, pleuritic pain, fevers/chills followed more recently by nausea and vomiting.      Objective:      Vital signs in last 24 hours:  Patient Vitals for the past 24 hrs:   BP Temp Temp src Pulse Resp SpO2   25 0814 123/87 97.5 °F (36.4 °C) Temporal 89 22 92 %   25 0638 -- -- -- -- -- 93 %   25 0454 131/83 97.2 °F (36.2 °C) -- 90 16 93 %   25 -- -- -- -- 18 --   25 127/83 98.2 °F (36.8 °C) Temporal (!) 102 20 93 %   25 1945 129/81 99 °F (37.2 °C) Oral (!) 102 18 92 %   25 1711 (!) 163/96 98.1 °F (36.7 °C) Temporal (!) 109 18 91 %     Physical exam    General: Ill-appearing without acute distress  Cardiac: Normal rate, regular rhythm  Respiratory: Faint expiratory wheezes in upper airways without respiratory distress  Abdomen: Nontender, nondistended  Extremities: Warm and dry      Lab Review   Recent Results (from the past 24 hours)   POCT Glucose    Collection Time: 25  5:13 PM   Result Value Ref Range    POC Glucose 118 (H) 70 - 99 mg/dl    Performed on AccuChek    POCT Glucose    Collection Time: 25  8:06 PM   Result Value Ref Range    POC Glucose 119 (H) 70 - 99 mg/dl    Performed on AccuChek    Basic Metabolic Panel w/ Reflex to MG    Collection Time: 25  5:25 AM   Result Value Ref Range 
 Daily Progress Note    Date:3/16/2025  Patient: Phuong Frias  : 1970  MRN:[id number]  CODE:Full Code No additional code details  PCP:Opal Grossman APRN    Admit Date: 3/11/2025  5:47 PM   LOS: 5 days       Subjective:    She is feeling better, improvement in respiratory symptoms, still has some intermittent wheezes and requiring supplemental O2 which has been weaned some today.  No fevers or chills overnight.  Still has some general weakness.  No nausea or vomiting today.        Summary: 54-year-old female with diabetes, diabetic left heel wound, hypertension, dyslipidemia, arthritis with chronic pain syndrome, obesity admitted with concern for sepsis with influenza A infection and UTI after presenting with 2 weeks of intermittently productive cough, congestion, pleuritic pain, wheezing, fevers/chills followed more recently by nausea and vomiting.  She had evidence of acute hypoxic respiratory failure, requiring 5 L of supplemental oxygen, treated for acute bronchitis with nebulized bronchodilators and oseltamivir.  E. coli and Proteus speciated from urine culture.  She completed a course of IV ceftriaxone for UTI.  Supplemental O2 was slowly weaned.      Objective:      Vital signs in last 24 hours:  Patient Vitals for the past 24 hrs:   BP Temp Temp src Pulse Resp SpO2   25 0710 -- -- -- -- -- 93 %   25 0653 (!) 141/85 97.2 °F (36.2 °C) Temporal 53 20 93 %   25 0504 136/70 97.3 °F (36.3 °C) Temporal 86 16 91 %   03/15/25 1959 (!) 141/76 97.3 °F (36.3 °C) Temporal 86 16 92 %   03/15/25 1617 (!) 157/85 97.7 °F (36.5 °C) -- 93 18 94 %   03/15/25 1449 -- -- -- 84 -- 94 %     Physical exam    General: Ill-appearing without acute distress  Cardiac: Normal rate, regular rhythm  Respiratory: Less wheezing in upper airways, no rhonchi or rales  Abdomen: Nontender, nondistended  Extremities: Warm and dry, no lower extremity edema, no erythema or drainage from lower extremity wound      Lab 
4 Eyes Skin Assessment     NAME:  Phuong Frias  YOB: 1970  MEDICAL RECORD NUMBER:  [id number]    The patient is being assessed for  Admission    I agree that at least one RN has performed a thorough Head to Toe Skin Assessment on the patient. ALL assessment sites listed below have been assessed.      Areas assessed by both nurses:    Head, Face, Ears, Shoulders, Back, Chest, Arms, Elbows, Hands, Sacrum. Buttock, Coccyx, Ischium, Legs. Feet and Heels, and Under Medical Devices         Does the Patient have a Wound? Yes wound(s) were present on assessment. LDA wound assessment was Initiated and completed by RN        Prevention initiated by RN: Yes  Wound Care Orders initiated by RN: Yes    Pressure Injury (Stage 3,4, Unstageable, DTI, NWPT, and Complex wounds) if present, place Wound referral order by RN under : Yes    New Ostomies, if present place, Ostomy referral order under : No     Nurse 1 eSignature: Electronically signed by Clare Valencia RN on 3/12/25 at 5:55 PM CDT    **SHARE this note so that the co-signing nurse can place an eSignature**    Nurse 2 eSignature: {Esignature:[id number]}    
CLINICAL PHARMACY NOTE: MEDS TO BEDS    Total # of Prescriptions Filled: 4   The following medications were delivered to the patient:  Current Discharge Medication List        START taking these medications    Details   benzonatate (TESSALON) 200 MG capsule Take 1 capsule by mouth 3 times daily as needed for Cough  Qty: 21 capsule, Refills: 0      guaiFENesin (MUCINEX) 600 MG extended release tablet Take 1 tablet by mouth 2 times daily  Qty: 20 tablet, Refills: 0      ondansetron (ZOFRAN-ODT) 4 MG disintegrating tablet Take 1 tablet by mouth 3 times daily as needed for Nausea or Vomiting  Qty: 21 tablet, Refills: 0      albuterol sulfate HFA (VENTOLIN HFA) 108 (90 Base) MCG/ACT inhaler Inhale 2 puffs into the lungs every 4 hours as needed for Wheezing or Shortness of Breath  Qty: 18 g, Refills: 0               Additional Documentation:    Handed scripts to patients family at pharmacy counter. Paid with card.   
Patient up to chair, very unsteady, two person transfer, patient complains of weakness with ambulation.  
Physical Therapy  Pt denies need for PT eval prior to dc home. States she feels back to baseline for TF's.  Electronically signed by Tamar Story PT on 3/17/2025 at 10:23 AM      
   COMPLEX PRIMARY ROBOTIC RIGHT KNEE TOTAL ARTHROPLASTY performed by Julio Valladares MD at [facility] OR    TOTAL KNEE ARTHROPLASTY Left 2023    ROBOTIC COMPLEX PRIMARY LEFT KNEE TOTAL ARTHROPLASTY performed by Julio Valladares MD at [facility] OR    TUBAL LIGATION Bilateral        FAMILY HISTORY    Family History   Problem Relation Age of Onset    High Cholesterol Mother     Diabetes Father         Smoker    Cancer Father         Bladder and Lung    Alcohol abuse Father     No Known Problems Sister         back problems    Alcohol abuse Maternal Grandmother          at a young age    Diabetes Maternal Grandfather     Diabetes Paternal Grandmother     Stroke Paternal Grandfather         smoker    No Known Problems Half-Brother     Breast Cancer Half-Sister     No Known Problems Half-Sister     No Known Problems Half-Sister     No Known Problems Son     No Known Problems Son         Vape User    No Known Problems Daughter        SOCIAL HISTORY    Social History     Tobacco Use    Smoking status: Never     Passive exposure: Never    Smokeless tobacco: Never   Vaping Use    Vaping status: Never Used    Passive vaping exposure: Yes (vape)   Substance Use Topics    Alcohol use: Yes     Comment: socially    Drug use: Never       ALLERGIES    No Known Allergies    MEDICATIONS    No current facility-administered medications on file prior to encounter.     Current Outpatient Medications on File Prior to Encounter   Medication Sig Dispense Refill    cyclobenzaprine (FLEXERIL) 10 MG tablet Take 1 tablet by mouth 3 times daily as needed for Muscle spasms      HYDROcodone-acetaminophen (NORCO)  MG per tablet Take 1 tablet by mouth every 8 hours as needed for Pain. Max Daily Amount: 3 tablets      meloxicam (MOBIC) 15 MG tablet Take 1 tablet by mouth daily      lisinopril (PRINIVIL;ZESTRIL) 10 MG tablet Take 1 tablet by mouth 2 times daily 180 tablet 1    ALPRAZolam (XANAX) 0.25 MG tablet Take 1 tablet by mouth 3 times 
% -- --   03/11/25 2317 -- -- -- (!) 126 20 (!) 89 % -- --   03/11/25 2305 -- -- -- (!) 124 29 (!) 85 % -- --   03/11/25 2300 -- -- -- (!) 122 16 (!) 87 % -- --   03/11/25 2200 114/61 (!) 102.5 °F (39.2 °C) Oral (!) 131 29 91 % -- --   03/11/25 2159 114/61 -- -- (!) 129 (!) 32 92 % -- --   03/11/25 2130 116/67 -- -- (!) 140 18 93 % -- --   03/11/25 2100 131/68 -- -- (!) 136 (!) 31 91 % -- --   03/11/25 2045 -- (!) 103.1 °F (39.5 °C) Oral (!) 135 26 91 % -- --   03/11/25 2030 (!) 140/92 -- -- (!) 137 23 91 % -- --   03/11/25 2000 (!) 115/96 -- -- (!) 128 29 (!) 86 % -- --   03/11/25 1930 (!) 157/84 -- -- (!) 138 (!) 31 (!) 89 % -- --   03/11/25 1746 -- -- -- -- -- 92 % -- --   03/11/25 1744 (!) 167/85 (!) 101.4 °F (38.6 °C) -- (!) 137 22 (!) 88 % 1.727 m (5' 8\") (!) 164.7 kg (363 lb)     Physical exam    General: Ill-appearing  Cardiac: Tachycardic, regular rhythm  Respiratory: Diminished breath sounds bilaterally, no expiratory wheezing  Abdomen: Nontender, nondistended  Extremities: Warm and dry      Lab Review   Recent Results (from the past 24 hours)   POCT Influenza A/B    Collection Time: 03/11/25  5:33 PM   Result Value Ref Range    Influenza A Ab POS (A)     Influenza B Ab NEG    POCT COVID-19 Antigen Card    Collection Time: 03/11/25  5:34 PM   Result Value Ref Range    SARS-COV-2, POC Not-Detected Not Detected    Lot Number [id number]     QC Pass/Fail PASS    CBC with Auto Differential    Collection Time: 03/11/25  6:28 PM   Result Value Ref Range    WBC 7.0 4.8 - 10.8 K/uL    RBC 4.45 4.20 - 5.40 M/uL    Hemoglobin 12.5 12.0 - 16.0 g/dL    Hematocrit 39.9 37.0 - 47.0 %    MCV 89.7 81.0 - 99.0 fL    MCH 28.1 27.0 - 31.0 pg    MCHC 31.3 (L) 33.0 - 37.0 g/dL    RDW 14.6 (H) 11.5 - 14.5 %    Platelets 241 130 - 400 K/uL    MPV 8.2 (L) 9.4 - 12.3 fL    Neutrophils % 86.8 (H) 50.0 - 65.0 %    Lymphocytes % 7.4 (L) 20.0 - 40.0 %    Monocytes % 5.4 0.0 - 10.0 %    Eosinophils % 0.0 0.0 - 5.0 %    Basophils % 0.1 0.0 - 
on AccuChek    POCT Glucose    Collection Time: 03/12/25  7:55 PM   Result Value Ref Range    POC Glucose 118 (H) 70 - 99 mg/dl    Performed on AccuChek    CBC with Auto Differential    Collection Time: 03/13/25  2:54 AM   Result Value Ref Range    WBC 14.9 (H) 4.8 - 10.8 K/uL    RBC 3.86 (L) 4.20 - 5.40 M/uL    Hemoglobin 10.9 (L) 12.0 - 16.0 g/dL    Hematocrit 35.3 (L) 37.0 - 47.0 %    MCV 91.5 81.0 - 99.0 fL    MCH 28.2 27.0 - 31.0 pg    MCHC 30.9 (L) 33.0 - 37.0 g/dL    RDW 15.1 (H) 11.5 - 14.5 %    Platelets 185 130 - 400 K/uL    MPV 8.4 (L) 9.4 - 12.3 fL    Neutrophils % 83.5 (H) 50.0 - 65.0 %    Lymphocytes % 12.4 (L) 20.0 - 40.0 %    Monocytes % 3.2 0.0 - 10.0 %    Eosinophils % 0.0 0.0 - 5.0 %    Basophils % 0.2 0.0 - 1.0 %    Neutrophils Absolute 12.4 (H) 1.5 - 7.5 K/uL    Immature Granulocytes # 0.1 K/uL    Lymphocytes Absolute 1.8 1.1 - 4.5 K/uL    Monocytes Absolute 0.50 0.00 - 0.90 K/uL    Eosinophils Absolute 0.00 0.00 - 0.60 K/uL    Basophils Absolute 0.00 0.00 - 0.20 K/uL   Basic Metabolic Panel w/ Reflex to MG    Collection Time: 03/13/25  2:54 AM   Result Value Ref Range    Sodium 141 136 - 145 mmol/L    Potassium reflex Magnesium 4.3 3.5 - 5.0 mmol/L    Chloride 104 98 - 107 mmol/L    CO2 29 22 - 29 mmol/L    Anion Gap 8 8 - 16 mmol/L    Glucose 107 (H) 70 - 99 mg/dL    BUN 11 6 - 20 mg/dL    Creatinine 1.1 (H) 0.5 - 0.9 mg/dL    Est, Glom Filt Rate 59 (A) >60    Calcium 8.4 (L) 8.6 - 10.0 mg/dL   POCT Glucose    Collection Time: 03/13/25  7:48 AM   Result Value Ref Range    POC Glucose 116 (H) 70 - 99 mg/dl    Performed on AccuChek    POCT Glucose    Collection Time: 03/13/25 11:42 AM   Result Value Ref Range    POC Glucose 153 (H) 70 - 99 mg/dl    Performed on AccuChek        I/O last 3 completed shifts:  In: 500 [IV Piggyback:500]  Out: 1300 [Urine:1300]  No intake/output data recorded.      Current Facility-Administered Medications:     gabapentin (NEURONTIN) capsule 800 mg, 800 mg, Oral, 
Proteus isolated from urine culture, reviewed culture sensitivities, both organisms sensitive to ceftriaxone  - Finish course of IV ceftriaxone    1 out of 2 blood cultures positive for Staph hominis this is consistent with contaminant    Nausea and vomiting  - As needed antiemetics including IV Zofran    NILSA on CKD stage II, prerenal  --- Treated and improved with IV fluids with creatinine from 1.3 down to 1.0 on review of metabolic panel today, renal function remains improved today    Diabetes mellitus with hyperglycemia  Reviewed glucose readings since admission  Continue POC glucose monitoring, continue current sliding scale insulin correction regimen with further adjustment as needed  Hypoglycemia protocol in place    Debility  Generalized weakness  - Encourage up and out of bed with assistance, PT OT    Will need home O2 assessment prior to discharge    Disposition TBD    Status and plan of care discussed with nursing staff       Low Swenson MD 3/15/2025 10:40 AM

## 2025-03-17 NOTE — DISCHARGE SUMMARY
Discharge Summary    NAME: Phuong Frias  :  1970  MRN:  [id number]    Admit date:  3/11/2025  Discharge date:  3/17/2025    Advance Directive: Full Code    Primary Care Physician:  Opal Grossman APRN    Discharge Diagnoses:  Principal Problem:    Sepsis secondary to UTI (HCC)  Active Problems:    Influenza A with respiratory manifestations    Acute hypoxemic respiratory failure (HCC)    Type 2 diabetes mellitus    Chronic renal disease, stage III (HCC) [633935]    Other chronic pain    Class 3 severe obesity with serious comorbidity and body mass index (BMI) of 50.0 to 59.9 in adult    Obesity, morbid (more than 100 lbs over ideal weight or BMI > 40)    Metabolic syndrome        Significant Diagnostic Studies:   CT HEAD WO CONTRAST  Result Date: 3/12/2025  EXAM:  CT OF THE HEAD WITHOUT CONTRAST  History:  Fever, altered mental status.  Technique:  Multiplanar CT images through the head were obtained without the administration of IV contrast.  FINDINGS:  Mild mucosal thickening of the bilateral maxillary sinuses.  Mastoid air cells are clear.  No acute calvarial abnormalities.  Intracranially the ventricular and cisternal spaces are normal in size, shape and configuration for a patient of this age.  No dominant mass or midline shift.  No hydrocephalous.  Subtle hyperdensity is seen in the bifrontal subdural regions on axial image number 22.      Impression:  Subtle subdural hyperdensity in the bifrontal regions is felt to represent beam hardening artifact from the low dose head CT.  Tiny subdural hematomas are considered much less likely but not excluded.  Consider further evaluation with brain MRI, especially if symptoms persist.  All CT scans are performed using dose optimization techniques as appropriate to the performed exam and include at least one of the following: Automated exposure control, adjustment of the mA and/or kV according to size, and the use of iterative reconstruction technique.

## 2025-03-17 NOTE — OUTREACH NOTE
Prep Survey      Flowsheet Row Responses   [facility] facility patient discharged from? Non-BH   Is LACE score < 7 ? Non- Discharge   Eligibility [facility]   Date of Admission 03/11/25   Date of Discharge 03/17/25   Discharge diagnosis Hypoxemia   Does the patient have one of the following disease processes/diagnoses(primary or secondary)? Other   Prep survey completed? Yes            Jelly BRITTON - Registered Nurse

## 2025-03-18 ENCOUNTER — TELEPHONE (OUTPATIENT)
Dept: PRIMARY CARE CLINIC | Age: 55
End: 2025-03-18

## 2025-03-18 ENCOUNTER — TRANSITIONAL CARE MANAGEMENT TELEPHONE ENCOUNTER (OUTPATIENT)
Dept: CALL CENTER | Facility: HOSPITAL | Age: 55
End: 2025-03-18
Payer: MEDICARE

## 2025-03-18 NOTE — TELEPHONE ENCOUNTER
Care Transitions Initial Follow Up Call    Outreach made within 2 business days of discharge: Yes    Patient: Phuong Frias   Patient : 1970   MRN: [id number]    Reason for Admission: Sepsis secondary to UTI   Discharge Date: 3/17/25       Spoke with: Patient    Discharge department/facility: [facility] Interactive Patient Contact:  Was patient able to fill all prescriptions: Yes  Was patient instructed to bring all medications to the follow-up visit: Yes  Is patient taking all medications as directed in the discharge summary? Yes  Does patient understand their discharge instructions: Yes  Does patient have questions or concerns that need addressed prior to 7-14 day follow up office visit: no    Additional needs identified to be addressed with provider  No needs identified             Scheduled appointment with PCP within 7-14 days    Spoke  with Phuong. She states she is feeling much better today. She has not had any nausea. She is having normal bladder and bowel function. Her appetite is returning to normal.  She denies any fever or chills. She voices no needs or concerns at this time. She will see Opal on 3/24/25    Follow Up  Future Appointments   Date Time Provider Department Center   3/24/2025 11:00 AM Arnulfo Pacheco MD L [facility]   2025  2:45 PM Chauncey, Opal M, APRN [facility] DEP   10/2/2025 12:40 PM Julio Valladares MD [place] [facility]       Latisha Avila MA

## 2025-03-18 NOTE — OUTREACH NOTE
Call Center TCM Note      Flowsheet Row Responses   [facility] patient discharged from? Non-  [[facility]]   Does the patient have one of the following disease processes/diagnoses(primary or secondary)? Other   TCM attempt successful? No   Unsuccessful attempts Attempt 1            Yeny Vasqeuz RN    3/18/2025, 14:36 CDT        
6

## 2025-03-18 NOTE — OUTREACH NOTE
Call Center TCM Note      Flowsheet Row Responses   [facility] patient discharged from? Non-  [[facility]]   Does the patient have one of the following disease processes/diagnoses(primary or secondary)? Other   TCM attempt successful? No   Unsuccessful attempts Attempt 2   Revoked Reason Other  [Pt sees a [facility] PCP and not  PCP]            Yeny Vasquez RN    3/18/2025, 15:23 CDT

## 2025-03-25 ENCOUNTER — HOSPITAL ENCOUNTER (OUTPATIENT)
Dept: WOUND CARE | Age: 55
Discharge: HOME | End: 2025-03-25
Payer: MEDICARE

## 2025-03-25 ENCOUNTER — OFFICE VISIT (OUTPATIENT)
Dept: PRIMARY CARE CLINIC | Age: 55
End: 2025-03-25

## 2025-03-25 VITALS
OXYGEN SATURATION: 93 % | BODY MASS INDEX: 50.02 KG/M2 | HEART RATE: 110 BPM | DIASTOLIC BLOOD PRESSURE: 84 MMHG | WEIGHT: 293 LBS | TEMPERATURE: 97.6 F | SYSTOLIC BLOOD PRESSURE: 124 MMHG

## 2025-03-25 VITALS
RESPIRATION RATE: 18 BRPM | HEIGHT: 68 IN | HEART RATE: 99 BPM | BODY MASS INDEX: 44.41 KG/M2 | SYSTOLIC BLOOD PRESSURE: 142 MMHG | TEMPERATURE: 97.6 F | WEIGHT: 293 LBS | DIASTOLIC BLOOD PRESSURE: 72 MMHG

## 2025-03-25 DIAGNOSIS — E11.621 DIABETIC ULCER OF LEFT HEEL ASSOCIATED WITH TYPE 2 DIABETES MELLITUS, UNSPECIFIED ULCER STAGE: ICD-10-CM

## 2025-03-25 DIAGNOSIS — L97.422 DIABETIC ULCER OF LEFT HEEL ASSOCIATED WITH TYPE 2 DIABETES MELLITUS, WITH FAT LAYER EXPOSED: Primary | Chronic | ICD-10-CM

## 2025-03-25 DIAGNOSIS — Z09 HOSPITAL DISCHARGE FOLLOW-UP: Primary | ICD-10-CM

## 2025-03-25 DIAGNOSIS — J96.01 ACUTE RESPIRATORY FAILURE WITH HYPOXIA: ICD-10-CM

## 2025-03-25 DIAGNOSIS — J40 BRONCHITIS: ICD-10-CM

## 2025-03-25 DIAGNOSIS — J18.9 PNEUMONIA DUE TO INFECTIOUS ORGANISM, UNSPECIFIED LATERALITY, UNSPECIFIED PART OF LUNG: ICD-10-CM

## 2025-03-25 DIAGNOSIS — E11.621 DIABETIC ULCER OF LEFT HEEL ASSOCIATED WITH TYPE 2 DIABETES MELLITUS, WITH FAT LAYER EXPOSED: Primary | Chronic | ICD-10-CM

## 2025-03-25 DIAGNOSIS — R06.02 SHORTNESS OF BREATH: Primary | ICD-10-CM

## 2025-03-25 DIAGNOSIS — R06.02 SHORTNESS OF BREATH: ICD-10-CM

## 2025-03-25 DIAGNOSIS — L97.429 DIABETIC ULCER OF LEFT HEEL ASSOCIATED WITH TYPE 2 DIABETES MELLITUS, UNSPECIFIED ULCER STAGE: ICD-10-CM

## 2025-03-25 PROCEDURE — 99212 OFFICE O/P EST SF 10 MIN: CPT

## 2025-03-25 PROCEDURE — 99212 OFFICE O/P EST SF 10 MIN: CPT | Performed by: SURGERY

## 2025-03-25 RX ORDER — ALBUTEROL SULFATE 0.83 MG/ML
2.5 SOLUTION RESPIRATORY (INHALATION) 4 TIMES DAILY PRN
Qty: 120 EACH | Refills: 3 | Status: SHIPPED | OUTPATIENT
Start: 2025-03-25

## 2025-03-25 RX ORDER — GUAIFENESIN 600 MG/1
600 TABLET, EXTENDED RELEASE ORAL 2 TIMES DAILY
Qty: 20 TABLET | Refills: 0 | Status: SHIPPED | OUTPATIENT
Start: 2025-03-25

## 2025-03-25 RX ORDER — BENZONATATE 200 MG/1
200 CAPSULE ORAL 3 TIMES DAILY PRN
Qty: 30 CAPSULE | Refills: 0 | Status: SHIPPED | OUTPATIENT
Start: 2025-03-25

## 2025-03-25 NOTE — PROGRESS NOTES
[facility]   Progress Note and Procedure Note      Phuong Frias  MEDICAL RECORD NUMBER:  [id number]  AGE: 54 y.o.   GENDER: female  : 1970  EPISODE DATE:  3/25/2025    Subjective:     Chief Complaint   Patient presents with    Wound Check     Patient presents today for recheck left heel wound.         HISTORY of PRESENT ILLNESS HPI     Phuong Frias is a 54 y.o. female who presents today for wound/ulcer evaluation.   History of Wound Context: Pt withy L heel wound here for eval/treat  Wound/Ulcer Pain Timing/Severity: none  Quality of pain: N/A  Severity:  0 / 10   Modifying Factors: None  Associated Signs/Symptoms: none    Ulcer Identification:  Ulcer Type: pressure  Contributing Factors: none    Wound:  pressure        PAST MEDICAL HISTORY        Diagnosis Date    Anxiety     Arthritis     Chronic pain     Class 3 severe obesity with serious comorbidity and body mass index (BMI) of 50.0 to 59.9 in adult 2025    Depression     Diabetes mellitus type 2 in obese     Hyperlipidemia     Hypertension     Knee pain     Metabolic syndrome     Obesity, morbid (more than 100 lbs over ideal weight or BMI > 40) 2025    Premenopausal patient 2018       PAST SURGICAL HISTORY    Past Surgical History:   Procedure Laterality Date     SECTION N/A 1999    Maria T     SECTION N/A 2002    Shay     SECTION N/A 2003    Josefa    PATELLAR TENDON REPAIR Left 2023    LEFT KNEE RETINACULUM REPAIR performed by Julio Valladares MD at [facility] OR    TENDON RELEASE Right     right finger, pt was in high school    TONSILLECTOMY Bilateral     at age \"29\" so  [place] -  Fall    TOTAL KNEE ARTHROPLASTY Right 2023    COMPLEX PRIMARY ROBOTIC RIGHT KNEE TOTAL ARTHROPLASTY performed by Julio Valladares MD at [facility] OR    TOTAL KNEE ARTHROPLASTY Left 2023    ROBOTIC COMPLEX PRIMARY LEFT KNEE TOTAL ARTHROPLASTY performed by Julio Valladares

## 2025-03-25 NOTE — PATIENT INSTRUCTIONS
[facility]   Physician Orders and Discharge Instructions  [address] [place] [address] Drive  [address]  [place] [address]  Telephone: [phone]      FAX [phone]    NAME:  Phuong Frias  YOB: 1970  MEDICAL RECORD NUMBER:  [id number]  DATE:  3/25/2025    Discharge condition: Stable    Discharge to: Home    Left via:Private automobile    Accompanied by: Spouse    [facility]    Dressing Orders: Left heel wound:  Healed, Moisturize and Protect  Follow up with your Family Practitioner as instructed     [facility] follow up visit __________PRN___________________  (Please note your next appointment above and if you are unable to keep, kindly give a 24 hour notice. Thank you.)    If you experience any of the following, please call the Wound Care Center during business hours:    * Increase in Pain  * Temperature over 101  * Increase in drainage from your wound  * Drainage with a foul odor  * Bleeding  * Increase in swelling  * Need for compression bandage changes due to slippage, breakthrough drainage.    If you need medical attention outside of the business hours of the Wound Care Centers please contact your PCP or go to the nearest emergency room.
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
## 2025-03-31 DIAGNOSIS — F41.9 ANXIETY: ICD-10-CM

## 2025-04-02 NOTE — TELEPHONE ENCOUNTER
Phuong Frias called to request a refill on her medication.      Last office visit : 3/25/2025   Next office visit : 6/4/2025     Last UDS:   Amphetamines   Date Value Ref Range Status   10/05/2014 Negative NEGATIVE Final     Comment:     URINE AMPHETAMINE [id number]=7265 NG/ML     Benzodiazepines   Date Value Ref Range Status   10/05/2014 Negative NEGATIVE Final     Comment:     URINE BENZODIAZEPINE JLYTJM=248 NG/ML     Benzodiazepine Screen, Urine   Date Value Ref Range Status   02/20/2024 -  Final     Buprenorphine Urine   Date Value Ref Range Status   02/20/2024 -  Final     Cocaine Metabolite Screen, Urine   Date Value Ref Range Status   02/20/2024 -  Final     Gabapentin Screen, Urine   Date Value Ref Range Status   02/20/2024 -  Final     Oxycodone Screen, Ur   Date Value Ref Range Status   02/20/2024 -  Final     Propoxyphene   Date Value Ref Range Status   01/17/2011 Negative () Final     Propoxyphene Screen, Urine   Date Value Ref Range Status   02/20/2024 -  Final     THC Screen, Urine   Date Value Ref Range Status   02/20/2024 -  Final     Tricyclic Antidepressants, Urine   Date Value Ref Range Status   02/20/2024 -  Final       Last Sterling: 10/29/24 DUE  Medication Contract: DUE   Last Fill: 3/4/25    Requested Prescriptions     Pending Prescriptions Disp Refills    ALPRAZolam (XANAX) 0.25 MG tablet [Pharmacy Med Name: ALPRAZOLAM 0.25MG TABLETS] 30 tablet      Sig: TAKE 1 TABLET BY MOUTH THREE TIMES DAILY AS NEEDED FOR ANXIETY. MAX DAILY AMOUNT: 0.75 MG         Please approve or refuse this medication.   Umu Espinoza MA

## 2025-04-03 RX ORDER — ALPRAZOLAM 0.25 MG
TABLET ORAL
Qty: 30 TABLET | Refills: 0 | Status: SHIPPED | OUTPATIENT
Start: 2025-04-03 | End: 2025-05-03

## 2025-04-06 DIAGNOSIS — G89.29 OTHER CHRONIC PAIN: ICD-10-CM

## 2025-04-06 DIAGNOSIS — F32.A ANXIETY AND DEPRESSION: ICD-10-CM

## 2025-04-06 DIAGNOSIS — F41.9 ANXIETY AND DEPRESSION: ICD-10-CM

## 2025-04-07 ENCOUNTER — OFFICE VISIT (OUTPATIENT)
Age: 55
End: 2025-04-07
Payer: MEDICARE

## 2025-04-07 VITALS — HEIGHT: 68 IN | WEIGHT: 293 LBS | BODY MASS INDEX: 44.41 KG/M2

## 2025-04-07 DIAGNOSIS — R29.898 WEAKNESS OF LEFT LOWER EXTREMITY: Primary | ICD-10-CM

## 2025-04-07 DIAGNOSIS — Z96.652 STATUS POST TOTAL KNEE REPLACEMENT, LEFT: ICD-10-CM

## 2025-04-07 DIAGNOSIS — M25.562 CHRONIC PAIN OF LEFT KNEE: ICD-10-CM

## 2025-04-07 DIAGNOSIS — G89.29 CHRONIC PAIN OF LEFT KNEE: ICD-10-CM

## 2025-04-07 PROCEDURE — 99213 OFFICE O/P EST LOW 20 MIN: CPT | Performed by: PHYSICIAN ASSISTANT

## 2025-04-07 RX ORDER — DULOXETINE 40 MG/1
1 CAPSULE, DELAYED RELEASE ORAL DAILY
Qty: 90 CAPSULE | Refills: 1 | Status: SHIPPED | OUTPATIENT
Start: 2025-04-07

## 2025-04-07 NOTE — TELEPHONE ENCOUNTER
Phuong Rodriguez called to request a refill on her medication.      Last office visit : 3/25/2025   Next office visit : 6/4/2025     Requested Prescriptions     Pending Prescriptions Disp Refills    DULoxetine HCl 40 MG CPEP [Pharmacy Med Name: DULOXETINE DR 40MG CAPSULES] 90 capsule 1     Sig: TAKE 1 CAPSULE BY MOUTH DAILY            Umu Espinoza MA

## 2025-04-07 NOTE — PROGRESS NOTES
aligned bilateral TM press-fit total knee replacements.  Patellas are tracking in the midline.           --------------------------------------------------------------------------------------------------------------------    Assessment:   Aftercare following musculoskeletal surgery as above  Encounter Diagnoses   Name Primary?    Chronic pain of left knee     Weakness of left lower extremity Yes    Status post total knee replacement, left         Plan:  We will order PT for quad strengthening exercises as tolerated with home program for her.  Check her response to treatment in 3 months.    Return in about 3 months (around 7/7/2025).     Orders Placed This Encounter    [facility] (PT)     Referral Priority:   Routine     Referral Type:   Eval and Treat     Referral Reason:   Specialty Services Required     Requested Specialty:   Physical Therapy     Number of Visits Requested:   1         Electronically signed by Ean Rizvi PA-C on 4/7/2025 at 12:59 PM

## 2025-04-28 DIAGNOSIS — F41.9 ANXIETY: ICD-10-CM

## 2025-04-28 RX ORDER — ALPRAZOLAM 0.25 MG
TABLET ORAL
Qty: 30 TABLET | Refills: 0 | Status: SHIPPED | OUTPATIENT
Start: 2025-04-28 | End: 2025-05-28

## 2025-04-28 NOTE — TELEPHONE ENCOUNTER
Phuong Frias called to request a refill on her medication.      Last office visit : 3/25/2025   Next office visit : 6/4/2025     Last UDS:   Amphetamines   Date Value Ref Range Status   10/05/2014 Negative NEGATIVE Final     Comment:     URINE AMPHETAMINE [id number] NG/ML     Benzodiazepines   Date Value Ref Range Status   10/05/2014 Negative NEGATIVE Final     Comment:     URINE BENZODIAZEPINE VPKGRF=151 NG/ML     Benzodiazepine Screen, Urine   Date Value Ref Range Status   02/20/2024 -  Final     Buprenorphine Urine   Date Value Ref Range Status   02/20/2024 -  Final     Cocaine Metabolite Screen, Urine   Date Value Ref Range Status   02/20/2024 -  Final     Gabapentin Screen, Urine   Date Value Ref Range Status   02/20/2024 -  Final     Oxycodone Screen, Ur   Date Value Ref Range Status   02/20/2024 -  Final     Propoxyphene   Date Value Ref Range Status   01/17/2011 Negative () Final     Propoxyphene Screen, Urine   Date Value Ref Range Status   02/20/2024 -  Final     THC Screen, Urine   Date Value Ref Range Status   02/20/2024 -  Final     Tricyclic Antidepressants, Urine   Date Value Ref Range Status   02/20/2024 -  Final       Last Sterling: 10/29/24  Medication Contract: 10/29/24   Last Fill: 04/03/25    Requested Prescriptions     Pending Prescriptions Disp Refills    ALPRAZolam (XANAX) 0.25 MG tablet [Pharmacy Med Name: ALPRAZOLAM 0.25MG TABLETS] 30 tablet      Sig: TAKE 1 TABLET BY MOUTH THREE TIMES DAILY AS NEEDED FOR ANXIETY. MAX DAILY AMOUNT: 0.75 MG         Please approve or refuse this medication.   Karen Bose LPN

## 2025-05-14 DIAGNOSIS — M17.10 ARTHRITIS OF KNEE: Primary | ICD-10-CM

## 2025-05-14 DIAGNOSIS — M17.31 POST-TRAUMATIC OSTEOARTHRITIS OF RIGHT KNEE: ICD-10-CM

## 2025-05-28 DIAGNOSIS — F41.9 ANXIETY: ICD-10-CM

## 2025-06-02 NOTE — TELEPHONE ENCOUNTER
Phuong Frias called to request a refill on her medication.      Last office visit : 3/25/2025   Next office visit : 6/4/2025     Last UDS:   Amphetamines   Date Value Ref Range Status   10/05/2014 Negative NEGATIVE Final     Comment:     URINE AMPHETAMINE [id number]=3314 NG/ML     Benzodiazepines   Date Value Ref Range Status   10/05/2014 Negative NEGATIVE Final     Comment:     URINE BENZODIAZEPINE IJRHLH=594 NG/ML     Benzodiazepine Screen, Urine   Date Value Ref Range Status   02/20/2024 -  Final     Buprenorphine Urine   Date Value Ref Range Status   02/20/2024 -  Final     Cocaine Metabolite Screen, Urine   Date Value Ref Range Status   02/20/2024 -  Final     Gabapentin Screen, Urine   Date Value Ref Range Status   02/20/2024 -  Final     Oxycodone Screen, Ur   Date Value Ref Range Status   02/20/2024 -  Final     Propoxyphene   Date Value Ref Range Status   01/17/2011 Negative () Final     Propoxyphene Screen, Urine   Date Value Ref Range Status   02/20/2024 -  Final     THC Screen, Urine   Date Value Ref Range Status   02/20/2024 -  Final     Tricyclic Antidepressants, Urine   Date Value Ref Range Status   02/20/2024 -  Final       Last Sterling: DUE  Medication Contract: DUE   Last Fill: 4/28/25    Requested Prescriptions     Pending Prescriptions Disp Refills    traZODone (DESYREL) 150 MG tablet [Pharmacy Med Name: TRAZODONE 150MG (HUNDRED-FIFTY) TAB] 60 tablet 3     Sig: TAKE 2 TABLETS BY MOUTH EVERY EVENING    ALPRAZolam (XANAX) 0.25 MG tablet [Pharmacy Med Name: ALPRAZOLAM 0.25MG TABLETS] 30 tablet      Sig: TAKE 1 TABLET BY MOUTH THREE TIMES DAILY AS NEEDED FOR ANXIETY. MAX DAILY AMOUNT: 0.75 MG         Please approve or refuse this medication.   Umu Espinoza MA

## 2025-06-09 RX ORDER — TRAZODONE HYDROCHLORIDE 150 MG/1
300 TABLET ORAL EVERY EVENING
Qty: 60 TABLET | Refills: 3 | Status: SHIPPED | OUTPATIENT
Start: 2025-06-09

## 2025-06-09 RX ORDER — ALPRAZOLAM 0.25 MG
TABLET ORAL
Qty: 30 TABLET | Refills: 0 | Status: SHIPPED | OUTPATIENT
Start: 2025-06-09 | End: 2025-07-09

## 2025-06-16 DIAGNOSIS — I10 ESSENTIAL HYPERTENSION: ICD-10-CM

## 2025-06-16 DIAGNOSIS — E11.69 TYPE 2 DIABETES MELLITUS WITH OTHER SPECIFIED COMPLICATION, WITHOUT LONG-TERM CURRENT USE OF INSULIN (HCC): ICD-10-CM

## 2025-06-16 LAB
25(OH)D3 SERPL-MCNC: 32.8 NG/ML
ALBUMIN SERPL-MCNC: 4.1 G/DL (ref 3.5–5.2)
ALP SERPL-CCNC: 107 U/L (ref 35–104)
ALT SERPL-CCNC: 27 U/L (ref 10–35)
ANION GAP SERPL CALCULATED.3IONS-SCNC: 11 MMOL/L (ref 8–16)
AST SERPL-CCNC: 24 U/L (ref 10–35)
BASOPHILS # BLD: 0.1 K/UL (ref 0–0.2)
BASOPHILS NFR BLD: 0.6 % (ref 0–1)
BILIRUB SERPL-MCNC: 0.3 MG/DL (ref 0.2–1.2)
BUN SERPL-MCNC: 12 MG/DL (ref 6–20)
CALCIUM SERPL-MCNC: 9.9 MG/DL (ref 8.6–10)
CHLORIDE SERPL-SCNC: 101 MMOL/L (ref 98–107)
CHOLEST SERPL-MCNC: 143 MG/DL (ref 0–199)
CO2 SERPL-SCNC: 27 MMOL/L (ref 22–29)
CREAT SERPL-MCNC: 1.1 MG/DL (ref 0.5–0.9)
EOSINOPHIL # BLD: 0.2 K/UL (ref 0–0.6)
EOSINOPHIL NFR BLD: 2.4 % (ref 0–5)
ERYTHROCYTE [DISTWIDTH] IN BLOOD BY AUTOMATED COUNT: 14.5 % (ref 11.5–14.5)
GLUCOSE SERPL-MCNC: 77 MG/DL (ref 70–99)
HBA1C MFR BLD: 5.3 % (ref 4–5.6)
HCT VFR BLD AUTO: 39.7 % (ref 37–47)
HDLC SERPL-MCNC: 49 MG/DL (ref 40–60)
HGB BLD-MCNC: 12.7 G/DL (ref 12–16)
IMM GRANULOCYTES # BLD: 0 K/UL
LDLC SERPL CALC-MCNC: 59 MG/DL
LYMPHOCYTES # BLD: 3 K/UL (ref 1.1–4.5)
LYMPHOCYTES NFR BLD: 30 % (ref 20–40)
MCH RBC QN AUTO: 28.1 PG (ref 27–31)
MCHC RBC AUTO-ENTMCNC: 32 G/DL (ref 33–37)
MCV RBC AUTO: 87.8 FL (ref 81–99)
MONOCYTES # BLD: 0.5 K/UL (ref 0–0.9)
MONOCYTES NFR BLD: 5.3 % (ref 0–10)
NEUTROPHILS # BLD: 6.2 K/UL (ref 1.5–7.5)
NEUTS SEG NFR BLD: 61.3 % (ref 50–65)
PLATELET # BLD AUTO: 364 K/UL (ref 130–400)
PMV BLD AUTO: 8.7 FL (ref 9.4–12.3)
POTASSIUM SERPL-SCNC: 4.3 MMOL/L (ref 3.5–5.1)
PROT SERPL-MCNC: 6.8 G/DL (ref 6.4–8.3)
RBC # BLD AUTO: 4.52 M/UL (ref 4.2–5.4)
SODIUM SERPL-SCNC: 139 MMOL/L (ref 136–145)
TRIGL SERPL-MCNC: 175 MG/DL (ref 0–149)
TSH SERPL DL<=0.005 MIU/L-ACNC: 1.81 UIU/ML (ref 0.27–4.2)
WBC # BLD AUTO: 10.1 K/UL (ref 4.8–10.8)

## 2025-06-23 ENCOUNTER — TELEPHONE (OUTPATIENT)
Age: 55
End: 2025-06-23

## 2025-06-23 ENCOUNTER — RESULTS FOLLOW-UP (OUTPATIENT)
Dept: PRIMARY CARE CLINIC | Age: 55
End: 2025-06-23

## 2025-06-23 DIAGNOSIS — Z96.652 PRESENCE OF ARTIFICIAL KNEE JOINT, LEFT: Primary | ICD-10-CM

## 2025-06-23 RX ORDER — AMOXICILLIN 500 MG/1
CAPSULE ORAL
Qty: 4 CAPSULE | Refills: 3 | Status: SHIPPED | OUTPATIENT
Start: 2025-06-23

## 2025-06-23 RX ORDER — TRAZODONE HYDROCHLORIDE 150 MG/1
300 TABLET ORAL EVERY EVENING
Qty: 60 TABLET | Refills: 3 | OUTPATIENT
Start: 2025-06-23

## 2025-06-23 NOTE — TELEPHONE ENCOUNTER
Called spoke to pt who is going next week for a dental cleaning and crown, pt is a diabetic will require lifelong prophylactic , pt verbalized understanding. Pt would like RX to go to [facility].  Pt has NKA  Pt had Left TKR 8/30/2023

## 2025-07-18 DIAGNOSIS — F41.9 ANXIETY AND DEPRESSION: ICD-10-CM

## 2025-07-18 DIAGNOSIS — F32.A ANXIETY AND DEPRESSION: ICD-10-CM

## 2025-07-18 DIAGNOSIS — G89.29 OTHER CHRONIC PAIN: ICD-10-CM

## 2025-07-18 RX ORDER — DULOXETINE 40 MG/1
1 CAPSULE, DELAYED RELEASE ORAL DAILY
Qty: 90 CAPSULE | Refills: 1 | OUTPATIENT
Start: 2025-07-18

## 2025-07-18 NOTE — TELEPHONE ENCOUNTER
Patient also requesting to have her trazadone filled also. Did make an appointment on 8/12/2025 for follow up visit    traZODone (DESYREL) 150 MG tablet

## 2025-07-29 NOTE — TELEPHONE ENCOUNTER
Phuong Hummeltner called to request a refill on her medication.      Last office visit : 3/25/2025   Next office visit : 8/12/2025     Requested Prescriptions     Pending Prescriptions Disp Refills    atorvastatin (LIPITOR) 20 MG tablet [Pharmacy Med Name: ATORVASTATIN 20MG TABLETS] 90 tablet 1     Sig: TAKE 1 TABLET BY MOUTH DAILY            Umu Espinoza MA

## 2025-07-30 RX ORDER — ATORVASTATIN CALCIUM 20 MG/1
20 TABLET, FILM COATED ORAL DAILY
Qty: 90 TABLET | Refills: 1 | Status: SHIPPED | OUTPATIENT
Start: 2025-07-30

## 2025-07-31 DIAGNOSIS — G89.29 OTHER CHRONIC PAIN: ICD-10-CM

## 2025-07-31 DIAGNOSIS — F41.9 ANXIETY AND DEPRESSION: ICD-10-CM

## 2025-07-31 DIAGNOSIS — F32.A ANXIETY AND DEPRESSION: ICD-10-CM

## 2025-07-31 RX ORDER — TRAZODONE HYDROCHLORIDE 150 MG/1
300 TABLET ORAL EVERY EVENING
Qty: 60 TABLET | Refills: 3 | OUTPATIENT
Start: 2025-07-31

## 2025-07-31 RX ORDER — DULOXETINE 40 MG/1
1 CAPSULE, DELAYED RELEASE ORAL DAILY
Qty: 90 CAPSULE | Refills: 1 | OUTPATIENT
Start: 2025-07-31

## 2025-08-01 DIAGNOSIS — F41.9 ANXIETY: ICD-10-CM

## 2025-08-04 RX ORDER — ALPRAZOLAM 0.25 MG
0.25 TABLET ORAL 3 TIMES DAILY PRN
Qty: 30 TABLET | Refills: 0 | Status: SHIPPED | OUTPATIENT
Start: 2025-08-04 | End: 2025-09-03

## 2025-08-05 RX ORDER — TRAZODONE HYDROCHLORIDE 150 MG/1
300 TABLET ORAL EVERY EVENING
Qty: 60 TABLET | Refills: 3 | Status: SHIPPED | OUTPATIENT
Start: 2025-08-05

## 2025-08-05 RX ORDER — DULOXETINE 40 MG/1
1 CAPSULE, DELAYED RELEASE ORAL DAILY
Qty: 90 CAPSULE | Refills: 1 | Status: SHIPPED | OUTPATIENT
Start: 2025-08-05

## 2025-08-12 ENCOUNTER — OFFICE VISIT (OUTPATIENT)
Dept: PRIMARY CARE CLINIC | Age: 55
End: 2025-08-12
Payer: MEDICARE

## 2025-08-12 VITALS
SYSTOLIC BLOOD PRESSURE: 128 MMHG | DIASTOLIC BLOOD PRESSURE: 60 MMHG | BODY MASS INDEX: 44.41 KG/M2 | OXYGEN SATURATION: 96 % | TEMPERATURE: 97 F | HEIGHT: 68 IN | HEART RATE: 72 BPM | WEIGHT: 293 LBS

## 2025-08-12 DIAGNOSIS — E11.69 TYPE 2 DIABETES MELLITUS WITH OTHER SPECIFIED COMPLICATION, WITHOUT LONG-TERM CURRENT USE OF INSULIN (HCC): ICD-10-CM

## 2025-08-12 DIAGNOSIS — F39 MOOD DISORDER: ICD-10-CM

## 2025-08-12 DIAGNOSIS — E66.01 MORBID OBESITY (HCC): ICD-10-CM

## 2025-08-12 DIAGNOSIS — F41.9 ANXIETY: Primary | ICD-10-CM

## 2025-08-12 DIAGNOSIS — R26.89 DECREASED MOBILITY: ICD-10-CM

## 2025-08-12 DIAGNOSIS — I10 ESSENTIAL HYPERTENSION: ICD-10-CM

## 2025-08-12 DIAGNOSIS — Z79.899 DRUG THERAPY: ICD-10-CM

## 2025-08-12 LAB

## 2025-08-12 PROCEDURE — 99214 OFFICE O/P EST MOD 30 MIN: CPT | Performed by: NURSE PRACTITIONER

## 2025-08-12 PROCEDURE — 80305 DRUG TEST PRSMV DIR OPT OBS: CPT | Performed by: NURSE PRACTITIONER

## 2025-08-12 PROCEDURE — 3074F SYST BP LT 130 MM HG: CPT | Performed by: NURSE PRACTITIONER

## 2025-08-12 PROCEDURE — 3078F DIAST BP <80 MM HG: CPT | Performed by: NURSE PRACTITIONER

## 2025-08-12 PROCEDURE — 3044F HG A1C LEVEL LT 7.0%: CPT | Performed by: NURSE PRACTITIONER

## 2025-08-12 ASSESSMENT — ENCOUNTER SYMPTOMS
VOICE CHANGE: 0
SHORTNESS OF BREATH: 0
NAUSEA: 0
VOMITING: 0
BACK PAIN: 0
COLOR CHANGE: 0
RHINORRHEA: 0
COUGH: 0
PHOTOPHOBIA: 0

## 2025-08-15 LAB
1OH-MIDAZOLAM UR CFM-MCNC: <20 NG/ML
7AMINOCLONAZEPAM UR CFM-MCNC: <5 NG/ML
A-OH ALPRAZ UR CFM-MCNC: <5 NG/ML
ALPRAZ UR CFM-MCNC: <5 NG/ML
CHLORDIAZEP UR CFM-MCNC: <20 NG/ML
CLONAZEPAM UR CFM-MCNC: <5 NG/ML
DIAZEPAM UR CFM-MCNC: <20 NG/ML
LORAZEPAM UR CFM-MCNC: <20 NG/ML
MIDAZOLAM UR CFM-MCNC: <20 NG/ML
NORDIAZEPAM UR CFM-MCNC: <20 NG/ML
OXAZEPAM UR CFM-MCNC: <20 NG/ML
TEMAZEPAM UR CFM-MCNC: <20 NG/ML

## 2025-08-18 RX ORDER — ARIPIPRAZOLE 5 MG/1
5 TABLET ORAL DAILY
Qty: 30 TABLET | Refills: 5 | Status: SHIPPED | OUTPATIENT
Start: 2025-08-18

## 2025-09-02 DIAGNOSIS — E11.69 TYPE 2 DIABETES MELLITUS WITH OTHER SPECIFIED COMPLICATION, WITHOUT LONG-TERM CURRENT USE OF INSULIN (HCC): ICD-10-CM

## 2025-09-02 DIAGNOSIS — I10 ESSENTIAL HYPERTENSION: ICD-10-CM

## 2025-09-04 RX ORDER — SEMAGLUTIDE 2.68 MG/ML
INJECTION, SOLUTION SUBCUTANEOUS
Qty: 3 ML | Refills: 5 | Status: SHIPPED | OUTPATIENT
Start: 2025-09-04

## 2025-09-04 RX ORDER — LISINOPRIL 10 MG/1
10 TABLET ORAL 2 TIMES DAILY
Qty: 180 TABLET | Refills: 1 | Status: SHIPPED | OUTPATIENT
Start: 2025-09-04

## (undated) DEVICE — GLOVE SURG SZ 85 L12IN FNGR THK79MIL GRN LTX FREE

## (undated) DEVICE — ZIMMER® STERILE DISPOSABLE TOURNIQUET CUFF WITH PLC, DUAL PORT, SINGLE BLADDER, 42 IN. (107 CM)

## (undated) DEVICE — SUTURE ETHLN SZ 2-0 L30IN NONABSORBABLE BLK L36MM FSLX 3/8 1674H

## (undated) DEVICE — LARYNGOSCOPE BLDE MAC HNDL M SZ 35 ST CURAPLEX CURAVIEW LED

## (undated) DEVICE — BIT DRL DIA2.9MM FOR SFT ANCHR SHT SHFT PK JUGGERKNOT

## (undated) DEVICE — SUTURE FIBERWIRE SZ 2 L38IN NONABSORBABLE BLU L36.6MM 1/2 AR7202

## (undated) DEVICE — SOLUTION IRRIG 3000ML 0.9% SOD CHL USP UROMATIC PLAS CONT

## (undated) DEVICE — SHEET,DRAPE,53X77,STERILE: Brand: MEDLINE

## (undated) DEVICE — SURGICAL PROCEDURE PACK KNEE TOT DBD CDS LOURDES HOSP LF

## (undated) DEVICE — ADHESIVE SKIN CLOSURE WND 8.661X1.5 IN 22 CM LIQUIBAND SECUR

## (undated) DEVICE — DUAL CUT SAGITTAL BLADE

## (undated) DEVICE — UNDERGLOVE SURG SZ 8 FNGR THK0.21MIL GRN LTX BEAD CUF

## (undated) DEVICE — PAD,NON-ADHERENT,3X8,STERILE,LF,1/PK: Brand: MEDLINE

## (undated) DEVICE — NON-WOVEN ADHESIVE WOUND DRESSING: Brand: PRIMAPORE ADHESIVE DRESSING 30*10CM

## (undated) DEVICE — Device

## (undated) DEVICE — CEMENT MIXING SYSTEM WITH FEMORAL BREAKWAY NOZZLE: Brand: REVOLUTION

## (undated) DEVICE — BANDAGE COMPR W6INXL10YD ST M E WHITE/BEIGE

## (undated) DEVICE — FAN SPRAY KIT: Brand: PULSAVAC®

## (undated) DEVICE — NEPTUNE E-SEP SMOKE EVACUATION PENCIL, COATED, 70MM BLADE, ROCKER SWITCH: Brand: NEPTUNE E-SEP

## (undated) DEVICE — DRESSING FOAM ABSORBENT 8X4 IN BORDER SELF ADH MEPILEX

## (undated) DEVICE — INTENDED FOR TISSUE SEPARATION, AND OTHER PROCEDURES THAT REQUIRE A SHARP SURGICAL BLADE TO PUNCTURE OR CUT.: Brand: BARD-PARKER ® CARBON RIB-BACK BLADES

## (undated) DEVICE — GLOVE SURG SZ 85 CRM LTX FREE POLYISOPRENE POLYMER BEAD ANTI

## (undated) DEVICE — SOLUTION IV IRRIG POUR BRL 0.9% SODIUM CHL 2F7124

## (undated) DEVICE — ZIMMER® STERILE DISPOSABLE TOURNIQUET CUFF WITH PLC, DUAL PORT, SINGLE BLADDER, 34 IN. (86 CM)

## (undated) DEVICE — GLOVE SURG SZ 8 L12IN FNGR THK79MIL GRN LTX FREE

## (undated) DEVICE — SUTURE VCRL SZ 2-0 L27IN ABSRB UD L26MM SH 1/2 CIR J417H

## (undated) DEVICE — SOLUTION WND CLN 1000ML FOR VAC VERAFLO THER PRONTOSAN

## (undated) DEVICE — XEROFORM 5X9 PK

## (undated) DEVICE — PAD,ABDOMINAL,8"X10",ST,LF: Brand: MEDLINE

## (undated) DEVICE — INSTRUMENT KIT ORTHOPEDIC KNEE NAVITRACK

## (undated) DEVICE — SUTURE ETHBND EXCEL SZ 0 L18IN NONABSORBABLE GRN L36MM CT-1 CX21D

## (undated) DEVICE — E-Z CLEAN, NON-STICK, PTFE COATED, ELECTROSURGICAL BLADE ELECTRODE, MODIFIED EXTENDED INSULATION, 4 INCH (10.2 CM): Brand: MEGADYNE

## (undated) DEVICE — TOWEL,OR,DSP,ST,BLUE,DLX,4/PK,20PK/CS: Brand: MEDLINE

## (undated) DEVICE — BIPOLAR SEALER 23-112-1 AQM 6.0: Brand: AQUAMANTYS ®

## (undated) DEVICE — PIN FIX L150MM DIA3.2MM FLUT CAS

## (undated) DEVICE — TUBE ET 7MM NSL ORAL BASIC CUF INTMED MURPHY EYE RADPQ MRK

## (undated) DEVICE — SUTURE VCRL SZ 2-0 L36IN ABSRB UD L36MM CT-1 1/2 CIR J945H

## (undated) DEVICE — YANKAUER,POOLE TIP,STERILE,50/CS: Brand: MEDLINE

## (undated) DEVICE — VITAL SIGNS™ ADULT FACE MASK WITH ADJUSTABLE AIR CUSHION - SIZE 5: Brand: VITAL SIGNS™

## (undated) DEVICE — GLOVE SURG SZ 75 CRM LTX FREE POLYISOPRENE POLYMER BEAD ANTI

## (undated) DEVICE — SUTURE ETHBND 1 L30IN NONABSORBABLE GRN L70MM XLH 1/2 CIR X190G

## (undated) DEVICE — GOWN, ORBIS, XLARGE, STERILE: Brand: MEDLINE

## (undated) DEVICE — SPONGE LAP W18XL18IN WHT COT 4 PLY FLD STRUNG RADPQ DISP ST 2 PER PACK

## (undated) DEVICE — ROYAL SILK SURGICAL GOWN, XXL: Brand: CONVERTORS

## (undated) DEVICE — PIN FIX L80MM DIA3.2MM STRL FLUT CAS

## (undated) DEVICE — OCCLUSIVE GAUZE STRIP,3% BISMUTH TRIBROMOPHENATE IN PETROLATUM BLEND: Brand: XEROFORM

## (undated) DEVICE — 3M™ STERI-DRAPE™ INSTRUMENT POUCH 1018: Brand: STERI-DRAPE™

## (undated) DEVICE — DRAPE,ORTHOMAX ,HIP,W/POUCHES: Brand: MEDLINE

## (undated) DEVICE — GOWN,PREVENTION PLUS,XL,ST,24/CS: Brand: MEDLINE

## (undated) DEVICE — SUTURE PROL SZ 0 L30IN NONABSORBABLE BLU L40MM CT 1/2 CIR 8434H

## (undated) DEVICE — 6.3MM ROUND FAST CUTTING BUR

## (undated) DEVICE — CHLORAPREP 26ML ORANGE

## (undated) DEVICE — TRAY EPI 25GA 3.5IN 0.75% BIPIVCAIN 8.25% D CONTAIN BPA

## (undated) DEVICE — ROSA RBTC UNT 20 DROP

## (undated) DEVICE — SUTURE VCRL SZ 1 L18IN ABSRB UD L36MM CT-1 1/2 CIR J841D

## (undated) DEVICE — GAUZE,SPONGE,FLUFF,6"X6.75",STRL,10/TRAY: Brand: MEDLINE

## (undated) DEVICE — SUTURE ABSRB BRAID COAT UD CP NO 2 27IN VCRL J195H

## (undated) DEVICE — SUTURE N ABSRB 5-0 1.7 MM W/NDL TAPE WHT BLU AR7511

## (undated) DEVICE — SYSTEM SKIN CLSR 22CM DERMBND PRINEO